# Patient Record
Sex: MALE | Race: WHITE | NOT HISPANIC OR LATINO | Employment: OTHER | URBAN - METROPOLITAN AREA
[De-identification: names, ages, dates, MRNs, and addresses within clinical notes are randomized per-mention and may not be internally consistent; named-entity substitution may affect disease eponyms.]

---

## 2017-08-11 ENCOUNTER — OFFICE VISIT (OUTPATIENT)
Dept: OPHTHALMOLOGY | Facility: CLINIC | Age: 67
End: 2017-08-11
Payer: MEDICARE

## 2017-08-11 VITALS — SYSTOLIC BLOOD PRESSURE: 186 MMHG | DIASTOLIC BLOOD PRESSURE: 76 MMHG

## 2017-08-11 DIAGNOSIS — H52.03 HYPEROPIA, BILATERAL: ICD-10-CM

## 2017-08-11 DIAGNOSIS — E11.9 DIABETES MELLITUS TYPE 2 WITHOUT RETINOPATHY: Primary | ICD-10-CM

## 2017-08-11 DIAGNOSIS — H52.4 BILATERAL PRESBYOPIA: ICD-10-CM

## 2017-08-11 PROCEDURE — 92004 COMPRE OPH EXAM NEW PT 1/>: CPT | Mod: S$GLB,,, | Performed by: OPTOMETRIST

## 2017-08-11 PROCEDURE — 99201 *HC E&M-NEW PATIENT-LVL I: CPT | Mod: PBBFAC | Performed by: OPTOMETRIST

## 2017-08-11 PROCEDURE — 92015 DETERMINE REFRACTIVE STATE: CPT | Mod: S$GLB,,, | Performed by: OPTOMETRIST

## 2017-08-11 PROCEDURE — 99999 PR PBB SHADOW E&M-NEW PATIENT-LVL I: CPT | Mod: PBBFAC,,, | Performed by: OPTOMETRIST

## 2017-08-11 RX ORDER — GLIMEPIRIDE 2 MG/1
TABLET ORAL
COMMUNITY

## 2017-08-11 RX ORDER — LOSARTAN POTASSIUM 100 MG/1
50 TABLET ORAL 2 TIMES DAILY
COMMUNITY
Start: 2015-11-05

## 2017-08-11 NOTE — LETTER
August 11, 2017        Maciel Enriquez MD  [address] [facility] [address]             [facility]  [id number] [place] [id number]  Phone: [phone]  Fax: [phone]   Patient: Feng Marques   MR Number: [id number]   YOB: 1950   Date of Visit: 8/11/2017     Dear Maciel Enriquez MD:    Today I saw Feng Marques for his diabetic eye exam.    His best corrected visual acuity was 20/25 in the right eye and 20/30 in the left eye.     Fundus exam revealed there was no background diabetic retinopathy. He was not dilated today due to his stated history of elevated BP when dilated. He did not take his BP medicine this morning. Checked today in our office it was 186/76.    Anterior ocular anatomy is normal.  Glaucoma screening is normal.  Dilated fundus exam revealed flat, healthy, well perfused optic nerves.    Thank you for this referral.  I will send this patient a reminder in 1 month for his diabetic dilated fundus exam.  If I can be of any further assistance, please feel free to contact me at [phone].      Sincerely,      Jc Miller, DELLA            CC  No Recipients    Enclosure

## 2017-08-11 NOTE — PROGRESS NOTES
HPI     New Patient  Diabetic/ 08/11/2017  NIDDM  Screening for glaucoma  RE  Not currently wearing any correction  Decreased near vision  Pt states last time he was dilated his blood pressure went up. He did not   take his BP medicine this morning.        Last edited by Jc Miller, OD on 8/11/2017  9:00 AM. (History)            Assessment /Plan     For exam results, see Encounter Report.    Diabetes mellitus type 2 without retinopathy    Hyperopia, bilateral    Bilateral presbyopia    Nuclear cataract, bilateral      No Background Diabetic Retinopathy    Mild cataracts OU, not surgical    Dispense Final Rx for glasses.  RTC 1 year

## 2017-09-15 ENCOUNTER — OFFICE VISIT (OUTPATIENT)
Dept: OPHTHALMOLOGY | Facility: CLINIC | Age: 67
End: 2017-09-15
Payer: MEDICARE

## 2017-09-15 DIAGNOSIS — E11.9 DIABETES MELLITUS TYPE 2 WITHOUT RETINOPATHY: Primary | ICD-10-CM

## 2017-09-15 PROCEDURE — 92014 COMPRE OPH EXAM EST PT 1/>: CPT | Mod: S$GLB,,, | Performed by: OPTOMETRIST

## 2017-09-15 PROCEDURE — 99999 PR PBB SHADOW E&M-EST. PATIENT-LVL I: CPT | Mod: PBBFAC,,, | Performed by: OPTOMETRIST

## 2017-09-15 RX ORDER — CHOLECALCIFEROL (VITAMIN D3) 25 MCG
1000 TABLET ORAL DAILY
COMMUNITY

## 2017-09-15 RX ORDER — FERROUS FUMARATE 324(106)MG
TABLET ORAL
COMMUNITY

## 2017-09-15 NOTE — PROGRESS NOTES
HPI     1 month DFE. Decrease near and distance visual acuity. NIDDM exam.    Last edited by Hayley Aponte on 9/15/2017  8:33 AM. (History)            Assessment /Plan     For exam results, see Encounter Report.    Diabetes mellitus type 2 without retinopathy      No Background Diabetic Retinopathy    RTC 1 year full exam and DFE

## 2018-09-21 ENCOUNTER — OFFICE VISIT (OUTPATIENT)
Dept: OPHTHALMOLOGY | Facility: CLINIC | Age: 68
End: 2018-09-21
Payer: MEDICARE

## 2018-09-21 DIAGNOSIS — E11.9 DIABETES MELLITUS TYPE 2 WITHOUT RETINOPATHY: Primary | ICD-10-CM

## 2018-09-21 DIAGNOSIS — H25.13 CATARACT, NUCLEAR SCLEROTIC SENILE, BILATERAL: ICD-10-CM

## 2018-09-21 DIAGNOSIS — H52.4 BILATERAL PRESBYOPIA: ICD-10-CM

## 2018-09-21 DIAGNOSIS — H52.03 HYPEROPIA, BILATERAL: ICD-10-CM

## 2018-09-21 PROCEDURE — 92014 COMPRE OPH EXAM EST PT 1/>: CPT | Mod: S$PBB,,, | Performed by: OPTOMETRIST

## 2018-09-21 PROCEDURE — 92015 DETERMINE REFRACTIVE STATE: CPT | Mod: ,,, | Performed by: OPTOMETRIST

## 2018-09-21 PROCEDURE — 99999 PR PBB SHADOW E&M-EST. PATIENT-LVL II: CPT | Mod: PBBFAC,,, | Performed by: OPTOMETRIST

## 2018-09-21 PROCEDURE — 99212 OFFICE O/P EST SF 10 MIN: CPT | Mod: PBBFAC | Performed by: OPTOMETRIST

## 2018-09-21 RX ORDER — PRAVASTATIN SODIUM 10 MG/1
TABLET ORAL
COMMUNITY
Start: 2018-08-10

## 2018-09-21 NOTE — PROGRESS NOTES
HPI     NIDDM exam.  Blurred vision with increase blood sugar.  Last eye exam 09/15/2017 TRF.  Update glasses RX.    Last edited by Hayley Aponte on 9/21/2018  8:45 AM. (History)            Assessment /Plan     For exam results, see Encounter Report.    Diabetes mellitus type 2 without retinopathy    Cataract, nuclear sclerotic senile, bilateral    Hyperopia, bilateral    Bilateral presbyopia      No Background Diabetic Retinopathy  One small heme right eye.    Moderate cataracts OU, not surgical    Dispense Final Rx for glasses.  RTC 1 year  Discussed above and answered questions.

## 2018-09-21 NOTE — PATIENT INSTRUCTIONS
Diabetes mellitus type 2 without retinopathy     Cataract, nuclear sclerotic senile, bilateral     Hyperopia, bilateral     Bilateral presbyopia        No Background Diabetic Retinopathy  One small heme right eye.     Moderate cataracts OU, not surgical     Dispense Final Rx for glasses.  RTC 1 year  Discussed above and answered questions.

## 2019-06-13 ENCOUNTER — OFFICE VISIT (OUTPATIENT)
Dept: CARDIOLOGY | Facility: CLINIC | Age: 69
End: 2019-06-13
Payer: MEDICARE

## 2019-06-13 ENCOUNTER — CLINICAL SUPPORT (OUTPATIENT)
Dept: CARDIOLOGY | Facility: CLINIC | Age: 69
End: 2019-06-13
Payer: MEDICARE

## 2019-06-13 VITALS
BODY MASS INDEX: 21.55 KG/M2 | DIASTOLIC BLOOD PRESSURE: 74 MMHG | SYSTOLIC BLOOD PRESSURE: 150 MMHG | WEIGHT: 150.56 LBS | HEIGHT: 70 IN

## 2019-06-13 DIAGNOSIS — I10 ESSENTIAL HYPERTENSION: ICD-10-CM

## 2019-06-13 DIAGNOSIS — R07.89 OTHER CHEST PAIN: Primary | ICD-10-CM

## 2019-06-13 DIAGNOSIS — E11.8 TYPE 2 DIABETES MELLITUS WITH COMPLICATION, WITHOUT LONG-TERM CURRENT USE OF INSULIN: ICD-10-CM

## 2019-06-13 DIAGNOSIS — R06.09 DYSPNEA ON EXERTION: ICD-10-CM

## 2019-06-13 DIAGNOSIS — R07.89 OTHER CHEST PAIN: ICD-10-CM

## 2019-06-13 DIAGNOSIS — I25.118 CORONARY ARTERY DISEASE OF NATIVE ARTERY OF NATIVE HEART WITH STABLE ANGINA PECTORIS: Primary | ICD-10-CM

## 2019-06-13 DIAGNOSIS — E78.49 OTHER HYPERLIPIDEMIA: ICD-10-CM

## 2019-06-13 PROCEDURE — 3078F DIAST BP <80 MM HG: CPT | Mod: CPTII,S$GLB,, | Performed by: INTERNAL MEDICINE

## 2019-06-13 PROCEDURE — 99999 PR PBB SHADOW E&M-EST. PATIENT-LVL III: CPT | Mod: PBBFAC,,, | Performed by: INTERNAL MEDICINE

## 2019-06-13 PROCEDURE — 3078F PR MOST RECENT DIASTOLIC BLOOD PRESSURE < 80 MM HG: ICD-10-PCS | Mod: CPTII,S$GLB,, | Performed by: INTERNAL MEDICINE

## 2019-06-13 PROCEDURE — 3077F PR MOST RECENT SYSTOLIC BLOOD PRESSURE >= 140 MM HG: ICD-10-PCS | Mod: CPTII,S$GLB,, | Performed by: INTERNAL MEDICINE

## 2019-06-13 PROCEDURE — 3077F SYST BP >= 140 MM HG: CPT | Mod: CPTII,S$GLB,, | Performed by: INTERNAL MEDICINE

## 2019-06-13 PROCEDURE — 99999 PR PBB SHADOW E&M-EST. PATIENT-LVL III: ICD-10-PCS | Mod: PBBFAC,,, | Performed by: INTERNAL MEDICINE

## 2019-06-13 PROCEDURE — 93005 EKG 12-LEAD: ICD-10-PCS | Mod: S$GLB,,, | Performed by: INTERNAL MEDICINE

## 2019-06-13 PROCEDURE — 93010 ELECTROCARDIOGRAM REPORT: CPT | Mod: S$GLB,,, | Performed by: INTERNAL MEDICINE

## 2019-06-13 PROCEDURE — 93005 ELECTROCARDIOGRAM TRACING: CPT | Mod: S$GLB,,, | Performed by: INTERNAL MEDICINE

## 2019-06-13 PROCEDURE — 93010 EKG 12-LEAD: ICD-10-PCS | Mod: S$GLB,,, | Performed by: INTERNAL MEDICINE

## 2019-06-13 PROCEDURE — 99205 PR OFFICE/OUTPT VISIT, NEW, LEVL V, 60-74 MIN: ICD-10-PCS | Mod: S$GLB,,, | Performed by: INTERNAL MEDICINE

## 2019-06-13 PROCEDURE — 99205 OFFICE O/P NEW HI 60 MIN: CPT | Mod: S$GLB,,, | Performed by: INTERNAL MEDICINE

## 2019-06-13 NOTE — PROGRESS NOTES
Subjective:   Patient ID:  Feng Marques is a 68 y.o. male who presents for cardiac consult of Shortness of Breath; Chest Pain; and Dizziness      HPI  The patient came in today for cardiac consult of Shortness of Breath; Chest Pain; and Dizziness    6/13/19  Feng Marques is a 68 y.o. male pt with CAD s/p stent 2012, HTN, HLD, DM2 presents for initial CV evaluation of CP, SOB and dizziness.    He thought he had an MI earlier this week - he had central and left sided chest pain felt like a heavy weight. He also felt SOB with that. He has very labile blood sugars. He had NTG in the past but did not use. He is not taking aspirin, he bleeds easily.     Patient feels no leg swelling, no PND, no palpitation, no dizziness, no syncope, no CNS symptoms.    Patient has fairly good exercise tolerance.    Patient is compliant with medications.    ECG - NSR, 1st deg AVB    Past Medical History:   Diagnosis Date    Diabetes mellitus 2002     am 09/15/2017    DM (diabetes mellitus) 2002     am 09/21/2018    Hypertension        History reviewed. No pertinent surgical history.    Social History     Tobacco Use    Smoking status: Never Smoker    Smokeless tobacco: Current User     Types: Chew   Substance Use Topics    Alcohol use: No    Drug use: Not on file       Family History   Problem Relation Age of Onset    Diabetes Mother     Cataracts Mother     Diabetes Sister     Diabetes Sister     Diabetes Sister         borderline       Patient's Medications   New Prescriptions    No medications on file   Previous Medications    BLOOD SUGAR DIAGNOSTIC STRP    Use as directed up to 3 times daily    FERROUS FUMARATE 324 MG (106 MG IRON) TAB    Take by mouth.    GLIMEPIRIDE (AMARYL) 2 MG TABLET        LOSARTAN (COZAAR) 100 MG TABLET    Take 100 mg by mouth.    PRAVASTATIN (PRAVACHOL) 10 MG TABLET        VITAMIN D 1000 UNITS TAB    Take 1,000 Units by mouth once daily.   Modified Medications    No  "medications on file   Discontinued Medications    No medications on file       Review of Systems   Constitutional: Positive for malaise/fatigue.   HENT: Negative.    Eyes: Negative.    Respiratory: Positive for shortness of breath.    Cardiovascular: Positive for chest pain.   Gastrointestinal: Negative.    Genitourinary: Negative.    Musculoskeletal: Negative.    Skin: Negative.    Neurological: Negative.    Endo/Heme/Allergies: Negative.    Psychiatric/Behavioral: Negative.    All 12 systems otherwise negative.      Wt Readings from Last 3 Encounters:   06/13/19 68.3 kg (150 lb 9.2 oz)     Temp Readings from Last 3 Encounters:   No data found for Temp     BP Readings from Last 3 Encounters:   06/13/19 (!) 150/74   08/11/17 (!) 186/76     Pulse Readings from Last 3 Encounters:   No data found for Pulse       BP (!) 150/74 (BP Location: Left arm, Patient Position: Sitting, BP Method: Medium (Manual))   Ht 5' 10" (1.778 m)   Wt 68.3 kg (150 lb 9.2 oz)   BMI 21.61 kg/m²     Objective:   Physical Exam   Constitutional: He is oriented to person, place, and time. He appears well-developed and well-nourished. No distress.   HENT:   Head: Normocephalic and atraumatic.   Nose: Nose normal.   Mouth/Throat: Oropharynx is clear and moist.   Eyes: Conjunctivae and EOM are normal. No scleral icterus.   Neck: Normal range of motion. Neck supple. No JVD present. No thyromegaly present.   Cardiovascular: Normal rate, regular rhythm, S1 normal and S2 normal. Exam reveals no gallop, no S3, no S4 and no friction rub.   No murmur heard.  Pulmonary/Chest: Effort normal and breath sounds normal. No stridor. No respiratory distress. He has no wheezes. He has no rales. He exhibits no tenderness.   Abdominal: Soft. Bowel sounds are normal. He exhibits no distension and no mass. There is no tenderness. There is no rebound.   Genitourinary:   Genitourinary Comments: Deferred   Musculoskeletal: Normal range of motion. He exhibits no edema, " tenderness or deformity.   Lymphadenopathy:     He has no cervical adenopathy.   Neurological: He is alert and oriented to person, place, and time. He exhibits normal muscle tone. Coordination normal.   Skin: Skin is warm and dry. No rash noted. He is not diaphoretic. No erythema. No pallor.   Psychiatric: He has a normal mood and affect. His behavior is normal. Judgment and thought content normal.   Nursing note and vitals reviewed.      No results found for: NA, K, CL, CO2, BUN, CREATININE, GLU, HGBA1C, MG, AST, ALT, ALBUMIN, PROT, BILITOT, WBC, HGB, HCT, MCV, PLT, INR, TSH, CHOL, HDL, LDLCALC, TRIG, BNP  Assessment:      1. Coronary artery disease of native artery of native heart with stable angina pectoris    2. Essential hypertension    3. Type 2 diabetes mellitus with complication, without long-term current use of insulin    4. Other hyperlipidemia    5. Other chest pain    6. Dyspnea on exertion        Plan:   1. CAD s/p stent in 2012 with CP/SOB - atypical  - pharm nuclear stress test ordered, pt cannot walk on treadmill due to weakness  - 2D ECHO  - if severe CP needs to go to ER  - rec asa, statin, BB    2. HTN  - cont meds    3. HLD  - cont statin    4. DM2  -cont meds per PCP      Thank you for allowing me to participate in this patient's care. Please do not hesitate to contact me with any questions or concerns. Consult note has been forwarded to the referral physician.

## 2019-09-27 ENCOUNTER — OFFICE VISIT (OUTPATIENT)
Dept: OPHTHALMOLOGY | Facility: CLINIC | Age: 69
End: 2019-09-27
Payer: MEDICARE

## 2019-09-27 DIAGNOSIS — E11.3299 BDR (BACKGROUND DIABETIC RETINOPATHY): Primary | ICD-10-CM

## 2019-09-27 DIAGNOSIS — H52.03 HYPEROPIA, BILATERAL: ICD-10-CM

## 2019-09-27 DIAGNOSIS — H25.13 CATARACT, NUCLEAR SCLEROTIC SENILE, BILATERAL: ICD-10-CM

## 2019-09-27 DIAGNOSIS — H52.4 BILATERAL PRESBYOPIA: ICD-10-CM

## 2019-09-27 PROCEDURE — 99999 PR PBB SHADOW E&M-EST. PATIENT-LVL II: ICD-10-PCS | Mod: PBBFAC,,, | Performed by: OPTOMETRIST

## 2019-09-27 PROCEDURE — 92014 COMPRE OPH EXAM EST PT 1/>: CPT | Mod: S$GLB,,, | Performed by: OPTOMETRIST

## 2019-09-27 PROCEDURE — 99999 PR PBB SHADOW E&M-EST. PATIENT-LVL II: CPT | Mod: PBBFAC,,, | Performed by: OPTOMETRIST

## 2019-09-27 PROCEDURE — 92014 PR EYE EXAM, EST PATIENT,COMPREHESV: ICD-10-PCS | Mod: S$GLB,,, | Performed by: OPTOMETRIST

## 2019-09-27 PROCEDURE — 92015 DETERMINE REFRACTIVE STATE: CPT | Mod: S$GLB,,, | Performed by: OPTOMETRIST

## 2019-09-27 PROCEDURE — 92015 PR REFRACTION: ICD-10-PCS | Mod: S$GLB,,, | Performed by: OPTOMETRIST

## 2019-09-27 NOTE — PROGRESS NOTES
HPI     NIDDM exam.  Patient lost glasses x 3 months  Blurred vision with increase and decrease  blood sugar.  Last eye exam 09/21/2018TRF.  Update glasses RX.      Last edited by Hayley Aponte on 9/27/2019  8:57 AM. (History)            Assessment /Plan     For exam results, see Encounter Report.    BDR (background diabetic retinopathy)    Cataract, nuclear sclerotic senile, bilateral    Hyperopia, bilateral    Bilateral presbyopia      A few hemes present OU  No macular edema present on OCT.  mOCT Right eye    mOCT Left eye        Minimal cataracts OU, not surgical    Dispense Final Rx for glasses.  RTC 6 months DFE, recheck BDR  Discussed above and answered questions.

## 2020-03-27 ENCOUNTER — TELEPHONE (OUTPATIENT)
Dept: OPHTHALMOLOGY | Facility: CLINIC | Age: 70
End: 2020-03-27

## 2020-03-27 NOTE — TELEPHONE ENCOUNTER
----- Message from Leigh Lazaro sent at 3/27/2020  4:16 PM CDT -----  Contact: self  needs call back regarding rescheduling..[phone] ([phone])

## 2020-09-10 ENCOUNTER — OFFICE VISIT (OUTPATIENT)
Dept: OPHTHALMOLOGY | Facility: CLINIC | Age: 70
End: 2020-09-10
Payer: MEDICARE

## 2020-09-10 DIAGNOSIS — E11.9 DIABETES MELLITUS TYPE 2 WITHOUT RETINOPATHY: Primary | ICD-10-CM

## 2020-09-10 DIAGNOSIS — E11.36 DIABETIC CATARACT: ICD-10-CM

## 2020-09-10 DIAGNOSIS — H52.03 HYPEROPIA, BILATERAL: ICD-10-CM

## 2020-09-10 DIAGNOSIS — H52.4 BILATERAL PRESBYOPIA: ICD-10-CM

## 2020-09-10 PROCEDURE — 92015 DETERMINE REFRACTIVE STATE: CPT | Mod: S$GLB,,, | Performed by: OPTOMETRIST

## 2020-09-10 PROCEDURE — 92015 PR REFRACTION: ICD-10-PCS | Mod: S$GLB,,, | Performed by: OPTOMETRIST

## 2020-09-10 PROCEDURE — 99999 PR PBB SHADOW E&M-EST. PATIENT-LVL II: CPT | Mod: PBBFAC,,, | Performed by: OPTOMETRIST

## 2020-09-10 PROCEDURE — 92014 COMPRE OPH EXAM EST PT 1/>: CPT | Mod: S$GLB,,, | Performed by: OPTOMETRIST

## 2020-09-10 PROCEDURE — 92014 PR EYE EXAM, EST PATIENT,COMPREHESV: ICD-10-PCS | Mod: S$GLB,,, | Performed by: OPTOMETRIST

## 2020-09-10 PROCEDURE — 99999 PR PBB SHADOW E&M-EST. PATIENT-LVL II: ICD-10-PCS | Mod: PBBFAC,,, | Performed by: OPTOMETRIST

## 2020-09-10 NOTE — PROGRESS NOTES
HPI     NIDDM exam.  Left eye watery this morning.  Last eye exam 09/27/2019 TRF.  Patient left glasses today.  Update glasses RX.    Last edited by Hayley Aponte on 9/10/2020 10:24 AM. (History)            Assessment /Plan     For exam results, see Encounter Report.    Diabetes mellitus type 2 without retinopathy    Diabetic cataract    Hyperopia, bilateral    Bilateral presbyopia      No Background Diabetic Retinopathy    Significant cataracts OU, pt defers the cataract evaluation consult for 6 months due to dental problems.    Dispense Final Rx for glasses.  RTC 6 months cat eval  Discussed above and answered questions.

## 2022-09-14 ENCOUNTER — OFFICE VISIT (OUTPATIENT)
Dept: CARDIOLOGY | Facility: CLINIC | Age: 72
End: 2022-09-14
Payer: MEDICARE

## 2022-09-14 ENCOUNTER — HOSPITAL ENCOUNTER (OUTPATIENT)
Dept: CARDIOLOGY | Facility: HOSPITAL | Age: 72
Discharge: HOME | End: 2022-09-14
Attending: INTERNAL MEDICINE
Payer: MEDICARE

## 2022-09-14 VITALS
HEIGHT: 70 IN | HEART RATE: 70 BPM | SYSTOLIC BLOOD PRESSURE: 160 MMHG | OXYGEN SATURATION: 99 % | DIASTOLIC BLOOD PRESSURE: 80 MMHG | BODY MASS INDEX: 20.99 KG/M2 | WEIGHT: 146.63 LBS

## 2022-09-14 DIAGNOSIS — R07.89 OTHER CHEST PAIN: ICD-10-CM

## 2022-09-14 DIAGNOSIS — I10 ESSENTIAL HYPERTENSION: Primary | ICD-10-CM

## 2022-09-14 DIAGNOSIS — I10 ESSENTIAL HYPERTENSION: ICD-10-CM

## 2022-09-14 DIAGNOSIS — E78.49 OTHER HYPERLIPIDEMIA: ICD-10-CM

## 2022-09-14 DIAGNOSIS — E11.8 TYPE 2 DIABETES MELLITUS WITH COMPLICATION, WITHOUT LONG-TERM CURRENT USE OF INSULIN: ICD-10-CM

## 2022-09-14 DIAGNOSIS — Z72.0 TOBACCO CHEW USE: ICD-10-CM

## 2022-09-14 DIAGNOSIS — I25.118 CORONARY ARTERY DISEASE OF NATIVE ARTERY OF NATIVE HEART WITH STABLE ANGINA PECTORIS: ICD-10-CM

## 2022-09-14 DIAGNOSIS — Z95.5 STENTED CORONARY ARTERY: ICD-10-CM

## 2022-09-14 PROCEDURE — 93005 ELECTROCARDIOGRAM TRACING: CPT

## 2022-09-14 PROCEDURE — 99205 PR OFFICE/OUTPT VISIT, NEW, LEVL V, 60-74 MIN: ICD-10-PCS | Mod: S$GLB,,, | Performed by: INTERNAL MEDICINE

## 2022-09-14 PROCEDURE — 99999 PR PBB SHADOW E&M-EST. PATIENT-LVL III: ICD-10-PCS | Mod: PBBFAC,,, | Performed by: INTERNAL MEDICINE

## 2022-09-14 PROCEDURE — 3008F PR BODY MASS INDEX (BMI) DOCUMENTED: ICD-10-PCS | Mod: CPTII,S$GLB,, | Performed by: INTERNAL MEDICINE

## 2022-09-14 PROCEDURE — 3077F SYST BP >= 140 MM HG: CPT | Mod: CPTII,S$GLB,, | Performed by: INTERNAL MEDICINE

## 2022-09-14 PROCEDURE — 1159F PR MEDICATION LIST DOCUMENTED IN MEDICAL RECORD: ICD-10-PCS | Mod: CPTII,S$GLB,, | Performed by: INTERNAL MEDICINE

## 2022-09-14 PROCEDURE — 93010 EKG 12-LEAD: ICD-10-PCS | Mod: ,,, | Performed by: STUDENT IN AN ORGANIZED HEALTH CARE EDUCATION/TRAINING PROGRAM

## 2022-09-14 PROCEDURE — 3079F PR MOST RECENT DIASTOLIC BLOOD PRESSURE 80-89 MM HG: ICD-10-PCS | Mod: CPTII,S$GLB,, | Performed by: INTERNAL MEDICINE

## 2022-09-14 PROCEDURE — 1126F AMNT PAIN NOTED NONE PRSNT: CPT | Mod: CPTII,S$GLB,, | Performed by: INTERNAL MEDICINE

## 2022-09-14 PROCEDURE — 93010 ELECTROCARDIOGRAM REPORT: CPT | Mod: ,,, | Performed by: STUDENT IN AN ORGANIZED HEALTH CARE EDUCATION/TRAINING PROGRAM

## 2022-09-14 PROCEDURE — 1101F PT FALLS ASSESS-DOCD LE1/YR: CPT | Mod: CPTII,S$GLB,, | Performed by: INTERNAL MEDICINE

## 2022-09-14 PROCEDURE — 4010F ACE/ARB THERAPY RXD/TAKEN: CPT | Mod: CPTII,S$GLB,, | Performed by: INTERNAL MEDICINE

## 2022-09-14 PROCEDURE — 1101F PR PT FALLS ASSESS DOC 0-1 FALLS W/OUT INJ PAST YR: ICD-10-PCS | Mod: CPTII,S$GLB,, | Performed by: INTERNAL MEDICINE

## 2022-09-14 PROCEDURE — 1159F MED LIST DOCD IN RCRD: CPT | Mod: CPTII,S$GLB,, | Performed by: INTERNAL MEDICINE

## 2022-09-14 PROCEDURE — 3077F PR MOST RECENT SYSTOLIC BLOOD PRESSURE >= 140 MM HG: ICD-10-PCS | Mod: CPTII,S$GLB,, | Performed by: INTERNAL MEDICINE

## 2022-09-14 PROCEDURE — 3288F FALL RISK ASSESSMENT DOCD: CPT | Mod: CPTII,S$GLB,, | Performed by: INTERNAL MEDICINE

## 2022-09-14 PROCEDURE — 3008F BODY MASS INDEX DOCD: CPT | Mod: CPTII,S$GLB,, | Performed by: INTERNAL MEDICINE

## 2022-09-14 PROCEDURE — 1126F PR PAIN SEVERITY QUANTIFIED, NO PAIN PRESENT: ICD-10-PCS | Mod: CPTII,S$GLB,, | Performed by: INTERNAL MEDICINE

## 2022-09-14 PROCEDURE — 99205 OFFICE O/P NEW HI 60 MIN: CPT | Mod: S$GLB,,, | Performed by: INTERNAL MEDICINE

## 2022-09-14 PROCEDURE — 99999 PR PBB SHADOW E&M-EST. PATIENT-LVL III: CPT | Mod: PBBFAC,,, | Performed by: INTERNAL MEDICINE

## 2022-09-14 PROCEDURE — 3288F PR FALLS RISK ASSESSMENT DOCUMENTED: ICD-10-PCS | Mod: CPTII,S$GLB,, | Performed by: INTERNAL MEDICINE

## 2022-09-14 PROCEDURE — 3079F DIAST BP 80-89 MM HG: CPT | Mod: CPTII,S$GLB,, | Performed by: INTERNAL MEDICINE

## 2022-09-14 PROCEDURE — 4010F PR ACE/ARB THEARPY RXD/TAKEN: ICD-10-PCS | Mod: CPTII,S$GLB,, | Performed by: INTERNAL MEDICINE

## 2022-09-14 RX ORDER — PANTOPRAZOLE SODIUM 40 MG/1
40 TABLET, DELAYED RELEASE ORAL DAILY
Qty: 30 TABLET | Refills: 11 | Status: SHIPPED | OUTPATIENT
Start: 2022-09-14 | End: 2023-09-14

## 2022-09-14 RX ORDER — NITROGLYCERIN 0.3 MG/1
0.3 TABLET SUBLINGUAL EVERY 5 MIN PRN
Qty: 100 TABLET | Refills: 0 | Status: SHIPPED | OUTPATIENT
Start: 2022-09-14 | End: 2023-09-14

## 2022-09-14 RX ORDER — AMLODIPINE BESYLATE 5 MG/1
5 TABLET ORAL DAILY
COMMUNITY

## 2022-09-14 RX ORDER — ESCITALOPRAM OXALATE 10 MG/1
10 TABLET ORAL DAILY
COMMUNITY
Start: 2022-06-14

## 2022-09-14 RX ORDER — ASPIRIN 81 MG/1
81 TABLET ORAL DAILY
COMMUNITY

## 2022-09-14 RX ORDER — OMEPRAZOLE 20 MG/1
20 CAPSULE, DELAYED RELEASE ORAL EVERY MORNING
COMMUNITY
Start: 2022-06-14 | End: 2022-09-14

## 2022-09-14 RX ORDER — ISOSORBIDE MONONITRATE 30 MG/1
30 TABLET, EXTENDED RELEASE ORAL DAILY
Qty: 30 TABLET | Refills: 11 | Status: SHIPPED | OUTPATIENT
Start: 2022-09-14 | End: 2023-04-12

## 2022-09-15 NOTE — PROGRESS NOTES
Subjective:   Patient ID:  Feng Marques is a 72 y.o. male who presents for evaluation of Carotid Artery Disease      HPI  72-year-old male, smokeless tobacco user, diabetes, history of stented coronaries in 2012, no records, but states that was in the main artery? LAD  Last seen Dr. Do in 2019 for chest pain as well.  There was a plan to get a nuclear stress test however not done for unclear reason.    He states that he has been having chest pain for the last few years but worse in the last few months.  Chest pains described as a right and left-sided chest pain sometime to his lower rib sometimes to the mid chest.  Some of it is feels sharp and sometimes feels like a pressure.  Exertional and nonexertional moderate in intensity.  He also states that sometimes he has feeling that his pain is in his stomach.    He denies heartburn.  But he does chew tobacco.    Not tender or reproducible on exam.  Past Medical History:   Diagnosis Date    Diabetes mellitus 2002     am 09/15/2017    DM (diabetes mellitus) 2002     am 09/21/2018    DM (diabetes mellitus) 2002     am 09/27/2019    DM (diabetes mellitus) 2002     am 09/10/2020    Hypertension        History reviewed. No pertinent surgical history.    Social History     Tobacco Use    Smoking status: Never    Smokeless tobacco: Current     Types: Chew   Substance Use Topics    Alcohol use: No       Family History   Problem Relation Age of Onset    Diabetes Mother     Cataracts Mother     Diabetes Sister     Diabetes Sister     Diabetes Sister         borderline       Review of Systems   Constitutional: Negative for fever and malaise/fatigue.   HENT:  Negative for sore throat.    Eyes:  Negative for blurred vision.   Cardiovascular:  Positive for chest pain. Negative for claudication, cyanosis, dyspnea on exertion, irregular heartbeat, leg swelling, near-syncope, orthopnea, palpitations, paroxysmal nocturnal dyspnea and syncope.    Respiratory:  Negative for cough and hemoptysis.    Hematologic/Lymphatic: Negative for bleeding problem.   Skin:  Negative for rash.   Musculoskeletal:  Negative for falls.   Gastrointestinal:  Negative for abdominal pain.   Genitourinary: Negative.    Neurological: Negative.    Psychiatric/Behavioral:  Negative for altered mental status and substance abuse.      Current Outpatient Medications on File Prior to Visit   Medication Sig    amLODIPine (NORVASC) 5 MG tablet Take 5 mg by mouth once daily.    ferrous fumarate 324 mg (106 mg iron) Tab Take by mouth.    glimepiride (AMARYL) 2 MG tablet     losartan (COZAAR) 100 MG tablet Take 50 mg by mouth 2 (two) times a day.    pravastatin (PRAVACHOL) 10 MG tablet     vitamin D 1000 units Tab Take 1,000 Units by mouth once daily.    aspirin (ECOTRIN) 81 MG EC tablet Take 81 mg by mouth once daily.    blood sugar diagnostic Strp Use as directed up to 3 times daily    EScitalopram oxalate (LEXAPRO) 10 MG tablet Take 10 mg by mouth once daily.    [DISCONTINUED] omeprazole (PRILOSEC) 20 MG capsule Take 20 mg by mouth every morning.     No current facility-administered medications on file prior to visit.       Objective:   Objective:  Wt Readings from Last 3 Encounters:   09/14/22 66.5 kg (146 lb 9.7 oz)   06/13/19 68.3 kg (150 lb 9.2 oz)     Temp Readings from Last 3 Encounters:   No data found for Temp     BP Readings from Last 3 Encounters:   09/14/22 (!) 160/80   06/13/19 (!) 150/74   08/11/17 (!) 186/76     Pulse Readings from Last 3 Encounters:   09/14/22 70       Physical Exam  Vitals reviewed.   Constitutional:       Appearance: He is well-developed.   HENT:      Head: Normocephalic and atraumatic.   Eyes:      General: No scleral icterus.     Conjunctiva/sclera: Conjunctivae normal.   Cardiovascular:      Rate and Rhythm: Normal rate and regular rhythm.      Pulses: Intact distal pulses.      Heart sounds: Normal heart sounds. No murmur heard.  Pulmonary:       Effort: No respiratory distress.      Breath sounds: No wheezing or rales.   Chest:      Chest wall: No tenderness.   Abdominal:      General: Bowel sounds are normal. There is no distension.      Palpations: Abdomen is soft.      Tenderness: There is no guarding.   Musculoskeletal:         General: Normal range of motion.      Cervical back: Normal range of motion and neck supple.   Skin:     General: Skin is warm.   Neurological:      Mental Status: He is alert and oriented to person, place, and time.       No results found for: CHOL  No results found for: HDL  No results found for: LDLCALC  No results found for: TRIG  No results found for: CHOLHDL    Chemistry    No results found for: NA, K, CL, CO2, BUN, CREATININE, GLU No results found for: CALCIUM, ALKPHOS, AST, ALT, BILITOT, ESTGFRAFRICA, EGFRNONAA       No results found for: TSH  No results found for: INR, PROTIME  No results found for: WBC, HGB, HCT, MCV, PLT  BNP  @LABRCNTIP(BNP,BNPTRIAGEBLO)@  CrCl cannot be calculated (No successful lab value found.).     Imaging:  ======  No results found for this or any previous visit.    No results found for this or any previous visit.    No results found for this or any previous visit.    No results found for this or any previous visit.    No results found for this or any previous visit.    No valid procedures specified.    Diagnostic Results:  ECG: Reviewed  Vent. Rate : 068 BPM     Atrial Rate : 068 BPM      P-R Int : 266 ms          QRS Dur : 078 ms       QT Int : 396 ms       P-R-T Axes : 077 -05 018 degrees      QTc Int : 421 ms     Sinus rhythm with 1st degree A-V block   Otherwise normal ECG   When compared with ECG of 13-JUN-2019 15:40,   No significant change was found       The ASCVD Risk score (Ina DK, et al., 2019) failed to calculate for the following reasons:    Cannot find a previous HDL lab    Cannot find a previous total cholesterol lab    Assessment and Plan:   Essential hypertension    Other  chest pain  -     Echo; Future  -     Nuclear Stress - Cardiology Interpreted; Future  -     nitroGLYCERIN (NITROSTAT) 0.3 MG SL tablet; Place 1 tablet (0.3 mg total) under the tongue every 5 (five) minutes as needed for Chest pain.  Dispense: 100 tablet; Refill: 0  -     pantoprazole (PROTONIX) 40 MG tablet; Take 1 tablet (40 mg total) by mouth once daily.  Dispense: 30 tablet; Refill: 11  -     isosorbide mononitrate (IMDUR) 30 MG 24 hr tablet; Take 1 tablet (30 mg total) by mouth once daily.  Dispense: 30 tablet; Refill: 11    Other hyperlipidemia    Coronary artery disease of native artery of native heart with stable angina pectoris    Type 2 diabetes mellitus with complication, without long-term current use of insulin    Stented coronary artery    Tobacco chew use    Continue with amlodipine losartan and aspirin  Continue pravastatin.    Repeat lipid panel next visit.    Typical and atypical chest pain.  Nitroglycerin try it.    Start Imdur and Protonix as well and DC Prilosec.    Discussed possibility of left heart catheterization if nuclear stress test is abnormal.    Not on beta-blocker or ccb due to first-degree AV block.  Counseled against tobacco chewing.    Reviewed all tests and above medical conditions with patient in detail and formulated treatment plan.  Risk factor modification discussed.   Cardiac low salt diet discussed.  Hypertension not controlled.  Start today on amlodipine by PCP on top of his losartan.  Agree with changes.      Follow up in 6 months

## 2022-10-14 ENCOUNTER — TELEPHONE (OUTPATIENT)
Dept: CARDIOLOGY | Facility: CLINIC | Age: 72
End: 2022-10-14
Payer: MEDICARE

## 2022-10-14 NOTE — TELEPHONE ENCOUNTER
No answer - appt 10/14 cancelled      ----- Message from Audrey Aponte sent at 10/14/2022 11:49 AM CDT -----  Contact: pt  The pt request a call to reschedule his 10/10 appt, no additional info given and can be reached at [phone]///[id number]

## 2022-10-24 ENCOUNTER — OFFICE VISIT (OUTPATIENT)
Dept: OPHTHALMOLOGY | Facility: CLINIC | Age: 72
End: 2022-10-24
Payer: MEDICARE

## 2022-10-24 DIAGNOSIS — E11.36 DIABETIC CATARACT: ICD-10-CM

## 2022-10-24 DIAGNOSIS — E11.9 DIABETES MELLITUS TYPE 2 WITHOUT RETINOPATHY: Primary | ICD-10-CM

## 2022-10-24 DIAGNOSIS — H52.7 REFRACTIVE ERRORS: ICD-10-CM

## 2022-10-24 PROCEDURE — 99999 PR PBB SHADOW E&M-EST. PATIENT-LVL III: CPT | Mod: PBBFAC,,, | Performed by: OPTOMETRIST

## 2022-10-24 PROCEDURE — 1159F PR MEDICATION LIST DOCUMENTED IN MEDICAL RECORD: ICD-10-PCS | Mod: CPTII,S$GLB,, | Performed by: OPTOMETRIST

## 2022-10-24 PROCEDURE — 4010F ACE/ARB THERAPY RXD/TAKEN: CPT | Mod: CPTII,S$GLB,, | Performed by: OPTOMETRIST

## 2022-10-24 PROCEDURE — 92014 COMPRE OPH EXAM EST PT 1/>: CPT | Mod: S$GLB,,, | Performed by: OPTOMETRIST

## 2022-10-24 PROCEDURE — 99999 PR PBB SHADOW E&M-EST. PATIENT-LVL III: ICD-10-PCS | Mod: PBBFAC,,, | Performed by: OPTOMETRIST

## 2022-10-24 PROCEDURE — 1159F MED LIST DOCD IN RCRD: CPT | Mod: CPTII,S$GLB,, | Performed by: OPTOMETRIST

## 2022-10-24 PROCEDURE — 92015 PR REFRACTION: ICD-10-PCS | Mod: S$GLB,,, | Performed by: OPTOMETRIST

## 2022-10-24 PROCEDURE — 4010F PR ACE/ARB THEARPY RXD/TAKEN: ICD-10-PCS | Mod: CPTII,S$GLB,, | Performed by: OPTOMETRIST

## 2022-10-24 PROCEDURE — 92015 DETERMINE REFRACTIVE STATE: CPT | Mod: S$GLB,,, | Performed by: OPTOMETRIST

## 2022-10-24 PROCEDURE — 92014 PR EYE EXAM, EST PATIENT,COMPREHESV: ICD-10-PCS | Mod: S$GLB,,, | Performed by: OPTOMETRIST

## 2022-10-24 NOTE — PROGRESS NOTES
HPI     Diabetic Eye Exam     Additional comments: No results found for: LABA1C, HGBA1C             Comments    Pt. States vision is worse when blood sugar goes up.          Last edited by Ana Gonzalez MA on 10/24/2022  2:15 PM.            Assessment /Plan     For exam results, see Encounter Report.    Diabetes mellitus type 2 without retinopathy    Diabetic cataract    Refractive errors      No Background Diabetic Retinopathy    Significant cataracts OU, pt defers the cataract evaluation consult.  Worsening cataracts, OD>OS    Dispense Final Rx for glasses.  RTC 1 year  Discussed above and answered questions.

## 2022-11-09 ENCOUNTER — HOSPITAL ENCOUNTER (OUTPATIENT)
Dept: CARDIOLOGY | Facility: HOSPITAL | Age: 72
Discharge: HOME | End: 2022-11-09
Attending: INTERNAL MEDICINE
Payer: MEDICARE

## 2022-11-09 ENCOUNTER — HOSPITAL ENCOUNTER (OUTPATIENT)
Dept: RADIOLOGY | Facility: HOSPITAL | Age: 72
Discharge: HOME | End: 2022-11-09
Attending: INTERNAL MEDICINE
Payer: MEDICARE

## 2022-11-09 VITALS — HEIGHT: 70 IN | WEIGHT: 146 LBS | BODY MASS INDEX: 20.9 KG/M2

## 2022-11-09 DIAGNOSIS — R07.89 OTHER CHEST PAIN: ICD-10-CM

## 2022-11-09 LAB
AV INDEX (PROSTH): 0.73
AV MEAN GRADIENT: 2 MMHG
AV PEAK GRADIENT: 3 MMHG
AV VALVE AREA: 2.58 CM2
AV VELOCITY RATIO: 0.81
BSA FOR ECHO PROCEDURE: 1.81 M2
CV ECHO LV RWT: 0.48 CM
DOP CALC AO PEAK VEL: 0.91 M/S
DOP CALC AO VTI: 23.8 CM
DOP CALC LVOT AREA: 3.5 CM2
DOP CALC LVOT DIAMETER: 2.12 CM
DOP CALC LVOT PEAK VEL: 0.74 M/S
DOP CALC LVOT STROKE VOLUME: 61.39 CM3
DOP CALCLVOT PEAK VEL VTI: 17.4 CM
E WAVE DECELERATION TIME: 228.97 MSEC
E/A RATIO: 0.88
E/E' RATIO: 7.09 M/S
ECHO LV POSTERIOR WALL: 0.97 CM (ref 0.6–1.1)
EJECTION FRACTION: 60 %
FRACTIONAL SHORTENING: 39 % (ref 28–44)
INTERVENTRICULAR SEPTUM: 1.15 CM (ref 0.6–1.1)
IVRT: 97.05 MSEC
LA MAJOR: 3.98 CM
LA MINOR: 4.75 CM
LA WIDTH: 3.5 CM
LEFT ATRIUM SIZE: 4.35 CM
LEFT ATRIUM VOLUME INDEX MOD: 20.5 ML/M2
LEFT ATRIUM VOLUME INDEX: 30.6 ML/M2
LEFT ATRIUM VOLUME MOD: 37.49 CM3
LEFT ATRIUM VOLUME: 56.05 CM3
LEFT INTERNAL DIMENSION IN SYSTOLE: 2.47 CM (ref 2.1–4)
LEFT VENTRICLE DIASTOLIC VOLUME INDEX: 38.61 ML/M2
LEFT VENTRICLE DIASTOLIC VOLUME: 70.66 ML
LEFT VENTRICLE MASS INDEX: 76 G/M2
LEFT VENTRICLE SYSTOLIC VOLUME INDEX: 11.8 ML/M2
LEFT VENTRICLE SYSTOLIC VOLUME: 21.66 ML
LEFT VENTRICULAR INTERNAL DIMENSION IN DIASTOLE: 4.02 CM (ref 3.5–6)
LEFT VENTRICULAR MASS: 139.14 G
LV LATERAL E/E' RATIO: 6.5 M/S
LV SEPTAL E/E' RATIO: 7.8 M/S
LVOT MG: 1.36 MMHG
LVOT MV: 0.57 CM/S
MV PEAK A VEL: 0.89 M/S
MV PEAK E VEL: 0.78 M/S
RA MAJOR: 3.95 CM
RA PRESSURE: 8 MMHG
RA WIDTH: 2.96 CM
RIGHT VENTRICULAR END-DIASTOLIC DIMENSION: 4.13 CM
SINUS: 3.12 CM
STJ: 3.19 CM
TDI LATERAL: 0.12 M/S
TDI SEPTAL: 0.1 M/S
TDI: 0.11 M/S
TRICUSPID ANNULAR PLANE SYSTOLIC EXCURSION: 2.43 CM

## 2022-11-09 PROCEDURE — 93306 TTE W/DOPPLER COMPLETE: CPT

## 2022-11-09 PROCEDURE — 93306 TTE W/DOPPLER COMPLETE: CPT | Mod: 26,,, | Performed by: INTERNAL MEDICINE

## 2022-11-09 PROCEDURE — 78452 STRESS TEST WITH MYOCARDIAL PERFUSION (CUPID ONLY): ICD-10-PCS | Mod: 26,,, | Performed by: INTERNAL MEDICINE

## 2022-11-09 PROCEDURE — A9502 TC99M TETROFOSMIN: HCPCS

## 2022-11-09 PROCEDURE — 93016 CV STRESS TEST SUPVJ ONLY: CPT | Mod: ,,, | Performed by: INTERNAL MEDICINE

## 2022-11-09 PROCEDURE — 93016 STRESS TEST WITH MYOCARDIAL PERFUSION (CUPID ONLY): ICD-10-PCS | Mod: ,,, | Performed by: INTERNAL MEDICINE

## 2022-11-09 PROCEDURE — 93306 ECHO (CUPID ONLY): ICD-10-PCS | Mod: 26,,, | Performed by: INTERNAL MEDICINE

## 2022-11-09 PROCEDURE — 63600175 PHARM REV CODE 636 W HCPCS: Performed by: INTERNAL MEDICINE

## 2022-11-09 PROCEDURE — 78452 HT MUSCLE IMAGE SPECT MULT: CPT | Mod: 26,,, | Performed by: INTERNAL MEDICINE

## 2022-11-09 PROCEDURE — 93018 CV STRESS TEST I&R ONLY: CPT | Mod: ,,, | Performed by: INTERNAL MEDICINE

## 2022-11-09 PROCEDURE — 93018 STRESS TEST WITH MYOCARDIAL PERFUSION (CUPID ONLY): ICD-10-PCS | Mod: ,,, | Performed by: INTERNAL MEDICINE

## 2022-11-09 PROCEDURE — 93017 CV STRESS TEST TRACING ONLY: CPT

## 2022-11-09 RX ORDER — REGADENOSON 0.08 MG/ML
0.4 INJECTION, SOLUTION INTRAVENOUS ONCE
Status: COMPLETED | OUTPATIENT
Start: 2022-11-09 | End: 2022-11-09

## 2022-11-09 RX ADMIN — REGADENOSON 0.4 MG: 0.08 INJECTION, SOLUTION INTRAVENOUS at 09:11

## 2022-11-10 ENCOUNTER — TELEPHONE (OUTPATIENT)
Dept: CARDIOLOGY | Facility: CLINIC | Age: 72
End: 2022-11-10
Payer: MEDICARE

## 2022-11-10 LAB
CV STRESS BASE HR: 65 BPM
DIASTOLIC BLOOD PRESSURE: 66 MMHG
NUC REST EJECTION FRACTION: 78
NUC STRESS EJECTION FRACTION: 72 %
OHS CV CPX 85 PERCENT MAX PREDICTED HEART RATE MALE: 126
OHS CV CPX ESTIMATED METS: 1
OHS CV CPX MAX PREDICTED HEART RATE: 148
OHS CV CPX PATIENT IS FEMALE: 0
OHS CV CPX PATIENT IS MALE: 1
OHS CV CPX PEAK DIASTOLIC BLOOD PRESSURE: 66 MMHG
OHS CV CPX PEAK HEAR RATE: 106 BPM
OHS CV CPX PEAK RATE PRESSURE PRODUCT: NORMAL
OHS CV CPX PEAK SYSTOLIC BLOOD PRESSURE: 136 MMHG
OHS CV CPX PERCENT MAX PREDICTED HEART RATE ACHIEVED: 72
OHS CV CPX RATE PRESSURE PRODUCT PRESENTING: 9360
STRESS ECHO POST EXERCISE DUR SEC: 51 SECONDS
SYSTOLIC BLOOD PRESSURE: 144 MMHG

## 2022-11-10 NOTE — TELEPHONE ENCOUNTER
Pt was contacted about results:     Normal stress test and echo   To keep next follow up   Thanks       Pt verbalized understanding with no questions or concerns.

## 2023-04-12 ENCOUNTER — OFFICE VISIT (OUTPATIENT)
Dept: CARDIOLOGY | Facility: CLINIC | Age: 73
End: 2023-04-12
Payer: MEDICARE

## 2023-04-12 VITALS
DIASTOLIC BLOOD PRESSURE: 70 MMHG | OXYGEN SATURATION: 98 % | WEIGHT: 145.06 LBS | BODY MASS INDEX: 20.77 KG/M2 | SYSTOLIC BLOOD PRESSURE: 140 MMHG | HEIGHT: 70 IN | HEART RATE: 73 BPM

## 2023-04-12 DIAGNOSIS — I25.118 CORONARY ARTERY DISEASE OF NATIVE ARTERY OF NATIVE HEART WITH STABLE ANGINA PECTORIS: ICD-10-CM

## 2023-04-12 DIAGNOSIS — I10 ESSENTIAL HYPERTENSION: Primary | ICD-10-CM

## 2023-04-12 DIAGNOSIS — E78.49 OTHER HYPERLIPIDEMIA: ICD-10-CM

## 2023-04-12 DIAGNOSIS — E11.8 TYPE 2 DIABETES MELLITUS WITH COMPLICATION, WITHOUT LONG-TERM CURRENT USE OF INSULIN: ICD-10-CM

## 2023-04-12 PROCEDURE — 99214 OFFICE O/P EST MOD 30 MIN: CPT | Mod: S$GLB,,, | Performed by: INTERNAL MEDICINE

## 2023-04-12 PROCEDURE — 1126F PR PAIN SEVERITY QUANTIFIED, NO PAIN PRESENT: ICD-10-PCS | Mod: CPTII,S$GLB,, | Performed by: INTERNAL MEDICINE

## 2023-04-12 PROCEDURE — 99214 PR OFFICE/OUTPT VISIT, EST, LEVL IV, 30-39 MIN: ICD-10-PCS | Mod: S$GLB,,, | Performed by: INTERNAL MEDICINE

## 2023-04-12 PROCEDURE — 1159F MED LIST DOCD IN RCRD: CPT | Mod: CPTII,S$GLB,, | Performed by: INTERNAL MEDICINE

## 2023-04-12 PROCEDURE — 3008F BODY MASS INDEX DOCD: CPT | Mod: CPTII,S$GLB,, | Performed by: INTERNAL MEDICINE

## 2023-04-12 PROCEDURE — 3078F DIAST BP <80 MM HG: CPT | Mod: CPTII,S$GLB,, | Performed by: INTERNAL MEDICINE

## 2023-04-12 PROCEDURE — 1101F PT FALLS ASSESS-DOCD LE1/YR: CPT | Mod: CPTII,S$GLB,, | Performed by: INTERNAL MEDICINE

## 2023-04-12 PROCEDURE — 3008F PR BODY MASS INDEX (BMI) DOCUMENTED: ICD-10-PCS | Mod: CPTII,S$GLB,, | Performed by: INTERNAL MEDICINE

## 2023-04-12 PROCEDURE — 3288F PR FALLS RISK ASSESSMENT DOCUMENTED: ICD-10-PCS | Mod: CPTII,S$GLB,, | Performed by: INTERNAL MEDICINE

## 2023-04-12 PROCEDURE — 1101F PR PT FALLS ASSESS DOC 0-1 FALLS W/OUT INJ PAST YR: ICD-10-PCS | Mod: CPTII,S$GLB,, | Performed by: INTERNAL MEDICINE

## 2023-04-12 PROCEDURE — 99999 PR PBB SHADOW E&M-EST. PATIENT-LVL III: ICD-10-PCS | Mod: PBBFAC,,, | Performed by: INTERNAL MEDICINE

## 2023-04-12 PROCEDURE — 3078F PR MOST RECENT DIASTOLIC BLOOD PRESSURE < 80 MM HG: ICD-10-PCS | Mod: CPTII,S$GLB,, | Performed by: INTERNAL MEDICINE

## 2023-04-12 PROCEDURE — 1159F PR MEDICATION LIST DOCUMENTED IN MEDICAL RECORD: ICD-10-PCS | Mod: CPTII,S$GLB,, | Performed by: INTERNAL MEDICINE

## 2023-04-12 PROCEDURE — 3077F SYST BP >= 140 MM HG: CPT | Mod: CPTII,S$GLB,, | Performed by: INTERNAL MEDICINE

## 2023-04-12 PROCEDURE — 99999 PR PBB SHADOW E&M-EST. PATIENT-LVL III: CPT | Mod: PBBFAC,,, | Performed by: INTERNAL MEDICINE

## 2023-04-12 PROCEDURE — 1126F AMNT PAIN NOTED NONE PRSNT: CPT | Mod: CPTII,S$GLB,, | Performed by: INTERNAL MEDICINE

## 2023-04-12 PROCEDURE — 3288F FALL RISK ASSESSMENT DOCD: CPT | Mod: CPTII,S$GLB,, | Performed by: INTERNAL MEDICINE

## 2023-04-12 PROCEDURE — 3077F PR MOST RECENT SYSTOLIC BLOOD PRESSURE >= 140 MM HG: ICD-10-PCS | Mod: CPTII,S$GLB,, | Performed by: INTERNAL MEDICINE

## 2023-04-12 NOTE — PROGRESS NOTES
Subjective:   Patient ID:  Feng Marques is a 72 y.o. male who presents for evaluation of No chief complaint on file.        HPI  4.12.2023  Takes lipid panel with his pcp and states never had a lipid issue.  However his last LDL in 2019 was 94.    He takes pravastatin 3 times a week if he takes it.  Had a normal nuclear stress test TID ratio was 1.32 on visual like there is no TID.    His echocardiogram was normal.    He states he never took the isosorbide.  He only took the PPI and he states that this has resolved all his symptoms.          11.2022  72-year-old male, smokeless tobacco user, diabetes, history of stented coronaries in 2012, no records, but states that was in the main artery? LAD  Last seen Dr. Do in 2019 for chest pain as well.  There was a plan to get a nuclear stress test however not done for unclear reason.    He states that he has been having chest pain for the last few years but worse in the last few months.  Chest pains described as a right and left-sided chest pain sometime to his lower rib sometimes to the mid chest.  Some of it is feels sharp and sometimes feels like a pressure.  Exertional and nonexertional moderate in intensity.  He also states that sometimes he has feeling that his pain is in his stomach.    He denies heartburn.  But he does chew tobacco.    Not tender or reproducible on exam.  Past Medical History:   Diagnosis Date    Diabetes mellitus 2002     am 09/15/2017    DM (diabetes mellitus) 2002     am 09/21/2018    DM (diabetes mellitus) 2002     am 09/27/2019    DM (diabetes mellitus) 2002     am 09/10/2020    Hypertension        History reviewed. No pertinent surgical history.    Social History     Tobacco Use    Smoking status: Never    Smokeless tobacco: Current     Types: Chew   Substance Use Topics    Alcohol use: No       Family History   Problem Relation Age of Onset    Diabetes Mother     Cataracts Mother     Diabetes Sister      Diabetes Sister     Diabetes Sister         borderline       Review of Systems   Constitutional: Negative for fever and malaise/fatigue.   HENT:  Negative for sore throat.    Eyes:  Negative for blurred vision.   Cardiovascular:  Positive for chest pain. Negative for claudication, cyanosis, dyspnea on exertion, irregular heartbeat, leg swelling, near-syncope, orthopnea, palpitations, paroxysmal nocturnal dyspnea and syncope.   Respiratory:  Negative for cough and hemoptysis.    Hematologic/Lymphatic: Negative for bleeding problem.   Skin:  Negative for rash.   Musculoskeletal:  Negative for falls.   Gastrointestinal:  Negative for abdominal pain.   Genitourinary: Negative.    Neurological: Negative.    Psychiatric/Behavioral:  Negative for altered mental status and substance abuse.      Current Outpatient Medications on File Prior to Visit   Medication Sig    amLODIPine (NORVASC) 5 MG tablet Take 5 mg by mouth once daily.    aspirin (ECOTRIN) 81 MG EC tablet Take 81 mg by mouth once daily.    blood sugar diagnostic Strp Use as directed up to 3 times daily    EScitalopram oxalate (LEXAPRO) 10 MG tablet Take 10 mg by mouth once daily.    ferrous fumarate 324 mg (106 mg iron) Tab Take by mouth.    glimepiride (AMARYL) 2 MG tablet     losartan (COZAAR) 100 MG tablet Take 50 mg by mouth 2 (two) times a day.    nitroGLYCERIN (NITROSTAT) 0.3 MG SL tablet Place 1 tablet (0.3 mg total) under the tongue every 5 (five) minutes as needed for Chest pain.    pantoprazole (PROTONIX) 40 MG tablet Take 1 tablet (40 mg total) by mouth once daily.    pravastatin (PRAVACHOL) 10 MG tablet     vitamin D 1000 units Tab Take 1,000 Units by mouth once daily.    [DISCONTINUED] isosorbide mononitrate (IMDUR) 30 MG 24 hr tablet Take 1 tablet (30 mg total) by mouth once daily.     No current facility-administered medications on file prior to visit.       Objective:   Objective:  Wt Readings from Last 3 Encounters:   04/12/23 65.8 kg (145 lb 1  oz)   11/09/22 66.2 kg (146 lb)   09/14/22 66.5 kg (146 lb 9.7 oz)     Temp Readings from Last 3 Encounters:   No data found for Temp     BP Readings from Last 3 Encounters:   04/12/23 (!) 140/70   09/14/22 (!) 160/80   06/13/19 (!) 150/74     Pulse Readings from Last 3 Encounters:   04/12/23 73   09/14/22 70       Physical Exam  Vitals reviewed.   Constitutional:       Appearance: He is well-developed.   HENT:      Head: Normocephalic and atraumatic.   Eyes:      General: No scleral icterus.     Conjunctiva/sclera: Conjunctivae normal.   Cardiovascular:      Rate and Rhythm: Normal rate and regular rhythm.      Pulses: Intact distal pulses.      Heart sounds: Normal heart sounds. No murmur heard.  Pulmonary:      Effort: No respiratory distress.      Breath sounds: No wheezing or rales.   Chest:      Chest wall: No tenderness.   Abdominal:      General: Bowel sounds are normal. There is no distension.      Palpations: Abdomen is soft.      Tenderness: There is no guarding.   Musculoskeletal:         General: Normal range of motion.      Cervical back: Normal range of motion and neck supple.   Skin:     General: Skin is warm.   Neurological:      Mental Status: He is alert and oriented to person, place, and time.       No results found for: CHOL  No results found for: HDL  No results found for: LDLCALC  No results found for: TRIG  No results found for: CHOLHDL    Chemistry    No results found for: NA, K, CL, CO2, BUN, CREATININE, GLU No results found for: CALCIUM, ALKPHOS, AST, ALT, BILITOT, ESTGFRAFRICA, EGFRNONAA       No results found for: TSH  No results found for: INR, PROTIME  No results found for: WBC, HGB, HCT, MCV, PLT  BNP  @LABRCNTIP(BNP,BNPTRIAGEBLO)@  CrCl cannot be calculated (No successful lab value found.).     Imaging:  ======  No results found for this or any previous visit.    No results found for this or any previous visit.    No results found for this or any previous visit.    No results found  for this or any previous visit.    No results found for this or any previous visit.    No valid procedures specified.    Diagnostic Results:  ECG: Reviewed  Vent. Rate : 068 BPM     Atrial Rate : 068 BPM      P-R Int : 266 ms          QRS Dur : 078 ms       QT Int : 396 ms       P-R-T Axes : 077 -05 018 degrees      QTc Int : 421 ms     Sinus rhythm with 1st degree A-V block   Otherwise normal ECG   When compared with ECG of 13-JUN-2019 15:40,   No significant change was found       The ASCVD Risk score (Ina DUPONT, et al., 2019) failed to calculate for the following reasons:    Cannot find a previous HDL lab    Cannot find a previous total cholesterol lab    Assessment and Plan:   Essential hypertension    Other hyperlipidemia    Coronary artery disease of native artery of native heart with stable angina pectoris    Type 2 diabetes mellitus with complication, without long-term current use of insulin        Continue with amlodipine losartan and aspirin  Continue pravastatin.       Not on beta-blocker or ccb due to first-degree AV block.  Counseled against tobacco chewing.    Reviewed all tests and above medical conditions with patient in detail and formulated treatment plan.  Risk factor modification discussed.   Cardiac low salt diet discussed.  Continue amlodipine     Follow up in 6 months

## 2024-04-02 ENCOUNTER — OFFICE VISIT (OUTPATIENT)
Dept: GASTROENTEROLOGY | Facility: CLINIC | Age: 74
End: 2024-04-02
Payer: MEDICARE

## 2024-04-02 VITALS
HEIGHT: 70 IN | WEIGHT: 147.81 LBS | OXYGEN SATURATION: 98 % | HEART RATE: 80 BPM | DIASTOLIC BLOOD PRESSURE: 98 MMHG | SYSTOLIC BLOOD PRESSURE: 158 MMHG | BODY MASS INDEX: 21.16 KG/M2

## 2024-04-02 DIAGNOSIS — I25.118 CORONARY ARTERY DISEASE OF NATIVE ARTERY OF NATIVE HEART WITH STABLE ANGINA PECTORIS: ICD-10-CM

## 2024-04-02 DIAGNOSIS — R07.9 CHEST PAIN, UNSPECIFIED TYPE: ICD-10-CM

## 2024-04-02 DIAGNOSIS — R10.13 EPIGASTRIC ABDOMINAL PAIN: Primary | ICD-10-CM

## 2024-04-02 PROCEDURE — 99214 OFFICE O/P EST MOD 30 MIN: CPT | Mod: PBBFAC,PN | Performed by: INTERNAL MEDICINE

## 2024-04-02 PROCEDURE — 99204 OFFICE O/P NEW MOD 45 MIN: CPT | Mod: S$GLB,,, | Performed by: INTERNAL MEDICINE

## 2024-04-02 PROCEDURE — 99999 PR PBB SHADOW E&M-EST. PATIENT-LVL IV: CPT | Mod: PBBFAC,,, | Performed by: INTERNAL MEDICINE

## 2024-04-02 NOTE — PROGRESS NOTES
[facility]  Gastroenterology    PCP: Maciel Enriquez MD    4/2/24    HPI       Abdominal Pain     Additional comments: Pt present today wit hc/o chest pain radiating under ribs to back with epigastric pain           Last edited by Milligan, Toni, MA on 4/2/2024 11:14 AM.        Reason for Visit: Chest Pain, Epigastric Abdominal Pain    Subjective:   Feng Marques is a 73 y.o. male here for evaluation of chest pain and epigastric abdominal pain. States he has been taking his inhaler for a few weeks but it wasn't helping so he's been off for 3 days. Describes a chest pain and epigastric pain that can radiate to back. It can occur with a deep breath but also can occur with exertional activity. It does not hurt to palpation. Takes Protonix daily but doesn't not any improvement with this. No prior EGD on file. Patient follows with cardiology but last appointment was a year ago.       Past Medical History:   Diagnosis Date    Diabetes mellitus 2002     am 09/15/2017    DM (diabetes mellitus) 2002     am 09/21/2018    DM (diabetes mellitus) 2002     am 09/27/2019    DM (diabetes mellitus) 2002     am 09/10/2020    Hypertension        No past surgical history on file.    Current Outpatient Medications on File Prior to Visit   Medication Sig Dispense Refill    amLODIPine (NORVASC) 5 MG tablet Take 5 mg by mouth once daily.      aspirin (ECOTRIN) 81 MG EC tablet Take 81 mg by mouth once daily.      blood sugar diagnostic Strp Use as directed up to 3 times daily      EScitalopram oxalate (LEXAPRO) 10 MG tablet Take 10 mg by mouth once daily.      ferrous fumarate 324 mg (106 mg iron) Tab Take by mouth.      glimepiride (AMARYL) 2 MG tablet       losartan (COZAAR) 100 MG tablet Take 50 mg by mouth 2 (two) times a day.      pravastatin (PRAVACHOL) 10 MG tablet       vitamin D 1000 units Tab Take 1,000 Units by mouth once daily.      nitroGLYCERIN (NITROSTAT) 0.3 MG SL tablet  Place 1 tablet (0.3 mg total) under the tongue every 5 (five) minutes as needed for Chest pain. 100 tablet 0    pantoprazole (PROTONIX) 40 MG tablet Take 1 tablet (40 mg total) by mouth once daily. 30 tablet 11     No current facility-administered medications on file prior to visit.       Review of patient's allergies indicates:   Allergen Reactions    Codeine        Social History     Socioeconomic History    Marital status: Single   Tobacco Use    Smoking status: Never    Smokeless tobacco: Current     Types: Chew   Substance and Sexual Activity    Alcohol use: No       Family History   Problem Relation Age of Onset    Diabetes Mother     Cataracts Mother     Diabetes Sister     Diabetes Sister     Diabetes Sister         borderline       Review of Systems   Constitutional:  Negative for appetite change, fever and unexpected weight change.   HENT:  Negative for sore throat and trouble swallowing.    Eyes:  Negative for visual disturbance.   Respiratory:  Negative for cough, shortness of breath and wheezing.    Cardiovascular:  Positive for chest pain. Negative for palpitations and leg swelling.   Gastrointestinal:  Positive for abdominal pain. Negative for blood in stool, constipation, diarrhea, nausea and vomiting.   Genitourinary:  Negative for dysuria.   Musculoskeletal:  Negative for arthralgias and myalgias.   Skin:  Negative for color change, pallor and rash.   Neurological:  Negative for dizziness and weakness.   Hematological:  Negative for adenopathy.   Psychiatric/Behavioral:  Negative for agitation.        Objective:   Vitals:   Vitals:    04/02/24 1115   BP: (!) 158/98   Pulse: 80       Physical Exam  Vitals reviewed.   Constitutional:       General: He is not in acute distress.     Appearance: He is not diaphoretic.   HENT:      Head: Normocephalic and atraumatic.      Mouth/Throat:      Pharynx: No oropharyngeal exudate.   Eyes:      General: No scleral icterus.        Right eye: No discharge.          Left eye: No discharge.      Conjunctiva/sclera: Conjunctivae normal.      Pupils: Pupils are equal, round, and reactive to light.   Cardiovascular:      Rate and Rhythm: Normal rate and regular rhythm.      Heart sounds: Normal heart sounds. No murmur heard.     No friction rub. No gallop.   Pulmonary:      Effort: Pulmonary effort is normal. No respiratory distress.      Breath sounds: Normal breath sounds. No stridor. No wheezing or rales.   Abdominal:      General: Bowel sounds are normal. There is no distension.      Palpations: Abdomen is soft. There is no mass.      Tenderness: There is no abdominal tenderness. There is no guarding.   Musculoskeletal:         General: Normal range of motion.      Cervical back: Normal range of motion.   Skin:     General: Skin is warm and dry.      Coloration: Skin is not pale.      Findings: No erythema or rash.   Neurological:      Mental Status: He is alert and oriented to person, place, and time.         IMPRESSION     Problem List Items Addressed This Visit          Cardiac/Vascular    Coronary artery disease of native artery of native heart with stable angina pectoris     Other Visit Diagnoses       Epigastric abdominal pain    -  Primary    Chest pain, unspecified type                PLANS:    - Feel that patient would benefit from EGD but needs to see cardiology first, as its been over a year and patient describes both typical and atypical chest pain symptoms  - Will message cardiology department for him to get a f/u appointment scheduled  - If cleared, can proceed with ordering EGD  - Continue with PPI for now    Epigastric abdominal pain    Chest pain, unspecified type    Coronary artery disease of native artery of native heart with stable angina pectoris      Ruby Tinoco MD  Gastroenterology

## 2024-04-04 ENCOUNTER — TELEPHONE (OUTPATIENT)
Dept: GASTROENTEROLOGY | Facility: CLINIC | Age: 74
End: 2024-04-04
Payer: MEDICARE

## 2024-04-04 NOTE — TELEPHONE ENCOUNTER
Mateohieu informed of appointment scheduled with cardiologist on 04/09/24 at 11:20; pt verbally confirmed

## 2024-04-04 NOTE — TELEPHONE ENCOUNTER
----- Message from Tsering Collado sent at 4/4/2024  7:44 AM CDT -----  Regarding: RE: appointment  Good morning,   I have patient scheduled for Tuesday with Dr. Carrasco   ----- Message -----  From: Milligan, Toni, MA  Sent: 4/3/2024   7:51 AM CDT  To: Tsering Collado  Subject: appointment                                      I do apologize, on yesterday there were internet issues and I was unable to correct the mistake I'd made.  would like  to be scheduled with  c/o chest pain, radiating to back, with epigastric pain prior to having an  EGD performed. Pt is unaware of how to contact, please contact pt to schedule. Again my apologies and thank you for responding

## 2024-04-05 DIAGNOSIS — I10 ESSENTIAL HYPERTENSION: Primary | ICD-10-CM

## 2024-04-09 ENCOUNTER — TELEPHONE (OUTPATIENT)
Dept: CARDIOLOGY | Facility: HOSPITAL | Age: 74
End: 2024-04-09
Payer: MEDICARE

## 2024-04-09 ENCOUNTER — OFFICE VISIT (OUTPATIENT)
Dept: CARDIOLOGY | Facility: CLINIC | Age: 74
End: 2024-04-09
Payer: MEDICARE

## 2024-04-09 ENCOUNTER — HOSPITAL ENCOUNTER (OUTPATIENT)
Dept: CARDIOLOGY | Facility: HOSPITAL | Age: 74
Discharge: HOME | End: 2024-04-09
Attending: INTERNAL MEDICINE
Payer: MEDICARE

## 2024-04-09 VITALS
HEIGHT: 70 IN | DIASTOLIC BLOOD PRESSURE: 89 MMHG | HEART RATE: 83 BPM | OXYGEN SATURATION: 97 % | SYSTOLIC BLOOD PRESSURE: 157 MMHG | WEIGHT: 146.38 LBS | BODY MASS INDEX: 20.96 KG/M2

## 2024-04-09 DIAGNOSIS — I44.0 FIRST DEGREE AV BLOCK: Primary | ICD-10-CM

## 2024-04-09 DIAGNOSIS — Z95.5 STENTED CORONARY ARTERY: ICD-10-CM

## 2024-04-09 DIAGNOSIS — R07.89 OTHER CHEST PAIN: ICD-10-CM

## 2024-04-09 DIAGNOSIS — I25.118 CORONARY ARTERY DISEASE OF NATIVE ARTERY OF NATIVE HEART WITH STABLE ANGINA PECTORIS: ICD-10-CM

## 2024-04-09 DIAGNOSIS — E11.8 TYPE 2 DIABETES MELLITUS WITH COMPLICATION, WITHOUT LONG-TERM CURRENT USE OF INSULIN: ICD-10-CM

## 2024-04-09 DIAGNOSIS — I10 ESSENTIAL HYPERTENSION: ICD-10-CM

## 2024-04-09 DIAGNOSIS — Z72.0 TOBACCO CHEW USE: ICD-10-CM

## 2024-04-09 DIAGNOSIS — E78.49 OTHER HYPERLIPIDEMIA: ICD-10-CM

## 2024-04-09 LAB
OHS QRS DURATION: 88 MS
OHS QTC CALCULATION: 449 MS

## 2024-04-09 PROCEDURE — 93005 ELECTROCARDIOGRAM TRACING: CPT

## 2024-04-09 PROCEDURE — 3079F DIAST BP 80-89 MM HG: CPT | Mod: CPTII,S$GLB,, | Performed by: INTERNAL MEDICINE

## 2024-04-09 PROCEDURE — 1101F PT FALLS ASSESS-DOCD LE1/YR: CPT | Mod: CPTII,S$GLB,, | Performed by: INTERNAL MEDICINE

## 2024-04-09 PROCEDURE — 99999 PR PBB SHADOW E&M-EST. PATIENT-LVL IV: CPT | Mod: PBBFAC,,, | Performed by: INTERNAL MEDICINE

## 2024-04-09 PROCEDURE — 3077F SYST BP >= 140 MM HG: CPT | Mod: CPTII,S$GLB,, | Performed by: INTERNAL MEDICINE

## 2024-04-09 PROCEDURE — 99214 OFFICE O/P EST MOD 30 MIN: CPT | Mod: S$GLB,,, | Performed by: INTERNAL MEDICINE

## 2024-04-09 PROCEDURE — 1126F AMNT PAIN NOTED NONE PRSNT: CPT | Mod: CPTII,S$GLB,, | Performed by: INTERNAL MEDICINE

## 2024-04-09 PROCEDURE — 93010 ELECTROCARDIOGRAM REPORT: CPT | Mod: ,,, | Performed by: INTERNAL MEDICINE

## 2024-04-09 PROCEDURE — 3288F FALL RISK ASSESSMENT DOCD: CPT | Mod: CPTII,S$GLB,, | Performed by: INTERNAL MEDICINE

## 2024-04-09 PROCEDURE — 1159F MED LIST DOCD IN RCRD: CPT | Mod: CPTII,S$GLB,, | Performed by: INTERNAL MEDICINE

## 2024-04-09 PROCEDURE — 3008F BODY MASS INDEX DOCD: CPT | Mod: CPTII,S$GLB,, | Performed by: INTERNAL MEDICINE

## 2024-04-09 PROCEDURE — 4010F ACE/ARB THERAPY RXD/TAKEN: CPT | Mod: CPTII,S$GLB,, | Performed by: INTERNAL MEDICINE

## 2024-04-09 RX ORDER — ISOSORBIDE MONONITRATE 30 MG/1
30 TABLET, EXTENDED RELEASE ORAL DAILY
Qty: 30 TABLET | Refills: 11 | Status: ON HOLD | OUTPATIENT
Start: 2024-04-09 | End: 2024-05-14 | Stop reason: HOSPADM

## 2024-04-09 NOTE — H&P (VIEW-ONLY)
Subjective:   Patient ID:  Feng Marques is a 73 y.o. male who presents for evaluation of Chest Pain        HPI  4.9.2024  Comes in for a 1 year follow-up.    Continues to chew tobacco.    He has a intermittent chest pains.  Reports chest pain lately with exertion.  However only once every 1-2 weeks.  And subsided quickly with nitroglycerin.  His pain is 4 to 5/10 midsternal.  He also has some stigmata of esophageal spasm as he continues to chew tobacco.    His last stress test in October 2022 showed a TID of 1.3 however visually I did not appreciate any dilatation.  So we continued to treat him medically.          4.12.2023  Takes lipid panel with his pcp and states never had a lipid issue.  However his last LDL in 2019 was 94.    He takes pravastatin 3 times a week if he takes it.  Had a normal nuclear stress test TID ratio was 1.32 on visual like there is no TID.    His echocardiogram was normal.    He states he never took the isosorbide.  He only took the PPI and he states that this has resolved all his symptoms.          11.2022  72-year-old male, smokeless tobacco user, diabetes, history of stented coronaries in 2012, no records, but states that was in the main artery? LAD  Last seen Dr. Do in 2019 for chest pain as well.  There was a plan to get a nuclear stress test however not done for unclear reason.    He states that he has been having chest pain for the last few years but worse in the last few months.  Chest pains described as a right and left-sided chest pain sometime to his lower rib sometimes to the mid chest.  Some of it is feels sharp and sometimes feels like a pressure.  Exertional and nonexertional moderate in intensity.  He also states that sometimes he has feeling that his pain is in his stomach.    He denies heartburn.  But he does chew tobacco.    Not tender or reproducible on exam.  Past Medical History:   Diagnosis Date    Diabetes mellitus 2002     am 09/15/2017    DM  (diabetes mellitus) 2002     am 09/21/2018    DM (diabetes mellitus) 2002     am 09/27/2019    DM (diabetes mellitus) 2002     am 09/10/2020    Hypertension        No past surgical history on file.    Social History     Tobacco Use    Smoking status: Never    Smokeless tobacco: Current     Types: Chew   Substance Use Topics    Alcohol use: No       Family History   Problem Relation Age of Onset    Diabetes Mother     Cataracts Mother     Diabetes Sister     Diabetes Sister     Diabetes Sister         borderline       Review of Systems   Cardiovascular:  Positive for chest pain. Negative for dyspnea on exertion, palpitations and syncope.   Genitourinary: Negative.    Neurological: Negative.        Current Outpatient Medications on File Prior to Visit   Medication Sig    amLODIPine (NORVASC) 5 MG tablet Take 5 mg by mouth once daily.    aspirin (ECOTRIN) 81 MG EC tablet Take 81 mg by mouth once daily.    blood sugar diagnostic Strp Use as directed up to 3 times daily    EScitalopram oxalate (LEXAPRO) 10 MG tablet Take 10 mg by mouth once daily.    ferrous fumarate 324 mg (106 mg iron) Tab Take by mouth.    glimepiride (AMARYL) 2 MG tablet     losartan (COZAAR) 100 MG tablet Take 50 mg by mouth 2 (two) times a day.    nitroGLYCERIN (NITROSTAT) 0.3 MG SL tablet Place 1 tablet (0.3 mg total) under the tongue every 5 (five) minutes as needed for Chest pain.    pantoprazole (PROTONIX) 40 MG tablet Take 1 tablet (40 mg total) by mouth once daily.    pravastatin (PRAVACHOL) 10 MG tablet     vitamin D 1000 units Tab Take 1,000 Units by mouth once daily.     No current facility-administered medications on file prior to visit.       Objective:   Objective:  Wt Readings from Last 3 Encounters:   04/09/24 66.4 kg (146 lb 6.2 oz)   04/02/24 67 kg (147 lb 13.1 oz)   04/12/23 65.8 kg (145 lb 1 oz)     Temp Readings from Last 3 Encounters:   No data found for Temp     BP Readings from Last 3 Encounters:   04/09/24  "(!) 157/89   04/02/24 (!) 158/98   04/12/23 (!) 140/70     Pulse Readings from Last 3 Encounters:   04/09/24 83   04/02/24 80   04/12/23 73       Physical Exam  Vitals reviewed.   Constitutional:       Appearance: He is well-developed.   Neck:      Vascular: No carotid bruit.   Cardiovascular:      Rate and Rhythm: Normal rate and regular rhythm.      Pulses: Intact distal pulses.      Heart sounds: Normal heart sounds. No murmur heard.  Pulmonary:      Breath sounds: Normal breath sounds.   Neurological:      Mental Status: He is oriented to person, place, and time.         No results found for: "CHOL"  No results found for: "HDL"  No results found for: "LDLCALC"  No results found for: "TRIG"  No results found for: "CHOLHDL"    Chemistry    No results found for: "NA", "K", "CL", "CO2", "BUN", "CREATININE", "GLU" No results found for: "CALCIUM", "ALKPHOS", "AST", "ALT", "BILITOT", "ESTGFRAFRICA", "EGFRNONAA"       No results found for: "TSH"  No results found for: "INR", "PROTIME"  No results found for: "WBC", "HGB", "HCT", "MCV", "PLT"  BNP  @LABRCNTIP(BNP,BNPTRIAGEBLO)@  CrCl cannot be calculated (No successful lab value found.).     Imaging:  ======  No results found for this or any previous visit.    No results found for this or any previous visit.    No results found for this or any previous visit.    No results found for this or any previous visit.    No results found for this or any previous visit.    No valid procedures specified.    Diagnostic Results:  ECG: Reviewed  Vent. Rate : 068 BPM     Atrial Rate : 068 BPM      P-R Int : 266 ms          QRS Dur : 078 ms       QT Int : 396 ms       P-R-T Axes : 077 -05 018 degrees      QTc Int : 421 ms     Sinus rhythm with 1st degree A-V block   Otherwise normal ECG   When compared with ECG of 13-JUN-2019 15:40,   No significant change was found       The ASCVD Risk score (Ina DK, et al., 2019) failed to calculate for the following reasons:    Cannot find a " previous HDL lab    Cannot find a previous total cholesterol lab    Assessment and Plan:   First degree AV block    Stented coronary artery    Other chest pain  -     Nuclear Stress - Cardiology Interpreted; Future    Coronary artery disease of native artery of native heart with stable angina pectoris  -     isosorbide mononitrate (IMDUR) 30 MG 24 hr tablet; Take 1 tablet (30 mg total) by mouth once daily.  Dispense: 30 tablet; Refill: 11    Tobacco chew use    Other hyperlipidemia    Essential hypertension    Type 2 diabetes mellitus with complication, without long-term current use of insulin          Continue with amlodipine losartan and aspirin  Continue pravastatin.       Not on beta-blocker or ccb due to first-degree AV block.  Counseled against tobacco chewing.    Reviewed all tests and above medical conditions with patient in detail and formulated treatment plan.  Risk factor modification discussed.   Cardiac low salt diet discussed.  Continue amlodipine   Repeat stress test.  Explained possible need for left heart catheterization if  ischemia.  Follow up in 6 months

## 2024-04-18 ENCOUNTER — HOSPITAL ENCOUNTER (OUTPATIENT)
Dept: RADIOLOGY | Facility: HOSPITAL | Age: 74
Discharge: HOME | End: 2024-04-18
Attending: INTERNAL MEDICINE
Payer: MEDICARE

## 2024-04-18 ENCOUNTER — HOSPITAL ENCOUNTER (OUTPATIENT)
Dept: PULMONOLOGY | Facility: HOSPITAL | Age: 74
Discharge: HOME | End: 2024-04-18
Attending: INTERNAL MEDICINE
Payer: MEDICARE

## 2024-04-18 VITALS
HEART RATE: 69 BPM | HEIGHT: 70 IN | DIASTOLIC BLOOD PRESSURE: 76 MMHG | SYSTOLIC BLOOD PRESSURE: 151 MMHG | BODY MASS INDEX: 21.05 KG/M2 | WEIGHT: 147 LBS

## 2024-04-18 DIAGNOSIS — R07.89 OTHER CHEST PAIN: ICD-10-CM

## 2024-04-18 LAB
CV STRESS BASE HR: 71 BPM
DIASTOLIC BLOOD PRESSURE: 76 MMHG
EJECTION FRACTION- HIGH: 73 %
END DIASTOLIC INDEX-HIGH: 165 ML/M2
END DIASTOLIC INDEX-LOW: 101 ML/M2
END SYSTOLIC INDEX-HIGH: 64 ML/M2
END SYSTOLIC INDEX-LOW: 28 ML/M2
NUC REST EJECTION FRACTION: 62
NUC STRESS EJECTION FRACTION: 54 %
OHS CV CPX 85 PERCENT MAX PREDICTED HEART RATE MALE: 125
OHS CV CPX MAX PREDICTED HEART RATE: 147
OHS CV CPX PATIENT IS FEMALE: 0
OHS CV CPX PATIENT IS MALE: 1
OHS CV CPX PEAK DIASTOLIC BLOOD PRESSURE: 76 MMHG
OHS CV CPX PEAK HEAR RATE: 98 BPM
OHS CV CPX PEAK RATE PRESSURE PRODUCT: NORMAL
OHS CV CPX PEAK SYSTOLIC BLOOD PRESSURE: 151 MMHG
OHS CV CPX PERCENT MAX PREDICTED HEART RATE ACHIEVED: 67
OHS CV CPX RATE PRESSURE PRODUCT PRESENTING: NORMAL
RETIRED EF AND QEF - SEE NOTES: 59 %
SYSTOLIC BLOOD PRESSURE: 151 MMHG

## 2024-04-18 PROCEDURE — 93016 CV STRESS TEST SUPVJ ONLY: CPT | Mod: ,,, | Performed by: INTERNAL MEDICINE

## 2024-04-18 PROCEDURE — 63600175 PHARM REV CODE 636 W HCPCS: Performed by: INTERNAL MEDICINE

## 2024-04-18 PROCEDURE — 93018 CV STRESS TEST I&R ONLY: CPT | Mod: ,,, | Performed by: INTERNAL MEDICINE

## 2024-04-18 PROCEDURE — 93017 CV STRESS TEST TRACING ONLY: CPT

## 2024-04-18 PROCEDURE — A9502 TC99M TETROFOSMIN: HCPCS | Performed by: INTERNAL MEDICINE

## 2024-04-18 PROCEDURE — 78452 HT MUSCLE IMAGE SPECT MULT: CPT | Mod: 26,,, | Performed by: INTERNAL MEDICINE

## 2024-04-18 RX ORDER — REGADENOSON 0.08 MG/ML
0.4 INJECTION, SOLUTION INTRAVENOUS ONCE
Status: COMPLETED | OUTPATIENT
Start: 2024-04-18 | End: 2024-04-18

## 2024-04-18 RX ADMIN — TETROFOSMIN 30.6 MILLICURIE: 1.38 INJECTION, POWDER, LYOPHILIZED, FOR SOLUTION INTRAVENOUS at 11:04

## 2024-04-18 RX ADMIN — TETROFOSMIN 10 MILLICURIE: 1.38 INJECTION, POWDER, LYOPHILIZED, FOR SOLUTION INTRAVENOUS at 10:04

## 2024-04-18 RX ADMIN — REGADENOSON 0.4 MG: 0.08 INJECTION, SOLUTION INTRAVENOUS at 11:04

## 2024-04-19 ENCOUNTER — TELEPHONE (OUTPATIENT)
Dept: CARDIOLOGY | Facility: CLINIC | Age: 74
End: 2024-04-19
Payer: MEDICARE

## 2024-04-19 DIAGNOSIS — I25.110 CORONARY ARTERY DISEASE INVOLVING NATIVE CORONARY ARTERY OF NATIVE HEART WITH UNSTABLE ANGINA PECTORIS: ICD-10-CM

## 2024-04-19 DIAGNOSIS — R06.02 SOB (SHORTNESS OF BREATH): ICD-10-CM

## 2024-04-19 DIAGNOSIS — R94.39 ABNORMAL STRESS TEST: Primary | ICD-10-CM

## 2024-04-19 DIAGNOSIS — I44.0 FIRST DEGREE AV BLOCK: ICD-10-CM

## 2024-04-19 NOTE — TELEPHONE ENCOUNTER
Patient has been scheduled for May 3rd at [facility] Staff to contact patient    ----- Message from Abdulaziz Carrasco MD sent at 4/19/2024  4:24 PM CDT -----  Abnormal stress test.  I called him and he is agreeable with a heart catheterization.  Please schedule for May 3rd.    Thank you

## 2024-04-23 ENCOUNTER — LAB VISIT (OUTPATIENT)
Dept: LAB | Facility: HOSPITAL | Age: 74
End: 2024-04-23
Attending: INTERNAL MEDICINE
Payer: MEDICARE

## 2024-04-23 DIAGNOSIS — I25.110 CORONARY ARTERY DISEASE INVOLVING NATIVE CORONARY ARTERY OF NATIVE HEART WITH UNSTABLE ANGINA PECTORIS: ICD-10-CM

## 2024-04-23 DIAGNOSIS — R94.39 ABNORMAL STRESS TEST: ICD-10-CM

## 2024-04-23 DIAGNOSIS — R06.02 SOB (SHORTNESS OF BREATH): ICD-10-CM

## 2024-04-23 DIAGNOSIS — I44.0 FIRST DEGREE AV BLOCK: ICD-10-CM

## 2024-04-23 LAB
ANION GAP SERPL CALC-SCNC: 9 MMOL/L (ref 8–16)
BUN SERPL-MCNC: 20 MG/DL (ref 8–23)
CALCIUM SERPL-MCNC: 9.6 MG/DL (ref 8.7–10.5)
CHLORIDE SERPL-SCNC: 103 MMOL/L (ref 95–110)
CO2 SERPL-SCNC: 24 MMOL/L (ref 23–29)
CREAT SERPL-MCNC: 1.1 MG/DL (ref 0.5–1.4)
EST. GFR  (NO RACE VARIABLE): >60 ML/MIN/1.73 M^2
GLUCOSE SERPL-MCNC: 148 MG/DL (ref 70–110)
POTASSIUM SERPL-SCNC: 4.4 MMOL/L (ref 3.5–5.1)
SODIUM SERPL-SCNC: 136 MMOL/L (ref 136–145)

## 2024-04-23 PROCEDURE — 36415 COLL VENOUS BLD VENIPUNCTURE: CPT | Performed by: INTERNAL MEDICINE

## 2024-04-23 PROCEDURE — 85610 PROTHROMBIN TIME: CPT | Performed by: INTERNAL MEDICINE

## 2024-04-23 PROCEDURE — 85025 COMPLETE CBC W/AUTO DIFF WBC: CPT | Performed by: INTERNAL MEDICINE

## 2024-04-23 PROCEDURE — 85730 THROMBOPLASTIN TIME PARTIAL: CPT | Performed by: INTERNAL MEDICINE

## 2024-04-23 PROCEDURE — 80048 BASIC METABOLIC PNL TOTAL CA: CPT | Performed by: INTERNAL MEDICINE

## 2024-04-24 LAB
APTT PPP: 27.7 SEC (ref 21–32)
BASOPHILS # BLD AUTO: 0.06 K/UL (ref 0–0.2)
BASOPHILS NFR BLD: 1 % (ref 0–1.9)
DIFFERENTIAL METHOD BLD: ABNORMAL
EOSINOPHIL # BLD AUTO: 0.2 K/UL (ref 0–0.5)
EOSINOPHIL NFR BLD: 4.1 % (ref 0–8)
ERYTHROCYTE [DISTWIDTH] IN BLOOD BY AUTOMATED COUNT: 13.2 % (ref 11.5–14.5)
HCT VFR BLD AUTO: 40.9 % (ref 40–54)
HGB BLD-MCNC: 13.3 G/DL (ref 14–18)
IMM GRANULOCYTES # BLD AUTO: 0.02 K/UL (ref 0–0.04)
IMM GRANULOCYTES NFR BLD AUTO: 0.3 % (ref 0–0.5)
INR PPP: 1 (ref 0.8–1.2)
LYMPHOCYTES # BLD AUTO: 2 K/UL (ref 1–4.8)
LYMPHOCYTES NFR BLD: 34.7 % (ref 18–48)
MCH RBC QN AUTO: 31.5 PG (ref 27–31)
MCHC RBC AUTO-ENTMCNC: 32.5 G/DL (ref 32–36)
MCV RBC AUTO: 97 FL (ref 82–98)
MONOCYTES # BLD AUTO: 0.5 K/UL (ref 0.3–1)
MONOCYTES NFR BLD: 9.2 % (ref 4–15)
NEUTROPHILS # BLD AUTO: 3 K/UL (ref 1.8–7.7)
NEUTROPHILS NFR BLD: 50.7 % (ref 38–73)
NRBC BLD-RTO: 0 /100 WBC
PLATELET # BLD AUTO: 213 K/UL (ref 150–450)
PMV BLD AUTO: 11.1 FL (ref 9.2–12.9)
PROTHROMBIN TIME: 10.8 SEC (ref 9–12.5)
RBC # BLD AUTO: 4.22 M/UL (ref 4.6–6.2)
WBC # BLD AUTO: 5.88 K/UL (ref 3.9–12.7)

## 2024-05-03 ENCOUNTER — HOSPITAL ENCOUNTER (INPATIENT)
Facility: HOSPITAL | Age: 74
LOS: 11 days | Discharge: HOME HEALTH | DRG: 234 | End: 2024-05-14
Attending: INTERNAL MEDICINE | Admitting: EMERGENCY MEDICINE
Payer: MEDICARE

## 2024-05-03 DIAGNOSIS — I25.10 CAD (CORONARY ARTERY DISEASE): ICD-10-CM

## 2024-05-03 DIAGNOSIS — I25.118 CORONARY ARTERY DISEASE OF NATIVE ARTERY OF NATIVE HEART WITH STABLE ANGINA PECTORIS: ICD-10-CM

## 2024-05-03 DIAGNOSIS — R94.39 ABNORMAL STRESS TEST: ICD-10-CM

## 2024-05-03 DIAGNOSIS — R07.9 CHEST PAIN, UNSPECIFIED TYPE: ICD-10-CM

## 2024-05-03 DIAGNOSIS — E78.49 OTHER HYPERLIPIDEMIA: ICD-10-CM

## 2024-05-03 DIAGNOSIS — I44.0 FIRST DEGREE AV BLOCK: ICD-10-CM

## 2024-05-03 DIAGNOSIS — Z95.1 S/P CABG X 4: Primary | ICD-10-CM

## 2024-05-03 DIAGNOSIS — D62 ANEMIA ASSOCIATED WITH ACUTE BLOOD LOSS: ICD-10-CM

## 2024-05-03 DIAGNOSIS — R07.9 CHEST PAIN: ICD-10-CM

## 2024-05-03 DIAGNOSIS — I10 ESSENTIAL HYPERTENSION: ICD-10-CM

## 2024-05-03 DIAGNOSIS — I25.10 CORONARY ARTERY DISEASE, UNSPECIFIED VESSEL OR LESION TYPE, UNSPECIFIED WHETHER ANGINA PRESENT, UNSPECIFIED WHETHER NATIVE OR TRANSPLANTED HEART: ICD-10-CM

## 2024-05-03 DIAGNOSIS — Z98.890 POST-OPERATIVE STATE: ICD-10-CM

## 2024-05-03 DIAGNOSIS — I25.110 CORONARY ARTERY DISEASE INVOLVING NATIVE CORONARY ARTERY OF NATIVE HEART WITH UNSTABLE ANGINA PECTORIS: ICD-10-CM

## 2024-05-03 DIAGNOSIS — Z01.810 PRE-OPERATIVE CARDIOVASCULAR EXAMINATION: ICD-10-CM

## 2024-05-03 LAB
ALBUMIN SERPL BCP-MCNC: 3.6 G/DL (ref 3.5–5.2)
ALP SERPL-CCNC: 70 U/L (ref 55–135)
ALT SERPL W/O P-5'-P-CCNC: 25 U/L (ref 10–44)
ANION GAP SERPL CALC-SCNC: 7 MMOL/L (ref 8–16)
AORTIC ROOT ANNULUS: 3.39 CM
ASCENDING AORTA: 3.23 CM
AST SERPL-CCNC: 23 U/L (ref 10–40)
AV INDEX (PROSTH): 0.77
AV MEAN GRADIENT: 3 MMHG
AV PEAK GRADIENT: 4 MMHG
AV VALVE AREA BY VELOCITY RATIO: 2.57 CM²
AV VALVE AREA: 2.48 CM²
AV VELOCITY RATIO: 0.8
BILIRUB SERPL-MCNC: 0.5 MG/DL (ref 0.1–1)
BSA FOR ECHO PROCEDURE: 1.8 M2
BUN SERPL-MCNC: 21 MG/DL (ref 8–23)
CALCIUM SERPL-MCNC: 9.4 MG/DL (ref 8.7–10.5)
CATH EF QUANTITATIVE: 70 %
CHLORIDE SERPL-SCNC: 105 MMOL/L (ref 95–110)
CO2 SERPL-SCNC: 23 MMOL/L (ref 23–29)
CREAT SERPL-MCNC: 1.2 MG/DL (ref 0.5–1.4)
CV ECHO LV RWT: 0.63 CM
DOP CALC AO PEAK VEL: 0.98 M/S
DOP CALC AO VTI: 20.5 CM
DOP CALC LVOT AREA: 3.2 CM2
DOP CALC LVOT DIAMETER: 2.03 CM
DOP CALC LVOT PEAK VEL: 0.78 M/S
DOP CALC LVOT STROKE VOLUME: 50.79 CM3
DOP CALC MV VTI: 29.2 CM
DOP CALC RVOT PEAK VEL: 1.05 M/S
DOP CALC RVOT VTI: 19.6 CM
DOP CALCLVOT PEAK VEL VTI: 15.7 CM
E WAVE DECELERATION TIME: 268.67 MSEC
E/A RATIO: 0.69
E/E' RATIO: 9.07 M/S
ECHO LV POSTERIOR WALL: 1.27 CM (ref 0.6–1.1)
EJECTION FRACTION: 60 %
EST. GFR  (NO RACE VARIABLE): >60 ML/MIN/1.73 M^2
ESTIMATED AVG GLUCOSE: 154 MG/DL (ref 68–131)
FRACTIONAL SHORTENING: 34 % (ref 28–44)
GLUCOSE SERPL-MCNC: 228 MG/DL (ref 70–110)
HBA1C MFR BLD: 7 % (ref 4–5.6)
INTERVENTRICULAR SEPTUM: 1.55 CM (ref 0.6–1.1)
IVRT: 83.73 MSEC
LA MAJOR: 4.91 CM
LA MINOR: 4.81 CM
LA WIDTH: 3.9 CM
LEFT ATRIUM SIZE: 3.87 CM
LEFT ATRIUM VOLUME INDEX: 34.3 ML/M2
LEFT ATRIUM VOLUME: 62.34 CM3
LEFT INTERNAL DIMENSION IN SYSTOLE: 2.64 CM (ref 2.1–4)
LEFT VENTRICLE DIASTOLIC VOLUME INDEX: 38.58 ML/M2
LEFT VENTRICLE DIASTOLIC VOLUME: 70.21 ML
LEFT VENTRICLE MASS INDEX: 117 G/M2
LEFT VENTRICLE SYSTOLIC VOLUME INDEX: 14 ML/M2
LEFT VENTRICLE SYSTOLIC VOLUME: 25.5 ML
LEFT VENTRICULAR INTERNAL DIMENSION IN DIASTOLE: 4.01 CM (ref 3.5–6)
LEFT VENTRICULAR MASS: 212.04 G
LV LATERAL E/E' RATIO: 8.5 M/S
LV SEPTAL E/E' RATIO: 9.71 M/S
LVOT MG: 1.83 MMHG
LVOT MV: 0.67 CM/S
MV MEAN GRADIENT: 1 MMHG
MV PEAK A VEL: 0.99 M/S
MV PEAK E VEL: 0.68 M/S
MV PEAK GRADIENT: 3 MMHG
MV STENOSIS PRESSURE HALF TIME: 77.91 MS
MV VALVE AREA BY CONTINUITY EQUATION: 1.74 CM2
MV VALVE AREA P 1/2 METHOD: 2.82 CM2
PISA MRMAX VEL: 3.82 M/S
PISA TR MAX VEL: 1.97 M/S
POCT GLUCOSE: 152 MG/DL (ref 70–110)
POCT GLUCOSE: 230 MG/DL (ref 70–110)
POCT GLUCOSE: 97 MG/DL (ref 70–110)
POTASSIUM SERPL-SCNC: 4.7 MMOL/L (ref 3.5–5.1)
PROT SERPL-MCNC: 6.7 G/DL (ref 6–8.4)
PV MEAN GRADIENT: 3 MMHG
RA MAJOR: 4.15 CM
RA PRESSURE ESTIMATED: 3 MMHG
RA WIDTH: 2.8 CM
RV TB RVSP: 5 MMHG
SODIUM SERPL-SCNC: 135 MMOL/L (ref 136–145)
STJ: 3.33 CM
TDI LATERAL: 0.08 M/S
TDI SEPTAL: 0.07 M/S
TDI: 0.08 M/S
TR MAX PG: 16 MMHG
TRICUSPID ANNULAR PLANE SYSTOLIC EXCURSION: 2.38 CM
TV REST PULMONARY ARTERY PRESSURE: 19 MMHG
Z-SCORE OF LEFT VENTRICULAR DIMENSION IN END DIASTOLE: -2.28
Z-SCORE OF LEFT VENTRICULAR DIMENSION IN END SYSTOLE: -1.3

## 2024-05-03 PROCEDURE — 25000003 PHARM REV CODE 250: Performed by: INTERNAL MEDICINE

## 2024-05-03 PROCEDURE — B2111ZZ FLUOROSCOPY OF MULTIPLE CORONARY ARTERIES USING LOW OSMOLAR CONTRAST: ICD-10-PCS | Performed by: INTERNAL MEDICINE

## 2024-05-03 PROCEDURE — 80053 COMPREHEN METABOLIC PANEL: CPT | Performed by: INTERNAL MEDICINE

## 2024-05-03 PROCEDURE — 25500020 PHARM REV CODE 255: Performed by: INTERNAL MEDICINE

## 2024-05-03 PROCEDURE — B3101ZZ FLUOROSCOPY OF THORACIC AORTA USING LOW OSMOLAR CONTRAST: ICD-10-PCS | Performed by: INTERNAL MEDICINE

## 2024-05-03 PROCEDURE — 83036 HEMOGLOBIN GLYCOSYLATED A1C: CPT | Performed by: INTERNAL MEDICINE

## 2024-05-03 PROCEDURE — 36415 COLL VENOUS BLD VENIPUNCTURE: CPT | Performed by: INTERNAL MEDICINE

## 2024-05-03 PROCEDURE — C1894 INTRO/SHEATH, NON-LASER: HCPCS | Performed by: INTERNAL MEDICINE

## 2024-05-03 PROCEDURE — 99153 MOD SED SAME PHYS/QHP EA: CPT | Performed by: INTERNAL MEDICINE

## 2024-05-03 PROCEDURE — C1769 GUIDE WIRE: HCPCS | Performed by: INTERNAL MEDICINE

## 2024-05-03 PROCEDURE — B3121ZZ FLUOROSCOPY OF LEFT SUBCLAVIAN ARTERY USING LOW OSMOLAR CONTRAST: ICD-10-PCS | Performed by: INTERNAL MEDICINE

## 2024-05-03 PROCEDURE — 4A023N7 MEASUREMENT OF CARDIAC SAMPLING AND PRESSURE, LEFT HEART, PERCUTANEOUS APPROACH: ICD-10-PCS | Performed by: INTERNAL MEDICINE

## 2024-05-03 PROCEDURE — B2151ZZ FLUOROSCOPY OF LEFT HEART USING LOW OSMOLAR CONTRAST: ICD-10-PCS | Performed by: INTERNAL MEDICINE

## 2024-05-03 PROCEDURE — 27000221 HC OXYGEN, UP TO 24 HOURS

## 2024-05-03 PROCEDURE — 21400001 HC TELEMETRY ROOM

## 2024-05-03 PROCEDURE — 99152 MOD SED SAME PHYS/QHP 5/>YRS: CPT | Performed by: INTERNAL MEDICINE

## 2024-05-03 PROCEDURE — 63600175 PHARM REV CODE 636 W HCPCS: Performed by: INTERNAL MEDICINE

## 2024-05-03 PROCEDURE — 93459 L HRT ART/GRFT ANGIO: CPT | Mod: 26,,, | Performed by: INTERNAL MEDICINE

## 2024-05-03 PROCEDURE — 99900035 HC TECH TIME PER 15 MIN (STAT)

## 2024-05-03 PROCEDURE — G0278 ILIAC ART ANGIO,CARDIAC CATH: HCPCS | Performed by: INTERNAL MEDICINE

## 2024-05-03 PROCEDURE — 99152 MOD SED SAME PHYS/QHP 5/>YRS: CPT | Mod: ,,, | Performed by: INTERNAL MEDICINE

## 2024-05-03 PROCEDURE — 93459 L HRT ART/GRFT ANGIO: CPT | Performed by: INTERNAL MEDICINE

## 2024-05-03 RX ORDER — NALOXONE HCL 0.4 MG/ML
0.4 VIAL (ML) INJECTION
Status: DISCONTINUED | OUTPATIENT
Start: 2024-05-03 | End: 2024-05-14 | Stop reason: HOSPADM

## 2024-05-03 RX ORDER — NITROGLYCERIN 5 MG/ML
INJECTION, SOLUTION INTRAVENOUS
Status: DISCONTINUED | OUTPATIENT
Start: 2024-05-03 | End: 2024-05-03 | Stop reason: HOSPADM

## 2024-05-03 RX ORDER — FENTANYL CITRATE 50 UG/ML
INJECTION, SOLUTION INTRAMUSCULAR; INTRAVENOUS
Status: DISCONTINUED | OUTPATIENT
Start: 2024-05-03 | End: 2024-05-03 | Stop reason: HOSPADM

## 2024-05-03 RX ORDER — GLUCAGON 1 MG
1 KIT INJECTION
Status: DISCONTINUED | OUTPATIENT
Start: 2024-05-03 | End: 2024-05-14 | Stop reason: HOSPADM

## 2024-05-03 RX ORDER — ISOSORBIDE MONONITRATE 30 MG/1
30 TABLET, EXTENDED RELEASE ORAL DAILY
Status: DISCONTINUED | OUTPATIENT
Start: 2024-05-04 | End: 2024-05-09

## 2024-05-03 RX ORDER — ASPIRIN 81 MG/1
81 TABLET ORAL DAILY
Status: DISCONTINUED | OUTPATIENT
Start: 2024-05-04 | End: 2024-05-06

## 2024-05-03 RX ORDER — ACETAMINOPHEN 325 MG/1
650 TABLET ORAL EVERY 4 HOURS PRN
Status: DISCONTINUED | OUTPATIENT
Start: 2024-05-03 | End: 2024-05-06

## 2024-05-03 RX ORDER — IBUPROFEN 200 MG
24 TABLET ORAL
Status: DISCONTINUED | OUTPATIENT
Start: 2024-05-03 | End: 2024-05-14 | Stop reason: HOSPADM

## 2024-05-03 RX ORDER — AMLODIPINE BESYLATE 5 MG/1
5 TABLET ORAL DAILY
Status: DISCONTINUED | OUTPATIENT
Start: 2024-05-04 | End: 2024-05-08

## 2024-05-03 RX ORDER — HEPARIN SODIUM 1000 [USP'U]/ML
INJECTION, SOLUTION INTRAVENOUS; SUBCUTANEOUS
Status: DISCONTINUED | OUTPATIENT
Start: 2024-05-03 | End: 2024-05-03 | Stop reason: HOSPADM

## 2024-05-03 RX ORDER — AMIODARONE HYDROCHLORIDE 200 MG/1
200 TABLET ORAL ONCE
Status: COMPLETED | OUTPATIENT
Start: 2024-05-06 | End: 2024-05-06

## 2024-05-03 RX ORDER — LOSARTAN POTASSIUM 50 MG/1
50 TABLET ORAL DAILY
Status: DISCONTINUED | OUTPATIENT
Start: 2024-05-04 | End: 2024-05-08

## 2024-05-03 RX ORDER — VERAPAMIL HYDROCHLORIDE 2.5 MG/ML
INJECTION, SOLUTION INTRAVENOUS
Status: DISCONTINUED | OUTPATIENT
Start: 2024-05-03 | End: 2024-05-03 | Stop reason: HOSPADM

## 2024-05-03 RX ORDER — SODIUM CHLORIDE 9 MG/ML
INJECTION, SOLUTION INTRAVENOUS CONTINUOUS
Status: ACTIVE | OUTPATIENT
Start: 2024-05-03 | End: 2024-05-03

## 2024-05-03 RX ORDER — NITROGLYCERIN 0.4 MG/1
0.4 TABLET SUBLINGUAL EVERY 5 MIN PRN
Status: DISCONTINUED | OUTPATIENT
Start: 2024-05-03 | End: 2024-05-10

## 2024-05-03 RX ORDER — SODIUM CHLORIDE 9 MG/ML
INJECTION, SOLUTION INTRAVENOUS
Status: DISCONTINUED | OUTPATIENT
Start: 2024-05-03 | End: 2024-05-06

## 2024-05-03 RX ORDER — DIPHENHYDRAMINE HCL 50 MG
50 CAPSULE ORAL ONCE
Status: COMPLETED | OUTPATIENT
Start: 2024-05-03 | End: 2024-05-03

## 2024-05-03 RX ORDER — ESCITALOPRAM OXALATE 10 MG/1
10 TABLET ORAL DAILY
Status: DISCONTINUED | OUTPATIENT
Start: 2024-05-04 | End: 2024-05-14 | Stop reason: HOSPADM

## 2024-05-03 RX ORDER — MIDAZOLAM HYDROCHLORIDE 1 MG/ML
INJECTION INTRAMUSCULAR; INTRAVENOUS
Status: DISCONTINUED | OUTPATIENT
Start: 2024-05-03 | End: 2024-05-03 | Stop reason: HOSPADM

## 2024-05-03 RX ORDER — ONDANSETRON 8 MG/1
8 TABLET, ORALLY DISINTEGRATING ORAL EVERY 8 HOURS PRN
Status: DISCONTINUED | OUTPATIENT
Start: 2024-05-03 | End: 2024-05-06

## 2024-05-03 RX ORDER — INSULIN ASPART 100 [IU]/ML
0-5 INJECTION, SOLUTION INTRAVENOUS; SUBCUTANEOUS
Status: DISCONTINUED | OUTPATIENT
Start: 2024-05-03 | End: 2024-05-06

## 2024-05-03 RX ORDER — IBUPROFEN 200 MG
16 TABLET ORAL
Status: DISCONTINUED | OUTPATIENT
Start: 2024-05-03 | End: 2024-05-14 | Stop reason: HOSPADM

## 2024-05-03 RX ORDER — SODIUM CHLORIDE 0.9 % (FLUSH) 0.9 %
10 SYRINGE (ML) INJECTION
Status: DISCONTINUED | OUTPATIENT
Start: 2024-05-03 | End: 2024-05-14 | Stop reason: HOSPADM

## 2024-05-03 RX ORDER — NAPROXEN SODIUM 220 MG/1
81 TABLET, FILM COATED ORAL ONCE
Status: COMPLETED | OUTPATIENT
Start: 2024-05-03 | End: 2024-05-03

## 2024-05-03 RX ORDER — SODIUM CHLORIDE 0.9 % (FLUSH) 0.9 %
10 SYRINGE (ML) INJECTION EVERY 12 HOURS PRN
Status: DISCONTINUED | OUTPATIENT
Start: 2024-05-03 | End: 2024-05-14 | Stop reason: HOSPADM

## 2024-05-03 RX ORDER — PRAVASTATIN SODIUM 20 MG/1
40 TABLET ORAL NIGHTLY
Status: DISCONTINUED | OUTPATIENT
Start: 2024-05-03 | End: 2024-05-08

## 2024-05-03 RX ORDER — ONDANSETRON HYDROCHLORIDE 2 MG/ML
4 INJECTION, SOLUTION INTRAVENOUS EVERY 6 HOURS PRN
Status: DISCONTINUED | OUTPATIENT
Start: 2024-05-03 | End: 2024-05-14 | Stop reason: HOSPADM

## 2024-05-03 RX ORDER — LIDOCAINE HYDROCHLORIDE 20 MG/ML
INJECTION, SOLUTION EPIDURAL; INFILTRATION; INTRACAUDAL; PERINEURAL
Status: DISCONTINUED | OUTPATIENT
Start: 2024-05-03 | End: 2024-05-03 | Stop reason: HOSPADM

## 2024-05-03 RX ADMIN — SODIUM CHLORIDE: 9 INJECTION, SOLUTION INTRAVENOUS at 08:05

## 2024-05-03 RX ADMIN — INSULIN ASPART 1 UNITS: 100 INJECTION, SOLUTION INTRAVENOUS; SUBCUTANEOUS at 09:05

## 2024-05-03 RX ADMIN — ASPIRIN 81 MG CHEWABLE TABLET 81 MG: 81 TABLET CHEWABLE at 08:05

## 2024-05-03 RX ADMIN — DIPHENHYDRAMINE HYDROCHLORIDE 50 MG: 50 CAPSULE ORAL at 08:05

## 2024-05-03 RX ADMIN — PRAVASTATIN SODIUM 40 MG: 20 TABLET ORAL at 09:05

## 2024-05-03 NOTE — ASSESSMENT & PLAN NOTE
Patient with known CAD s/p stent placement, which is uncontrolled Will continue ASA and Statin and monitor for S/Sx of angina/ACS. Continue to monitor on telemetry.   -s/p left heart cath with multivessel disease  -cardiothoracic surgery consulted  -evaluation for CABG  -prn NTG

## 2024-05-03 NOTE — ASSESSMENT & PLAN NOTE
The patient is a 73-year-old male with coronary artery disease status post stenting, diabetes, hyperlipidemia and hypertension who was worked up for chest pain with cardiac catheterization.  Cardiac catheterization shows severe multivessel coronary artery disease with a 90% proximal LAD stenosis.  The patient is a candidate for urgent coronary artery bypass grafting.  The risks and benefits of surgery have been explained to the patient.  The patient understands the risks and benefits of surgery and has agreed to proceed with urgent coronary artery bypass grafting which will be scheduled for 05/06/2024.

## 2024-05-03 NOTE — Clinical Note
The catheter was inserted into the left subclavian artery. An angiography was performed of the LIMA.

## 2024-05-03 NOTE — SUBJECTIVE & OBJECTIVE
Current Facility-Administered Medications   Medication Dose Route Frequency Provider Last Rate Last Admin    0.9%  NaCl infusion    Continuous PRN Abdulaziz Carrasco  mL/hr at 05/03/24 1020 100 mL/hr at 05/03/24 1020    acetaminophen tablet 650 mg  650 mg Oral Q4H PRN Abdulaziz Carrasco MD        acetaminophen tablet 650 mg  650 mg Oral Q4H PRN Peyton Mendosa MD        [START ON 5/4/2024] amLODIPine tablet 5 mg  5 mg Oral Daily Peyton Mendosa MD        [START ON 5/4/2024] aspirin EC tablet 81 mg  81 mg Oral Daily Peyton Mendosa MD        [START ON 5/4/2024] EScitalopram oxalate tablet 10 mg  10 mg Oral Daily Peyton Mendosa MD        glucagon (human recombinant) injection 1 mg  1 mg Intramuscular PRN Peyton Mendosa MD        glucose chewable tablet 16 g  16 g Oral PRN Peyton Mendosa MD        glucose chewable tablet 24 g  24 g Oral PRN Peyton Mendosa MD        insulin aspart U-100 pen 0-5 Units  0-5 Units Subcutaneous QID (AC + HS) PRN Peyton Mendosa MD        [START ON 5/4/2024] isosorbide mononitrate 24 hr tablet 30 mg  30 mg Oral Daily Peyton Mendosa MD        [START ON 5/4/2024] losartan tablet 50 mg  50 mg Oral Daily Peyton Mendosa MD        naloxone 0.4 mg/mL injection 0.4 mg  0.4 mg Intravenous PRN Peyton Mendosa MD        nitroGLYCERIN SL tablet 0.4 mg  0.4 mg Sublingual Q5 Min PRN Peyton Mendosa MD        ondansetron disintegrating tablet 8 mg  8 mg Oral Q8H PRN Abdulaziz Carrasco MD        ondansetron injection 4 mg  4 mg Intravenous Q6H PRN Peyton Mendosa MD        pravastatin tablet 40 mg  40 mg Oral QHS Peyton Mendosa MD        sodium chloride 0.9% flush 10 mL  10 mL Intravenous PRN Abdulaziz Carrasco MD        sodium chloride 0.9% flush 10 mL  10 mL Intravenous Q12H PRN Peyton Mendosa MD           Review of patient's allergies indicates:   Allergen Reactions    Codeine        Past Medical History:   Diagnosis Date     Diabetes mellitus 2002     am 09/15/2017    DM (diabetes mellitus) 2002     am 09/21/2018    DM (diabetes mellitus) 2002     am 09/27/2019    DM (diabetes mellitus) 2002     am 09/10/2020    Hypertension      No past surgical history on file.  Family History       Problem Relation (Age of Onset)    Cataracts Mother    Diabetes Mother, Sister, Sister, Sister          Tobacco Use    Smoking status: Never    Smokeless tobacco: Current     Types: Chew   Substance and Sexual Activity    Alcohol use: No    Drug use: Not on file    Sexual activity: Not on file     Review of Systems   Constitutional:  Positive for activity change and fatigue.   HENT: Negative.     Eyes: Negative.    Respiratory:  Negative for cough and shortness of breath.    Cardiovascular:  Positive for chest pain and palpitations. Negative for leg swelling.   Gastrointestinal:  Positive for abdominal pain. Negative for anal bleeding, constipation and diarrhea.   Genitourinary:  Positive for difficulty urinating. Negative for hematuria.   Musculoskeletal:  Positive for back pain. Negative for arthralgias.   Skin: Negative.    Neurological:  Positive for headaches.   Hematological:  Bruises/bleeds easily.   Psychiatric/Behavioral: Negative.       Objective:     Vital Signs (Most Recent):  Temp: 97.9 °F (36.6 °C) (05/03/24 1340)  Pulse: 66 (05/03/24 1340)  Resp: 18 (05/03/24 1340)  BP: (!) 158/73 (05/03/24 1340)  SpO2: 98 % (05/03/24 1340) Vital Signs (24h Range):  Temp:  [97.9 °F (36.6 °C)] 97.9 °F (36.6 °C)  Pulse:  [66-68] 66  Resp:  [18] 18  SpO2:  [98 %-100 %] 98 %  BP: (130-167)/(63-83) 158/73     Weight: 65.8 kg (145 lb)  Body mass index is 20.81 kg/m².    SpO2: 98 %        Intake/Output - Last 3 Shifts       None             Lines/Drains/Airways       Peripheral Intravenous Line  Duration                  Peripheral IV - Single Lumen 05/03/24 0801 20 G Anterior;Proximal;Right Forearm <1 day                     STS Risk Score:   Pending     Physical Exam  Constitutional:       Appearance: Normal appearance.   HENT:      Head: Normocephalic and atraumatic.      Mouth/Throat:      Mouth: Mucous membranes are moist.   Eyes:      Extraocular Movements: Extraocular movements intact.      Conjunctiva/sclera: Conjunctivae normal.      Pupils: Pupils are equal, round, and reactive to light.   Cardiovascular:      Rate and Rhythm: Normal rate and regular rhythm.      Heart sounds: Normal heart sounds.   Pulmonary:      Effort: Pulmonary effort is normal.      Breath sounds: Normal breath sounds.   Abdominal:      General: Abdomen is flat. Bowel sounds are normal.      Palpations: Abdomen is soft.   Musculoskeletal:      Right lower leg: No edema.      Left lower leg: No edema.   Skin:     General: Skin is warm and dry.   Neurological:      Mental Status: He is alert and oriented to person, place, and time.   Psychiatric:         Behavior: Behavior normal.            Significant Labs:  All pertinent labs from the last 24 hours have been reviewed.    Significant Diagnostics:  I have reviewed all pertinent imaging results/findings within the past 24 hours.

## 2024-05-03 NOTE — CONSULTS
'[facility] [place])  Cardiothoracic Surgery  Consult Note    Patient Name: Feng Marques  MRN: [id number]  Admission Date: 5/3/2024  Attending Physician: Jeremías Shaver MD  Referring Provider: Abdulaziz Carrasco MD    Patient information was obtained from patient and past medical records.     Consults  Subjective:     Principal Problem: Coronary artery disease of native artery of native heart with stable angina pectoris    History of Present Illness: The patient is a 73-year-old male with hypertension, hyperlipidemia, diabetes and coronary artery disease status post stenting several years ago who presented for cardiac catheterization today as part of a workup for complaints of chest pain.  Patient has episodes of chest pain at mild exertion.  Chest pain frequency has been increasing over the past few weeks.  Patient denies any chest pain at rest.  Patient denies any orthopnea or PND.  Patient denies any ankle swelling.  Cardiac catheterization shows severe multivessel coronary artery disease with a 90% proximal LAD stenosis.    Current Facility-Administered Medications   Medication Dose Route Frequency Provider Last Rate Last Admin    0.9%  NaCl infusion    Continuous PRN Abdulaziz Carrasco  mL/hr at 05/03/24 1020 100 mL/hr at 05/03/24 1020    acetaminophen tablet 650 mg  650 mg Oral Q4H PRN Abdulaziz Carrasco MD        acetaminophen tablet 650 mg  650 mg Oral Q4H PRN Peyton Mendosa MD        [START ON 5/4/2024] amLODIPine tablet 5 mg  5 mg Oral Daily Peyton Mendosa MD        [START ON 5/4/2024] aspirin EC tablet 81 mg  81 mg Oral Daily Peyton Mendosa MD        [START ON 5/4/2024] EScitalopram oxalate tablet 10 mg  10 mg Oral Daily Peyton Mendosa MD        glucagon (human recombinant) injection 1 mg  1 mg Intramuscular PRN Peyton Mendosa MD        glucose chewable tablet 16 g  16 g Oral PRN Peyton Mendosa MD        glucose chewable tablet 24 g  24 g Oral PRN  Peyton Mendosa MD        insulin aspart U-100 pen 0-5 Units  0-5 Units Subcutaneous QID (AC + HS) PRN Peyton Mendosa MD        [START ON 5/4/2024] isosorbide mononitrate 24 hr tablet 30 mg  30 mg Oral Daily Peyton Mendosa MD        [START ON 5/4/2024] losartan tablet 50 mg  50 mg Oral Daily Peyton Mendosa MD        naloxone 0.4 mg/mL injection 0.4 mg  0.4 mg Intravenous PRN Peyton Mendosa MD        nitroGLYCERIN SL tablet 0.4 mg  0.4 mg Sublingual Q5 Min PRN Peyton Mendosa MD        ondansetron disintegrating tablet 8 mg  8 mg Oral Q8H PRN Abdulaziz Carrasco MD        ondansetron injection 4 mg  4 mg Intravenous Q6H PRN Peyton Mendosa MD        pravastatin tablet 40 mg  40 mg Oral QHS Peyton Mendosa MD        sodium chloride 0.9% flush 10 mL  10 mL Intravenous PRN Abdulaziz Carrasco MD        sodium chloride 0.9% flush 10 mL  10 mL Intravenous Q12H PRN Peyton Mendosa MD           Review of patient's allergies indicates:   Allergen Reactions    Codeine        Past Medical History:   Diagnosis Date    Diabetes mellitus 2002     am 09/15/2017    DM (diabetes mellitus) 2002     am 09/21/2018    DM (diabetes mellitus) 2002     am 09/27/2019    DM (diabetes mellitus) 2002     am 09/10/2020    Hypertension      No past surgical history on file.  Family History       Problem Relation (Age of Onset)    Cataracts Mother    Diabetes Mother, Sister, Sister, Sister          Tobacco Use    Smoking status: Never    Smokeless tobacco: Current     Types: Chew   Substance and Sexual Activity    Alcohol use: No    Drug use: Not on file    Sexual activity: Not on file     Review of Systems   Constitutional:  Positive for activity change and fatigue.   HENT: Negative.     Eyes: Negative.    Respiratory:  Negative for cough and shortness of breath.    Cardiovascular:  Positive for chest pain and palpitations. Negative for leg swelling.   Gastrointestinal:  Positive for  abdominal pain. Negative for anal bleeding, constipation and diarrhea.   Genitourinary:  Positive for difficulty urinating. Negative for hematuria.   Musculoskeletal:  Positive for back pain. Negative for arthralgias.   Skin: Negative.    Neurological:  Positive for headaches.   Hematological:  Bruises/bleeds easily.   Psychiatric/Behavioral: Negative.       Objective:     Vital Signs (Most Recent):  Temp: 97.9 °F (36.6 °C) (05/03/24 1340)  Pulse: 66 (05/03/24 1340)  Resp: 18 (05/03/24 1340)  BP: (!) 158/73 (05/03/24 1340)  SpO2: 98 % (05/03/24 1340) Vital Signs (24h Range):  Temp:  [97.9 °F (36.6 °C)] 97.9 °F (36.6 °C)  Pulse:  [66-68] 66  Resp:  [18] 18  SpO2:  [98 %-100 %] 98 %  BP: (130-167)/(63-83) 158/73     Weight: 65.8 kg (145 lb)  Body mass index is 20.81 kg/m².    SpO2: 98 %        Intake/Output - Last 3 Shifts       None             Lines/Drains/Airways       Peripheral Intravenous Line  Duration                  Peripheral IV - Single Lumen 05/03/24 0801 20 G Anterior;Proximal;Right Forearm <1 day                     STS Risk Score:  Pending     Physical Exam  Constitutional:       Appearance: Normal appearance.   HENT:      Head: Normocephalic and atraumatic.      Mouth/Throat:      Mouth: Mucous membranes are moist.   Eyes:      Extraocular Movements: Extraocular movements intact.      Conjunctiva/sclera: Conjunctivae normal.      Pupils: Pupils are equal, round, and reactive to light.   Cardiovascular:      Rate and Rhythm: Normal rate and regular rhythm.      Heart sounds: Normal heart sounds.   Pulmonary:      Effort: Pulmonary effort is normal.      Breath sounds: Normal breath sounds.   Abdominal:      General: Abdomen is flat. Bowel sounds are normal.      Palpations: Abdomen is soft.   Musculoskeletal:      Right lower leg: No edema.      Left lower leg: No edema.   Skin:     General: Skin is warm and dry.   Neurological:      Mental Status: He is alert and oriented to person, place, and time.    Psychiatric:         Behavior: Behavior normal.            Significant Labs:  All pertinent labs from the last 24 hours have been reviewed.    Significant Diagnostics:  I have reviewed all pertinent imaging results/findings within the past 24 hours.    Assessment/Plan:     NYHA Score: NYHA III: marked limitation of physical activity, comfortable at rest    * Coronary artery disease of native artery of native heart with stable angina pectoris  The patient is a 73-year-old male with coronary artery disease status post stenting, diabetes, hyperlipidemia and hypertension who was worked up for chest pain with cardiac catheterization.  Cardiac catheterization shows severe multivessel coronary artery disease with a 90% proximal LAD stenosis.  The patient is a candidate for urgent coronary artery bypass grafting.  The risks and benefits of surgery have been explained to the patient.  The patient understands the risks and benefits of surgery and has agreed to proceed with urgent coronary artery bypass grafting which will be scheduled for 05/06/2024.        Thank you for your consult. I will follow-up with patient. Please contact us if you have any additional questions.    Maury Amato MD  Cardiothoracic Surgery  '[place] - Telemetry ([facility])

## 2024-05-03 NOTE — NURSING
Report to to Rosario RN    S/p L HC  Surgical consult pending  Completed NS while in recovery.    L wrist site benign w/splint in place    VS per flowsheet    A & O X 3 Cooperative/Communicative  Bedrest completed while in CVRU    Transported per 2 RN's  via hospital bed  SR per monitor

## 2024-05-03 NOTE — NURSING
All air removed from TR Band successfully.  Gauze dsg/clear tape applied  Splint reapplied.  Ambulated w/o diff to bathroom and reported voiding.  In good spirits  dtr-in-law remains at bedside.

## 2024-05-03 NOTE — H&P (VIEW-ONLY)
'[facility] [place])  Cardiothoracic Surgery  Consult Note    Patient Name: Feng Marques  MRN: [id number]  Admission Date: 5/3/2024  Attending Physician: Jeremías Shaver MD  Referring Provider: Abdulaziz Carrasco MD    Patient information was obtained from patient and past medical records.     Consults  Subjective:     Principal Problem: Coronary artery disease of native artery of native heart with stable angina pectoris    History of Present Illness: The patient is a 73-year-old male with hypertension, hyperlipidemia, diabetes and coronary artery disease status post stenting several years ago who presented for cardiac catheterization today as part of a workup for complaints of chest pain.  Patient has episodes of chest pain at mild exertion.  Chest pain frequency has been increasing over the past few weeks.  Patient denies any chest pain at rest.  Patient denies any orthopnea or PND.  Patient denies any ankle swelling.  Cardiac catheterization shows severe multivessel coronary artery disease with a 90% proximal LAD stenosis.    Current Facility-Administered Medications   Medication Dose Route Frequency Provider Last Rate Last Admin    0.9%  NaCl infusion    Continuous PRN Abdulaziz Carrasco  mL/hr at 05/03/24 1020 100 mL/hr at 05/03/24 1020    acetaminophen tablet 650 mg  650 mg Oral Q4H PRN Abdulaziz Carrasco MD        acetaminophen tablet 650 mg  650 mg Oral Q4H PRN Peyton Mendosa MD        [START ON 5/4/2024] amLODIPine tablet 5 mg  5 mg Oral Daily Peyton Mendosa MD        [START ON 5/4/2024] aspirin EC tablet 81 mg  81 mg Oral Daily Peyton Mendosa MD        [START ON 5/4/2024] EScitalopram oxalate tablet 10 mg  10 mg Oral Daily Peyton Mendosa MD        glucagon (human recombinant) injection 1 mg  1 mg Intramuscular PRN Peyton Mendosa MD        glucose chewable tablet 16 g  16 g Oral PRN Peyton Mendosa MD        glucose chewable tablet 24 g  24 g Oral PRN  Peyton Mendosa MD        insulin aspart U-100 pen 0-5 Units  0-5 Units Subcutaneous QID (AC + HS) PRN Peyton Mendosa MD        [START ON 5/4/2024] isosorbide mononitrate 24 hr tablet 30 mg  30 mg Oral Daily Peyton Mendosa MD        [START ON 5/4/2024] losartan tablet 50 mg  50 mg Oral Daily Peyton Mendosa MD        naloxone 0.4 mg/mL injection 0.4 mg  0.4 mg Intravenous PRN Peyton Mendosa MD        nitroGLYCERIN SL tablet 0.4 mg  0.4 mg Sublingual Q5 Min PRN Peyton Mendosa MD        ondansetron disintegrating tablet 8 mg  8 mg Oral Q8H PRN Abdulaziz Carrasco MD        ondansetron injection 4 mg  4 mg Intravenous Q6H PRN Peyton Mendosa MD        pravastatin tablet 40 mg  40 mg Oral QHS Peyton Mendosa MD        sodium chloride 0.9% flush 10 mL  10 mL Intravenous PRN Abdulaziz Carrasco MD        sodium chloride 0.9% flush 10 mL  10 mL Intravenous Q12H PRN Peyton Mendosa MD           Review of patient's allergies indicates:   Allergen Reactions    Codeine        Past Medical History:   Diagnosis Date    Diabetes mellitus 2002     am 09/15/2017    DM (diabetes mellitus) 2002     am 09/21/2018    DM (diabetes mellitus) 2002     am 09/27/2019    DM (diabetes mellitus) 2002     am 09/10/2020    Hypertension      No past surgical history on file.  Family History       Problem Relation (Age of Onset)    Cataracts Mother    Diabetes Mother, Sister, Sister, Sister          Tobacco Use    Smoking status: Never    Smokeless tobacco: Current     Types: Chew   Substance and Sexual Activity    Alcohol use: No    Drug use: Not on file    Sexual activity: Not on file     Review of Systems   Constitutional:  Positive for activity change and fatigue.   HENT: Negative.     Eyes: Negative.    Respiratory:  Negative for cough and shortness of breath.    Cardiovascular:  Positive for chest pain and palpitations. Negative for leg swelling.   Gastrointestinal:  Positive for  abdominal pain. Negative for anal bleeding, constipation and diarrhea.   Genitourinary:  Positive for difficulty urinating. Negative for hematuria.   Musculoskeletal:  Positive for back pain. Negative for arthralgias.   Skin: Negative.    Neurological:  Positive for headaches.   Hematological:  Bruises/bleeds easily.   Psychiatric/Behavioral: Negative.       Objective:     Vital Signs (Most Recent):  Temp: 97.9 °F (36.6 °C) (05/03/24 1340)  Pulse: 66 (05/03/24 1340)  Resp: 18 (05/03/24 1340)  BP: (!) 158/73 (05/03/24 1340)  SpO2: 98 % (05/03/24 1340) Vital Signs (24h Range):  Temp:  [97.9 °F (36.6 °C)] 97.9 °F (36.6 °C)  Pulse:  [66-68] 66  Resp:  [18] 18  SpO2:  [98 %-100 %] 98 %  BP: (130-167)/(63-83) 158/73     Weight: 65.8 kg (145 lb)  Body mass index is 20.81 kg/m².    SpO2: 98 %        Intake/Output - Last 3 Shifts       None             Lines/Drains/Airways       Peripheral Intravenous Line  Duration                  Peripheral IV - Single Lumen 05/03/24 0801 20 G Anterior;Proximal;Right Forearm <1 day                     STS Risk Score:  Pending     Physical Exam  Constitutional:       Appearance: Normal appearance.   HENT:      Head: Normocephalic and atraumatic.      Mouth/Throat:      Mouth: Mucous membranes are moist.   Eyes:      Extraocular Movements: Extraocular movements intact.      Conjunctiva/sclera: Conjunctivae normal.      Pupils: Pupils are equal, round, and reactive to light.   Cardiovascular:      Rate and Rhythm: Normal rate and regular rhythm.      Heart sounds: Normal heart sounds.   Pulmonary:      Effort: Pulmonary effort is normal.      Breath sounds: Normal breath sounds.   Abdominal:      General: Abdomen is flat. Bowel sounds are normal.      Palpations: Abdomen is soft.   Musculoskeletal:      Right lower leg: No edema.      Left lower leg: No edema.   Skin:     General: Skin is warm and dry.   Neurological:      Mental Status: He is alert and oriented to person, place, and time.    Psychiatric:         Behavior: Behavior normal.            Significant Labs:  All pertinent labs from the last 24 hours have been reviewed.    Significant Diagnostics:  I have reviewed all pertinent imaging results/findings within the past 24 hours.    Assessment/Plan:     NYHA Score: NYHA III: marked limitation of physical activity, comfortable at rest    * Coronary artery disease of native artery of native heart with stable angina pectoris  The patient is a 73-year-old male with coronary artery disease status post stenting, diabetes, hyperlipidemia and hypertension who was worked up for chest pain with cardiac catheterization.  Cardiac catheterization shows severe multivessel coronary artery disease with a 90% proximal LAD stenosis.  The patient is a candidate for urgent coronary artery bypass grafting.  The risks and benefits of surgery have been explained to the patient.  The patient understands the risks and benefits of surgery and has agreed to proceed with urgent coronary artery bypass grafting which will be scheduled for 05/06/2024.        Thank you for your consult. I will follow-up with patient. Please contact us if you have any additional questions.    Maury Amato MD  Cardiothoracic Surgery  '[place] - Telemetry ([facility])

## 2024-05-03 NOTE — HPI
The patient is a 74 yo male with past medical of CAD, diabetes, HLD and HTN who presented to the hospital for scheduled left heart cath. He has history of stent placement years ago. The patient states he was having chest pain with exertion. The chest pain is midsternal and lasts until he takes nitroglycerin. He reports the chest pain was occurring more frequently. He was being medically managed based upon last stress test in 2022. Cardiology brought in for left heart cath. Multiple blockages noted and not amenable to PCI. Cardiothoracic surgery and hospital medicine consulted.

## 2024-05-03 NOTE — ASSESSMENT & PLAN NOTE
Chronic, . Latest blood pressure and vitals reviewed-     Temp:  [97.9 °F (36.6 °C)]   Pulse:  [68]   Resp:  [18]   BP: (130-140)/(63-66)   SpO2:  [100 %] .   Home meds for hypertension were reviewed and noted below.   Hypertension Medications               amLODIPine (NORVASC) 5 MG tablet Take 5 mg by mouth once daily.    isosorbide mononitrate (IMDUR) 30 MG 24 hr tablet Take 1 tablet (30 mg total) by mouth once daily.    losartan (COZAAR) 100 MG tablet Take 50 mg by mouth 2 (two) times a day.    nitroGLYCERIN (NITROSTAT) 0.3 MG SL tablet Place 1 tablet (0.3 mg total) under the tongue every 5 (five) minutes as needed for Chest pain.            While in the hospital, will manage blood pressure as follows; Continue home antihypertensive regimen

## 2024-05-03 NOTE — HPI
The patient is a 73-year-old male with hypertension, hyperlipidemia, diabetes and coronary artery disease status post stenting several years ago who presented for cardiac catheterization today as part of a workup for complaints of chest pain.  Patient has episodes of chest pain at mild exertion.  Chest pain frequency has been increasing over the past few weeks.  Patient denies any chest pain at rest.  Patient denies any orthopnea or PND.  Patient denies any ankle swelling.  Cardiac catheterization shows severe multivessel coronary artery disease with a 90% proximal LAD stenosis.

## 2024-05-03 NOTE — NURSING
Rec'd to CRISSY MAHONEY  A & O X 3 Neuro assessment performed/benign.  NS at 100/hr  L wrist TR Band w/splint in place.  Site benign  Instructed to keep l wrist immobilized.  Verbalized understanding.

## 2024-05-03 NOTE — Clinical Note
The catheter was inserted into the aorta. An angiography was performed of the aorta. The angiography was performed via power injection. The injected amount was 25 mL contrast at 15 mL/s.

## 2024-05-03 NOTE — SUBJECTIVE & OBJECTIVE
Past Medical History:   Diagnosis Date    Diabetes mellitus 2002     am 09/15/2017    DM (diabetes mellitus) 2002     am 09/21/2018    DM (diabetes mellitus) 2002     am 09/27/2019    DM (diabetes mellitus) 2002     am 09/10/2020    Hypertension        No past surgical history on file.    Review of patient's allergies indicates:   Allergen Reactions    Codeine        Current Facility-Administered Medications   Medication Dose Route Frequency Provider Last Rate Last Admin    0.9%  NaCl infusion    Continuous PRN Abdulaziz Carrasco  mL/hr at 05/03/24 1020 100 mL/hr at 05/03/24 1020    0.9%  NaCl infusion   Intravenous Continuous Abdulaziz Carrasco MD        acetaminophen tablet 650 mg  650 mg Oral Q4H PRAbdulaziz Altamirano MD        acetaminophen tablet 650 mg  650 mg Oral Q4H PRN Peyton Mendosa MD        fentaNYL 50 mcg/mL injection    PRN Abdulaziz Carrasco MD   25 mcg at 05/03/24 1054    glucagon (human recombinant) injection 1 mg  1 mg Intramuscular PRN Peyton Mendosa MD        glucose chewable tablet 16 g  16 g Oral PRN Peyton Mendosa MD        glucose chewable tablet 24 g  24 g Oral PRN Peyton Mendosa MD        heparin (porcine) injection    PRN Abdulaziz Carrasco MD   3,000 Units at 05/03/24 1024    insulin aspart U-100 pen 0-5 Units  0-5 Units Subcutaneous QID (AC + HS) PRPeyton Cabrera MD        iohexoL (OMNIPAQUE 300) injection    PRN Abdulaziz Carrasco MD   135 mL at 05/03/24 1044    LIDOcaine (PF) 20 mg/mL (2%) injection    PRAbdulaziz Altamirano MD   5 mL at 05/03/24 1023    midazolam (VERSED) 1 mg/mL injection    PRAbdulaziz Altamirano MD   0.5 mg at 05/03/24 1043    naloxone 0.4 mg/mL injection 0.4 mg  0.4 mg Intravenous PRN Peyton Mendosa MD        nitroGLYCERIN injection    PRAbdulaziz Altamirano MD   200 mcg at 05/03/24 1044    nitroGLYCERIN SL tablet 0.4 mg  0.4 mg Sublingual Q5 Min PRN Peyton Mendosa MD        ondansetron  disintegrating tablet 8 mg  8 mg Oral Q8H PRN Abdulaziz Carrasco MD        ondansetron injection 4 mg  4 mg Intravenous Q6H PRN Peyton Mendosa MD        sodium chloride 0.9% flush 10 mL  10 mL Intravenous PRN Abdulaziz Carrasco MD        sodium chloride 0.9% flush 10 mL  10 mL Intravenous Q12H PRN Peyton Mendosa MD        verapamiL injection    PRN Abdulaziz Carrasco MD   2.5 mg at 05/03/24 1044     Family History       Problem Relation (Age of Onset)    Cataracts Mother    Diabetes Mother, Sister, Sister, Sister          Tobacco Use    Smoking status: Never    Smokeless tobacco: Current     Types: Chew   Substance and Sexual Activity    Alcohol use: No    Drug use: Not on file    Sexual activity: Not on file     Review of Systems   Constitutional:  Positive for activity change.   Cardiovascular:  Positive for chest pain.   All other systems reviewed and are negative.    Objective:     Vital Signs (Most Recent):  Temp: 97.9 °F (36.6 °C) (05/03/24 0750)  Pulse: 68 (05/03/24 0750)  Resp: 18 (05/03/24 0750)  BP: 135/65 (05/03/24 1115)  SpO2: 100 % (05/03/24 0750) Vital Signs (24h Range):  Temp:  [97.9 °F (36.6 °C)] 97.9 °F (36.6 °C)  Pulse:  [68] 68  Resp:  [18] 18  SpO2:  [100 %] 100 %  BP: (130-140)/(63-66) 135/65     Weight: 65.8 kg (145 lb)  Body mass index is 20.81 kg/m².     Physical Exam  HENT:      Head: Normocephalic and atraumatic.   Cardiovascular:      Rate and Rhythm: Normal rate and regular rhythm.      Heart sounds: No murmur heard.  Pulmonary:      Effort: Pulmonary effort is normal. No respiratory distress.      Breath sounds: Normal breath sounds. No wheezing.   Abdominal:      General: Bowel sounds are normal. There is no distension.      Palpations: Abdomen is soft.      Tenderness: There is no abdominal tenderness.   Musculoskeletal:         General: No swelling.   Skin:     General: Skin is warm and dry.   Neurological:      Mental Status: He is alert and oriented to person, place, and  time. Mental status is at baseline.                Significant Labs: All pertinent labs within the past 24 hours have been reviewed.  Recent Lab Results         05/03/24  1058   05/03/24  0806        Cath EF Quantitative 70         POCT Glucose   152               Significant Imaging: I have reviewed all pertinent imaging results/findings within the past 24 hours.

## 2024-05-03 NOTE — ASSESSMENT & PLAN NOTE
"Patient's FSGs are controlled on current medication regimen.  Last A1c reviewed- No results found for: "LABA1C", "HGBA1C"  Most recent fingerstick glucose reviewed-   Recent Labs   Lab 05/03/24  0806   POCTGLUCOSE 152*     Current correctional scale  Low  Maintain anti-hyperglycemic dose as follows-   Antihyperglycemics (From admission, onward)      Start     Stop Route Frequency Ordered    05/03/24 1223  insulin aspart U-100 pen 0-5 Units         -- SubQ Before meals & nightly PRN 05/03/24 1124          Hold Oral hypoglycemics while patient is in the hospital.      "

## 2024-05-03 NOTE — H&P
[facility] Lab ([facility] Medicine  History & Physical    Patient Name: Feng Marques  MRN: [id number]  Patient Class: IP- Inpatient  Admission Date: 5/3/2024  Attending Physician: Peyton Mendosa MD   Primary Care Provider: Maciel Enriquez MD         Patient information was obtained from patient, past medical records, and ER records.     Subjective:     Principal Problem:Coronary artery disease of native artery of native heart with stable angina pectoris    Chief Complaint: No chief complaint on file.       HPI: The patient is a 74 yo male with past medical of CAD, diabetes, HLD and HTN who presented to the hospital for scheduled left heart cath. He has history of stent placement years ago. The patient states he was having chest pain with exertion. The chest pain is midsternal and lasts until he takes nitroglycerin. He reports the chest pain was occurring more frequently. He was being medically managed based upon last stress test in 2022. Cardiology brought in for left heart cath. Multiple blockages noted and not amenable to PCI. Cardiothoracic surgery and hospital medicine consulted.     Past Medical History:   Diagnosis Date    Diabetes mellitus 2002     am 09/15/2017    DM (diabetes mellitus) 2002     am 09/21/2018    DM (diabetes mellitus) 2002     am 09/27/2019    DM (diabetes mellitus) 2002     am 09/10/2020    Hypertension        No past surgical history on file.    Review of patient's allergies indicates:   Allergen Reactions    Codeine        Current Facility-Administered Medications   Medication Dose Route Frequency Provider Last Rate Last Admin    0.9%  NaCl infusion    Continuous PRN Abdulaziz Carrasco  mL/hr at 05/03/24 1020 100 mL/hr at 05/03/24 1020    0.9%  NaCl infusion   Intravenous Continuous Abdulaziz Carrasco MD        acetaminophen tablet 650 mg  650 mg Oral Q4H PRN Abdulaziz Carrasco MD        acetaminophen tablet 650 mg  650 mg Oral Q4H  PRPeyton Cabrera MD        fentaNYL 50 mcg/mL injection    PRAbdulaziz Altamirano MD   25 mcg at 05/03/24 1054    glucagon (human recombinant) injection 1 mg  1 mg Intramuscular PRN Peyton Mendosa MD        glucose chewable tablet 16 g  16 g Oral PRN Peyton Mendosa MD        glucose chewable tablet 24 g  24 g Oral PRN Peyton Mendosa MD        heparin (porcine) injection    PRAbdulaziz Altamirano MD   3,000 Units at 05/03/24 1024    insulin aspart U-100 pen 0-5 Units  0-5 Units Subcutaneous QID (AC + HS) PRPeyton Cabrera MD        iohexoL (OMNIPAQUE 300) injection    PRAbdulaziz Altamirano MD   135 mL at 05/03/24 1044    LIDOcaine (PF) 20 mg/mL (2%) injection    PRAbdulaziz Altamirano MD   5 mL at 05/03/24 1023    midazolam (VERSED) 1 mg/mL injection    PRAbdulaziz Altamirano MD   0.5 mg at 05/03/24 1043    naloxone 0.4 mg/mL injection 0.4 mg  0.4 mg Intravenous PRPeyton Cabrera MD        nitroGLYCERIN injection    PRAbdulaziz Altamirano MD   200 mcg at 05/03/24 1044    nitroGLYCERIN SL tablet 0.4 mg  0.4 mg Sublingual Q5 Min PRPeyton Cabrera MD        ondansetron disintegrating tablet 8 mg  8 mg Oral Q8H PRAbdulaziz Altamirano MD        ondansetron injection 4 mg  4 mg Intravenous Q6H PRN Peyton Mendosa MD        sodium chloride 0.9% flush 10 mL  10 mL Intravenous PRAbdulaziz Altamirano MD        sodium chloride 0.9% flush 10 mL  10 mL Intravenous Q12H PRPeyton Cabrera MD        verapamiL injection    PRAbdulaziz Altamirano MD   2.5 mg at 05/03/24 1044     Family History       Problem Relation (Age of Onset)    Cataracts Mother    Diabetes Mother, Sister, Sister, Sister          Tobacco Use    Smoking status: Never    Smokeless tobacco: Current     Types: Chew   Substance and Sexual Activity    Alcohol use: No    Drug use: Not on file    Sexual activity: Not on file     Review of Systems   Constitutional:  Positive for activity change.   Cardiovascular:   Positive for chest pain.   All other systems reviewed and are negative.    Objective:     Vital Signs (Most Recent):  Temp: 97.9 °F (36.6 °C) (05/03/24 0750)  Pulse: 68 (05/03/24 0750)  Resp: 18 (05/03/24 0750)  BP: 135/65 (05/03/24 1115)  SpO2: 100 % (05/03/24 0750) Vital Signs (24h Range):  Temp:  [97.9 °F (36.6 °C)] 97.9 °F (36.6 °C)  Pulse:  [68] 68  Resp:  [18] 18  SpO2:  [100 %] 100 %  BP: (130-140)/(63-66) 135/65     Weight: 65.8 kg (145 lb)  Body mass index is 20.81 kg/m².     Physical Exam  HENT:      Head: Normocephalic and atraumatic.   Cardiovascular:      Rate and Rhythm: Normal rate and regular rhythm.      Heart sounds: No murmur heard.  Pulmonary:      Effort: Pulmonary effort is normal. No respiratory distress.      Breath sounds: Normal breath sounds. No wheezing.   Abdominal:      General: Bowel sounds are normal. There is no distension.      Palpations: Abdomen is soft.      Tenderness: There is no abdominal tenderness.   Musculoskeletal:         General: No swelling.   Skin:     General: Skin is warm and dry.   Neurological:      Mental Status: He is alert and oriented to person, place, and time. Mental status is at baseline.                Significant Labs: All pertinent labs within the past 24 hours have been reviewed.  Recent Lab Results         05/03/24  1058   05/03/24  0806        Cath EF Quantitative 70         POCT Glucose   152               Significant Imaging: I have reviewed all pertinent imaging results/findings within the past 24 hours.  Assessment/Plan:     * Coronary artery disease of native artery of native heart with stable angina pectoris  Patient with known CAD s/p stent placement, which is uncontrolled Will continue ASA and Statin and monitor for S/Sx of angina/ACS. Continue to monitor on telemetry.   -s/p left heart cath with multivessel disease  -cardiothoracic surgery consulted  -evaluation for CABG  -prn NTG    Other hyperlipidemia  -continue statin      Type 2 diabetes  "mellitus with complication, without long-term current use of insulin  Patient's FSGs are controlled on current medication regimen.  Last A1c reviewed- No results found for: "LABA1C", "HGBA1C"  Most recent fingerstick glucose reviewed-   Recent Labs   Lab 05/03/24  0806   POCTGLUCOSE 152*     Current correctional scale  Low  Maintain anti-hyperglycemic dose as follows-   Antihyperglycemics (From admission, onward)      Start     Stop Route Frequency Ordered    05/03/24 1223  insulin aspart U-100 pen 0-5 Units         -- SubQ Before meals & nightly PRN 05/03/24 1124          Hold Oral hypoglycemics while patient is in the hospital.        Essential hypertension  Chronic, . Latest blood pressure and vitals reviewed-     Temp:  [97.9 °F (36.6 °C)]   Pulse:  [68]   Resp:  [18]   BP: (130-140)/(63-66)   SpO2:  [100 %] .   Home meds for hypertension were reviewed and noted below.   Hypertension Medications               amLODIPine (NORVASC) 5 MG tablet Take 5 mg by mouth once daily.    isosorbide mononitrate (IMDUR) 30 MG 24 hr tablet Take 1 tablet (30 mg total) by mouth once daily.    losartan (COZAAR) 100 MG tablet Take 50 mg by mouth 2 (two) times a day.    nitroGLYCERIN (NITROSTAT) 0.3 MG SL tablet Place 1 tablet (0.3 mg total) under the tongue every 5 (five) minutes as needed for Chest pain.            While in the hospital, will manage blood pressure as follows; Continue home antihypertensive regimen        VTE Risk Mitigation (From admission, onward)           Ordered     IP VTE HIGH RISK PATIENT  Once         05/03/24 1124     Place sequential compression device  Until discontinued         05/03/24 1124     heparin (porcine) injection  As needed (PRN)         05/03/24 1024                                    Peyton Mendosa MD  Department of Hospital Medicine  [facility] ([facility])          "

## 2024-05-04 LAB
ALBUMIN SERPL BCP-MCNC: 3.6 G/DL (ref 3.5–5.2)
ALP SERPL-CCNC: 71 U/L (ref 55–135)
ALT SERPL W/O P-5'-P-CCNC: 26 U/L (ref 10–44)
ANION GAP SERPL CALC-SCNC: 7 MMOL/L (ref 8–16)
AST SERPL-CCNC: 23 U/L (ref 10–40)
BASOPHILS # BLD AUTO: 0.04 K/UL (ref 0–0.2)
BASOPHILS NFR BLD: 0.6 % (ref 0–1.9)
BILIRUB SERPL-MCNC: 0.3 MG/DL (ref 0.1–1)
BNP SERPL-MCNC: 489 PG/ML (ref 0–99)
BUN SERPL-MCNC: 21 MG/DL (ref 8–23)
CALCIUM SERPL-MCNC: 9.5 MG/DL (ref 8.7–10.5)
CHLORIDE SERPL-SCNC: 108 MMOL/L (ref 95–110)
CO2 SERPL-SCNC: 23 MMOL/L (ref 23–29)
CREAT SERPL-MCNC: 1.2 MG/DL (ref 0.5–1.4)
DIFFERENTIAL METHOD BLD: ABNORMAL
EOSINOPHIL # BLD AUTO: 0.5 K/UL (ref 0–0.5)
EOSINOPHIL NFR BLD: 6.9 % (ref 0–8)
ERYTHROCYTE [DISTWIDTH] IN BLOOD BY AUTOMATED COUNT: 13 % (ref 11.5–14.5)
EST. GFR  (NO RACE VARIABLE): >60 ML/MIN/1.73 M^2
GLUCOSE SERPL-MCNC: 146 MG/DL (ref 70–110)
HCT VFR BLD AUTO: 43 % (ref 40–54)
HGB BLD-MCNC: 13.9 G/DL (ref 14–18)
IMM GRANULOCYTES # BLD AUTO: 0.02 K/UL (ref 0–0.04)
IMM GRANULOCYTES NFR BLD AUTO: 0.3 % (ref 0–0.5)
LYMPHOCYTES # BLD AUTO: 2.1 K/UL (ref 1–4.8)
LYMPHOCYTES NFR BLD: 29.4 % (ref 18–48)
MAGNESIUM SERPL-MCNC: 1.9 MG/DL (ref 1.6–2.6)
MCH RBC QN AUTO: 30.8 PG (ref 27–31)
MCHC RBC AUTO-ENTMCNC: 32.3 G/DL (ref 32–36)
MCV RBC AUTO: 95 FL (ref 82–98)
MONOCYTES # BLD AUTO: 0.6 K/UL (ref 0.3–1)
MONOCYTES NFR BLD: 8.7 % (ref 4–15)
NEUTROPHILS # BLD AUTO: 3.9 K/UL (ref 1.8–7.7)
NEUTROPHILS NFR BLD: 54.1 % (ref 38–73)
NRBC BLD-RTO: 0 /100 WBC
PLATELET # BLD AUTO: 175 K/UL (ref 150–450)
PMV BLD AUTO: 10.9 FL (ref 9.2–12.9)
POCT GLUCOSE: 137 MG/DL (ref 70–110)
POCT GLUCOSE: 212 MG/DL (ref 70–110)
POCT GLUCOSE: 89 MG/DL (ref 70–110)
POTASSIUM SERPL-SCNC: 3.8 MMOL/L (ref 3.5–5.1)
PROT SERPL-MCNC: 6.9 G/DL (ref 6–8.4)
RBC # BLD AUTO: 4.51 M/UL (ref 4.6–6.2)
SODIUM SERPL-SCNC: 138 MMOL/L (ref 136–145)
WBC # BLD AUTO: 7.21 K/UL (ref 3.9–12.7)

## 2024-05-04 PROCEDURE — 21400001 HC TELEMETRY ROOM

## 2024-05-04 PROCEDURE — 83735 ASSAY OF MAGNESIUM: CPT | Performed by: INTERNAL MEDICINE

## 2024-05-04 PROCEDURE — 99232 SBSQ HOSP IP/OBS MODERATE 35: CPT | Mod: ,,, | Performed by: INTERNAL MEDICINE

## 2024-05-04 PROCEDURE — 36415 COLL VENOUS BLD VENIPUNCTURE: CPT | Performed by: INTERNAL MEDICINE

## 2024-05-04 PROCEDURE — 63600175 PHARM REV CODE 636 W HCPCS: Performed by: INTERNAL MEDICINE

## 2024-05-04 PROCEDURE — 85025 COMPLETE CBC W/AUTO DIFF WBC: CPT | Performed by: INTERNAL MEDICINE

## 2024-05-04 PROCEDURE — 25000003 PHARM REV CODE 250: Performed by: INTERNAL MEDICINE

## 2024-05-04 PROCEDURE — 80053 COMPREHEN METABOLIC PANEL: CPT | Performed by: INTERNAL MEDICINE

## 2024-05-04 PROCEDURE — 83880 ASSAY OF NATRIURETIC PEPTIDE: CPT | Performed by: THORACIC SURGERY (CARDIOTHORACIC VASCULAR SURGERY)

## 2024-05-04 RX ADMIN — INSULIN ASPART 2 UNITS: 100 INJECTION, SOLUTION INTRAVENOUS; SUBCUTANEOUS at 11:05

## 2024-05-04 RX ADMIN — ESCITALOPRAM OXALATE 10 MG: 10 TABLET ORAL at 08:05

## 2024-05-04 RX ADMIN — INSULIN ASPART 2 UNITS: 100 INJECTION, SOLUTION INTRAVENOUS; SUBCUTANEOUS at 03:05

## 2024-05-04 RX ADMIN — LOSARTAN POTASSIUM 50 MG: 50 TABLET, FILM COATED ORAL at 08:05

## 2024-05-04 RX ADMIN — ASPIRIN 81 MG: 81 TABLET, COATED ORAL at 08:05

## 2024-05-04 RX ADMIN — PRAVASTATIN SODIUM 40 MG: 20 TABLET ORAL at 08:05

## 2024-05-04 RX ADMIN — ISOSORBIDE MONONITRATE 30 MG: 30 TABLET, EXTENDED RELEASE ORAL at 08:05

## 2024-05-04 RX ADMIN — AMLODIPINE BESYLATE 5 MG: 5 TABLET ORAL at 08:05

## 2024-05-04 NOTE — PROGRESS NOTES
"[facility] - Tele[facility] ([facility] Medicine  Progress Note     Patient Name:  Feng Marques  MRN:  [id number]  Patient Class: IP-Inpatient  Admission Date:  5/3/2024   Length of Stay:  1  Attending Physician: Jose De La Cruz MD  Primary Care Provider: Maciel Enriquez MD    Subjective:      Subsequent Care: Follow up Coronary artery disease of native artery of native heart with stable angina pectoris        HPI:  The patient is a 74 yo male with past medical of CAD, diabetes, HLD and HTN who presented to the hospital for scheduled left heart cath. He has history of stent placement years ago. The patient states he was having chest pain with exertion. The chest pain is midsternal and lasts until he takes nitroglycerin. He reports the chest pain was occurring more frequently. He was being medically managed based upon last stress test in 2022. Cardiology brought in for left heart cath. Multiple blockages noted and not amenable to PCI. Cardiothoracic surgery and hospital medicine consulted.       Interval Hx  NAEON.     Objective  BP (!) 152/70   Pulse (!) 57   Temp 97.9 °F (36.6 °C)   Resp 18   Ht 5' 10" (1.778 m)   Wt 65.8 kg (145 lb)   SpO2 97%   BMI 20.81 kg/m²     Intake/Output Summary (Last 24 hours) at 5/4/2024 1056  Last data filed at 5/4/2024 0830  Gross per 24 hour   Intake 540 ml   Output --   Net 540 ml       PHYSICAL EXAM  Vitals reviewed  GEN: No acute distress, pleasant, body habitus normal  HEENT: atraumatic and normocephalic  CARDS: regular rate and rhythm, no m/g, pulses palpable in LE  PULM: breathing comfortably on room air, chest symmetric, nonlabored, no abnormal breath sounds on auscultation  ABD: nontender, nondistended, soft, no organomegaly, BS+  Neuro: Alert and oriented x3, CN's I-IX grossly intact, sensation and motor intact; follows directions and answers questions appropriately    LABS  All labs from the past 24 hours were reviewed.     BMP:   Recent Labs   Lab " "05/04/24  0534   *      K 3.8      CO2 23   BUN 21   CREATININE 1.2   CALCIUM 9.5   MG 1.9     CBC:   Recent Labs   Lab 05/04/24  0534   WBC 7.21   HGB 13.9*   HCT 43.0        CMP:   Recent Labs   Lab 05/03/24  1349 05/04/24  0534   * 138   K 4.7 3.8    108   CO2 23 23   * 146*   BUN 21 21   CREATININE 1.2 1.2   CALCIUM 9.4 9.5   PROT 6.7 6.9   ALBUMIN 3.6 3.6   BILITOT 0.5 0.3   ALKPHOS 70 71   AST 23 23   ALT 25 26   ANIONGAP 7* 7*     Cardiac Markers:   Recent Labs   Lab 05/04/24  0534   *     Coagulation: No results for input(s): "PT", "INR", "APTT" in the last 48 hours.  Lactic Acid: No results for input(s): "LACTATE" in the last 48 hours.  Magnesium:   Recent Labs   Lab 05/04/24  0534   MG 1.9     Troponin: No results for input(s): "TROPONINI", "TROPONINIHS" in the last 48 hours.  TSH:   No results for input(s): "TSH" in the last 4320 hours.  Urine Studies:   No results for input(s): "COLORU", "APPEARANCEUA", "PHUR", "SPECGRAV", "PROTEINUA", "GLUCUA", "KETONESU", "BILIRUBINUA", "OCCULTUA", "NITRITE", "UROBILINOGEN", "LEUKOCYTESUR", "RBCUA", "WBCUA", "BACTERIA", "SQUAMEPITHEL", "HYALINECASTS" in the last 48 hours.    Invalid input(s): "WRIGHTSUR"    IMAGING  All imaging from the past 24 hours were reviewed.         Assessment/Plan:     Coronary artery disease of native artery of native heart with stable angina pectoris  Patient with known CAD s/p stent placement, which is uncontrolled Will continue ASA and Statin and monitor for S/Sx of angina/ACS. Continue to monitor on telemetry.   -s/p left heart cath with multivessel disease  -cardiothoracic surgery consulted  -evaluation for CABG  -prn NTG    05/04/2024  CABG planned for Monday 5/6/2024  CTS following, appreciate recs     Other hyperlipidemia  -continue statin        Type 2 diabetes mellitus with complication, without long-term current use of insulin  Patient's FSGs are controlled on current medication " "regimen.  Last A1c reviewed- No results found for: "LABA1C", "HGBA1C"  Most recent fingerstick glucose reviewed-       Recent Labs   Lab 05/03/24  0806   POCTGLUCOSE 152*      Current correctional scale  Low  Maintain anti-hyperglycemic dose as follows-   Antihyperglycemics (From admission, onward)        Start     Stop Route Frequency Ordered     05/03/24 1223   insulin aspart U-100 pen 0-5 Units         -- SubQ Before meals & nightly PRN 05/03/24 1124             Hold Oral hypoglycemics while patient is in the hospital.           Essential hypertension  Chronic, . Latest blood pressure and vitals reviewed-      Temp:  [97.9 °F (36.6 °C)]   Pulse:  [68]   Resp:  [18]   BP: (130-140)/(63-66)   SpO2:  [100 %] .   Home meds for hypertension were reviewed and noted below.   Hypertension Medications                    amLODIPine (NORVASC) 5 MG tablet Take 5 mg by mouth once daily.     isosorbide mononitrate (IMDUR) 30 MG 24 hr tablet Take 1 tablet (30 mg total) by mouth once daily.     losartan (COZAAR) 100 MG tablet Take 50 mg by mouth 2 (two) times a day.     nitroGLYCERIN (NITROSTAT) 0.3 MG SL tablet Place 1 tablet (0.3 mg total) under the tongue every 5 (five) minutes as needed for Chest pain.                While in the hospital, will manage blood pressure as follows; Continue home antihypertensive regimen     CORE MEASURES:  VTE Risk Mitigation (From admission, onward)           Ordered     IP VTE HIGH RISK PATIENT  Once         05/03/24 1124     Place sequential compression device  Until discontinued         05/03/24 1124                    Code Status: FULL    Diet: Diet Cardiac Standard Tray    Disposition: CABG planned for Monday       Jose De La Cruz MD  Department of Hospital Medicine   [facility] - Tele[facility] (Hospital)    "

## 2024-05-04 NOTE — PLAN OF CARE
A244/A244 JACOB Marques is a 73 y.o.male admitted on 5/3/2024 for Coronary artery disease of native artery of native heart with stable angina pectoris   Code Status: Full Code MRN: [id number]   Review of patient's allergies indicates:   Allergen Reactions    Codeine      Past Medical History:   Diagnosis Date    Diabetes mellitus 2002     am 09/15/2017    DM (diabetes mellitus) 2002     am 09/21/2018    DM (diabetes mellitus) 2002     am 09/27/2019    DM (diabetes mellitus) 2002     am 09/10/2020    Hypertension       PRN meds    sodium chloride 0.9%, , Continuous PRN  acetaminophen, 650 mg, Q4H PRN  acetaminophen, 650 mg, Q4H PRN  glucagon (human recombinant), 1 mg, PRN  glucose, 16 g, PRN  glucose, 24 g, PRN  insulin aspart U-100, 0-5 Units, QID (AC + HS) PRN  naloxone, 0.4 mg, PRN  nitroGLYCERIN, 0.4 mg, Q5 Min PRN  ondansetron, 8 mg, Q8H PRN  ondansetron, 4 mg, Q6H PRN  sodium chloride 0.9%, 10 mL, PRN  sodium chloride 0.9%, 10 mL, Q12H PRN      Chart check completed. Will continue plan of care.     Plan for CABG on Monday      Orientation: oriented x 4  [place] Coma Scale Score: 15     Lead Monitored: Lead II Rhythm: sinus bradycardia Frequency/Ectopy: PVCs  Cardiac/Telemetry Box Number: [id number]  VTE Required Core Measure: Pharmacological prophylaxis initiated/maintained Last Bowel Movement: 05/04/24  Diet Cardiac Standard Tray     Chris Score: 20  Fall Risk Score: 16  Accucheck [x]   Freq?      Lines/Drains/Airways       Peripheral Intravenous Line  Duration                  Peripheral IV - Single Lumen 05/03/24 0801 20 G Anterior;Proximal;Right Forearm 1 day

## 2024-05-04 NOTE — PLAN OF CARE
A244/A244 JACOB Marques is a 73 y.o.male admitted on 5/3/2024 for Coronary artery disease of native artery of native heart with stable angina pectoris   Code Status: Full Code MRN: [id number]   Review of patient's allergies indicates:   Allergen Reactions    Codeine      Past Medical History:   Diagnosis Date    Diabetes mellitus 2002     am 09/15/2017    DM (diabetes mellitus) 2002     am 09/21/2018    DM (diabetes mellitus) 2002     am 09/27/2019    DM (diabetes mellitus) 2002     am 09/10/2020    Hypertension       PRN meds    sodium chloride 0.9%, , Continuous PRN  acetaminophen, 650 mg, Q4H PRN  acetaminophen, 650 mg, Q4H PRN  glucagon (human recombinant), 1 mg, PRN  glucose, 16 g, PRN  glucose, 24 g, PRN  insulin aspart U-100, 0-5 Units, QID (AC + HS) PRN  naloxone, 0.4 mg, PRN  nitroGLYCERIN, 0.4 mg, Q5 Min PRN  ondansetron, 8 mg, Q8H PRN  ondansetron, 4 mg, Q6H PRN  sodium chloride 0.9%, 10 mL, PRN  sodium chloride 0.9%, 10 mL, Q12H PRN      Chart check completed. Will continue plan of care.         [place] Coma Scale Score: 15     Lead Monitored: Lead II Rhythm: sinus bradycardia Frequency/Ectopy: PVCs  Cardiac/Telemetry Box Number: [id number]  VTE Required Core Measure: Pharmacological prophylaxis initiated/maintained Last Bowel Movement: 05/02/24  Diet Cardiac Standard Tray     Chris Score: 20  Fall Risk Score: 9  Accucheck [x]   Freq? AC/HS     Lines/Drains/Airways       Peripheral Intravenous Line  Duration                  Peripheral IV - Single Lumen 05/03/24 0801 20 G Anterior;Proximal;Right Forearm <1 day

## 2024-05-04 NOTE — SUBJECTIVE & OBJECTIVE
Interval History: denies any chest pain    Review of Systems   Constitutional: Negative for malaise/fatigue.   Eyes:  Negative for blurred vision.   Cardiovascular:  Negative for chest pain, claudication, cyanosis, dyspnea on exertion, irregular heartbeat, leg swelling, near-syncope, orthopnea, palpitations and paroxysmal nocturnal dyspnea.   Respiratory:  Negative for cough, hemoptysis and shortness of breath.    Hematologic/Lymphatic: Negative for bleeding problem. Does not bruise/bleed easily.   Skin:  Negative for dry skin and itching.   Musculoskeletal:  Negative for falls, muscle weakness and myalgias.   Gastrointestinal:  Negative for abdominal pain, diarrhea, heartburn, hematemesis, hematochezia and melena.   Genitourinary:  Negative for flank pain and hematuria.   Neurological:  Negative for dizziness, focal weakness, headaches, light-headedness, numbness, paresthesias, seizures and weakness.   Psychiatric/Behavioral:  Negative for altered mental status and memory loss. The patient is not nervous/anxious.    Allergic/Immunologic: Negative for hives.     Objective:     Vital Signs (Most Recent):  Temp: 98.1 °F (36.7 °C) (05/04/24 1207)  Pulse: 84 (05/04/24 1207)  Resp: 18 (05/04/24 1207)  BP: 113/63 (05/04/24 1207)  SpO2: 97 % (05/04/24 1207) Vital Signs (24h Range):  Temp:  [97.9 °F (36.6 °C)-98.8 °F (37.1 °C)] 98.1 °F (36.7 °C)  Pulse:  [54-84] 84  Resp:  [18] 18  SpO2:  [96 %-99 %] 97 %  BP: (113-158)/(63-78) 113/63     Weight: 65.8 kg (145 lb)  Body mass index is 20.81 kg/m².     SpO2: 97 %         Intake/Output Summary (Last 24 hours) at 5/4/2024 1233  Last data filed at 5/4/2024 0830  Gross per 24 hour   Intake 540 ml   Output --   Net 540 ml       Lines/Drains/Airways       Peripheral Intravenous Line  Duration                  Peripheral IV - Single Lumen 05/03/24 0801 20 G Anterior;Proximal;Right Forearm 1 day                       Physical Exam  Vitals and nursing note reviewed.   Constitutional:        General: He is not in acute distress.     Appearance: He is well-developed. He is not diaphoretic.   HENT:      Head: Normocephalic and atraumatic.   Eyes:      General:         Right eye: No discharge.         Left eye: No discharge.      Pupils: Pupils are equal, round, and reactive to light.   Neck:      Thyroid: No thyromegaly.      Vascular: No JVD.   Cardiovascular:      Rate and Rhythm: Normal rate and regular rhythm.      Pulses: Intact distal pulses.      Heart sounds: Normal heart sounds. No murmur heard.     No friction rub. No gallop.   Pulmonary:      Effort: Pulmonary effort is normal. No respiratory distress.      Breath sounds: Normal breath sounds. No wheezing or rales.   Chest:      Chest wall: No tenderness.   Abdominal:      General: Bowel sounds are normal. There is no distension.      Palpations: Abdomen is soft.      Tenderness: There is no abdominal tenderness.   Musculoskeletal:         General: Normal range of motion.      Cervical back: Neck supple.      Right lower leg: No edema.      Left lower leg: No edema.   Skin:     General: Skin is warm and dry.      Findings: No erythema or rash.      Comments: Bruises noted cath site ok.   Neurological:      General: No focal deficit present.      Mental Status: He is alert and oriented to person, place, and time.      Cranial Nerves: No cranial nerve deficit.   Psychiatric:         Mood and Affect: Mood normal.         Behavior: Behavior normal.            Significant Labs:   Recent Lab Results  (Last 5 results in the past 24 hours)        05/04/24  1107   05/04/24  0624   05/04/24  0534   05/03/24  2109   05/03/24  1658        Albumin     3.6           ALP     71           ALT     26           Anion Gap     7           Ao root annulus               Ascending aorta               Ao peak fabrizio               Ao VTI               AST     23           AV valve area               LEXUS by Velocity Ratio               AV mean gradient                AV index (prosthetic)               AV peak gradient               AV Velocity Ratio               Baso #     0.04           Basophil %     0.6           BILIRUBIN TOTAL     0.3  Comment: For infants and newborns, interpretation of results should be based  on gestational age, weight and in agreement with clinical  observations.    Premature Infant recommended reference ranges:  Up to 24 hours.............<8.0 mg/dL  Up to 48 hours............<12.0 mg/dL  3-5 days..................<15.0 mg/dL  6-29 days.................<15.0 mg/dL             BNP     489  Comment: Values of less than 100 pg/ml are consistent with non-CHF populations.           BSA               BUN     21           Calcium     9.5           Chloride     108           CO2     23           Creatinine     1.2           Left Ventricle Relative Wall Thickness               Differential Method     Automated           E/A ratio               E/E' ratio               eGFR     >60           EF               Eos #     0.5           Eos %     6.9           E wave deceleration time               FS               Glucose     146           Gran # (ANC)     3.9           Gran %     54.1           Hematocrit     43.0           Hemoglobin     13.9           Immature Grans (Abs)     0.02  Comment: Mild elevation in immature granulocytes is non specific and   can be seen in a variety of conditions including stress response,   acute inflammation, trauma and pregnancy. Correlation with other   laboratory and clinical findings is essential.             Immature Granulocytes     0.3           IVRT               IVSd               LA WIDTH               Left Atrium Major Axis               Left Atrium Minor Axis               LA size               LA volume               LA vol index               LVOT area               LV LATERAL E/E' RATIO               LV SEPTAL E/E' RATIO               LV EDV BP               LV Diastolic Volume Index               LVIDd                LVIDs               LV mass               LV Mass Index               Left Ventricular Outflow Tract Mean Gradient               Left Ventricular Outflow Tract Mean Velocity               LVOT diameter               LVOT peak evin               LVOT stroke volume               LVOT peak VTI               LV ESV BP               LV Systolic Volume Index               Lymph #     2.1           Lymph %     29.4           Magnesium      1.9           MCH     30.8           MCHC     32.3           MCV     95           Mean e'               Mono #     0.6           Mono %     8.7           MPV     10.9           Mr max evin               MV valve area p 1/2 method               MV valve area by continuity eq               MV mean gradient               MV peak gradient               MV Peak A Evin               MV Peak E Evin               MV stenosis pressure 1/2 time               MV VTI               nRBC     0           Platelet Count     175           POCT Glucose 212   137     230   97       Potassium     3.8           PROTEIN TOTAL     6.9           PV mean gradient               Posterior Wall               RA Major Axis               Est. RA pres               RA Width               RBC     4.51           RDW     13.0           RV TB RVSP               RVOT peak evin               RVOT peak VTI               Sodium     138           STJ               TAPSE               TDI SEPTAL               TDI LATERAL               Triscuspid Valve Regurgitation Peak Gradient               TR Max Evin               TV resting pulmonary artery pressure               WBC     7.21           ZLVIDD               ZLVIDS                                      Significant Imaging: X-Ray: CXR: X-Ray Chest 1 View (CXR): No results found for this visit on 05/03/24.

## 2024-05-04 NOTE — HOSPITAL COURSE
5/4/2024 uneventful night denies any chest pain or shortness of breath awaiting cabg on Monday 5/5/2024 NO ANGINA ASYMPTOMATIC    5/7/24-Patient seen and examined today, s/p CABG x4, POD # 1. Moaning in pain during exam. Labs reviewed. H/H 7.6/22.8. Platelets 128,000.    5/8/24-Patient seen and examined today, s/p CABG x 4, POD #2. Feeling better, sitting up in chair. Pain improved. Chest tubes removed. Labs reviewed. Creatinine 1.3. H/H improved post transfusion. LFT's bumped.    5/9/24-Patient seen and examined today, s/p CABG x 4 POD # 3. Continues to improve. Pain controlled. Ambulated with PT/OT this AM. Labs reviewed.     5/10/24-Patient seen and examined today, s/p CABG x 4 POD # 4. Feels ok, still weak/tired. Working with PT/OT this AM. Paced on monitor. Labs reviewed. LFT's still bumped.    5/11/24- Patient seen and examined today sitting in bedside chair. S/P CABG x 4 POD #5. Feels good. Ambulated with PT this morning. Labs reviewed. LFT's are still high.       5/12/24- Patient seen and examined today sitting in up in chair. S/P CABG X4 POD # 6. He states his pain isn't too bad. Ambulated in the hallways with PT yesterday. Patient claims his appetite isn't too good. Labs reviewed. LFT's are improving.     5/13/24-Patient amy nd examined today, s/p CABG x 4 POD #7. Sitting up in bedside chair. Feels ok. Admits to fatigue/loss of appetite. States it hurts to swallow certain foods. Incisional pain controlled. Working with PT/OT. BP marginal at times. Labs reviewed.     05/14/24 no chest pain arrhythmia and SOB. The lab reviewed and VSS. Home today per CVT

## 2024-05-04 NOTE — PROGRESS NOTES
[facility])  Cardiology  Progress Note    Patient Name: Feng Marques  MRN: [id number]  Admission Date: 5/3/2024  Hospital Length of Stay: 1 days  Code Status: Full Code   Attending Physician: Jose De La Cruz MD   Primary Care Physician: Maciel Enriquez MD  Expected Discharge Date:   Principal Problem:Coronary artery disease of native artery of native heart with stable angina pectoris    Subjective:     Hospital Course:   5/4/2024 uneventful night denies any chest pain or shortness of breath awaiting cabg on monday    Interval History: denies any chest pain    Review of Systems   Constitutional: Negative for malaise/fatigue.   Eyes:  Negative for blurred vision.   Cardiovascular:  Negative for chest pain, claudication, cyanosis, dyspnea on exertion, irregular heartbeat, leg swelling, near-syncope, orthopnea, palpitations and paroxysmal nocturnal dyspnea.   Respiratory:  Negative for cough, hemoptysis and shortness of breath.    Hematologic/Lymphatic: Negative for bleeding problem. Does not bruise/bleed easily.   Skin:  Negative for dry skin and itching.   Musculoskeletal:  Negative for falls, muscle weakness and myalgias.   Gastrointestinal:  Negative for abdominal pain, diarrhea, heartburn, hematemesis, hematochezia and melena.   Genitourinary:  Negative for flank pain and hematuria.   Neurological:  Negative for dizziness, focal weakness, headaches, light-headedness, numbness, paresthesias, seizures and weakness.   Psychiatric/Behavioral:  Negative for altered mental status and memory loss. The patient is not nervous/anxious.    Allergic/Immunologic: Negative for hives.     Objective:     Vital Signs (Most Recent):  Temp: 98.1 °F (36.7 °C) (05/04/24 1207)  Pulse: 84 (05/04/24 1207)  Resp: 18 (05/04/24 1207)  BP: 113/63 (05/04/24 1207)  SpO2: 97 % (05/04/24 1207) Vital Signs (24h Range):  Temp:  [97.9 °F (36.6 °C)-98.8 °F (37.1 °C)] 98.1 °F (36.7 °C)  Pulse:  [54-84] 84  Resp:  [18]  18  SpO2:  [96 %-99 %] 97 %  BP: (113-158)/(63-78) 113/63     Weight: 65.8 kg (145 lb)  Body mass index is 20.81 kg/m².     SpO2: 97 %         Intake/Output Summary (Last 24 hours) at 5/4/2024 1233  Last data filed at 5/4/2024 0830  Gross per 24 hour   Intake 540 ml   Output --   Net 540 ml       Lines/Drains/Airways       Peripheral Intravenous Line  Duration                  Peripheral IV - Single Lumen 05/03/24 0801 20 G Anterior;Proximal;Right Forearm 1 day                       Physical Exam  Vitals and nursing note reviewed.   Constitutional:       General: He is not in acute distress.     Appearance: He is well-developed. He is not diaphoretic.   HENT:      Head: Normocephalic and atraumatic.   Eyes:      General:         Right eye: No discharge.         Left eye: No discharge.      Pupils: Pupils are equal, round, and reactive to light.   Neck:      Thyroid: No thyromegaly.      Vascular: No JVD.   Cardiovascular:      Rate and Rhythm: Normal rate and regular rhythm.      Pulses: Intact distal pulses.      Heart sounds: Normal heart sounds. No murmur heard.     No friction rub. No gallop.   Pulmonary:      Effort: Pulmonary effort is normal. No respiratory distress.      Breath sounds: Normal breath sounds. No wheezing or rales.   Chest:      Chest wall: No tenderness.   Abdominal:      General: Bowel sounds are normal. There is no distension.      Palpations: Abdomen is soft.      Tenderness: There is no abdominal tenderness.   Musculoskeletal:         General: Normal range of motion.      Cervical back: Neck supple.      Right lower leg: No edema.      Left lower leg: No edema.   Skin:     General: Skin is warm and dry.      Findings: No erythema or rash.      Comments: Bruises noted cath site ok.   Neurological:      General: No focal deficit present.      Mental Status: He is alert and oriented to person, place, and time.      Cranial Nerves: No cranial nerve deficit.   Psychiatric:         Mood and  Affect: Mood normal.         Behavior: Behavior normal.            Significant Labs:   Recent Lab Results  (Last 5 results in the past 24 hours)        05/04/24  1107   05/04/24  0624   05/04/24  0534   05/03/24  2109   05/03/24  1658        Albumin     3.6           ALP     71           ALT     26           Anion Gap     7           Ao root annulus               Ascending aorta               Ao peak fabrizio               Ao VTI               AST     23           AV valve area               LEXUS by Velocity Ratio               AV mean gradient               AV index (prosthetic)               AV peak gradient               AV Velocity Ratio               Baso #     0.04           Basophil %     0.6           BILIRUBIN TOTAL     0.3  Comment: For infants and newborns, interpretation of results should be based  on gestational age, weight and in agreement with clinical  observations.    Premature Infant recommended reference ranges:  Up to 24 hours.............<8.0 mg/dL  Up to 48 hours............<12.0 mg/dL  3-5 days..................<15.0 mg/dL  6-29 days.................<15.0 mg/dL             BNP     489  Comment: Values of less than 100 pg/ml are consistent with non-CHF populations.           BSA               BUN     21           Calcium     9.5           Chloride     108           CO2     23           Creatinine     1.2           Left Ventricle Relative Wall Thickness               Differential Method     Automated           E/A ratio               E/E' ratio               eGFR     >60           EF               Eos #     0.5           Eos %     6.9           E wave deceleration time               FS               Glucose     146           Gran # (ANC)     3.9           Gran %     54.1           Hematocrit     43.0           Hemoglobin     13.9           Immature Grans (Abs)     0.02  Comment: Mild elevation in immature granulocytes is non specific and   can be seen in a variety of conditions including stress  response,   acute inflammation, trauma and pregnancy. Correlation with other   laboratory and clinical findings is essential.             Immature Granulocytes     0.3           IVRT               IVSd               LA WIDTH               Left Atrium Major Axis               Left Atrium Minor Axis               LA size               LA volume               LA vol index               LVOT area               LV LATERAL E/E' RATIO               LV SEPTAL E/E' RATIO               LV EDV BP               LV Diastolic Volume Index               LVIDd               LVIDs               LV mass               LV Mass Index               Left Ventricular Outflow Tract Mean Gradient               Left Ventricular Outflow Tract Mean Velocity               LVOT diameter               LVOT peak evin               LVOT stroke volume               LVOT peak VTI               LV ESV BP               LV Systolic Volume Index               Lymph #     2.1           Lymph %     29.4           Magnesium      1.9           MCH     30.8           MCHC     32.3           MCV     95           Mean e'               Mono #     0.6           Mono %     8.7           MPV     10.9           Mr max evin               MV valve area p 1/2 method               MV valve area by continuity eq               MV mean gradient               MV peak gradient               MV Peak A Evin               MV Peak E Evin               MV stenosis pressure 1/2 time               MV VTI               nRBC     0           Platelet Count     175           POCT Glucose 212   137     230   97       Potassium     3.8           PROTEIN TOTAL     6.9           PV mean gradient               Posterior Wall               RA Major Axis               Est. RA pres               RA Width               RBC     4.51           RDW     13.0           RV TB RVSP               RVOT peak evin               RVOT peak VTI               Sodium     138           STJ               TAPSE                TDI SEPTAL               TDI LATERAL               Triscuspid Valve Regurgitation Peak Gradient               TR Max Evin               TV resting pulmonary artery pressure               WBC     7.21           ZLVIDD               ZLVIDS                                      Significant Imaging: X-Ray: CXR: X-Ray Chest 1 View (CXR): No results found for this visit on 05/03/24.  Assessment and Plan:     Brief HPI: a 72 yo amle with htn hlp diabetes presents for [facility] after having an abnormal cardiolite was found to have 3 vessel cad is planned for cabg on Monday. No angina.     * Coronary artery disease of native artery of native heart with stable angina pectoris  Multivessel cad for cabg on Monday no angina    Other hyperlipidemia  statins    Type 2 diabetes mellitus with complication, without long-term current use of insulin  Per hospital medicine    Essential hypertension  Continuer home meds        VTE Risk Mitigation (From admission, onward)           Ordered     IP VTE HIGH RISK PATIENT  Once         05/03/24 1124     Place sequential compression device  Until discontinued         05/03/24 1124                    Hali Hutchison MD  Cardiology  [facility] ([facility])

## 2024-05-04 NOTE — PLAN OF CARE
[facility] (Hospital)  Initial Discharge Assessment       Primary Care Provider: Maciel Enriquez MD    Admission Diagnosis: Abnormal stress test [R94.39]  Coronary artery disease involving native coronary artery of native heart with unstable angina pectoris [I25.110]  Essential hypertension [I10]  First degree AV block [I44.0]  Chest pain, unspecified type [R07.9]    Admission Date: 5/3/2024  Expected Discharge Date:     Transition of Care Barriers: (P) None    Payor: [place] MC[employer] C / Plan: [place] [employer] ADV / Product Type: Medicare Advantage /     Extended Emergency Contact Information  Primary Emergency Contact: MateoErick hagen  Address: [address] [place], [place] [id number] [place] [address]  Mobile Phone: [phone]  Relation: Son    Discharge Plan A: (P) Home  Discharge Plan B: (P) Home with family      [address] [address] [facility] [address]  [address] [facility] [address]  [facility] [address]  Phone: [phone] Fax: [phone]      Initial Assessment (most recent)       Adult Discharge Assessment - 05/04/24 1059          Discharge Assessment    Assessment Type Discharge Planning Assessment (P)      Confirmed/corrected address, phone number and insurance Yes (P)      Confirmed Demographics Correct on Facesheet (P)      Source of Information patient (P)      When was your last doctors appointment? 01/25/24 (P)      Communicated MANUEL with patient/caregiver Date not available/Unable to determine (P)      Reason For Admission Coronary artery disease of native artery of native heart with stable angina pectoris (P)      People in Home alone (P)    Patient reported that his son lives next door.    Do you expect to return to your current living situation? Yes (P)      Do you have help at home or someone to help you manage your care at home? No (P)      Prior to hospitilization cognitive status: Alert/Oriented (P)      Current cognitive  status: Alert/Oriented (P)      Walking or Climbing Stairs Difficulty no (P)      Dressing/Bathing Difficulty no (P)      Home Accessibility not wheelchair accessible;stairs to enter home (P)      Number of Stairs, Main Entrance nine (P)      Surface of Stairs, Main Entrance hardwood (P)      Stair Railings, Main Entrance railings safe and in good condition;railings on both sides of stairs (P)      Landing, Stairs, Main Entrance railings present (P)      Home Layout Able to live on 1st floor (P)      Equipment Currently Used at Home glucometer;blood pressure machine (P)      Readmission within 30 days? No (P)      Patient currently being followed by outpatient case management? No (P)      Do you currently have service(s) that help you manage your care at home? No (P)      Do you take prescription medications? Yes (P)      Do you have prescription coverage? Yes (P)      [facility] MCARE ADV (P)      Do you have any problems affording any of your prescribed medications? No (P)      Is the patient taking medications as prescribed? yes (P)      Who is going to help you get home at discharge? Daughter or Step Daughter (P)      How do you get to doctors appointments? health plan transportation (P)      Are you on dialysis? No (P)      Do you take coumadin? No (P)      Discharge Plan A Home (P)      Discharge Plan B Home with family (P)      DME Needed Upon Discharge  none (P)      Discharge Plan discussed with: Patient (P)      Transition of Care Barriers None (P)                           Jessica Barry LMSW 5/4/2024 11:09 AM

## 2024-05-05 ENCOUNTER — ANESTHESIA EVENT (OUTPATIENT)
Dept: SURGERY | Facility: HOSPITAL | Age: 74
DRG: 234 | End: 2024-05-05
Payer: MEDICARE

## 2024-05-05 LAB
ALBUMIN SERPL BCP-MCNC: 3.7 G/DL (ref 3.5–5.2)
ALP SERPL-CCNC: 72 U/L (ref 55–135)
ALT SERPL W/O P-5'-P-CCNC: 26 U/L (ref 10–44)
ANION GAP SERPL CALC-SCNC: 10 MMOL/L (ref 8–16)
AST SERPL-CCNC: 23 U/L (ref 10–40)
BASOPHILS # BLD AUTO: 0.05 K/UL (ref 0–0.2)
BASOPHILS NFR BLD: 0.8 % (ref 0–1.9)
BILIRUB SERPL-MCNC: 0.4 MG/DL (ref 0.1–1)
BILIRUB UR QL STRIP: NEGATIVE
BUN SERPL-MCNC: 20 MG/DL (ref 8–23)
CALCIUM SERPL-MCNC: 9.4 MG/DL (ref 8.7–10.5)
CHLORIDE SERPL-SCNC: 106 MMOL/L (ref 95–110)
CLARITY UR: CLEAR
CO2 SERPL-SCNC: 23 MMOL/L (ref 23–29)
COLOR UR: COLORLESS
CREAT SERPL-MCNC: 1.2 MG/DL (ref 0.5–1.4)
DIFFERENTIAL METHOD BLD: ABNORMAL
EOSINOPHIL # BLD AUTO: 0.4 K/UL (ref 0–0.5)
EOSINOPHIL NFR BLD: 6.9 % (ref 0–8)
ERYTHROCYTE [DISTWIDTH] IN BLOOD BY AUTOMATED COUNT: 13 % (ref 11.5–14.5)
EST. GFR  (NO RACE VARIABLE): >60 ML/MIN/1.73 M^2
GLUCOSE SERPL-MCNC: 156 MG/DL (ref 70–110)
GLUCOSE UR QL STRIP: NEGATIVE
HCT VFR BLD AUTO: 42.5 % (ref 40–54)
HGB BLD-MCNC: 13.6 G/DL (ref 14–18)
HGB UR QL STRIP: NEGATIVE
IMM GRANULOCYTES # BLD AUTO: 0.01 K/UL (ref 0–0.04)
IMM GRANULOCYTES NFR BLD AUTO: 0.2 % (ref 0–0.5)
KETONES UR QL STRIP: NEGATIVE
LEUKOCYTE ESTERASE UR QL STRIP: NEGATIVE
LYMPHOCYTES # BLD AUTO: 2.1 K/UL (ref 1–4.8)
LYMPHOCYTES NFR BLD: 33.1 % (ref 18–48)
MAGNESIUM SERPL-MCNC: 1.9 MG/DL (ref 1.6–2.6)
MCH RBC QN AUTO: 30.8 PG (ref 27–31)
MCHC RBC AUTO-ENTMCNC: 32 G/DL (ref 32–36)
MCV RBC AUTO: 96 FL (ref 82–98)
MONOCYTES # BLD AUTO: 0.6 K/UL (ref 0.3–1)
MONOCYTES NFR BLD: 8.8 % (ref 4–15)
NEUTROPHILS # BLD AUTO: 3.2 K/UL (ref 1.8–7.7)
NEUTROPHILS NFR BLD: 50.2 % (ref 38–73)
NITRITE UR QL STRIP: NEGATIVE
NRBC BLD-RTO: 0 /100 WBC
PH UR STRIP: 7 [PH] (ref 5–8)
PLATELET # BLD AUTO: 161 K/UL (ref 150–450)
PMV BLD AUTO: 10.6 FL (ref 9.2–12.9)
POCT GLUCOSE: 112 MG/DL (ref 70–110)
POCT GLUCOSE: 143 MG/DL (ref 70–110)
POCT GLUCOSE: 162 MG/DL (ref 70–110)
POCT GLUCOSE: 176 MG/DL (ref 70–110)
POCT GLUCOSE: 212 MG/DL (ref 70–110)
POCT GLUCOSE: 232 MG/DL (ref 70–110)
POTASSIUM SERPL-SCNC: 4 MMOL/L (ref 3.5–5.1)
PROT SERPL-MCNC: 6.9 G/DL (ref 6–8.4)
PROT UR QL STRIP: NEGATIVE
RBC # BLD AUTO: 4.42 M/UL (ref 4.6–6.2)
SODIUM SERPL-SCNC: 139 MMOL/L (ref 136–145)
SP GR UR STRIP: 1.01 (ref 1–1.03)
URN SPEC COLLECT METH UR: ABNORMAL
UROBILINOGEN UR STRIP-ACNC: NEGATIVE EU/DL
WBC # BLD AUTO: 6.35 K/UL (ref 3.9–12.7)

## 2024-05-05 PROCEDURE — 99232 SBSQ HOSP IP/OBS MODERATE 35: CPT | Mod: ,,, | Performed by: INTERNAL MEDICINE

## 2024-05-05 PROCEDURE — 81003 URINALYSIS AUTO W/O SCOPE: CPT | Performed by: THORACIC SURGERY (CARDIOTHORACIC VASCULAR SURGERY)

## 2024-05-05 PROCEDURE — 25000003 PHARM REV CODE 250: Performed by: INTERNAL MEDICINE

## 2024-05-05 PROCEDURE — 21400001 HC TELEMETRY ROOM

## 2024-05-05 PROCEDURE — 94010 BREATHING CAPACITY TEST: CPT

## 2024-05-05 PROCEDURE — 63600175 PHARM REV CODE 636 W HCPCS: Performed by: THORACIC SURGERY (CARDIOTHORACIC VASCULAR SURGERY)

## 2024-05-05 PROCEDURE — 83735 ASSAY OF MAGNESIUM: CPT | Performed by: INTERNAL MEDICINE

## 2024-05-05 PROCEDURE — 85025 COMPLETE CBC W/AUTO DIFF WBC: CPT | Performed by: INTERNAL MEDICINE

## 2024-05-05 PROCEDURE — 25000003 PHARM REV CODE 250: Performed by: THORACIC SURGERY (CARDIOTHORACIC VASCULAR SURGERY)

## 2024-05-05 PROCEDURE — 36415 COLL VENOUS BLD VENIPUNCTURE: CPT | Performed by: INTERNAL MEDICINE

## 2024-05-05 PROCEDURE — 63600175 PHARM REV CODE 636 W HCPCS: Mod: JZ,EC,JG | Performed by: THORACIC SURGERY (CARDIOTHORACIC VASCULAR SURGERY)

## 2024-05-05 PROCEDURE — 80053 COMPREHEN METABOLIC PANEL: CPT | Performed by: INTERNAL MEDICINE

## 2024-05-05 RX ORDER — CYANOCOBALAMIN 1000 UG/ML
1000 INJECTION, SOLUTION INTRAMUSCULAR; SUBCUTANEOUS ONCE
Status: COMPLETED | OUTPATIENT
Start: 2024-05-05 | End: 2024-05-05

## 2024-05-05 RX ORDER — FOLIC ACID 1 MG/1
5 TABLET ORAL ONCE
Status: COMPLETED | OUTPATIENT
Start: 2024-05-05 | End: 2024-05-05

## 2024-05-05 RX ADMIN — AMLODIPINE BESYLATE 5 MG: 5 TABLET ORAL at 08:05

## 2024-05-05 RX ADMIN — EPOETIN ALFA-EPBX 40000 UNITS: 40000 INJECTION, SOLUTION INTRAVENOUS; SUBCUTANEOUS at 09:05

## 2024-05-05 RX ADMIN — ASPIRIN 81 MG: 81 TABLET, COATED ORAL at 08:05

## 2024-05-05 RX ADMIN — PRAVASTATIN SODIUM 40 MG: 20 TABLET ORAL at 09:05

## 2024-05-05 RX ADMIN — ESCITALOPRAM OXALATE 10 MG: 10 TABLET ORAL at 08:05

## 2024-05-05 RX ADMIN — IRON SUCROSE 200 MG: 20 INJECTION, SOLUTION INTRAVENOUS at 09:05

## 2024-05-05 RX ADMIN — LOSARTAN POTASSIUM 50 MG: 50 TABLET, FILM COATED ORAL at 08:05

## 2024-05-05 RX ADMIN — CYANOCOBALAMIN 1000 MCG: 1000 INJECTION INTRAMUSCULAR; SUBCUTANEOUS at 08:05

## 2024-05-05 RX ADMIN — ISOSORBIDE MONONITRATE 30 MG: 30 TABLET, EXTENDED RELEASE ORAL at 08:05

## 2024-05-05 RX ADMIN — INSULIN ASPART 2 UNITS: 100 INJECTION, SOLUTION INTRAVENOUS; SUBCUTANEOUS at 11:05

## 2024-05-05 RX ADMIN — FOLIC ACID 5 MG: 1 TABLET ORAL at 08:05

## 2024-05-05 NOTE — NURSING
Spoke with pt and family present to discuss CABG post-op expectations.  Spent about 30 minutes detailing first 2 days of and after surgery and answered all questions.  Provided family with visitaion guidelines sheet.

## 2024-05-05 NOTE — PROGRESS NOTES
"[facility] ([facility] Medicine  Progress Note     Patient Name:  Feng Marques  MRN:  [id number]  Patient Class: IP-Inpatient  Admission Date:  5/3/2024   Length of Stay:  2  Attending Physician: Jose De La Cruz MD  Primary Care Provider: Maciel Enriquez MD    Subjective:      Subsequent Care: Follow up Coronary artery disease of native artery of native heart with stable angina pectoris        HPI:  The patient is a 74 yo male with past medical of CAD, diabetes, HLD and HTN who presented to the hospital for scheduled left heart cath. He has history of stent placement years ago. The patient states he was having chest pain with exertion. The chest pain is midsternal and lasts until he takes nitroglycerin. He reports the chest pain was occurring more frequently. He was being medically managed based upon last stress test in 2022. Cardiology brought in for left heart cath. Multiple blockages noted and not amenable to PCI. Cardiothoracic surgery and hospital medicine consulted.       Interval Hx  NAEON.     Objective  /65   Pulse (!) 56   Temp 97.7 °F (36.5 °C)   Resp 18   Ht 5' 10" (1.778 m)   Wt 65.8 kg (145 lb)   SpO2 97%   BMI 20.81 kg/m²     Intake/Output Summary (Last 24 hours) at 5/5/2024 0912  Last data filed at 5/4/2024 1620  Gross per 24 hour   Intake 480 ml   Output --   Net 480 ml       PHYSICAL EXAM  Vitals reviewed  GEN: No acute distress, pleasant, body habitus normal  HEENT: atraumatic and normocephalic  CARDS: regular rate and rhythm, no m/g, pulses palpable in LE  PULM: breathing comfortably on room air, chest symmetric, nonlabored, no abnormal breath sounds on auscultation  ABD: nontender, nondistended, soft, no organomegaly, BS+  Neuro: Alert and oriented x3, CN's I-IX grossly intact, sensation and motor intact; follows directions and answers questions appropriately    LABS  All labs from the past 24 hours were reviewed.     BMP:   Recent Labs   Lab " "05/05/24  0528   *      K 4.0      CO2 23   BUN 20   CREATININE 1.2   CALCIUM 9.4   MG 1.9     CBC:   Recent Labs   Lab 05/04/24  0534 05/05/24  0528   WBC 7.21 6.35   HGB 13.9* 13.6*   HCT 43.0 42.5    161     CMP:   Recent Labs   Lab 05/03/24  1349 05/04/24  0534 05/05/24  0528   * 138 139   K 4.7 3.8 4.0    108 106   CO2 23 23 23   * 146* 156*   BUN 21 21 20   CREATININE 1.2 1.2 1.2   CALCIUM 9.4 9.5 9.4   PROT 6.7 6.9 6.9   ALBUMIN 3.6 3.6 3.7   BILITOT 0.5 0.3 0.4   ALKPHOS 70 71 72   AST 23 23 23   ALT 25 26 26   ANIONGAP 7* 7* 10     Cardiac Markers:   Recent Labs   Lab 05/04/24 0534   *     Coagulation: No results for input(s): "PT", "INR", "APTT" in the last 48 hours.  Lactic Acid: No results for input(s): "LACTATE" in the last 48 hours.  Magnesium:   Recent Labs   Lab 05/04/24  0534 05/05/24 0528   MG 1.9 1.9     Troponin: No results for input(s): "TROPONINI", "TROPONINIHS" in the last 48 hours.  TSH:   No results for input(s): "TSH" in the last 4320 hours.  Urine Studies:   No results for input(s): "COLORU", "APPEARANCEUA", "PHUR", "SPECGRAV", "PROTEINUA", "GLUCUA", "KETONESU", "BILIRUBINUA", "OCCULTUA", "NITRITE", "UROBILINOGEN", "LEUKOCYTESUR", "RBCUA", "WBCUA", "BACTERIA", "SQUAMEPITHEL", "HYALINECASTS" in the last 48 hours.    Invalid input(s): "WRIGHTSUR"    IMAGING  All imaging from the past 24 hours were reviewed.         Assessment/Plan:     Coronary artery disease of native artery of native heart with stable angina pectoris  Patient with known CAD s/p stent placement, which is uncontrolled Will continue ASA and Statin and monitor for S/Sx of angina/ACS. Continue to monitor on telemetry.   -s/p left heart cath with multivessel disease  -cardiothoracic surgery consulted  -evaluation for CABG  -prn NTG    05/05/2024  CABG planned for Monday 5/6/2024  CTS following, appreciate recs     Other hyperlipidemia  -continue statin        Type 2 diabetes " "mellitus with complication, without long-term current use of insulin  Patient's FSGs are controlled on current medication regimen.  Last A1c reviewed- No results found for: "LABA1C", "HGBA1C"  Most recent fingerstick glucose reviewed-       Recent Labs   Lab 05/03/24  0806   POCTGLUCOSE 152*      Current correctional scale  Low  Maintain anti-hyperglycemic dose as follows-   Antihyperglycemics (From admission, onward)        Start     Stop Route Frequency Ordered     05/03/24 1223   insulin aspart U-100 pen 0-5 Units         -- SubQ Before meals & nightly PRN 05/03/24 1124             Hold Oral hypoglycemics while patient is in the hospital.           Essential hypertension  Chronic, . Latest blood pressure and vitals reviewed-      Temp:  [97.9 °F (36.6 °C)]   Pulse:  [68]   Resp:  [18]   BP: (130-140)/(63-66)   SpO2:  [100 %] .   Home meds for hypertension were reviewed and noted below.   Hypertension Medications                    amLODIPine (NORVASC) 5 MG tablet Take 5 mg by mouth once daily.     isosorbide mononitrate (IMDUR) 30 MG 24 hr tablet Take 1 tablet (30 mg total) by mouth once daily.     losartan (COZAAR) 100 MG tablet Take 50 mg by mouth 2 (two) times a day.     nitroGLYCERIN (NITROSTAT) 0.3 MG SL tablet Place 1 tablet (0.3 mg total) under the tongue every 5 (five) minutes as needed for Chest pain.                While in the hospital, will manage blood pressure as follows; Continue home antihypertensive regimen     CORE MEASURES:  VTE Risk Mitigation (From admission, onward)           Ordered     IP VTE HIGH RISK PATIENT  Once         05/03/24 1124     Place sequential compression device  Until discontinued         05/03/24 1124                    Code Status: FULL    Diet: Diet Cardiac Standard Tray    Disposition: CABG planned for Monday       Jose De La Cruz MD  Department of Hospital Medicine   [facility] - Tele[facility] (Hospital)    " Additional Notes: Patient consent was obtained to proceed with the visit and recommended plan of care after discussion of all risks and benefits, including the risks of COVID-19 exposure. Detail Level: Simple

## 2024-05-05 NOTE — SUBJECTIVE & OBJECTIVE
Interval History: NO ANGINA     Review of Systems   Constitutional: Negative for malaise/fatigue.   Eyes:  Negative for blurred vision.   Cardiovascular:  Negative for chest pain, claudication, cyanosis, dyspnea on exertion, irregular heartbeat, leg swelling, near-syncope, orthopnea, palpitations and paroxysmal nocturnal dyspnea.   Respiratory:  Negative for cough, hemoptysis and shortness of breath.    Hematologic/Lymphatic: Negative for bleeding problem. Does not bruise/bleed easily.   Skin:  Negative for dry skin and itching.   Musculoskeletal:  Negative for falls, muscle weakness and myalgias.   Gastrointestinal:  Negative for abdominal pain, diarrhea, heartburn, hematemesis, hematochezia and melena.   Genitourinary:  Negative for flank pain and hematuria.   Neurological:  Negative for dizziness, focal weakness, headaches, light-headedness, numbness, paresthesias, seizures and weakness.   Psychiatric/Behavioral:  Negative for altered mental status and memory loss. The patient is not nervous/anxious.    Allergic/Immunologic: Negative for hives.     Objective:     Vital Signs (Most Recent):  Temp: 98 °F (36.7 °C) (05/05/24 1256)  Pulse: 66 (05/05/24 1323)  Resp: 18 (05/05/24 1256)  BP: (!) 124/59 (05/05/24 1256)  SpO2: 97 % (05/05/24 1256) Vital Signs (24h Range):  Temp:  [97.7 °F (36.5 °C)-98.2 °F (36.8 °C)] 98 °F (36.7 °C)  Pulse:  [54-90] 66  Resp:  [18-19] 18  SpO2:  [97 %] 97 %  BP: (118-155)/(55-73) 124/59     Weight: 65.8 kg (145 lb)  Body mass index is 20.81 kg/m².     SpO2: 97 %         Intake/Output Summary (Last 24 hours) at 5/5/2024 1434  Last data filed at 5/5/2024 0830  Gross per 24 hour   Intake 480 ml   Output --   Net 480 ml       Lines/Drains/Airways       Peripheral Intravenous Line  Duration                  Peripheral IV - Single Lumen 05/03/24 0801 20 G Anterior;Proximal;Right Forearm 2 days                       Physical Exam  Vitals and nursing note reviewed.   Constitutional:        General: He is not in acute distress.     Appearance: He is well-developed. He is not diaphoretic.   HENT:      Head: Normocephalic and atraumatic.   Eyes:      General:         Right eye: No discharge.         Left eye: No discharge.      Pupils: Pupils are equal, round, and reactive to light.   Neck:      Thyroid: No thyromegaly.      Vascular: No JVD.   Cardiovascular:      Rate and Rhythm: Normal rate and regular rhythm.      Pulses: Intact distal pulses.      Heart sounds: Normal heart sounds. No murmur heard.     No friction rub. No gallop.   Pulmonary:      Effort: Pulmonary effort is normal. No respiratory distress.      Breath sounds: Normal breath sounds. No wheezing or rales.   Chest:      Chest wall: No tenderness.   Abdominal:      General: Bowel sounds are normal. There is no distension.      Palpations: Abdomen is soft.      Tenderness: There is no abdominal tenderness.   Musculoskeletal:         General: Normal range of motion.      Cervical back: Neck supple.   Skin:     General: Skin is warm and dry.      Findings: No erythema or rash.   Neurological:      Mental Status: He is alert and oriented to person, place, and time.      Cranial Nerves: No cranial nerve deficit.   Psychiatric:         Behavior: Behavior normal.            Significant Labs:   Recent Lab Results  (Last 5 results in the past 24 hours)        05/05/24  1116   05/05/24  0559   05/05/24  0528   05/05/24  0010   05/04/24  2042        Albumin     3.7           ALP     72           ALT     26           Anion Gap     10           AST     23           Baso #     0.05           Basophil %     0.8           BILIRUBIN TOTAL     0.4  Comment: For infants and newborns, interpretation of results should be based  on gestational age, weight and in agreement with clinical  observations.    Premature Infant recommended reference ranges:  Up to 24 hours.............<8.0 mg/dL  Up to 48 hours............<12.0 mg/dL  3-5 days..................<15.0  mg/dL  6-29 days.................<15.0 mg/dL             BUN     20           Calcium     9.4           Chloride     106           CO2     23           Creatinine     1.2           Differential Method     Automated           eGFR     >60           Eos #     0.4           Eos %     6.9           Glucose     156           Gran # (ANC)     3.2           Gran %     50.2           Hematocrit     42.5           Hemoglobin     13.6           Immature Grans (Abs)     0.01  Comment: Mild elevation in immature granulocytes is non specific and   can be seen in a variety of conditions including stress response,   acute inflammation, trauma and pregnancy. Correlation with other   laboratory and clinical findings is essential.             Immature Granulocytes     0.2           Lymph #     2.1           Lymph %     33.1           Magnesium      1.9           MCH     30.8           MCHC     32.0           MCV     96           Mono #     0.6           Mono %     8.8           MPV     10.6           nRBC     0           Platelet Count     161           POCT Glucose 232   143     112   89       Potassium     4.0           PROTEIN TOTAL     6.9           RBC     4.42           RDW     13.0           Sodium     139           WBC     6.35                                  Significant Imaging: X-Ray: CXR: X-Ray Chest 1 View (CXR): No results found for this visit on 05/03/24.

## 2024-05-05 NOTE — PLAN OF CARE
A244/A244 JACOB Marques is a 73 y.o.male admitted on 5/3/2024 for Coronary artery disease of native artery of native heart with stable angina pectoris   Code Status: Full Code MRN: [id number]   Review of patient's allergies indicates:   Allergen Reactions    Codeine      Past Medical History:   Diagnosis Date    Diabetes mellitus 2002     am 09/15/2017    DM (diabetes mellitus) 2002     am 09/21/2018    DM (diabetes mellitus) 2002     am 09/27/2019    DM (diabetes mellitus) 2002     am 09/10/2020    Hypertension       PRN meds    sodium chloride 0.9%, , Continuous PRN  acetaminophen, 650 mg, Q4H PRN  acetaminophen, 650 mg, Q4H PRN  glucagon (human recombinant), 1 mg, PRN  glucose, 16 g, PRN  glucose, 24 g, PRN  insulin aspart U-100, 0-5 Units, QID (AC + HS) PRN  naloxone, 0.4 mg, PRN  nitroGLYCERIN, 0.4 mg, Q5 Min PRN  ondansetron, 8 mg, Q8H PRN  ondansetron, 4 mg, Q6H PRN  sodium chloride 0.9%, 10 mL, PRN  sodium chloride 0.9%, 10 mL, Q12H PRN      Chart check completed. Will continue plan of care.     CABG in AM      Orientation: oriented x 4  Chris Coma Scale Score: 15     Lead Monitored: Lead II Rhythm: normal sinus rhythm Frequency/Ectopy: PVCs  Cardiac/Telemetry Box Number: [id number]  VTE Required Core Measure: (SCDs) Sequential compression device initiated/maintained Last Bowel Movement: 05/04/24  Diet Cardiac Standard Tray     Chris Score: 22  Fall Risk Score: 16  Accucheck [x]   Freq?      Lines/Drains/Airways       Peripheral Intravenous Line  Duration                  Peripheral IV - Single Lumen 05/03/24 0801 20 G Anterior;Proximal;Right Forearm 2 days

## 2024-05-05 NOTE — PROGRESS NOTES
[facility] Telemetry ([facility])  Cardiology  Progress Note    Patient Name: Feng Marques  MRN: [id number]  Admission Date: 5/3/2024  Hospital Length of Stay: 2 days  Code Status: Full Code   Attending Physician: Jose De La Cruz MD   Primary Care Physician: Maciel Enriquez MD  Expected Discharge Date:   Principal Problem:Coronary artery disease of native artery of native heart with stable angina pectoris    Subjective:     Hospital Course:   5/4/2024 uneventful night denies any chest pain or shortness of breath awaiting cabg on Monday 5/5/2024 NO ANGINA ASYMPTOMATIC    Interval History: NO ANGINA     Review of Systems   Constitutional: Negative for malaise/fatigue.   Eyes:  Negative for blurred vision.   Cardiovascular:  Negative for chest pain, claudication, cyanosis, dyspnea on exertion, irregular heartbeat, leg swelling, near-syncope, orthopnea, palpitations and paroxysmal nocturnal dyspnea.   Respiratory:  Negative for cough, hemoptysis and shortness of breath.    Hematologic/Lymphatic: Negative for bleeding problem. Does not bruise/bleed easily.   Skin:  Negative for dry skin and itching.   Musculoskeletal:  Negative for falls, muscle weakness and myalgias.   Gastrointestinal:  Negative for abdominal pain, diarrhea, heartburn, hematemesis, hematochezia and melena.   Genitourinary:  Negative for flank pain and hematuria.   Neurological:  Negative for dizziness, focal weakness, headaches, light-headedness, numbness, paresthesias, seizures and weakness.   Psychiatric/Behavioral:  Negative for altered mental status and memory loss. The patient is not nervous/anxious.    Allergic/Immunologic: Negative for hives.     Objective:     Vital Signs (Most Recent):  Temp: 98 °F (36.7 °C) (05/05/24 1256)  Pulse: 66 (05/05/24 1323)  Resp: 18 (05/05/24 1256)  BP: (!) 124/59 (05/05/24 1256)  SpO2: 97 % (05/05/24 1256) Vital Signs (24h Range):  Temp:  [97.7 °F (36.5 °C)-98.2 °F (36.8 °C)] 98 °F (36.7 °C)  Pulse:   [54-90] 66  Resp:  [18-19] 18  SpO2:  [97 %] 97 %  BP: (118-155)/(55-73) 124/59     Weight: 65.8 kg (145 lb)  Body mass index is 20.81 kg/m².     SpO2: 97 %         Intake/Output Summary (Last 24 hours) at 5/5/2024 1434  Last data filed at 5/5/2024 0830  Gross per 24 hour   Intake 480 ml   Output --   Net 480 ml       Lines/Drains/Airways       Peripheral Intravenous Line  Duration                  Peripheral IV - Single Lumen 05/03/24 0801 20 G Anterior;Proximal;Right Forearm 2 days                       Physical Exam  Vitals and nursing note reviewed.   Constitutional:       General: He is not in acute distress.     Appearance: He is well-developed. He is not diaphoretic.   HENT:      Head: Normocephalic and atraumatic.   Eyes:      General:         Right eye: No discharge.         Left eye: No discharge.      Pupils: Pupils are equal, round, and reactive to light.   Neck:      Thyroid: No thyromegaly.      Vascular: No JVD.   Cardiovascular:      Rate and Rhythm: Normal rate and regular rhythm.      Pulses: Intact distal pulses.      Heart sounds: Normal heart sounds. No murmur heard.     No friction rub. No gallop.   Pulmonary:      Effort: Pulmonary effort is normal. No respiratory distress.      Breath sounds: Normal breath sounds. No wheezing or rales.   Chest:      Chest wall: No tenderness.   Abdominal:      General: Bowel sounds are normal. There is no distension.      Palpations: Abdomen is soft.      Tenderness: There is no abdominal tenderness.   Musculoskeletal:         General: Normal range of motion.      Cervical back: Neck supple.   Skin:     General: Skin is warm and dry.      Findings: No erythema or rash.   Neurological:      Mental Status: He is alert and oriented to person, place, and time.      Cranial Nerves: No cranial nerve deficit.   Psychiatric:         Behavior: Behavior normal.            Significant Labs:   Recent Lab Results  (Last 5 results in the past 24 hours)         05/05/24  1116   05/05/24  0559   05/05/24  0528   05/05/24  0010   05/04/24  2042        Albumin     3.7           ALP     72           ALT     26           Anion Gap     10           AST     23           Baso #     0.05           Basophil %     0.8           BILIRUBIN TOTAL     0.4  Comment: For infants and newborns, interpretation of results should be based  on gestational age, weight and in agreement with clinical  observations.    Premature Infant recommended reference ranges:  Up to 24 hours.............<8.0 mg/dL  Up to 48 hours............<12.0 mg/dL  3-5 days..................<15.0 mg/dL  6-29 days.................<15.0 mg/dL             BUN     20           Calcium     9.4           Chloride     106           CO2     23           Creatinine     1.2           Differential Method     Automated           eGFR     >60           Eos #     0.4           Eos %     6.9           Glucose     156           Gran # (ANC)     3.2           Gran %     50.2           Hematocrit     42.5           Hemoglobin     13.6           Immature Grans (Abs)     0.01  Comment: Mild elevation in immature granulocytes is non specific and   can be seen in a variety of conditions including stress response,   acute inflammation, trauma and pregnancy. Correlation with other   laboratory and clinical findings is essential.             Immature Granulocytes     0.2           Lymph #     2.1           Lymph %     33.1           Magnesium      1.9           MCH     30.8           MCHC     32.0           MCV     96           Mono #     0.6           Mono %     8.8           MPV     10.6           nRBC     0           Platelet Count     161           POCT Glucose 232   143     112   89       Potassium     4.0           PROTEIN TOTAL     6.9           RBC     4.42           RDW     13.0           Sodium     139           WBC     6.35                                  Significant Imaging: X-Ray: CXR: X-Ray Chest 1 View (CXR): No results found for  this visit on 05/03/24.  Assessment and Plan:     Brief HPI: A 74 YO M,ALTHEA WITH MULTIVESSEL CAD AWAITS CABG IN AM. HE IS ASYMPTOMATIC    * Coronary artery disease of native artery of native heart with stable angina pectoris  Multivessel cad for cabg on Monday no angina    Other hyperlipidemia  statins    Type 2 diabetes mellitus with complication, without long-term current use of insulin  Per hospital medicine    Essential hypertension  Continuer home meds        VTE Risk Mitigation (From admission, onward)           Ordered     IP VTE HIGH RISK PATIENT  Once         05/03/24 1124     Place sequential compression device  Until discontinued         05/03/24 1124                    Hali Hutchison MD  Cardiology  [facility] - Tele[facility] ([facility])

## 2024-05-05 NOTE — PLAN OF CARE
A244/A244 JACOB Marques is a 73 y.o.male admitted on 5/3/2024 for Coronary artery disease of native artery of native heart with stable angina pectoris   Code Status: Full Code MRN: [id number]   Review of patient's allergies indicates:   Allergen Reactions    Codeine      Past Medical History:   Diagnosis Date    Diabetes mellitus 2002     am 09/15/2017    DM (diabetes mellitus) 2002     am 09/21/2018    DM (diabetes mellitus) 2002     am 09/27/2019    DM (diabetes mellitus) 2002     am 09/10/2020    Hypertension       PRN meds    sodium chloride 0.9%, , Continuous PRN  acetaminophen, 650 mg, Q4H PRN  acetaminophen, 650 mg, Q4H PRN  glucagon (human recombinant), 1 mg, PRN  glucose, 16 g, PRN  glucose, 24 g, PRN  insulin aspart U-100, 0-5 Units, QID (AC + HS) PRN  naloxone, 0.4 mg, PRN  nitroGLYCERIN, 0.4 mg, Q5 Min PRN  ondansetron, 8 mg, Q8H PRN  ondansetron, 4 mg, Q6H PRN  sodium chloride 0.9%, 10 mL, PRN  sodium chloride 0.9%, 10 mL, Q12H PRN      Chart check completed. Will continue plan of care.      Orientation: oriented x 4  [place] Coma Scale Score: 15     Lead Monitored: Lead II Rhythm: sinus bradycardia Frequency/Ectopy: PVCs  Cardiac/Telemetry Box Number: [id number]  VTE Required Core Measure: Pharmacological prophylaxis initiated/maintained Last Bowel Movement: 05/02/24  Diet Cardiac Standard Tray     Chris Score: 20  Fall Risk Score: 16  Accucheck [x]   Freq?AC/HS      Lines/Drains/Airways       Peripheral Intravenous Line  Duration                  Peripheral IV - Single Lumen 05/03/24 0801 20 G Anterior;Proximal;Right Forearm 1 day

## 2024-05-06 ENCOUNTER — ANESTHESIA (OUTPATIENT)
Dept: SURGERY | Facility: HOSPITAL | Age: 74
DRG: 234 | End: 2024-05-06
Payer: MEDICARE

## 2024-05-06 PROBLEM — Z99.11 ON MECHANICALLY ASSISTED VENTILATION: Status: ACTIVE | Noted: 2024-05-06

## 2024-05-06 LAB
ABO + RH BLD: NORMAL
ALBUMIN SERPL BCP-MCNC: 2.1 G/DL (ref 3.5–5.2)
ALBUMIN SERPL BCP-MCNC: 3.8 G/DL (ref 3.5–5.2)
ALLENS TEST: ABNORMAL
ALP SERPL-CCNC: 42 U/L (ref 55–135)
ALP SERPL-CCNC: 76 U/L (ref 55–135)
ALT SERPL W/O P-5'-P-CCNC: 19 U/L (ref 10–44)
ALT SERPL W/O P-5'-P-CCNC: 29 U/L (ref 10–44)
ANION GAP SERPL CALC-SCNC: 12 MMOL/L (ref 8–16)
ANION GAP SERPL CALC-SCNC: 13 MMOL/L (ref 8–16)
ANION GAP SERPL CALC-SCNC: 8 MMOL/L (ref 8–16)
APTT PPP: 26.8 SEC (ref 21–32)
APTT PPP: 28.2 SEC (ref 21–32)
AST SERPL-CCNC: 25 U/L (ref 10–40)
AST SERPL-CCNC: 48 U/L (ref 10–40)
BASOPHILS # BLD AUTO: 0.03 K/UL (ref 0–0.2)
BASOPHILS # BLD AUTO: 0.03 K/UL (ref 0–0.2)
BASOPHILS NFR BLD: 0.2 % (ref 0–1.9)
BASOPHILS NFR BLD: 0.4 % (ref 0–1.9)
BILIRUB SERPL-MCNC: 0.3 MG/DL (ref 0.1–1)
BILIRUB SERPL-MCNC: 0.4 MG/DL (ref 0.1–1)
BLD GP AB SCN CELLS X3 SERPL QL: NORMAL
BUN SERPL-MCNC: 15 MG/DL (ref 8–23)
BUN SERPL-MCNC: 17 MG/DL (ref 8–23)
BUN SERPL-MCNC: 18 MG/DL (ref 8–23)
CALCIUM SERPL-MCNC: 9.1 MG/DL (ref 8.7–10.5)
CALCIUM SERPL-MCNC: 9.2 MG/DL (ref 8.7–10.5)
CALCIUM SERPL-MCNC: 9.8 MG/DL (ref 8.7–10.5)
CHLORIDE SERPL-SCNC: 107 MMOL/L (ref 95–110)
CHLORIDE SERPL-SCNC: 109 MMOL/L (ref 95–110)
CHLORIDE SERPL-SCNC: 110 MMOL/L (ref 95–110)
CO2 SERPL-SCNC: 21 MMOL/L (ref 23–29)
CO2 SERPL-SCNC: 23 MMOL/L (ref 23–29)
CO2 SERPL-SCNC: 27 MMOL/L (ref 23–29)
CREAT SERPL-MCNC: 1.1 MG/DL (ref 0.5–1.4)
CREAT SERPL-MCNC: 1.1 MG/DL (ref 0.5–1.4)
CREAT SERPL-MCNC: 1.5 MG/DL (ref 0.5–1.4)
DELSYS: ABNORMAL
DIFFERENTIAL METHOD BLD: ABNORMAL
DIFFERENTIAL METHOD BLD: ABNORMAL
EOSINOPHIL # BLD AUTO: 0 K/UL (ref 0–0.5)
EOSINOPHIL # BLD AUTO: 0.3 K/UL (ref 0–0.5)
EOSINOPHIL NFR BLD: 0 % (ref 0–8)
EOSINOPHIL NFR BLD: 4.7 % (ref 0–8)
ERYTHROCYTE [DISTWIDTH] IN BLOOD BY AUTOMATED COUNT: 12.9 % (ref 11.5–14.5)
ERYTHROCYTE [DISTWIDTH] IN BLOOD BY AUTOMATED COUNT: 12.9 % (ref 11.5–14.5)
ERYTHROCYTE [DISTWIDTH] IN BLOOD BY AUTOMATED COUNT: 13.2 % (ref 11.5–14.5)
ERYTHROCYTE [SEDIMENTATION RATE] IN BLOOD BY WESTERGREN METHOD: 16 MM/H
EST. GFR  (NO RACE VARIABLE): 49 ML/MIN/1.73 M^2
EST. GFR  (NO RACE VARIABLE): >60 ML/MIN/1.73 M^2
EST. GFR  (NO RACE VARIABLE): >60 ML/MIN/1.73 M^2
ESTIMATED AVG GLUCOSE: 154 MG/DL (ref 68–131)
FIBRINOGEN PPP-MCNC: 175 MG/DL (ref 182–400)
FIO2: 100
GLUCOSE SERPL-MCNC: 113 MG/DL (ref 70–110)
GLUCOSE SERPL-MCNC: 120 MG/DL (ref 70–110)
GLUCOSE SERPL-MCNC: 122 MG/DL (ref 70–110)
GLUCOSE SERPL-MCNC: 128 MG/DL (ref 70–110)
GLUCOSE SERPL-MCNC: 155 MG/DL (ref 70–110)
GLUCOSE SERPL-MCNC: 162 MG/DL (ref 70–110)
GLUCOSE SERPL-MCNC: 167 MG/DL (ref 70–110)
GLUCOSE SERPL-MCNC: 196 MG/DL (ref 70–110)
GLUCOSE SERPL-MCNC: 197 MG/DL (ref 70–110)
GLUCOSE SERPL-MCNC: 245 MG/DL (ref 70–110)
GLUCOSE SERPL-MCNC: 271 MG/DL (ref 70–110)
GLUCOSE SERPL-MCNC: 273 MG/DL (ref 70–110)
GLUCOSE SERPL-MCNC: 331 MG/DL (ref 70–110)
HBA1C MFR BLD: 7 % (ref 4–5.6)
HCO3 UR-SCNC: 18.4 MMOL/L (ref 24–28)
HCO3 UR-SCNC: 20.9 MMOL/L (ref 24–28)
HCO3 UR-SCNC: 21.4 MMOL/L (ref 24–28)
HCO3 UR-SCNC: 21.9 MMOL/L (ref 24–28)
HCO3 UR-SCNC: 23.4 MMOL/L (ref 24–28)
HCO3 UR-SCNC: 23.5 MMOL/L (ref 24–28)
HCO3 UR-SCNC: 24.1 MMOL/L (ref 24–28)
HCO3 UR-SCNC: 24.5 MMOL/L (ref 24–28)
HCO3 UR-SCNC: 24.9 MMOL/L (ref 24–28)
HCO3 UR-SCNC: 27.9 MMOL/L (ref 24–28)
HCT VFR BLD AUTO: 27.1 % (ref 40–54)
HCT VFR BLD AUTO: 27.2 % (ref 40–54)
HCT VFR BLD AUTO: 40 % (ref 40–54)
HCT VFR BLD CALC: 23 %PCV (ref 36–54)
HCT VFR BLD CALC: 24 %PCV (ref 36–54)
HCT VFR BLD CALC: 25 %PCV (ref 36–54)
HCT VFR BLD CALC: 26 %PCV (ref 36–54)
HCT VFR BLD CALC: 27 %PCV (ref 36–54)
HCT VFR BLD CALC: 30 %PCV (ref 36–54)
HCT VFR BLD CALC: 34 %PCV (ref 36–54)
HCT VFR BLD CALC: 35 %PCV (ref 36–54)
HGB BLD-MCNC: 13.4 G/DL (ref 14–18)
HGB BLD-MCNC: 9 G/DL (ref 14–18)
HGB BLD-MCNC: 9 G/DL (ref 14–18)
IMM GRANULOCYTES # BLD AUTO: 0.02 K/UL (ref 0–0.04)
IMM GRANULOCYTES # BLD AUTO: 0.13 K/UL (ref 0–0.04)
IMM GRANULOCYTES NFR BLD AUTO: 0.3 % (ref 0–0.5)
IMM GRANULOCYTES NFR BLD AUTO: 0.7 % (ref 0–0.5)
INR PPP: 0.9 (ref 0.8–1.2)
INR PPP: 1.3 (ref 0.8–1.2)
LYMPHOCYTES # BLD AUTO: 0.7 K/UL (ref 1–4.8)
LYMPHOCYTES # BLD AUTO: 2.1 K/UL (ref 1–4.8)
LYMPHOCYTES NFR BLD: 3.3 % (ref 18–48)
LYMPHOCYTES NFR BLD: 30.5 % (ref 18–48)
MAGNESIUM SERPL-MCNC: 1.9 MG/DL (ref 1.6–2.6)
MAGNESIUM SERPL-MCNC: 3 MG/DL (ref 1.6–2.6)
MAGNESIUM SERPL-MCNC: 4.2 MG/DL (ref 1.6–2.6)
MCH RBC QN AUTO: 30.6 PG (ref 27–31)
MCH RBC QN AUTO: 30.8 PG (ref 27–31)
MCH RBC QN AUTO: 30.9 PG (ref 27–31)
MCHC RBC AUTO-ENTMCNC: 33.1 G/DL (ref 32–36)
MCHC RBC AUTO-ENTMCNC: 33.2 G/DL (ref 32–36)
MCHC RBC AUTO-ENTMCNC: 33.5 G/DL (ref 32–36)
MCV RBC AUTO: 92 FL (ref 82–98)
MCV RBC AUTO: 93 FL (ref 82–98)
MCV RBC AUTO: 93 FL (ref 82–98)
MODE: ABNORMAL
MONOCYTES # BLD AUTO: 0.8 K/UL (ref 0.3–1)
MONOCYTES # BLD AUTO: 1.7 K/UL (ref 0.3–1)
MONOCYTES NFR BLD: 11 % (ref 4–15)
MONOCYTES NFR BLD: 8.7 % (ref 4–15)
NEUTROPHILS # BLD AUTO: 17.3 K/UL (ref 1.8–7.7)
NEUTROPHILS # BLD AUTO: 3.6 K/UL (ref 1.8–7.7)
NEUTROPHILS NFR BLD: 53.1 % (ref 38–73)
NEUTROPHILS NFR BLD: 87.1 % (ref 38–73)
NRBC BLD-RTO: 0 /100 WBC
NRBC BLD-RTO: 0 /100 WBC
PCO2 BLDA: 32.8 MMHG (ref 35–45)
PCO2 BLDA: 33.1 MMHG (ref 35–45)
PCO2 BLDA: 33.2 MMHG (ref 35–45)
PCO2 BLDA: 35.4 MMHG (ref 35–45)
PCO2 BLDA: 37 MMHG (ref 35–45)
PCO2 BLDA: 40.2 MMHG (ref 35–45)
PCO2 BLDA: 40.5 MMHG (ref 35–45)
PCO2 BLDA: 42 MMHG (ref 35–45)
PCO2 BLDA: 42.5 MMHG (ref 35–45)
PCO2 BLDA: 44.9 MMHG (ref 35–45)
PEEP: 5
PH SMN: 7.35 [PH] (ref 7.35–7.45)
PH SMN: 7.36 [PH] (ref 7.35–7.45)
PH SMN: 7.37 [PH] (ref 7.35–7.45)
PH SMN: 7.39 [PH] (ref 7.35–7.45)
PH SMN: 7.4 [PH] (ref 7.35–7.45)
PH SMN: 7.41 [PH] (ref 7.35–7.45)
PH SMN: 7.41 [PH] (ref 7.35–7.45)
PH SMN: 7.42 [PH] (ref 7.35–7.45)
PLATELET # BLD AUTO: 127 K/UL (ref 150–450)
PLATELET # BLD AUTO: 130 K/UL (ref 150–450)
PLATELET # BLD AUTO: 166 K/UL (ref 150–450)
PMV BLD AUTO: 10.4 FL (ref 9.2–12.9)
PMV BLD AUTO: 10.6 FL (ref 9.2–12.9)
PMV BLD AUTO: 11.2 FL (ref 9.2–12.9)
PO2 BLDA: 208 MMHG (ref 80–100)
PO2 BLDA: 247 MMHG (ref 80–100)
PO2 BLDA: 266 MMHG (ref 80–100)
PO2 BLDA: 266 MMHG (ref 80–100)
PO2 BLDA: 300 MMHG (ref 80–100)
PO2 BLDA: 317 MMHG (ref 80–100)
PO2 BLDA: 353 MMHG (ref 80–100)
PO2 BLDA: 426 MMHG (ref 80–100)
PO2 BLDA: 452 MMHG (ref 80–100)
PO2 BLDA: 561 MMHG (ref 80–100)
POC ACTIVATED CLOTTING TIME K: 131 SEC (ref 74–137)
POC ACTIVATED CLOTTING TIME K: 152 SEC (ref 74–137)
POC ACTIVATED CLOTTING TIME K: 542 SEC (ref 74–137)
POC ACTIVATED CLOTTING TIME K: 585 SEC (ref 74–137)
POC ACTIVATED CLOTTING TIME K: 590 SEC (ref 74–137)
POC ACTIVATED CLOTTING TIME K: 655 SEC (ref 74–137)
POC ACTIVATED CLOTTING TIME K: 682 SEC (ref 74–137)
POC ACTIVATED CLOTTING TIME K: 709 SEC (ref 74–137)
POC ACTIVATED CLOTTING TIME K: 844 SEC (ref 74–137)
POC BE: -1 MMOL/L
POC BE: -1 MMOL/L
POC BE: -2 MMOL/L
POC BE: -3 MMOL/L
POC BE: -3 MMOL/L
POC BE: -4 MMOL/L
POC BE: -7 MMOL/L
POC BE: 0 MMOL/L
POC BE: 0 MMOL/L
POC BE: 3 MMOL/L
POC IONIZED CALCIUM: 1.18 MMOL/L (ref 1.06–1.42)
POC IONIZED CALCIUM: 1.18 MMOL/L (ref 1.06–1.42)
POC IONIZED CALCIUM: 1.2 MMOL/L (ref 1.06–1.42)
POC IONIZED CALCIUM: 1.21 MMOL/L (ref 1.06–1.42)
POC IONIZED CALCIUM: 1.24 MMOL/L (ref 1.06–1.42)
POC IONIZED CALCIUM: 1.24 MMOL/L (ref 1.06–1.42)
POC IONIZED CALCIUM: 1.26 MMOL/L (ref 1.06–1.42)
POC IONIZED CALCIUM: 1.35 MMOL/L (ref 1.06–1.42)
POC IONIZED CALCIUM: 1.54 MMOL/L (ref 1.06–1.42)
POC IONIZED CALCIUM: 1.7 MMOL/L (ref 1.06–1.42)
POC SATURATED O2: 100 % (ref 95–100)
POCT GLUCOSE: 137 MG/DL (ref 70–110)
POCT GLUCOSE: 146 MG/DL (ref 70–110)
POCT GLUCOSE: 184 MG/DL (ref 70–110)
POCT GLUCOSE: 215 MG/DL (ref 70–110)
POCT GLUCOSE: 275 MG/DL (ref 70–110)
POCT GLUCOSE: 277 MG/DL (ref 70–110)
POCT GLUCOSE: 302 MG/DL (ref 70–110)
POCT GLUCOSE: 302 MG/DL (ref 70–110)
POCT GLUCOSE: 304 MG/DL (ref 70–110)
POCT GLUCOSE: 324 MG/DL (ref 70–110)
POTASSIUM BLD-SCNC: 3 MMOL/L (ref 3.5–5.1)
POTASSIUM BLD-SCNC: 3.5 MMOL/L (ref 3.5–5.1)
POTASSIUM BLD-SCNC: 3.8 MMOL/L (ref 3.5–5.1)
POTASSIUM BLD-SCNC: 3.9 MMOL/L (ref 3.5–5.1)
POTASSIUM BLD-SCNC: 4.1 MMOL/L (ref 3.5–5.1)
POTASSIUM BLD-SCNC: 4.2 MMOL/L (ref 3.5–5.1)
POTASSIUM BLD-SCNC: 4.6 MMOL/L (ref 3.5–5.1)
POTASSIUM BLD-SCNC: 4.7 MMOL/L (ref 3.5–5.1)
POTASSIUM BLD-SCNC: 5.9 MMOL/L (ref 3.5–5.1)
POTASSIUM BLD-SCNC: 6.6 MMOL/L (ref 3.5–5.1)
POTASSIUM SERPL-SCNC: 3.1 MMOL/L (ref 3.5–5.1)
POTASSIUM SERPL-SCNC: 3.6 MMOL/L (ref 3.5–5.1)
POTASSIUM SERPL-SCNC: 4.1 MMOL/L (ref 3.5–5.1)
PREALB SERPL-MCNC: 19 MG/DL (ref 20–43)
PROT SERPL-MCNC: 3.9 G/DL (ref 6–8.4)
PROT SERPL-MCNC: 6.8 G/DL (ref 6–8.4)
PROTHROMBIN TIME: 10.9 SEC (ref 9–12.5)
PROTHROMBIN TIME: 13.8 SEC (ref 9–12.5)
RBC # BLD AUTO: 2.92 M/UL (ref 4.6–6.2)
RBC # BLD AUTO: 2.94 M/UL (ref 4.6–6.2)
RBC # BLD AUTO: 4.33 M/UL (ref 4.6–6.2)
SAMPLE: ABNORMAL
SAMPLE: NORMAL
SITE: ABNORMAL
SODIUM BLD-SCNC: 133 MMOL/L (ref 136–145)
SODIUM BLD-SCNC: 134 MMOL/L (ref 136–145)
SODIUM BLD-SCNC: 137 MMOL/L (ref 136–145)
SODIUM BLD-SCNC: 137 MMOL/L (ref 136–145)
SODIUM BLD-SCNC: 138 MMOL/L (ref 136–145)
SODIUM BLD-SCNC: 140 MMOL/L (ref 136–145)
SODIUM BLD-SCNC: 140 MMOL/L (ref 136–145)
SODIUM BLD-SCNC: 141 MMOL/L (ref 136–145)
SODIUM BLD-SCNC: 142 MMOL/L (ref 136–145)
SODIUM BLD-SCNC: 147 MMOL/L (ref 136–145)
SODIUM SERPL-SCNC: 138 MMOL/L (ref 136–145)
SODIUM SERPL-SCNC: 143 MMOL/L (ref 136–145)
SODIUM SERPL-SCNC: 149 MMOL/L (ref 136–145)
SPECIMEN OUTDATE: NORMAL
VT: 420
WBC # BLD AUTO: 19.89 K/UL (ref 3.9–12.7)
WBC # BLD AUTO: 23.74 K/UL (ref 3.9–12.7)
WBC # BLD AUTO: 6.81 K/UL (ref 3.9–12.7)

## 2024-05-06 PROCEDURE — C1729 CATH, DRAINAGE: HCPCS | Performed by: THORACIC SURGERY (CARDIOTHORACIC VASCULAR SURGERY)

## 2024-05-06 PROCEDURE — 63600175 PHARM REV CODE 636 W HCPCS: Performed by: FAMILY MEDICINE

## 2024-05-06 PROCEDURE — 5A1221Z PERFORMANCE OF CARDIAC OUTPUT, CONTINUOUS: ICD-10-PCS | Performed by: THORACIC SURGERY (CARDIOTHORACIC VASCULAR SURGERY)

## 2024-05-06 PROCEDURE — 36000713 HC OR TIME LEV V EA ADD 15 MIN: Performed by: THORACIC SURGERY (CARDIOTHORACIC VASCULAR SURGERY)

## 2024-05-06 PROCEDURE — 36415 COLL VENOUS BLD VENIPUNCTURE: CPT | Performed by: INTERNAL MEDICINE

## 2024-05-06 PROCEDURE — 25000003 PHARM REV CODE 250: Performed by: FAMILY MEDICINE

## 2024-05-06 PROCEDURE — 25000003 PHARM REV CODE 250: Performed by: THORACIC SURGERY (CARDIOTHORACIC VASCULAR SURGERY)

## 2024-05-06 PROCEDURE — 25000003 PHARM REV CODE 250: Performed by: NURSE ANESTHETIST, CERTIFIED REGISTERED

## 2024-05-06 PROCEDURE — 86850 RBC ANTIBODY SCREEN: CPT | Performed by: THORACIC SURGERY (CARDIOTHORACIC VASCULAR SURGERY)

## 2024-05-06 PROCEDURE — 85027 COMPLETE CBC AUTOMATED: CPT | Performed by: THORACIC SURGERY (CARDIOTHORACIC VASCULAR SURGERY)

## 2024-05-06 PROCEDURE — 63600175 PHARM REV CODE 636 W HCPCS: Performed by: NURSE ANESTHETIST, CERTIFIED REGISTERED

## 2024-05-06 PROCEDURE — 06BQ4ZZ EXCISION OF LEFT SAPHENOUS VEIN, PERCUTANEOUS ENDOSCOPIC APPROACH: ICD-10-PCS | Performed by: THORACIC SURGERY (CARDIOTHORACIC VASCULAR SURGERY)

## 2024-05-06 PROCEDURE — 80048 BASIC METABOLIC PNL TOTAL CA: CPT | Mod: XB | Performed by: THORACIC SURGERY (CARDIOTHORACIC VASCULAR SURGERY)

## 2024-05-06 PROCEDURE — 63600175 PHARM REV CODE 636 W HCPCS: Performed by: INTERNAL MEDICINE

## 2024-05-06 PROCEDURE — S0017 INJECTION, AMINOCAPROIC ACID: HCPCS | Performed by: FAMILY MEDICINE

## 2024-05-06 PROCEDURE — 63600175 PHARM REV CODE 636 W HCPCS: Mod: JZ,JG | Performed by: THORACIC SURGERY (CARDIOTHORACIC VASCULAR SURGERY)

## 2024-05-06 PROCEDURE — 85025 COMPLETE CBC W/AUTO DIFF WBC: CPT | Mod: 91 | Performed by: THORACIC SURGERY (CARDIOTHORACIC VASCULAR SURGERY)

## 2024-05-06 PROCEDURE — 27100171 HC OXYGEN HIGH FLOW UP TO 24 HOURS

## 2024-05-06 PROCEDURE — 63600175 PHARM REV CODE 636 W HCPCS: Performed by: THORACIC SURGERY (CARDIOTHORACIC VASCULAR SURGERY)

## 2024-05-06 PROCEDURE — 33519 CABG ARTERY-VEIN THREE: CPT | Mod: ,,, | Performed by: THORACIC SURGERY (CARDIOTHORACIC VASCULAR SURGERY)

## 2024-05-06 PROCEDURE — 84134 ASSAY OF PREALBUMIN: CPT | Performed by: THORACIC SURGERY (CARDIOTHORACIC VASCULAR SURGERY)

## 2024-05-06 PROCEDURE — 25000003 PHARM REV CODE 250: Performed by: INTERNAL MEDICINE

## 2024-05-06 PROCEDURE — 85730 THROMBOPLASTIN TIME PARTIAL: CPT | Performed by: THORACIC SURGERY (CARDIOTHORACIC VASCULAR SURGERY)

## 2024-05-06 PROCEDURE — 94761 N-INVAS EAR/PLS OXIMETRY MLT: CPT

## 2024-05-06 PROCEDURE — 37000009 HC ANESTHESIA EA ADD 15 MINS: Performed by: THORACIC SURGERY (CARDIOTHORACIC VASCULAR SURGERY)

## 2024-05-06 PROCEDURE — C1887 CATHETER, GUIDING: HCPCS | Performed by: THORACIC SURGERY (CARDIOTHORACIC VASCULAR SURGERY)

## 2024-05-06 PROCEDURE — 94640 AIRWAY INHALATION TREATMENT: CPT

## 2024-05-06 PROCEDURE — C9290 INJ, BUPIVACAINE LIPOSOME: HCPCS | Performed by: THORACIC SURGERY (CARDIOTHORACIC VASCULAR SURGERY)

## 2024-05-06 PROCEDURE — C9399 UNCLASSIFIED DRUGS OR BIOLOG: HCPCS | Performed by: THORACIC SURGERY (CARDIOTHORACIC VASCULAR SURGERY)

## 2024-05-06 PROCEDURE — 27800903 OPTIME MED/SURG SUP & DEVICES OTHER IMPLANTS: Performed by: THORACIC SURGERY (CARDIOTHORACIC VASCULAR SURGERY)

## 2024-05-06 PROCEDURE — P9045 ALBUMIN (HUMAN), 5%, 250 ML: HCPCS | Mod: JZ,JG | Performed by: THORACIC SURGERY (CARDIOTHORACIC VASCULAR SURGERY)

## 2024-05-06 PROCEDURE — 20000000 HC ICU ROOM

## 2024-05-06 PROCEDURE — 85384 FIBRINOGEN ACTIVITY: CPT | Performed by: THORACIC SURGERY (CARDIOTHORACIC VASCULAR SURGERY)

## 2024-05-06 PROCEDURE — 33508 ENDOSCOPIC VEIN HARVEST: CPT | Mod: 59,,, | Performed by: THORACIC SURGERY (CARDIOTHORACIC VASCULAR SURGERY)

## 2024-05-06 PROCEDURE — 27201423 OPTIME MED/SURG SUP & DEVICES STERILE SUPPLY: Performed by: THORACIC SURGERY (CARDIOTHORACIC VASCULAR SURGERY)

## 2024-05-06 PROCEDURE — 36000712 HC OR TIME LEV V 1ST 15 MIN: Performed by: THORACIC SURGERY (CARDIOTHORACIC VASCULAR SURGERY)

## 2024-05-06 PROCEDURE — 83036 HEMOGLOBIN GLYCOSYLATED A1C: CPT | Performed by: THORACIC SURGERY (CARDIOTHORACIC VASCULAR SURGERY)

## 2024-05-06 PROCEDURE — 80053 COMPREHEN METABOLIC PANEL: CPT | Mod: 91 | Performed by: THORACIC SURGERY (CARDIOTHORACIC VASCULAR SURGERY)

## 2024-05-06 PROCEDURE — 99900035 HC TECH TIME PER 15 MIN (STAT)

## 2024-05-06 PROCEDURE — 25000242 PHARM REV CODE 250 ALT 637 W/ HCPCS: Performed by: THORACIC SURGERY (CARDIOTHORACIC VASCULAR SURGERY)

## 2024-05-06 PROCEDURE — 021209W BYPASS CORONARY ARTERY, THREE ARTERIES FROM AORTA WITH AUTOLOGOUS VENOUS TISSUE, OPEN APPROACH: ICD-10-PCS | Performed by: THORACIC SURGERY (CARDIOTHORACIC VASCULAR SURGERY)

## 2024-05-06 PROCEDURE — 85025 COMPLETE CBC W/AUTO DIFF WBC: CPT | Performed by: INTERNAL MEDICINE

## 2024-05-06 PROCEDURE — C9113 INJ PANTOPRAZOLE SODIUM, VIA: HCPCS | Performed by: THORACIC SURGERY (CARDIOTHORACIC VASCULAR SURGERY)

## 2024-05-06 PROCEDURE — 85610 PROTHROMBIN TIME: CPT | Performed by: THORACIC SURGERY (CARDIOTHORACIC VASCULAR SURGERY)

## 2024-05-06 PROCEDURE — 37000008 HC ANESTHESIA 1ST 15 MINUTES: Performed by: THORACIC SURGERY (CARDIOTHORACIC VASCULAR SURGERY)

## 2024-05-06 PROCEDURE — 02100Z9 BYPASS CORONARY ARTERY, ONE ARTERY FROM LEFT INTERNAL MAMMARY, OPEN APPROACH: ICD-10-PCS | Performed by: THORACIC SURGERY (CARDIOTHORACIC VASCULAR SURGERY)

## 2024-05-06 PROCEDURE — 83735 ASSAY OF MAGNESIUM: CPT | Performed by: INTERNAL MEDICINE

## 2024-05-06 PROCEDURE — 80053 COMPREHEN METABOLIC PANEL: CPT | Performed by: INTERNAL MEDICINE

## 2024-05-06 PROCEDURE — 83735 ASSAY OF MAGNESIUM: CPT | Mod: 91 | Performed by: THORACIC SURGERY (CARDIOTHORACIC VASCULAR SURGERY)

## 2024-05-06 PROCEDURE — 86920 COMPATIBILITY TEST SPIN: CPT | Performed by: STUDENT IN AN ORGANIZED HEALTH CARE EDUCATION/TRAINING PROGRAM

## 2024-05-06 PROCEDURE — 94002 VENT MGMT INPAT INIT DAY: CPT

## 2024-05-06 PROCEDURE — 27200667 HC PACEMAKER, TEMPORARY MONITORING, PER SHIFT

## 2024-05-06 PROCEDURE — 86920 COMPATIBILITY TEST SPIN: CPT | Performed by: THORACIC SURGERY (CARDIOTHORACIC VASCULAR SURGERY)

## 2024-05-06 PROCEDURE — 33533 CABG ARTERIAL SINGLE: CPT | Mod: ,,, | Performed by: THORACIC SURGERY (CARDIOTHORACIC VASCULAR SURGERY)

## 2024-05-06 DEVICE — TEMP PACING WIRE
Type: IMPLANTABLE DEVICE | Site: HEART | Status: FUNCTIONAL
Brand: MYO/WIRE

## 2024-05-06 RX ORDER — ASCORBIC ACID 500 MG
500 TABLET ORAL 2 TIMES DAILY
Status: DISCONTINUED | OUTPATIENT
Start: 2024-05-07 | End: 2024-05-14 | Stop reason: HOSPADM

## 2024-05-06 RX ORDER — POTASSIUM CHLORIDE 14.9 MG/ML
60 INJECTION INTRAVENOUS
Status: DISCONTINUED | OUTPATIENT
Start: 2024-05-06 | End: 2024-05-14 | Stop reason: HOSPADM

## 2024-05-06 RX ORDER — HEPARIN SODIUM 1000 [USP'U]/ML
INJECTION, SOLUTION INTRAVENOUS; SUBCUTANEOUS
Status: DISCONTINUED | OUTPATIENT
Start: 2024-05-06 | End: 2024-05-06

## 2024-05-06 RX ORDER — SODIUM CHLORIDE, SODIUM LACTATE, POTASSIUM CHLORIDE, CALCIUM CHLORIDE 600; 310; 30; 20 MG/100ML; MG/100ML; MG/100ML; MG/100ML
1000 INJECTION, SOLUTION INTRAVENOUS
Status: DISCONTINUED | OUTPATIENT
Start: 2024-05-06 | End: 2024-05-14 | Stop reason: HOSPADM

## 2024-05-06 RX ORDER — HEPARIN SODIUM 1000 [USP'U]/ML
INJECTION, SOLUTION INTRAVENOUS; SUBCUTANEOUS
Status: DISCONTINUED | OUTPATIENT
Start: 2024-05-06 | End: 2024-05-06 | Stop reason: HOSPADM

## 2024-05-06 RX ORDER — ACETAMINOPHEN 650 MG/20.3ML
650 LIQUID ORAL EVERY 6 HOURS PRN
Status: DISCONTINUED | OUTPATIENT
Start: 2024-05-06 | End: 2024-05-06 | Stop reason: SDUPTHER

## 2024-05-06 RX ORDER — DOCUSATE SODIUM 100 MG/1
100 CAPSULE, LIQUID FILLED ORAL 2 TIMES DAILY
Status: DISCONTINUED | OUTPATIENT
Start: 2024-05-06 | End: 2024-05-14 | Stop reason: HOSPADM

## 2024-05-06 RX ORDER — CLOPIDOGREL BISULFATE 75 MG/1
75 TABLET ORAL DAILY
Status: DISCONTINUED | OUTPATIENT
Start: 2024-05-07 | End: 2024-05-14 | Stop reason: HOSPADM

## 2024-05-06 RX ORDER — FUROSEMIDE 10 MG/ML
40 INJECTION INTRAMUSCULAR; INTRAVENOUS 2 TIMES DAILY
Status: DISCONTINUED | OUTPATIENT
Start: 2024-05-07 | End: 2024-05-08

## 2024-05-06 RX ORDER — POTASSIUM CHLORIDE 20 MEQ/1
20 TABLET, EXTENDED RELEASE ORAL EVERY 12 HOURS
Status: DISCONTINUED | OUTPATIENT
Start: 2024-05-07 | End: 2024-05-07

## 2024-05-06 RX ORDER — IPRATROPIUM BROMIDE AND ALBUTEROL SULFATE 2.5; .5 MG/3ML; MG/3ML
3 SOLUTION RESPIRATORY (INHALATION) EVERY 4 HOURS
Status: DISPENSED | OUTPATIENT
Start: 2024-05-06 | End: 2024-05-07

## 2024-05-06 RX ORDER — ACETAMINOPHEN 10 MG/ML
1000 INJECTION, SOLUTION INTRAVENOUS EVERY 8 HOURS
Status: DISCONTINUED | OUTPATIENT
Start: 2024-05-06 | End: 2024-05-06

## 2024-05-06 RX ORDER — OXYCODONE HYDROCHLORIDE 5 MG/1
10 TABLET ORAL EVERY 4 HOURS PRN
Status: DISCONTINUED | OUTPATIENT
Start: 2024-05-06 | End: 2024-05-06

## 2024-05-06 RX ORDER — ACETAMINOPHEN 325 MG/1
650 TABLET ORAL EVERY 6 HOURS
Status: DISPENSED | OUTPATIENT
Start: 2024-05-07 | End: 2024-05-10

## 2024-05-06 RX ORDER — DEXMEDETOMIDINE HYDROCHLORIDE 4 UG/ML
0-1.4 INJECTION INTRAVENOUS CONTINUOUS
Status: DISCONTINUED | OUTPATIENT
Start: 2024-05-06 | End: 2024-05-08

## 2024-05-06 RX ORDER — PROPOFOL 10 MG/ML
VIAL (ML) INTRAVENOUS
Status: DISCONTINUED | OUTPATIENT
Start: 2024-05-06 | End: 2024-05-06

## 2024-05-06 RX ORDER — METOCLOPRAMIDE HYDROCHLORIDE 5 MG/ML
5 INJECTION INTRAMUSCULAR; INTRAVENOUS EVERY 6 HOURS PRN
Status: DISCONTINUED | OUTPATIENT
Start: 2024-05-06 | End: 2024-05-14 | Stop reason: HOSPADM

## 2024-05-06 RX ORDER — FENTANYL CITRATE 50 UG/ML
25 INJECTION, SOLUTION INTRAMUSCULAR; INTRAVENOUS
Status: DISCONTINUED | OUTPATIENT
Start: 2024-05-06 | End: 2024-05-08

## 2024-05-06 RX ORDER — BUPIVACAINE HYDROCHLORIDE 2.5 MG/ML
INJECTION, SOLUTION EPIDURAL; INFILTRATION; INTRACAUDAL
Status: DISCONTINUED | OUTPATIENT
Start: 2024-05-06 | End: 2024-05-06 | Stop reason: HOSPADM

## 2024-05-06 RX ORDER — LIDOCAINE HYDROCHLORIDE ANHYDROUS AND DEXTROSE MONOHYDRATE .8; 5 G/100ML; G/100ML
INJECTION, SOLUTION INTRAVENOUS CONTINUOUS PRN
Status: DISCONTINUED | OUTPATIENT
Start: 2024-05-06 | End: 2024-05-06

## 2024-05-06 RX ORDER — OXYCODONE HYDROCHLORIDE 5 MG/1
5 TABLET ORAL EVERY 4 HOURS PRN
Status: DISCONTINUED | OUTPATIENT
Start: 2024-05-06 | End: 2024-05-06

## 2024-05-06 RX ORDER — FENTANYL CITRATE 50 UG/ML
INJECTION, SOLUTION INTRAMUSCULAR; INTRAVENOUS
Status: DISCONTINUED | OUTPATIENT
Start: 2024-05-06 | End: 2024-05-06

## 2024-05-06 RX ORDER — PAPAVERINE HYDROCHLORIDE 30 MG/ML
INJECTION INTRAMUSCULAR; INTRAVENOUS
Status: DISCONTINUED | OUTPATIENT
Start: 2024-05-06 | End: 2024-05-06 | Stop reason: HOSPADM

## 2024-05-06 RX ORDER — AMINOCAPROIC ACID 250 MG/ML
INJECTION, SOLUTION INTRAVENOUS
Status: DISCONTINUED | OUTPATIENT
Start: 2024-05-06 | End: 2024-05-06

## 2024-05-06 RX ORDER — LANOLIN ALCOHOL/MO/W.PET/CERES
1000 CREAM (GRAM) TOPICAL DAILY
Status: DISCONTINUED | OUTPATIENT
Start: 2024-05-08 | End: 2024-05-14 | Stop reason: HOSPADM

## 2024-05-06 RX ORDER — DEXTROSE, SODIUM CHLORIDE, SODIUM LACTATE, POTASSIUM CHLORIDE, AND CALCIUM CHLORIDE 5; .6; .31; .03; .02 G/100ML; G/100ML; G/100ML; G/100ML; G/100ML
INJECTION, SOLUTION INTRAVENOUS CONTINUOUS
Status: ACTIVE | OUTPATIENT
Start: 2024-05-06 | End: 2024-05-07

## 2024-05-06 RX ORDER — LANOLIN ALCOHOL/MO/W.PET/CERES
1 CREAM (GRAM) TOPICAL DAILY
Status: DISCONTINUED | OUTPATIENT
Start: 2024-05-07 | End: 2024-05-14 | Stop reason: HOSPADM

## 2024-05-06 RX ORDER — ACETAMINOPHEN 10 MG/ML
1000 INJECTION, SOLUTION INTRAVENOUS EVERY 8 HOURS
Status: COMPLETED | OUTPATIENT
Start: 2024-05-06 | End: 2024-05-06

## 2024-05-06 RX ORDER — FENTANYL CITRATE 50 UG/ML
12.5 INJECTION, SOLUTION INTRAMUSCULAR; INTRAVENOUS
Status: DISCONTINUED | OUTPATIENT
Start: 2024-05-06 | End: 2024-05-08

## 2024-05-06 RX ORDER — INDOCYANINE GREEN AND WATER 25 MG
KIT INJECTION
Status: DISCONTINUED | OUTPATIENT
Start: 2024-05-06 | End: 2024-05-06 | Stop reason: HOSPADM

## 2024-05-06 RX ORDER — LIDOCAINE HYDROCHLORIDE 20 MG/ML
INJECTION INTRAVENOUS
Status: DISCONTINUED | OUTPATIENT
Start: 2024-05-06 | End: 2024-05-06

## 2024-05-06 RX ORDER — ROCURONIUM BROMIDE 10 MG/ML
INJECTION, SOLUTION INTRAVENOUS
Status: DISCONTINUED | OUTPATIENT
Start: 2024-05-06 | End: 2024-05-06

## 2024-05-06 RX ORDER — ASPIRIN 81 MG/1
81 TABLET ORAL DAILY
Status: DISCONTINUED | OUTPATIENT
Start: 2024-05-07 | End: 2024-05-14 | Stop reason: HOSPADM

## 2024-05-06 RX ORDER — PANTOPRAZOLE SODIUM 40 MG/10ML
40 INJECTION, POWDER, LYOPHILIZED, FOR SOLUTION INTRAVENOUS DAILY
Status: DISCONTINUED | OUTPATIENT
Start: 2024-05-06 | End: 2024-05-08

## 2024-05-06 RX ORDER — CALCIUM GLUCONATE 20 MG/ML
2 INJECTION, SOLUTION INTRAVENOUS
Status: DISCONTINUED | OUTPATIENT
Start: 2024-05-06 | End: 2024-05-13

## 2024-05-06 RX ORDER — MIDAZOLAM HYDROCHLORIDE 1 MG/ML
INJECTION INTRAMUSCULAR; INTRAVENOUS
Status: DISCONTINUED | OUTPATIENT
Start: 2024-05-06 | End: 2024-05-06

## 2024-05-06 RX ORDER — VASOPRESSIN IN DEXTROSE 5 % 25/250 ML
0.04 PLASTIC BAG, INJECTION (ML) INTRAVENOUS CONTINUOUS
Status: DISCONTINUED | OUTPATIENT
Start: 2024-05-06 | End: 2024-05-13

## 2024-05-06 RX ORDER — ADHESIVE BANDAGE
10 BANDAGE TOPICAL 2 TIMES DAILY
Status: DISCONTINUED | OUTPATIENT
Start: 2024-05-07 | End: 2024-05-08

## 2024-05-06 RX ORDER — ADENOSINE 3 MG/ML
INJECTION INTRAVENOUS
Status: DISCONTINUED | OUTPATIENT
Start: 2024-05-06 | End: 2024-05-06 | Stop reason: HOSPADM

## 2024-05-06 RX ORDER — MAGNESIUM SULFATE HEPTAHYDRATE 40 MG/ML
4 INJECTION, SOLUTION INTRAVENOUS
Status: DISCONTINUED | OUTPATIENT
Start: 2024-05-06 | End: 2024-05-14 | Stop reason: HOSPADM

## 2024-05-06 RX ORDER — CALCIUM GLUCONATE 20 MG/ML
3 INJECTION, SOLUTION INTRAVENOUS
Status: DISCONTINUED | OUTPATIENT
Start: 2024-05-06 | End: 2024-05-13

## 2024-05-06 RX ORDER — CALCIUM GLUCONATE 20 MG/ML
1 INJECTION, SOLUTION INTRAVENOUS
Status: DISCONTINUED | OUTPATIENT
Start: 2024-05-06 | End: 2024-05-13

## 2024-05-06 RX ORDER — POTASSIUM CHLORIDE 14.9 MG/ML
20 INJECTION INTRAVENOUS
Status: DISCONTINUED | OUTPATIENT
Start: 2024-05-06 | End: 2024-05-14 | Stop reason: HOSPADM

## 2024-05-06 RX ORDER — KETAMINE HCL IN 0.9 % NACL 50 MG/5 ML
SYRINGE (ML) INTRAVENOUS
Status: DISCONTINUED | OUTPATIENT
Start: 2024-05-06 | End: 2024-05-06

## 2024-05-06 RX ORDER — ALBUMIN HUMAN 50 G/1000ML
25 SOLUTION INTRAVENOUS
Status: DISCONTINUED | OUTPATIENT
Start: 2024-05-06 | End: 2024-05-14 | Stop reason: HOSPADM

## 2024-05-06 RX ORDER — CEFAZOLIN SODIUM 2 G/50ML
2 SOLUTION INTRAVENOUS
Status: DISCONTINUED | OUTPATIENT
Start: 2024-05-06 | End: 2024-05-06 | Stop reason: HOSPADM

## 2024-05-06 RX ORDER — METOPROLOL TARTRATE 25 MG/1
25 TABLET, FILM COATED ORAL
Status: COMPLETED | OUTPATIENT
Start: 2024-05-06 | End: 2024-05-06

## 2024-05-06 RX ORDER — POLYETHYLENE GLYCOL 3350 17 G/17G
17 POWDER, FOR SOLUTION ORAL DAILY
Status: DISCONTINUED | OUTPATIENT
Start: 2024-05-07 | End: 2024-05-14 | Stop reason: HOSPADM

## 2024-05-06 RX ORDER — ONDANSETRON 2 MG/ML
INJECTION INTRAMUSCULAR; INTRAVENOUS
Status: DISCONTINUED | OUTPATIENT
Start: 2024-05-06 | End: 2024-05-06

## 2024-05-06 RX ORDER — ACETAMINOPHEN 325 MG/1
650 TABLET ORAL EVERY 6 HOURS PRN
Status: DISCONTINUED | OUTPATIENT
Start: 2024-05-06 | End: 2024-05-08

## 2024-05-06 RX ORDER — MAGNESIUM SULFATE HEPTAHYDRATE 40 MG/ML
INJECTION, SOLUTION INTRAVENOUS
Status: DISCONTINUED | OUTPATIENT
Start: 2024-05-06 | End: 2024-05-06

## 2024-05-06 RX ORDER — METOPROLOL TARTRATE 25 MG/1
25 TABLET, FILM COATED ORAL 2 TIMES DAILY
Status: DISCONTINUED | OUTPATIENT
Start: 2024-05-07 | End: 2024-05-09

## 2024-05-06 RX ORDER — CHLORHEXIDINE GLUCONATE ORAL RINSE 1.2 MG/ML
10 SOLUTION DENTAL
Status: DISCONTINUED | OUTPATIENT
Start: 2024-05-06 | End: 2024-05-06 | Stop reason: HOSPADM

## 2024-05-06 RX ORDER — ACETAMINOPHEN 10 MG/ML
1000 INJECTION, SOLUTION INTRAVENOUS ONCE
Status: COMPLETED | OUTPATIENT
Start: 2024-05-06 | End: 2024-05-06

## 2024-05-06 RX ORDER — CHLORHEXIDINE GLUCONATE ORAL RINSE 1.2 MG/ML
10 SOLUTION DENTAL 2 TIMES DAILY
Status: DISPENSED | OUTPATIENT
Start: 2024-05-06 | End: 2024-05-11

## 2024-05-06 RX ORDER — CEFAZOLIN SODIUM 1 G/3ML
INJECTION, POWDER, FOR SOLUTION INTRAMUSCULAR; INTRAVENOUS
Status: DISCONTINUED | OUTPATIENT
Start: 2024-05-06 | End: 2024-05-06

## 2024-05-06 RX ORDER — POTASSIUM CHLORIDE 29.8 MG/ML
40 INJECTION INTRAVENOUS
Status: DISCONTINUED | OUTPATIENT
Start: 2024-05-06 | End: 2024-05-14 | Stop reason: HOSPADM

## 2024-05-06 RX ORDER — FOLIC ACID 1 MG/1
1 TABLET ORAL DAILY
Status: DISCONTINUED | OUTPATIENT
Start: 2024-05-08 | End: 2024-05-14 | Stop reason: HOSPADM

## 2024-05-06 RX ADMIN — ONDANSETRON 4 MG: 2 INJECTION INTRAMUSCULAR; INTRAVENOUS at 06:05

## 2024-05-06 RX ADMIN — CEFAZOLIN 2 G: 330 INJECTION, POWDER, FOR SOLUTION INTRAMUSCULAR; INTRAVENOUS at 07:05

## 2024-05-06 RX ADMIN — DEXMEDETOMIDINE HYDROCHLORIDE 0.3 MCG/KG/HR: 4 INJECTION INTRAVENOUS at 01:05

## 2024-05-06 RX ADMIN — PANTOPRAZOLE SODIUM 40 MG: 40 INJECTION, POWDER, LYOPHILIZED, FOR SOLUTION INTRAVENOUS at 03:05

## 2024-05-06 RX ADMIN — VANCOMYCIN HYDROCHLORIDE 1000 MG: 1 INJECTION, POWDER, LYOPHILIZED, FOR SOLUTION INTRAVENOUS at 07:05

## 2024-05-06 RX ADMIN — CHLORHEXIDINE GLUCONATE 0.12% ORAL RINSE 10 ML: 1.2 LIQUID ORAL at 09:05

## 2024-05-06 RX ADMIN — ALBUMIN (HUMAN) 25 G: 2.5 SOLUTION INTRAVENOUS at 10:05

## 2024-05-06 RX ADMIN — FENTANYL CITRATE 50 MCG: 50 INJECTION, SOLUTION INTRAMUSCULAR; INTRAVENOUS at 07:05

## 2024-05-06 RX ADMIN — DEXMEDETOMIDINE HYDROCHLORIDE 0.4 MCG/KG/HR: 4 INJECTION INTRAVENOUS at 11:05

## 2024-05-06 RX ADMIN — AMIODARONE HYDROCHLORIDE 200 MG: 200 TABLET ORAL at 06:05

## 2024-05-06 RX ADMIN — SODIUM CHLORIDE, SODIUM LACTATE, POTASSIUM CHLORIDE, CALCIUM CHLORIDE AND DEXTROSE MONOHYDRATE: 5; 600; 310; 30; 20 INJECTION, SOLUTION INTRAVENOUS at 03:05

## 2024-05-06 RX ADMIN — HEPARIN SODIUM 35000 UNITS: 1000 INJECTION, SOLUTION INTRAVENOUS; SUBCUTANEOUS at 09:05

## 2024-05-06 RX ADMIN — INSULIN HUMAN 10 UNITS: 100 INJECTION, SOLUTION PARENTERAL at 11:05

## 2024-05-06 RX ADMIN — SODIUM CHLORIDE 1 UNITS/HR: 9 INJECTION, SOLUTION INTRAVENOUS at 04:05

## 2024-05-06 RX ADMIN — MIDAZOLAM HYDROCHLORIDE 1 MG: 1 INJECTION, SOLUTION INTRAMUSCULAR; INTRAVENOUS at 07:05

## 2024-05-06 RX ADMIN — POTASSIUM CHLORIDE 20 MEQ: 200 INJECTION, SOLUTION INTRAVENOUS at 09:05

## 2024-05-06 RX ADMIN — LIDOCAINE HYDROCHLORIDE 2 MG/MIN: 8 INJECTION, SOLUTION INTRAVENOUS at 08:05

## 2024-05-06 RX ADMIN — INSULIN HUMAN 10 UNITS: 100 INJECTION, SOLUTION PARENTERAL at 10:05

## 2024-05-06 RX ADMIN — AMINOCAPROIC ACID 5 G: 250 INJECTION, SOLUTION INTRAVENOUS at 08:05

## 2024-05-06 RX ADMIN — SODIUM CHLORIDE 10 UNITS/HR: 9 INJECTION, SOLUTION INTRAVENOUS at 10:05

## 2024-05-06 RX ADMIN — SUGAMMADEX 200 MG: 100 INJECTION, SOLUTION INTRAVENOUS at 02:05

## 2024-05-06 RX ADMIN — ROCURONIUM BROMIDE 50 MG: 10 SOLUTION INTRAVENOUS at 09:05

## 2024-05-06 RX ADMIN — PRAVASTATIN SODIUM 40 MG: 20 TABLET ORAL at 09:05

## 2024-05-06 RX ADMIN — IPRATROPIUM BROMIDE AND ALBUTEROL SULFATE 3 ML: 2.5; .5 SOLUTION RESPIRATORY (INHALATION) at 03:05

## 2024-05-06 RX ADMIN — CEFAZOLIN 2 G: 2 INJECTION, POWDER, FOR SOLUTION INTRAMUSCULAR; INTRAVENOUS at 04:05

## 2024-05-06 RX ADMIN — IPRATROPIUM BROMIDE AND ALBUTEROL SULFATE 3 ML: 2.5; .5 SOLUTION RESPIRATORY (INHALATION) at 11:05

## 2024-05-06 RX ADMIN — MAGNESIUM SULFATE HEPTAHYDRATE 2 G: 2 INJECTION, SOLUTION INTRAVENOUS at 07:05

## 2024-05-06 RX ADMIN — VASOPRESSIN 0.04 UNITS/MIN: 0.2 INJECTION INTRAVENOUS at 06:05

## 2024-05-06 RX ADMIN — SODIUM CHLORIDE, SODIUM LACTATE, POTASSIUM CHLORIDE, AND CALCIUM CHLORIDE: .6; .31; .03; .02 INJECTION, SOLUTION INTRAVENOUS at 07:05

## 2024-05-06 RX ADMIN — ALBUMIN (HUMAN) 25 G: 2.5 SOLUTION INTRAVENOUS at 03:05

## 2024-05-06 RX ADMIN — ROCURONIUM BROMIDE 50 MG: 10 SOLUTION INTRAVENOUS at 12:05

## 2024-05-06 RX ADMIN — POTASSIUM CHLORIDE 20 MEQ: 200 INJECTION, SOLUTION INTRAVENOUS at 03:05

## 2024-05-06 RX ADMIN — VASOPRESSIN 0.04 UNITS/MIN: 0.2 INJECTION INTRAVENOUS at 01:05

## 2024-05-06 RX ADMIN — METOPROLOL TARTRATE 25 MG: 25 TABLET, FILM COATED ORAL at 06:05

## 2024-05-06 RX ADMIN — EPINEPHRINE 0.04 MCG/KG/MIN: 1 INJECTION INTRAMUSCULAR; INTRAVENOUS; SUBCUTANEOUS at 11:05

## 2024-05-06 RX ADMIN — VASOPRESSIN 0.04 UNITS/MIN: 20 INJECTION INTRAVENOUS at 01:05

## 2024-05-06 RX ADMIN — Medication 25 MG: at 01:05

## 2024-05-06 RX ADMIN — CALCIUM CHLORIDE 1 G: 100 INJECTION, SOLUTION INTRAVENOUS at 01:05

## 2024-05-06 RX ADMIN — CHLORHEXIDINE GLUCONATE 0.12% ORAL RINSE 10 ML: 1.2 LIQUID ORAL at 06:05

## 2024-05-06 RX ADMIN — HEPARIN SODIUM 10000 UNITS: 1000 INJECTION, SOLUTION INTRAVENOUS; SUBCUTANEOUS at 10:05

## 2024-05-06 RX ADMIN — IPRATROPIUM BROMIDE AND ALBUTEROL SULFATE 3 ML: 2.5; .5 SOLUTION RESPIRATORY (INHALATION) at 07:05

## 2024-05-06 RX ADMIN — LIDOCAINE HYDROCHLORIDE 50 MG: 20 INJECTION INTRAVENOUS at 07:05

## 2024-05-06 RX ADMIN — EPINEPHRINE 0.04 MCG/KG/MIN: 1 INJECTION INTRAMUSCULAR; INTRAVENOUS; SUBCUTANEOUS at 01:05

## 2024-05-06 RX ADMIN — ACETAMINOPHEN 1000 MG: 10 INJECTION INTRAVENOUS at 03:05

## 2024-05-06 RX ADMIN — PROPOFOL 50 MG: 10 INJECTION, EMULSION INTRAVENOUS at 07:05

## 2024-05-06 RX ADMIN — ACETAMINOPHEN 1000 MG: 10 INJECTION INTRAVENOUS at 11:05

## 2024-05-06 RX ADMIN — ROCURONIUM BROMIDE 100 MG: 10 SOLUTION INTRAVENOUS at 07:05

## 2024-05-06 RX ADMIN — VANCOMYCIN HYDROCHLORIDE 1000 MG: 1 INJECTION, POWDER, LYOPHILIZED, FOR SOLUTION INTRAVENOUS at 09:05

## 2024-05-06 RX ADMIN — ACETAMINOPHEN 1000 MG: 10 INJECTION, SOLUTION INTRAVENOUS at 07:05

## 2024-05-06 NOTE — HPI
Mr. Marques is a 73-year-old male with past medical history of CAD, T2DM, HLD, HTN who presented to hospital last week for scheduled LHC on 5/3. Cath revealed severe multi-vessel disease involving 90% LAD stenosis. CTS consulted for CABG which was done today by Dr. Amato. Patient arrived to ICU inubated/sedated. On vasopressin/epi/insulin gtts and sedated with precedex.

## 2024-05-06 NOTE — PROGRESS NOTES
The patient is a 73-year-old male with hypertension, hyperlipidemia, diabetes and coronary artery disease who was worked up for exertional chest discomfort.  Cardiac catheterization shows severe multivessel coronary artery disease with a 90% proximal LAD stenosis.  The patient is now in the operating room for urgent coronary artery bypass grafting.  The risks and benefits of surgery include death, stroke, myocardial infarction, heart failure, renal failure, prolonged intubation, prolonged hospitalization, infection, bleeding, and reoperation.  The patient understands the risks and benefits of surgery and has agreed to proceed with urgent coronary artery bypass grafting.

## 2024-05-06 NOTE — CARE UPDATE
Pt arrived to ICU from OR. ETT secured @ 24/ teeth by tube hughes. Pt placed on 980 ventilator on documented settings. ABG shown to Dr. Amato at bedside and vent settings adjusted. Will continue to monitor.

## 2024-05-06 NOTE — INTERVAL H&P NOTE
The patient has been examined and the H&P has been reviewed:    I concur with the findings and no changes have occurred since H&P was written.    Surgery risks, benefits and alternative options discussed and understood by patient/family.          Active Hospital Problems    Diagnosis  POA    *Coronary artery disease of native artery of native heart with stable angina pectoris [I25.118]  Yes    Essential hypertension [I10]  Yes    Other hyperlipidemia [E78.49]  Yes    Type 2 diabetes mellitus with complication, without long-term current use of insulin [E11.8]  Yes      Resolved Hospital Problems   No resolved problems to display.

## 2024-05-06 NOTE — ANESTHESIA POSTPROCEDURE EVALUATION
Anesthesia Post Evaluation    Patient: Feng Marques    Procedure(s) Performed: Procedure(s) (LRB):  CORONARY ARTERY BYPASS GRAFT (CABG) (N/A)  ECHOCARDIOGRAM,TRANSESOPHAGEAL (N/A)  SURGICAL PROCUREMENT, VEIN, ENDOSCOPIC (Left)  BLOCK, NERVE, INTERCOSTAL, 2 OR MORE (N/A)    Final Anesthesia Type: general      Patient location during evaluation: ICU  Patient participation: No - Unable to Participate, Intubation  Level of consciousness: sedated  Post-procedure vital signs: reviewed and stable  Pain management: adequate  Airway patency: patent    PONV status at discharge: No PONV  Anesthetic complications: no      Cardiovascular status: blood pressure returned to baseline  Respiratory status: intubated and ventilator  Hydration status: euvolemic  Follow-up not needed.              Vitals Value Taken Time   /59 05/06/24 1601   Temp 36.9 °C (98.42 °F) 05/06/24 1613   Pulse 95 05/06/24 1613   Resp 22 05/06/24 1613   SpO2 100 % 05/06/24 1613   Vitals shown include unfiled device data.      No case tracking events are documented in the log.      Pain/Didi Score: Pain Rating Prior to Med Admin: 0 (5/6/2024  3:45 PM)

## 2024-05-06 NOTE — PLAN OF CARE
[facility]  Feng Marques is a 73 y.o.male admitted on 5/3/2024 for Coronary artery disease of native artery of native heart with stable angina pectoris   Code Status: Full Code MRN: [id number]   Review of patient's allergies indicates:   Allergen Reactions    Codeine      Past Medical History:   Diagnosis Date    Diabetes mellitus 2002     am 09/15/2017    DM (diabetes mellitus) 2002     am 09/21/2018    DM (diabetes mellitus) 2002     am 09/27/2019    DM (diabetes mellitus) 2002     am 09/10/2020    Hypertension       PRN meds    sodium chloride 0.9%, , Continuous PRN  acetaminophen, 650 mg, Q4H PRN  acetaminophen, 650 mg, Q4H PRN  ceFAZolin (Ancef) IV (PEDS and ADULTS), 2 g, On Call Procedure  chlorhexidine, 10 mL, On Call Procedure  glucagon (human recombinant), 1 mg, PRN  glucose, 16 g, PRN  glucose, 24 g, PRN  insulin aspart U-100, 0-5 Units, QID (AC + HS) PRN  metoprolol tartrate, 25 mg, On Call Procedure  naloxone, 0.4 mg, PRN  nitroGLYCERIN, 0.4 mg, Q5 Min PRN  ondansetron, 8 mg, Q8H PRN  ondansetron, 4 mg, Q6H PRN  sodium chloride 0.9%, 10 mL, PRN  sodium chloride 0.9%, 10 mL, Q12H PRN      Chart check completed. Will continue plan of care.      Orientation: oriented x 4  [place] Coma Scale Score: 15     Lead Monitored: Lead II Rhythm: sinus bradycardia Frequency/Ectopy: PVCs  Cardiac/Telemetry Box Number: [id number]  VTE Required Core Measure: (SCDs) Sequential compression device initiated/maintained Last Bowel Movement: 05/02/24  Diet NPO Except for: Sips with Medication     Chris Score: 21  Fall Risk Score: 16  Accucheck [x]   Freq? AC/HS     Lines/Drains/Airways       Peripheral Intravenous Line  Duration                  Peripheral IV - Single Lumen 05/03/24 0801 20 G Anterior;Proximal;Right Forearm 2 days

## 2024-05-06 NOTE — ASSESSMENT & PLAN NOTE
- Patient with known CAD s/p stent placement and CABG, which is controlled Will continue ASA, Plavix, and Statin and monitor for S/Sx of angina/ACS. Continue to monitor on telemetry.   - Multi-vessel disease involving LAD, LHC performed 5/3  - CTS consulted and taken for CABG today 5/6; S/p CABG x4  - Post op plan per CTS; will continue to follow

## 2024-05-06 NOTE — ANESTHESIA PROCEDURE NOTES
Arterial    Diagnosis: CABG    Patient location during procedure: done in OR  Timeout: 5/6/2024 7:11 AM  Procedure end time: 5/6/2024 7:19 AM    Staffing  Authorizing Provider: Pete Almaraz II, MD  Performing Provider: Pete Almaraz II, MD    Staffing  Performed by: Pete Almaraz II, MD  Authorized by: Pete Almaraz II, MD    Anesthesiologist was present at the time of the procedure.    Preanesthetic Checklist  Completed: patient identified, IV checked, site marked, risks and benefits discussed, surgical consent, monitors and equipment checked, pre-op evaluation, timeout performed and anesthesia consent givenArterial  Skin Prep: chlorhexidine gluconate  Local Infiltration: lidocaine  Orientation: right  Location: radial    Catheter Size: 20 G  Catheter placement by Ultrasound guidance. Heme positive aspiration all ports.   Vessel Caliber: medium, patent, compressibility normal  Needle advanced into vessel with real time Ultrasound guidance.  Guidewire confirmed in vessel.  Sterile sheath used.  Image recorded and saved.Insertion Attempts: 1  Assessment  Dressing: secured with tape and tegaderm and sutured in place and taped  Patient: Tolerated well

## 2024-05-06 NOTE — PROGRESS NOTES
Pt arrived to ICU. Secured to monitor. Dr Amato at bedside . Chest tube to suctions with medial leak. ( MD aware ). Pt intubated. PM secured with AV pacing. Sedated. All lines secured. GTTs confirmed. Pt reversed and is moving all ext. Will extubate when ready. VSS. Orders per post CABAG

## 2024-05-06 NOTE — ANESTHESIA PROCEDURE NOTES
Intubation    Date/Time: 5/6/2024 7:38 AM    Performed by: Zeina Nogueira CRNA  Authorized by: Pete Almaraz II, MD    Intubation:     Induction:  Intravenous    Intubated:  Postinduction    Mask Ventilation:  Easy mask    Attempts:  1    Attempted By:  Student    Method of Intubation:  Direct    Blade:  Cummings 2    Laryngeal View Grade: Grade I - full view of cords      Difficult Airway Encountered?: No      Complications:  None    Airway Device:  Oral endotracheal tube    Airway Device Size:  8.0    Style/Cuff Inflation:  Cuffed (inflated to minimal occlusive pressure)    Tube secured:  23    Secured at:  The lips    Placement Verified By:  Capnometry    Complicating Factors:  None    Findings Post-Intubation:  BS equal bilateral and atraumatic/condition of teeth unchanged

## 2024-05-06 NOTE — TRANSFER OF CARE
"Anesthesia Transfer of Care Note    Patient: Feng Marques    Procedure(s) Performed: Procedure(s) (LRB):  CORONARY ARTERY BYPASS GRAFT (CABG) (N/A)  ECHOCARDIOGRAM,TRANSESOPHAGEAL (N/A)  SURGICAL PROCUREMENT, VEIN, ENDOSCOPIC (Left)  BLOCK, NERVE, INTERCOSTAL, 2 OR MORE (N/A)    Patient location: ICU    Anesthesia Type: general    Transport from OR: Transported from OR intubated on 100% O2  with adequate ventilation controlled by transport ventilator. Continuous ECG monitoring in transport. Continuous SpO2 monitoring in transport. Continuos invasive BP monitoring in transport    Post pain: adequate analgesia    Post assessment: no apparent anesthetic complications    Post vital signs: stable    Level of consciousness: sedated    Nausea/Vomiting: no nausea/vomiting    Complications: none    Transfer of care protocol was followed      Last vitals: Visit Vitals  BP (!) 152/70 (BP Location: Right arm, Patient Position: Lying)   Pulse 64   Temp 36.8 °C (98.2 °F) (Oral)   Resp 18   Ht 5' 10" (1.778 m)   Wt 65.8 kg (145 lb)   SpO2 97%   BMI 20.81 kg/m²     "

## 2024-05-06 NOTE — ANESTHESIA PREPROCEDURE EVALUATION
05/06/2024  Feng Marques is a 73 y.o., male with hypertension, hyperlipidemia, diabetes and coronary artery disease status post stenting several years ago who presented for cardiac catheterization as part of a workup for complaints of chest pain.   Patient has episodes of chest pain at mild exertion. Chest pain frequency has been increasing over the past few weeks. Patient denies any chest pain at rest. Patient denies any orthopnea or PND. Patient denies any ankle swelling.   Cardiac catheterization shows severe multivessel coronary artery disease with a 90% proximal LAD stenosis.     Patient Active Problem List   Diagnosis    Essential hypertension    Type 2 diabetes mellitus with complication, without long-term current use of insulin    Other hyperlipidemia    Coronary artery disease of native artery of native heart with stable angina pectoris     Past Medical History:   Diagnosis Date    Diabetes mellitus 2002     am 09/15/2017    DM (diabetes mellitus) 2002     am 09/21/2018    DM (diabetes mellitus) 2002     am 09/27/2019    DM (diabetes mellitus) 2002     am 09/10/2020    Hypertension      No past surgical history on file.      Chemistry        Component Value Date/Time     05/05/2024 0528    K 4.0 05/05/2024 0528     05/05/2024 0528    CO2 23 05/05/2024 0528    BUN 20 05/05/2024 0528    CREATININE 1.2 05/05/2024 0528     (H) 05/05/2024 0528        Component Value Date/Time    CALCIUM 9.4 05/05/2024 0528    ALKPHOS 72 05/05/2024 0528    AST 23 05/05/2024 0528    ALT 26 05/05/2024 0528    BILITOT 0.4 05/05/2024 0528        Lab Results   Component Value Date    WBC 6.35 05/05/2024    HGB 13.6 (L) 05/05/2024    HCT 42.5 05/05/2024    MCV 96 05/05/2024     05/05/2024     Echo: (5/3/24)  Summary         Left Ventricle: The left ventricle is normal in  size. Normal wall thickness. There is concentric hypertrophy. Regional wall motion abnormalities present. See diagram for wall motion findings. There is normal systolic function with a visually estimated ejection fraction of 55 - 70%. Ejection fraction by visual approximation is 60%.    Right Ventricle: Normal right ventricular cavity size. Wall thickness is normal. Systolic function is normal.    Mitral Valve: There is mild regurgitation.    IVC/SVC: Normal venous pressure at 3 mmHg.    CARDIAC CATH (5/3/24):  Summary         The Prox RCA lesion was 50% stenosed.    The Dist RCA lesion was 70% stenosed.    The RPDA lesion was 80% stenosed.    The Ost LAD to Prox LAD lesion was 90% stenosed.    The Mid LAD lesion was 99% stenosed.    The Ost Cx to Prox Cx lesion was 95% stenosed.    The Lat 1st Mrg lesion was 75% stenosed.    The ejection fraction was calculated to be 70%.    The pre-procedure left ventricular end diastolic pressure was 12.    The post-procedure left ventricular end diastolic pressure was 14.    The estimated blood loss was none.    There was three vessel coronary artery disease.    The filling pressures on the left were normal.    There was no mitral valve regurgitation.    CT surgery evaluation, discussed with Dr Amato    Nuc Stress (4/18/24):  Interpretation Summary         There are two significant perfusion abnormalities.    Perfusion Abnormality #1 - There is a moderate to severe intensity, fixed perfusion abnormality consistent with scar in the mid to apical anteroapical wall(s).    Perfusion Abnormality #2 - There is a moderate intensity, fixed perfusion abnormality consistent with scar in the basal to distal inferolateral wall(s).    The gated perfusion images showed an ejection fraction of 62% at rest. The gated perfusion images showed an ejection fraction of 54% post stress. Normal ejection fraction is greater than 59%.    There is moderate global hypokinesis at rest and stress.    LV  cavity size is mildly enlarged at rest and mildly enlarged at stress.    The ECG portion of the study is negative for ischemia.    Pre-op Assessment    I have reviewed the Patient Summary Reports.    I have reviewed the NPO Status.   I have reviewed the Medications.     Review of Systems  Anesthesia Hx:   Neg history of prior surgery.             Social:  Non-Smoker, No Alcohol Use       Hematology/Oncology:  Hematology Normal   Oncology Normal    -- Denies Anemia:               Hematology Comments: 13.6/42.5                    Cardiovascular:     Hypertension   CAD   CABG/stent (previous stent (RCA?, 2010))   Angina        ECG has been reviewed.                          Pulmonary:  Pulmonary Normal                       Renal/:  Renal/ Normal                 Musculoskeletal:  Musculoskeletal Normal                Neurological:  Neurology Normal                                      Endocrine:  Diabetes, type 2   Bmi 21      Denies Obesity / BMI > 30      Physical Exam  General: Well nourished    Airway:  Mallampati: II   Mouth Opening: Normal  TM Distance: Normal  Neck ROM: Normal ROM    Dental:  Intact        Anesthesia Plan  Type of Anesthesia, risks & benefits discussed:    Anesthesia Type: Gen ETT  Intra-op Monitoring Plan: Standard ASA Monitors, Art Line, Central Line, ROSEMARIE and CO  Post Op Pain Control Plan: multimodal analgesia and IV/PO Opioids PRN  Induction:  IV  Airway Plan: Direct, Post-Induction  Informed Consent: Informed consent signed with the Patient and all parties understand the risks and agree with anesthesia plan.  All questions answered.   ASA Score: 3  Day of Surgery Review of History & Physical: H&P Update referred to the surgeon/provider.    Ready For Surgery From Anesthesia Perspective.     .

## 2024-05-06 NOTE — ANESTHESIA PROCEDURE NOTES
Monitor ROSEMARIE    Diagnosis: CABG  Patient location during procedure: OR    Staffing  Authorizing Provider: Pete Almaraz II, MD  Performing Provider: Pete Almaraz II, MD    Staffing  Anesthesiologist Present  Yes  Setup & Induction  Patient preparation: bite block inserted  Probe Insertion: easy      Findings    See saved images.  Impression    Other Findings    Probe Removal             Hydroxychloroquine Counseling:  I discussed with the patient that a baseline ophthalmologic exam is needed at the start of therapy and every year thereafter while on therapy. A CBC may also be warranted for monitoring.  The side effects of this medication were discussed with the patient, including but not limited to agranulocytosis, aplastic anemia, seizures, rashes, retinopathy, and liver toxicity. Patient instructed to call the office should any adverse effect occur.  The patient verbalized understanding of the proper use and possible adverse effects of Plaquenil.  All the patient's questions and concerns were addressed.

## 2024-05-06 NOTE — ASSESSMENT & PLAN NOTE
- Chronic, uncontrolled. Latest blood pressure and vitals reviewed:  - Home meds for hypertension were reviewed and noted below.   Hypertension Medications               amLODIPine (NORVASC) 5 MG tablet Take 5 mg by mouth once daily.    isosorbide mononitrate (IMDUR) 30 MG 24 hr tablet Take 1 tablet (30 mg total) by mouth once daily.    losartan (COZAAR) 100 MG tablet Take 50 mg by mouth 2 (two) times a day.    nitroGLYCERIN (NITROSTAT) 0.3 MG SL tablet Place 1 tablet (0.3 mg total) under the tongue every 5 (five) minutes as needed for Chest pain.   - While in the hospital, will manage blood pressure as follows; Continue home antihypertensive regimen

## 2024-05-06 NOTE — CONSULTS
[facility] - Intensive Care ([facility])  Critical Care Medicine  Consult Note    Patient Name: Feng Marques  MRN: [id number]  Admission Date: 5/3/2024  Hospital Length of Stay: 3 days  Code Status: Full Code  Attending Physician: Maury Amato MD   Primary Care Provider: Maciel Enriquez MD   Principal Problem: Coronary artery disease of native artery of native heart with stable angina pectoris    Subjective:     HPI:  Mr. Marques is a 73-year-old male with past medical history of CAD, T2DM, HLD, HTN who presented to hospital last week for scheduled LHC on 5/3. Cath revealed severe multi-vessel disease involving 90% LAD stenosis. CTS consulted for CABG which was done today by Dr. Amato. Patient arrived to ICU inubated/sedated. On vasopressin/epi/insulin gtts and sedated with precedex.     Past Medical History:   Diagnosis Date    Diabetes mellitus 2002     am 09/15/2017    DM (diabetes mellitus) 2002     am 09/21/2018    DM (diabetes mellitus) 2002     am 09/27/2019    DM (diabetes mellitus) 2002     am 09/10/2020    Hypertension      Past Surgical History:   Procedure Laterality Date    ANGIOGRAM, CORONARY, WITH LEFT HEART CATHETERIZATION N/A 5/3/2024    Procedure: Angiogram, Coronary, with Left Heart Cath;  Surgeon: Abdulaziz Carrasco MD;  Location: [facility] CATH LAB;  Service: Cardiology;  Laterality: N/A;    AORTOGRAPHY N/A 5/3/2024    Procedure: AORTOGRAM;  Surgeon: Abdulaziz Carrasco MD;  Location: [facility] CATH LAB;  Service: Cardiology;  Laterality: N/A;    ARTERIOGRAPHY OF SUBCLAVIAN ARTERY  5/3/2024    Procedure: ARTERIOGRAM, SUBCLAVIAN;  Surgeon: Abdulaziz Carrasco MD;  Location: [facility] CATH LAB;  Service: Cardiology;;    VENTRICULOGRAM, LEFT  5/3/2024    Procedure: Ventriculogram, Left;  Surgeon: Abdulaziz Carrasco MD;  Location: [facility] CATH LAB;  Service: Cardiology;;     Review of patient's allergies indicates:   Allergen Reactions    Codeine      Family History       Problem  Relation (Age of Onset)    Cataracts Mother    Diabetes Mother, Sister, Sister, Sister          Tobacco Use    Smoking status: Never    Smokeless tobacco: Current     Types: Chew   Substance and Sexual Activity    Alcohol use: No    Drug use: Not on file    Sexual activity: Not on file       Review of Systems   Reason unable to perform ROS: intubated/sedated.     Objective:     Vital Signs (Most Recent):  Temp: 98.6 °F (37 °C) (05/06/24 1545)  Pulse: 94 (05/06/24 1545)  Resp: (!) 22 (05/06/24 1545)  BP: (!) 109/53 (05/06/24 1503)  SpO2: 100 % (05/06/24 1545) Vital Signs (24h Range):  Temp:  [97.3 °F (36.3 °C)-98.6 °F (37 °C)] 98.6 °F (37 °C)  Pulse:  [48-94] 94  Resp:  [16-22] 22  SpO2:  [97 %-100 %] 100 %  BP: (109-152)/(53-70) 109/53  Arterial Line BP: ()/(45-51) 80/45     Weight: 65.8 kg (145 lb)  Body mass index is 20.81 kg/m².  Intake/Output Summary (Last 24 hours) at 5/6/2024 1609  Last data filed at 5/6/2024 1500  Gross per 24 hour   Intake 1466.14 ml   Output 285 ml   Net 1181.14 ml     Physical Exam  Vitals reviewed.   Constitutional:       Interventions: He is sedated and intubated.   HENT:      Head: Normocephalic.      Mouth/Throat:      Comments: ET tube in place  Cardiovascular:      Rate and Rhythm: Regular rhythm. Tachycardia present.      Pulses: Normal pulses.      Comments: Midline thoracic dressing clean/intact  Pulmonary:      Effort: He is intubated.   Abdominal:      General: Abdomen is flat. There is no distension.      Palpations: Abdomen is soft.   Musculoskeletal:      Comments: Ace bandage dressed to LLE; drain in place   Skin:     General: Skin is warm.      Capillary Refill: Capillary refill takes less than 2 seconds.      Coloration: Skin is not jaundiced.      Findings: No bruising.     Vents:  Vent Mode: A/C (05/06/24 1503)  Set Rate: 18 BPM (05/06/24 1503)  Vt Set: 420 mL (05/06/24 1503)  PEEP/CPAP: 5 cmH20 (05/06/24 1503)  Oxygen Concentration (%): 50 (05/06/24 1545)  Peak  Airway Pressure: 19 cmH20 (05/06/24 1503)  Plateau Pressure: 0 cmH20 (05/06/24 1503)  Total Ve: 7.94 L/m (05/06/24 1503)  Negative Inspiratory Force (cm H2O): 0 (05/06/24 1503)  F/VT Ratio<105 ([facility]): (!) 25.35 (05/06/24 1503)    Lines/Drains/Airways       Central Venous Catheter Line  Duration             Percutaneous Central Line - Triple Lumen  05/06/24 0846 Internal Jugular Right <1 day              Drain  Duration                  Chest Tube 05/06/24 1306 Tube - 3 Left Pleural 24 Fr. <1 day         Closed/Suction Drain 05/06/24 0956 Tube - 1 Left Leg Bulb 19 Fr. <1 day         Closed/Suction Drain 05/06/24 1000 Tube - 2 Left Leg Bulb 19 Fr. <1 day         Urethral Catheter 05/06/24 0844 Silicone;Straight-tip;Temperature probe <1 day         Y Chest Tube 1 and 2 05/06/24 1305 1 Right Mediastinal 24 Fr. 2 Left Mediastinal 24 Fr. <1 day              Airway  Duration                  Airway - Non-Surgical 05/06/24 0738 <1 day              Arterial Line  Duration             Arterial Line 05/06/24 0712 Right Radial <1 day              Line  Duration                  Pacer Wires 05/06/24 1310 <1 day         Pacer Wires 05/06/24 1310 <1 day              Peripheral Intravenous Line  Duration                  Peripheral IV - Single Lumen 05/03/24 0801 20 G Anterior;Proximal;Right Forearm 3 days         Peripheral IV - Single Lumen 05/06/24 0715 18 G Left Forearm <1 day                  Significant Labs:    CBC/Anemia Profile:  Recent Labs   Lab 05/05/24  0528 05/06/24  0318 05/06/24  0823 05/06/24  1413 05/06/24  1454 05/06/24  1458   WBC 6.35 6.81  --   --  23.74*  --    HGB 13.6* 13.4*  --   --  9.0*  --    HCT 42.5 40.0   < > 24* 27.2* 23*    166  --   --  127*  --    MCV 96 92  --   --  93  --    RDW 13.0 12.9  --   --  12.9  --     < > = values in this interval not displayed.     Chemistries:  Recent Labs   Lab 05/05/24  0528 05/06/24  0318 05/06/24  1454    138 149*   K 4.0 4.1 3.1*    107 110    CO2 23 23 27   BUN 20 18 15   CREATININE 1.2 1.1 1.1   CALCIUM 9.4 9.8 9.2   ALBUMIN 3.7 3.8 2.1*   PROT 6.9 6.8 3.9*   BILITOT 0.4 0.3 0.4   ALKPHOS 72 76 42*   ALT 26 29 19   AST 23 25 48*   MG 1.9 1.9 4.2*     ABGs:   Recent Labs   Lab 05/06/24  1458   PH 7.401   PCO2 44.9   HCO3 27.9   POCSATURATED 100   BE 3*     Significant Imaging:   I have reviewed and interpreted all pertinent imaging results/findings within the past 24 hours.    ABG  Recent Labs   Lab 05/06/24  1458   PH 7.401   PO2 452*   PCO2 44.9   HCO3 27.9   BE 3*     Assessment/Plan:     Pulmonary  On mechanically assisted ventilation  - S/p CABG x4 5/6  - Arrived to unit intubated/sedated on precedex  - Vent bundle in place  - Wean as able, hopeful extubation later today if appropriate    Cardiac/Vascular  * Coronary artery disease of native artery of native heart with stable angina pectoris  - Patient with known CAD s/p stent placement and CABG, which is controlled Will continue ASA, Plavix, and Statin and monitor for S/Sx of angina/ACS. Continue to monitor on telemetry.   - Multi-vessel disease involving LAD, LHC performed 5/3  - CTS consulted and taken for CABG today 5/6; S/p CABG x4  - Post op plan per CTS; will continue to follow     Other hyperlipidemia  - Continue statin    Essential hypertension  - Chronic, uncontrolled. Latest blood pressure and vitals reviewed:  - Home meds for hypertension were reviewed and noted below.   Hypertension Medications               amLODIPine (NORVASC) 5 MG tablet Take 5 mg by mouth once daily.    isosorbide mononitrate (IMDUR) 30 MG 24 hr tablet Take 1 tablet (30 mg total) by mouth once daily.    losartan (COZAAR) 100 MG tablet Take 50 mg by mouth 2 (two) times a day.    nitroGLYCERIN (NITROSTAT) 0.3 MG SL tablet Place 1 tablet (0.3 mg total) under the tongue every 5 (five) minutes as needed for Chest pain.   - While in the hospital, will manage blood pressure as follows; Continue home antihypertensive  regimen    Endocrine  Type 2 diabetes mellitus with complication, without long-term current use of insulin  - Insulin gtt per post CABG protocol      Critical Care Daily Checklist:    A: Awake: RASS Goal/Actual Goal: RASS Goal: 0-->alert and calm  Actual:     B: Spontaneous Breathing Trial Performed?  yes   C: SAT & SBT Coordinated?  yes                      D: Delirium: CAM-ICU  N/a   E: Early Mobility Performed? No   F: Feeding Goal:    Status:     Current Diet Order   No orders of the defined types were placed in this encounter.      AS: Analgesia/Sedation precedex   T: Thromboembolic Prophylaxis yes   H: HOB > 300 Yes   U: Stress Ulcer Prophylaxis (if needed) protonix   G: Glucose Control Insulin gtt   B: Bowel Function Stool Occurrence: 1   I: Indwelling Catheter (Lines & Michel) Necessity yes   D: De-escalation of Antimicrobials/Pharmacotherapies no    Plan for the day/ETD Extubation     Code Status:  Family/Goals of Care: Full Code       Critical Care Time: 34 minutes  Critical secondary to Patient has a condition that poses threat to life and bodily function: S/p CABGx4     Critical care was time spent personally by me on the following activities: development of treatment plan with patient or surrogate and bedside caregivers, discussions with consultants, evaluation of patient's response to treatment, examination of patient, ordering and performing treatments and interventions, ordering and review of laboratory studies, ordering and review of radiographic studies, pulse oximetry, re-evaluation of patient's condition. This critical care time did not overlap with that of any other provider or involve time for any procedures.    Thank you for your consult. I will follow-up with patient. Please contact us if you have any additional questions.     Fareed Calderon PA-C  Critical Care Medicine  [facility] - Intensive Care ([facility])

## 2024-05-06 NOTE — ASSESSMENT & PLAN NOTE
- S/p CABG x4 5/6  - Arrived to unit intubated/sedated on precedex  - Vent bundle in place  - Wean as able, hopeful extubation later today if appropriate

## 2024-05-06 NOTE — OP NOTE
[facility] - Surgery ([facility])  Cardiothoracic Surgery  Operative Note    SUMMARY     Date of Procedure: 5/6/2024     Procedure: Procedure(s) (LRB):  CORONARY ARTERY BYPASS GRAFT (CABG) (N/A)  ECHOCARDIOGRAM,TRANSESOPHAGEAL (N/A)  SURGICAL PROCUREMENT, VEIN, ENDOSCOPIC (Left)  BLOCK, NERVE, INTERCOSTAL, 2 OR MORE (N/A)      Coronary artery bypass grafting x4 with LIMA to LAD, reverse saphenous vein graft to the mid PDA, OM and diagonal.    Surgeons and Role:     * Maury Amato MD - Primary    Assisting Surgeon: None    Pre-Operative Diagnosis: Coronary artery disease, unspecified vessel or lesion type, unspecified whether angina present, unspecified whether native or transplanted heart [I25.10]  Coronary artery disease of native artery of native heart with stable angina pectoris [I25.118]    Post-Operative Diagnosis: Post-Op Diagnosis Codes:     * Coronary artery disease, unspecified vessel or lesion type, unspecified whether angina present, unspecified whether native or transplanted heart [I25.10]     * Coronary artery disease of native artery of native heart with stable angina pectoris [I25.118]    Anesthesia: General    Operative Findings (including complications, if any):  Extensive coronary artery disease.  Extensive coronary artery disease.  All distal and proximal anastomosis were interrogated with an indocyanine green angiogram which showed wide open anastomosis.  Intraop ROSEMARIE shows patient has ejection fraction was well preserved post bypass.  An epiaortic ultrasound performed shows the ascending aorta was free of atherosclerotic plaques.    Description of Technical Procedures:  The patient was prepped and draped sterilely.  A parasternal block was then performed using Exparel that was injected into the intercostal spaces in the midclavicular line bilaterally.  A median sternotomy incision was then performed which was carried down sharply to the sternum was encountered.  The sternum was then opened with a  sternal saw.  The left half of the sternum was then elevated with a mammary retractor.  The left internal mammary artery was then dissected down as a pedicle.  While this was being performed, a 2nd member of the team was harvesting the greater saphenous vein from the left leg.  The patient was then given heparin.  The pericardium was then opened.  Pericardial sutures were placed.  An epiaortic ultrasound was then performed which showed that the ascending aorta was free of atherosclerotic plaques.  The aortic cannula pursestring was then placed in the distal ascending aorta.  The venous pursestring was placed in the right atrial appendage.  The retrograde cardioplegia pursestring was placed in the free wall of the right atrium and the antegrade cardioplegia cannula pursestring was placed in the mid ascending aorta.  Therapeutic ACT was then ascertained.  The patient was then cannulated with a 21 Stateless aortic cannula.  A 3 stage venous cannula was placed in the IVC.  The retrograde cardioplegia cannula was then placed in the coronary sinus.  An antegrade cardioplegia cannula was placed in the mid ascending aorta.  The patient was then started on cardiopulmonary bypass.  The aorta was then crossclamped.  The heart was then arrested with antegrade warm cardioplegia.  Throughout the procedure the patient was perfused continuously with retrograde cold blood.  This was intermittently supplemented with antegrade cold blood cardioplegia.    Attention was 1st drawn to the PDA.  The site of the mid PDA lesion was then identified.  The PDA was then dissected out both proximal and distal to the mid PDA lesion.  A PDA arteriotomy was then performed proximal to the stenotic region.  This was then carried down sharply across the lesion onto the distal PDA.  A distal anastomosis of the reverse saphenous vein graft was then performed to the PDA.  An indocyanine green angiogram was then performed which showed that the distal  anastomosis was wide open and perfusing the inferior wall of the left ventricle.  Next attention was drawn to the aorta with 4.4 punch aortotomy was performed.  The proximal anastomosis of reverse saphenous vein graft was then performed to the aorta using 6 0 continuous Prolene sutures.  Next attention was drawn to the OM.  The OM was identified and found to be extensively diseased in his proximal aspect.  In addition the OM2 lateral branch was about 1 mm in diameter 1 was felt to be in adequate for bypass.  A soft segment of the OM was then identified.  This was then dissected out and an arteriotomy was then performed.  The distal anastomosis was then performed to the PDA as an end-to-side anastomosis using 7 0 continuous Prolene sutures.  Next an indocyanine green angiogram was then performed which showed that the distal anastomosis wide open and well perfused.  Next attention was drawn to the aorta with 4.4 punch aortotomy was performed.  The proximal anastomosis of the reverse saphenous vein graft was then performed to the aorta using 6 0 continuous Prolene sutures.  Next attention was drawn to the 1st diagonal.  The 1st diagonal was also extensively disease in his proximal 3rd.  A soft segment was identified in the mid diagonal.  A mid diagonal arteriotomy was then performed.  The distal anastomosis of the reverse saphenous vein graft was then performed to the mid diagonal as an end-to-side anastomosis using 7 0 continuous Prolene sutures.  An indocyanine green angiogram was then performed which showed that the distal anastomosis was wide open and perfusing perfusing the ventricle.  The attention was then drawn to the aorta with 4.4 punch aortotomy was performed.  The proximal anastomosis of the reverse saphenous vein graft was then performed to the aorta using 6 0 continuous Prolene sutures.  All proximal anastomosis sites were marked with a radio opaque proximal anastomosis marker.  A an indocyanine green  angiogram was then performed to interrogate all 3 proximal anastomosis.  All proximal anastomosis were wide open and perfusing the vein grafts.  Next attention was drawn to the LAD.  The LAD was intra myocardial up until the mid LAD area.  A LAD was then dissected down.  The LAD arteriotomy was then performed.  The LIMA was then anastomosed to the mid LAD as an end-to-side anastomosis using 8 0 continuous Prolene sutures.  An indocyanine green angiogram performed shows that the LAD was wide open and perfusing the anterior wall of the myocardium.  By this time the patient had been rewarming.  The patient was then given hotshot cardioplegia.  Surgical sites were inspected and were free of bleeding.    The aortic crossclamp was then removed.  Atrial and ventricular pacemaker wire were then placed.  Two mediastinal and left pleural chest tube were placed.  Ventilation was then restarted.  The patient was then weaned from cardiopulmonary bypass without any problems.  The patient was then decannulated.  Heparin was reversed with protamine.  Surgical sites were again inspected and were free of bleeding.  The patient's sternum was then closed with figure-of-eight sternal wires.  The fascia was closed with 1. Vicryl sutures.  And the skin was closed with 4 Monocryl sutures.    Significant Surgical Tasks Conducted by the Assistant(s), if Applicable:  Endoscopic vein harvesting    Estimated Blood Loss (EBL): * No values recorded between 5/6/2024  8:26 AM and 5/6/2024  2:07 PM *           Implants:   Implant Name Type Inv. Item Serial No.  Lot No. LRB No. Used Action   TIMO/WIRE TEMPORARY CARDIAC PACING WIRE   N/A  [id number]  4 Implanted   ROSELIA RADIOMARK GRAFT MARKER   N/A  [id number]  3 Implanted       Specimens:   Specimen (24h ago, onward)      None                    Condition: Stable    Disposition: ICU - intubated and hemodynamically stable.    Attestation: I performed the procedure.

## 2024-05-06 NOTE — SUBJECTIVE & OBJECTIVE
Past Medical History:   Diagnosis Date    Diabetes mellitus 2002     am 09/15/2017    DM (diabetes mellitus) 2002     am 09/21/2018    DM (diabetes mellitus) 2002     am 09/27/2019    DM (diabetes mellitus) 2002     am 09/10/2020    Hypertension      Past Surgical History:   Procedure Laterality Date    ANGIOGRAM, CORONARY, WITH LEFT HEART CATHETERIZATION N/A 5/3/2024    Procedure: Angiogram, Coronary, with Left Heart Cath;  Surgeon: Abdulaziz Carrasco MD;  Location: [facility] CATH LAB;  Service: Cardiology;  Laterality: N/A;    AORTOGRAPHY N/A 5/3/2024    Procedure: AORTOGRAM;  Surgeon: Abdulaziz Carrasco MD;  Location: [facility] CATH LAB;  Service: Cardiology;  Laterality: N/A;    ARTERIOGRAPHY OF SUBCLAVIAN ARTERY  5/3/2024    Procedure: ARTERIOGRAM, SUBCLAVIAN;  Surgeon: Abdulaziz Carrasco MD;  Location: [facility] CATH LAB;  Service: Cardiology;;    VENTRICULOGRAM, LEFT  5/3/2024    Procedure: Ventriculogram, Left;  Surgeon: Abdulaziz Carrasco MD;  Location: [facility] CATH LAB;  Service: Cardiology;;     Review of patient's allergies indicates:   Allergen Reactions    Codeine      Family History       Problem Relation (Age of Onset)    Cataracts Mother    Diabetes Mother, Sister, Sister, Sister          Tobacco Use    Smoking status: Never    Smokeless tobacco: Current     Types: Chew   Substance and Sexual Activity    Alcohol use: No    Drug use: Not on file    Sexual activity: Not on file       Review of Systems   Reason unable to perform ROS: intubated/sedated.     Objective:     Vital Signs (Most Recent):  Temp: 98.6 °F (37 °C) (05/06/24 1545)  Pulse: 94 (05/06/24 1545)  Resp: (!) 22 (05/06/24 1545)  BP: (!) 109/53 (05/06/24 1503)  SpO2: 100 % (05/06/24 1545) Vital Signs (24h Range):  Temp:  [97.3 °F (36.3 °C)-98.6 °F (37 °C)] 98.6 °F (37 °C)  Pulse:  [48-94] 94  Resp:  [16-22] 22  SpO2:  [97 %-100 %] 100 %  BP: (109-152)/(53-70) 109/53  Arterial Line BP: ()/(45-51) 80/45     Weight: 65.8 kg (145  lb)  Body mass index is 20.81 kg/m².  Intake/Output Summary (Last 24 hours) at 5/6/2024 1609  Last data filed at 5/6/2024 1500  Gross per 24 hour   Intake 1466.14 ml   Output 285 ml   Net 1181.14 ml     Physical Exam  Vitals reviewed.   Constitutional:       Interventions: He is sedated and intubated.   HENT:      Head: Normocephalic.      Mouth/Throat:      Comments: ET tube in place  Cardiovascular:      Rate and Rhythm: Regular rhythm. Tachycardia present.      Pulses: Normal pulses.      Comments: Midline thoracic dressing clean/intact  Pulmonary:      Effort: He is intubated.   Abdominal:      General: Abdomen is flat. There is no distension.      Palpations: Abdomen is soft.   Musculoskeletal:      Comments: Ace bandage dressed to LLE; drain in place   Skin:     General: Skin is warm.      Capillary Refill: Capillary refill takes less than 2 seconds.      Coloration: Skin is not jaundiced.      Findings: No bruising.     Vents:  Vent Mode: A/C (05/06/24 1503)  Set Rate: 18 BPM (05/06/24 1503)  Vt Set: 420 mL (05/06/24 1503)  PEEP/CPAP: 5 cmH20 (05/06/24 1503)  Oxygen Concentration (%): 50 (05/06/24 1545)  Peak Airway Pressure: 19 cmH20 (05/06/24 1503)  Plateau Pressure: 0 cmH20 (05/06/24 1503)  Total Ve: 7.94 L/m (05/06/24 1503)  Negative Inspiratory Force (cm H2O): 0 (05/06/24 1503)  F/VT Ratio<105 (RSBI): (!) 25.35 (05/06/24 1503)    Lines/Drains/Airways       Central Venous Catheter Line  Duration             Percutaneous Central Line - Triple Lumen  05/06/24 0846 Internal Jugular Right <1 day              Drain  Duration                  Chest Tube 05/06/24 1306 Tube - 3 Left Pleural 24 Fr. <1 day         Closed/Suction Drain 05/06/24 0956 Tube - 1 Left Leg Bulb 19 Fr. <1 day         Closed/Suction Drain 05/06/24 1000 Tube - 2 Left Leg Bulb 19 Fr. <1 day         Urethral Catheter 05/06/24 0844 Silicone;Straight-tip;Temperature probe <1 day         Y Chest Tube 1 and 2 05/06/24 1305 1 Right Mediastinal 24  Fr. 2 Left Mediastinal 24 Fr. <1 day              Airway  Duration                  Airway - Non-Surgical 05/06/24 0738 <1 day              Arterial Line  Duration             Arterial Line 05/06/24 0712 Right Radial <1 day              Line  Duration                  Pacer Wires 05/06/24 1310 <1 day         Pacer Wires 05/06/24 1310 <1 day              Peripheral Intravenous Line  Duration                  Peripheral IV - Single Lumen 05/03/24 0801 20 G Anterior;Proximal;Right Forearm 3 days         Peripheral IV - Single Lumen 05/06/24 0715 18 G Left Forearm <1 day                  Significant Labs:    CBC/Anemia Profile:  Recent Labs   Lab 05/05/24  0528 05/06/24  0318 05/06/24  0823 05/06/24  1413 05/06/24  1454 05/06/24  1458   WBC 6.35 6.81  --   --  23.74*  --    HGB 13.6* 13.4*  --   --  9.0*  --    HCT 42.5 40.0   < > 24* 27.2* 23*    166  --   --  127*  --    MCV 96 92  --   --  93  --    RDW 13.0 12.9  --   --  12.9  --     < > = values in this interval not displayed.     Chemistries:  Recent Labs   Lab 05/05/24  0528 05/06/24 0318 05/06/24  1454    138 149*   K 4.0 4.1 3.1*    107 110   CO2 23 23 27   BUN 20 18 15   CREATININE 1.2 1.1 1.1   CALCIUM 9.4 9.8 9.2   ALBUMIN 3.7 3.8 2.1*   PROT 6.9 6.8 3.9*   BILITOT 0.4 0.3 0.4   ALKPHOS 72 76 42*   ALT 26 29 19   AST 23 25 48*   MG 1.9 1.9 4.2*     ABGs:   Recent Labs   Lab 05/06/24  1458   PH 7.401   PCO2 44.9   HCO3 27.9   POCSATURATED 100   BE 3*     Significant Imaging:   I have reviewed and interpreted all pertinent imaging results/findings within the past 24 hours.

## 2024-05-06 NOTE — PROGRESS NOTES
"[facility] - Telemetry ([facility] Medicine  Progress Note     Patient Name:  Feng Marques  MRN:  [id number]  Patient Class: IP-Inpatient  Admission Date:  5/3/2024   Length of Stay:  3  Attending Physician: Jose De La Cruz MD  Primary Care Provider: Maciel Enriquez MD    Subjective:      Subsequent Care: Follow up Coronary artery disease of native artery of native heart with stable angina pectoris        HPI:  The patient is a 74 yo male with past medical of CAD, diabetes, HLD and HTN who presented to the hospital for scheduled left heart cath. He has history of stent placement years ago. The patient states he was having chest pain with exertion. The chest pain is midsternal and lasts until he takes nitroglycerin. He reports the chest pain was occurring more frequently. He was being medically managed based upon last stress test in 2022. Cardiology brought in for left heart cath. Multiple blockages noted and not amenable to PCI. Cardiothoracic surgery and hospital medicine consulted.       Interval Hx  NAEON per chart, but patient not in room this morning during rounds.     Objective  BP (!) 152/70 (BP Location: Right arm, Patient Position: Lying)   Pulse 64   Temp 98.2 °F (36.8 °C) (Oral)   Resp 18   Ht 5' 10" (1.778 m)   Wt 65.8 kg (145 lb)   SpO2 97%   BMI 20.81 kg/m²     Intake/Output Summary (Last 24 hours) at 5/6/2024 1441  Last data filed at 5/6/2024 0916  Gross per 24 hour   Intake 1350 ml   Output --   Net 1350 ml       PHYSICAL EXAM  Vitals reviewed  Did not see, could not obtain    LABS  All labs from the past 24 hours were reviewed.     BMP:   Recent Labs   Lab 05/06/24 0318   *      K 4.1      CO2 23   BUN 18   CREATININE 1.1   CALCIUM 9.8   MG 1.9     CBC:   Recent Labs   Lab 05/05/24  0528 05/06/24  0318 05/06/24  0823 05/06/24  1244 05/06/24  1333 05/06/24  1413   WBC 6.35 6.81  --   --   --   --    HGB 13.6* 13.4*  --   --   --   --    HCT 42.5 40.0   < > " "26* 25* 24*    166  --   --   --   --     < > = values in this interval not displayed.     CMP:   Recent Labs   Lab 05/05/24  0528 05/06/24  0318    138   K 4.0 4.1    107   CO2 23 23   * 155*   BUN 20 18   CREATININE 1.2 1.1   CALCIUM 9.4 9.8   PROT 6.9 6.8   ALBUMIN 3.7 3.8   BILITOT 0.4 0.3   ALKPHOS 72 76   AST 23 25   ALT 26 29   ANIONGAP 10 8     Cardiac Markers:   No results for input(s): "CKMB", "MYOGLOBIN", "BNP", "TROPISTAT" in the last 48 hours.    Coagulation:   Recent Labs   Lab 05/06/24  0318   INR 0.9   APTT 28.2     Lactic Acid: No results for input(s): "LACTATE" in the last 48 hours.  Magnesium:   Recent Labs   Lab 05/05/24 0528 05/06/24  0318   MG 1.9 1.9     Troponin: No results for input(s): "TROPONINI", "TROPONINIHS" in the last 48 hours.  TSH:   No results for input(s): "TSH" in the last 4320 hours.  Urine Studies:   Recent Labs   Lab 05/05/24  2237   COLORU Colorless*   APPEARANCEUA Clear   PHUR 7.0   SPECGRAV 1.010   PROTEINUA Negative   GLUCUA Negative   KETONESU Negative   BILIRUBINUA Negative   OCCULTUA Negative   NITRITE Negative   UROBILINOGEN Negative   LEUKOCYTESUR Negative       IMAGING  All imaging from the past 24 hours were reviewed.         Assessment/Plan:     Coronary artery disease of native artery of native heart with stable angina pectoris  Patient with known CAD s/p stent placement, which is uncontrolled Will continue ASA and Statin and monitor for S/Sx of angina/ACS. Continue to monitor on telemetry.   -s/p left heart cath with multivessel disease  -cardiothoracic surgery consulted  -evaluation for CABG  -prn NTG    05/06/2024  CABG planned for today  CTS following, appreciate recs     Other hyperlipidemia  -continue statin        Type 2 diabetes mellitus with complication, without long-term current use of insulin  Patient's FSGs are controlled on current medication regimen.  Last A1c reviewed- No results found for: "LABA1C", "HGBA1C"  Most recent " fingerstick glucose reviewed-       Recent Labs   Lab 05/03/24  0806   POCTGLUCOSE 152*      Current correctional scale  Low  Maintain anti-hyperglycemic dose as follows-   Antihyperglycemics (From admission, onward)        Start     Stop Route Frequency Ordered     05/03/24 1223   insulin aspart U-100 pen 0-5 Units         -- SubQ Before meals & nightly PRN 05/03/24 1124             Hold Oral hypoglycemics while patient is in the hospital.           Essential hypertension  Chronic, . Latest blood pressure and vitals reviewed-      Temp:  [97.9 °F (36.6 °C)]   Pulse:  [68]   Resp:  [18]   BP: (130-140)/(63-66)   SpO2:  [100 %] .   Home meds for hypertension were reviewed and noted below.   Hypertension Medications                    amLODIPine (NORVASC) 5 MG tablet Take 5 mg by mouth once daily.     isosorbide mononitrate (IMDUR) 30 MG 24 hr tablet Take 1 tablet (30 mg total) by mouth once daily.     losartan (COZAAR) 100 MG tablet Take 50 mg by mouth 2 (two) times a day.     nitroGLYCERIN (NITROSTAT) 0.3 MG SL tablet Place 1 tablet (0.3 mg total) under the tongue every 5 (five) minutes as needed for Chest pain.                While in the hospital, will manage blood pressure as follows; Continue home antihypertensive regimen     CORE MEASURES:  VTE Risk Mitigation (From admission, onward)           Ordered     Place DELROY hose  Until discontinued        Comments: Post Op Day 1 - After Ace and Drain Removed    05/06/24 1427     Place sequential compression device  Until discontinued         05/06/24 1427     IP VTE HIGH RISK PATIENT  Once         05/06/24 1427     Place DELROY hose  Until discontinued         05/05/24 1954     Place sequential compression device  Until discontinued         05/05/24 1954     Place sequential compression device  Until discontinued         05/03/24 1124                    Code Status: FULL    Diet: No diet orders on file    Disposition: CABG planned for Monday       Jose De La Cruz  MD  Department of Hospital Medicine   [facility] - Tele[facility] ([facility])

## 2024-05-06 NOTE — ANESTHESIA PROCEDURE NOTES
Central Line    Diagnosis: CABG  Patient location during procedure: done in OR  Procedure start time: 5/6/2024 7:47 AM  Timeout: 5/6/2024 7:46 AM  Procedure end time: 5/6/2024 8:01 AM    Staffing  Authorizing Provider: Pete Almaraz II, MD  Performing Provider: Pete Almaraz II, MD    Staffing  Performed: anesthesiologist   Anesthesiologist: Pete Almaraz II, MD  Performed by: Pete Almaraz II, MD  Authorized by: Pete Almaraz II, MD    Anesthesiologist was present at the time of the procedure.  Preanesthetic Checklist  Completed: patient identified, IV checked, site marked, risks and benefits discussed, surgical consent, monitors and equipment checked, pre-op evaluation, timeout performed and anesthesia consent given  Indication   Indication: hemodynamic monitoring, vascular access, med administration     Anesthesia   general anesthesia    Central Line   Skin Prep: skin prepped with ChloraPrep, skin prep agent completely dried prior to procedure  Sterile Barriers Followed: Yes    All five maximal barriers used- gloves, gown, cap, mask, and large sterile sheet    hand hygiene performed prior to central venous catheter insertion  Location: right internal jugular.   Catheter type: triple lumen  Catheter Size: 7 Fr  Inserted Catheter Length: 16 cm  Ultrasound: vascular probe with ultrasound   Vessel Caliber: medium, patent  Vascular Doppler:  not done, compressibility normal  Needle advanced into vessel with real time Ultrasound guidance.  Guidewire confirmed in vessel.  Image recorded and saved.  sterile gel and probe cover used in ultrasound-guided central venous catheter insertion   Manometry: Venous cannualation confirmed by visual estimation of blood vessel pressure using manometry.  Insertion Attempts: 1   Securement:line sutured, chlorhexidine patch, sterile dressing applied and blood return through all ports    Post-Procedure    Adverse Events:none      Guidewire Guidewire removed intact.

## 2024-05-07 PROBLEM — Z95.1 S/P CABG X 4: Status: ACTIVE | Noted: 2024-05-07

## 2024-05-07 LAB
ALBUMIN SERPL BCP-MCNC: 3.6 G/DL (ref 3.5–5.2)
ALP SERPL-CCNC: 40 U/L (ref 55–135)
ALT SERPL W/O P-5'-P-CCNC: 24 U/L (ref 10–44)
ANION GAP SERPL CALC-SCNC: 10 MMOL/L (ref 8–16)
ANION GAP SERPL CALC-SCNC: 12 MMOL/L (ref 8–16)
ANION GAP SERPL CALC-SCNC: 5 MMOL/L (ref 8–16)
APTT PPP: 35.8 SEC (ref 21–32)
AST SERPL-CCNC: 148 U/L (ref 10–40)
BASOPHILS # BLD AUTO: 0.02 K/UL (ref 0–0.2)
BASOPHILS NFR BLD: 0 % (ref 0–1.9)
BASOPHILS NFR BLD: 0.1 % (ref 0–1.9)
BILIRUB SERPL-MCNC: 0.3 MG/DL (ref 0.1–1)
BLD PROD TYP BPU: NORMAL
BLD PROD TYP BPU: NORMAL
BLOOD UNIT EXPIRATION DATE: NORMAL
BLOOD UNIT EXPIRATION DATE: NORMAL
BLOOD UNIT TYPE CODE: 5100
BLOOD UNIT TYPE CODE: 5100
BLOOD UNIT TYPE: NORMAL
BLOOD UNIT TYPE: NORMAL
BRPFT: ABNORMAL
BUN SERPL-MCNC: 16 MG/DL (ref 8–23)
BUN SERPL-MCNC: 19 MG/DL (ref 8–23)
BUN SERPL-MCNC: 25 MG/DL (ref 8–23)
CALCIUM SERPL-MCNC: 10.2 MG/DL (ref 8.7–10.5)
CALCIUM SERPL-MCNC: 9 MG/DL (ref 8.7–10.5)
CALCIUM SERPL-MCNC: 9.9 MG/DL (ref 8.7–10.5)
CHLORIDE SERPL-SCNC: 103 MMOL/L (ref 95–110)
CHLORIDE SERPL-SCNC: 106 MMOL/L (ref 95–110)
CHLORIDE SERPL-SCNC: 111 MMOL/L (ref 95–110)
CO2 SERPL-SCNC: 21 MMOL/L (ref 23–29)
CO2 SERPL-SCNC: 25 MMOL/L (ref 23–29)
CO2 SERPL-SCNC: 27 MMOL/L (ref 23–29)
CODING SYSTEM: NORMAL
CODING SYSTEM: NORMAL
CREAT SERPL-MCNC: 1.1 MG/DL (ref 0.5–1.4)
CREAT SERPL-MCNC: 1.3 MG/DL (ref 0.5–1.4)
CREAT SERPL-MCNC: 1.5 MG/DL (ref 0.5–1.4)
CROSSMATCH INTERPRETATION: NORMAL
CROSSMATCH INTERPRETATION: NORMAL
DIFFERENTIAL METHOD BLD: ABNORMAL
DIFFERENTIAL METHOD BLD: ABNORMAL
DISPENSE STATUS: NORMAL
DISPENSE STATUS: NORMAL
EOSINOPHIL # BLD AUTO: 0 K/UL (ref 0–0.5)
EOSINOPHIL NFR BLD: 0 % (ref 0–8)
EOSINOPHIL NFR BLD: 0 % (ref 0–8)
ERYTHROCYTE [DISTWIDTH] IN BLOOD BY AUTOMATED COUNT: 13.1 % (ref 11.5–14.5)
ERYTHROCYTE [DISTWIDTH] IN BLOOD BY AUTOMATED COUNT: 13.4 % (ref 11.5–14.5)
ERYTHROCYTE [DISTWIDTH] IN BLOOD BY AUTOMATED COUNT: 14.8 % (ref 11.5–14.5)
EST. GFR  (NO RACE VARIABLE): 49 ML/MIN/1.73 M^2
EST. GFR  (NO RACE VARIABLE): 58 ML/MIN/1.73 M^2
EST. GFR  (NO RACE VARIABLE): >60 ML/MIN/1.73 M^2
FEF 25 75 LLN: 0.94
FEF 25 75 PRE REF: 129.1 %
FEF 25 75 REF: 2.28
FEV1 FVC LLN: 62
FEV1 FVC PRE REF: 101.5 %
FEV1 FVC REF: 75
FEV1 LLN: 2.19
FEV1 PRE REF: 106.8 %
FEV1 REF: 3.08
FVC LLN: 3.02
FVC PRE REF: 104.6 %
FVC REF: 4.11
GLUCOSE SERPL-MCNC: 202 MG/DL (ref 70–110)
GLUCOSE SERPL-MCNC: 352 MG/DL (ref 70–110)
GLUCOSE SERPL-MCNC: 63 MG/DL (ref 70–110)
HCT VFR BLD AUTO: 19.1 % (ref 40–54)
HCT VFR BLD AUTO: 22.8 % (ref 40–54)
HCT VFR BLD AUTO: 29.7 % (ref 40–54)
HGB BLD-MCNC: 10 G/DL (ref 14–18)
HGB BLD-MCNC: 6.4 G/DL (ref 14–18)
HGB BLD-MCNC: 7.6 G/DL (ref 14–18)
IMM GRANULOCYTES # BLD AUTO: 0.14 K/UL (ref 0–0.04)
IMM GRANULOCYTES # BLD AUTO: ABNORMAL K/UL (ref 0–0.04)
IMM GRANULOCYTES NFR BLD AUTO: 0.7 % (ref 0–0.5)
IMM GRANULOCYTES NFR BLD AUTO: ABNORMAL % (ref 0–0.5)
INR PPP: 1.2 (ref 0.8–1.2)
LYMPHOCYTES # BLD AUTO: 1.4 K/UL (ref 1–4.8)
LYMPHOCYTES NFR BLD: 6 % (ref 18–48)
LYMPHOCYTES NFR BLD: 6.5 % (ref 18–48)
MAGNESIUM SERPL-MCNC: 2 MG/DL (ref 1.6–2.6)
MAGNESIUM SERPL-MCNC: 2.5 MG/DL (ref 1.6–2.6)
MCH RBC QN AUTO: 30 PG (ref 27–31)
MCH RBC QN AUTO: 30.9 PG (ref 27–31)
MCH RBC QN AUTO: 31.8 PG (ref 27–31)
MCHC RBC AUTO-ENTMCNC: 33.3 G/DL (ref 32–36)
MCHC RBC AUTO-ENTMCNC: 33.5 G/DL (ref 32–36)
MCHC RBC AUTO-ENTMCNC: 33.7 G/DL (ref 32–36)
MCV RBC AUTO: 89 FL (ref 82–98)
MCV RBC AUTO: 93 FL (ref 82–98)
MCV RBC AUTO: 95 FL (ref 82–98)
METAMYELOCYTES NFR BLD MANUAL: 1 %
MONOCYTES # BLD AUTO: 2 K/UL (ref 0.3–1)
MONOCYTES NFR BLD: 7 % (ref 4–15)
MONOCYTES NFR BLD: 9.2 % (ref 4–15)
NEUTROPHILS # BLD AUTO: 18 K/UL (ref 1.8–7.7)
NEUTROPHILS NFR BLD: 83.5 % (ref 38–73)
NEUTROPHILS NFR BLD: 85 % (ref 38–73)
NEUTS BAND NFR BLD MANUAL: 1 %
NRBC BLD-RTO: 0 /100 WBC
NRBC BLD-RTO: 0 /100 WBC
NUM UNITS TRANS PACKED RBC: NORMAL
NUM UNITS TRANS PACKED RBC: NORMAL
PEF LLN: 5.73
PEF PRE REF: 66.5 %
PEF REF: 8.05
PLATELET # BLD AUTO: 115 K/UL (ref 150–450)
PLATELET # BLD AUTO: 120 K/UL (ref 150–450)
PLATELET # BLD AUTO: 128 K/UL (ref 150–450)
PMV BLD AUTO: 11.2 FL (ref 9.2–12.9)
PMV BLD AUTO: 11.4 FL (ref 9.2–12.9)
PMV BLD AUTO: 11.4 FL (ref 9.2–12.9)
POCT GLUCOSE: 122 MG/DL (ref 70–110)
POCT GLUCOSE: 134 MG/DL (ref 70–110)
POCT GLUCOSE: 140 MG/DL (ref 70–110)
POCT GLUCOSE: 173 MG/DL (ref 70–110)
POCT GLUCOSE: 221 MG/DL (ref 70–110)
POCT GLUCOSE: 228 MG/DL (ref 70–110)
POCT GLUCOSE: 237 MG/DL (ref 70–110)
POCT GLUCOSE: 328 MG/DL (ref 70–110)
POCT GLUCOSE: 332 MG/DL (ref 70–110)
POCT GLUCOSE: 347 MG/DL (ref 70–110)
POCT GLUCOSE: 353 MG/DL (ref 70–110)
POCT GLUCOSE: 61 MG/DL (ref 70–110)
POTASSIUM SERPL-SCNC: 4 MMOL/L (ref 3.5–5.1)
POTASSIUM SERPL-SCNC: 4.3 MMOL/L (ref 3.5–5.1)
POTASSIUM SERPL-SCNC: 4.7 MMOL/L (ref 3.5–5.1)
POTASSIUM SERPL-SCNC: 5.2 MMOL/L (ref 3.5–5.1)
PRE FEF 25 75: 2.94 L/S (ref 0.94–4.19)
PRE FET 100: 7.44 SEC
PRE FEV1 FVC: 76.57 % (ref 61.55–87.86)
PRE FEV1: 3.29 L (ref 2.19–3.91)
PRE FVC: 4.3 L (ref 3.02–5.22)
PRE PEF: 5.35 L/S (ref 5.73–10.37)
PROT SERPL-MCNC: 5 G/DL (ref 6–8.4)
PROTHROMBIN TIME: 13.2 SEC (ref 9–12.5)
RBC # BLD AUTO: 2.01 M/UL (ref 4.6–6.2)
RBC # BLD AUTO: 2.46 M/UL (ref 4.6–6.2)
RBC # BLD AUTO: 3.33 M/UL (ref 4.6–6.2)
SODIUM SERPL-SCNC: 138 MMOL/L (ref 136–145)
SODIUM SERPL-SCNC: 139 MMOL/L (ref 136–145)
SODIUM SERPL-SCNC: 143 MMOL/L (ref 136–145)
WBC # BLD AUTO: 18.17 K/UL (ref 3.9–12.7)
WBC # BLD AUTO: 19.61 K/UL (ref 3.9–12.7)
WBC # BLD AUTO: 21.53 K/UL (ref 3.9–12.7)

## 2024-05-07 PROCEDURE — 85027 COMPLETE CBC AUTOMATED: CPT | Mod: 91 | Performed by: THORACIC SURGERY (CARDIOTHORACIC VASCULAR SURGERY)

## 2024-05-07 PROCEDURE — 27000221 HC OXYGEN, UP TO 24 HOURS

## 2024-05-07 PROCEDURE — 63600175 PHARM REV CODE 636 W HCPCS: Mod: JZ,JG | Performed by: THORACIC SURGERY (CARDIOTHORACIC VASCULAR SURGERY)

## 2024-05-07 PROCEDURE — P9045 ALBUMIN (HUMAN), 5%, 250 ML: HCPCS | Mod: JZ,JG | Performed by: THORACIC SURGERY (CARDIOTHORACIC VASCULAR SURGERY)

## 2024-05-07 PROCEDURE — 25000003 PHARM REV CODE 250: Performed by: INTERNAL MEDICINE

## 2024-05-07 PROCEDURE — 80048 BASIC METABOLIC PNL TOTAL CA: CPT | Mod: XB | Performed by: THORACIC SURGERY (CARDIOTHORACIC VASCULAR SURGERY)

## 2024-05-07 PROCEDURE — 25000242 PHARM REV CODE 250 ALT 637 W/ HCPCS: Performed by: THORACIC SURGERY (CARDIOTHORACIC VASCULAR SURGERY)

## 2024-05-07 PROCEDURE — P9047 ALBUMIN (HUMAN), 25%, 50ML: HCPCS | Mod: JZ,JG | Performed by: THORACIC SURGERY (CARDIOTHORACIC VASCULAR SURGERY)

## 2024-05-07 PROCEDURE — 30233N1 TRANSFUSION OF NONAUTOLOGOUS RED BLOOD CELLS INTO PERIPHERAL VEIN, PERCUTANEOUS APPROACH: ICD-10-PCS | Performed by: THORACIC SURGERY (CARDIOTHORACIC VASCULAR SURGERY)

## 2024-05-07 PROCEDURE — 80053 COMPREHEN METABOLIC PANEL: CPT | Performed by: THORACIC SURGERY (CARDIOTHORACIC VASCULAR SURGERY)

## 2024-05-07 PROCEDURE — 63600175 PHARM REV CODE 636 W HCPCS: Performed by: INTERNAL MEDICINE

## 2024-05-07 PROCEDURE — 85610 PROTHROMBIN TIME: CPT | Performed by: THORACIC SURGERY (CARDIOTHORACIC VASCULAR SURGERY)

## 2024-05-07 PROCEDURE — 63600175 PHARM REV CODE 636 W HCPCS: Performed by: THORACIC SURGERY (CARDIOTHORACIC VASCULAR SURGERY)

## 2024-05-07 PROCEDURE — 20000000 HC ICU ROOM

## 2024-05-07 PROCEDURE — 83735 ASSAY OF MAGNESIUM: CPT | Performed by: THORACIC SURGERY (CARDIOTHORACIC VASCULAR SURGERY)

## 2024-05-07 PROCEDURE — 36430 TRANSFUSION BLD/BLD COMPNT: CPT

## 2024-05-07 PROCEDURE — 85007 BL SMEAR W/DIFF WBC COUNT: CPT | Performed by: THORACIC SURGERY (CARDIOTHORACIC VASCULAR SURGERY)

## 2024-05-07 PROCEDURE — 84132 ASSAY OF SERUM POTASSIUM: CPT | Performed by: THORACIC SURGERY (CARDIOTHORACIC VASCULAR SURGERY)

## 2024-05-07 PROCEDURE — 99900035 HC TECH TIME PER 15 MIN (STAT)

## 2024-05-07 PROCEDURE — 25000003 PHARM REV CODE 250: Performed by: THORACIC SURGERY (CARDIOTHORACIC VASCULAR SURGERY)

## 2024-05-07 PROCEDURE — 99233 SBSQ HOSP IP/OBS HIGH 50: CPT | Mod: ,,, | Performed by: STUDENT IN AN ORGANIZED HEALTH CARE EDUCATION/TRAINING PROGRAM

## 2024-05-07 PROCEDURE — 94799 UNLISTED PULMONARY SVC/PX: CPT

## 2024-05-07 PROCEDURE — C9399 UNCLASSIFIED DRUGS OR BIOLOG: HCPCS | Performed by: THORACIC SURGERY (CARDIOTHORACIC VASCULAR SURGERY)

## 2024-05-07 PROCEDURE — 85730 THROMBOPLASTIN TIME PARTIAL: CPT | Performed by: THORACIC SURGERY (CARDIOTHORACIC VASCULAR SURGERY)

## 2024-05-07 PROCEDURE — 94640 AIRWAY INHALATION TREATMENT: CPT

## 2024-05-07 PROCEDURE — 27200667 HC PACEMAKER, TEMPORARY MONITORING, PER SHIFT

## 2024-05-07 PROCEDURE — 85027 COMPLETE CBC AUTOMATED: CPT | Performed by: THORACIC SURGERY (CARDIOTHORACIC VASCULAR SURGERY)

## 2024-05-07 PROCEDURE — 85025 COMPLETE CBC W/AUTO DIFF WBC: CPT | Performed by: THORACIC SURGERY (CARDIOTHORACIC VASCULAR SURGERY)

## 2024-05-07 PROCEDURE — 94761 N-INVAS EAR/PLS OXIMETRY MLT: CPT

## 2024-05-07 PROCEDURE — P9016 RBC LEUKOCYTES REDUCED: HCPCS | Performed by: THORACIC SURGERY (CARDIOTHORACIC VASCULAR SURGERY)

## 2024-05-07 RX ORDER — INSULIN ASPART 100 [IU]/ML
0-15 INJECTION, SOLUTION INTRAVENOUS; SUBCUTANEOUS
Status: DISCONTINUED | OUTPATIENT
Start: 2024-05-07 | End: 2024-05-10

## 2024-05-07 RX ORDER — HYDROCODONE BITARTRATE AND ACETAMINOPHEN 500; 5 MG/1; MG/1
TABLET ORAL
Status: DISCONTINUED | OUTPATIENT
Start: 2024-05-07 | End: 2024-05-13 | Stop reason: ALTCHOICE

## 2024-05-07 RX ORDER — TRAMADOL HYDROCHLORIDE 50 MG/1
50 TABLET ORAL EVERY 6 HOURS PRN
Status: DISCONTINUED | OUTPATIENT
Start: 2024-05-07 | End: 2024-05-14 | Stop reason: HOSPADM

## 2024-05-07 RX ORDER — INSULIN ASPART 100 [IU]/ML
0-10 INJECTION, SOLUTION INTRAVENOUS; SUBCUTANEOUS
Status: DISCONTINUED | OUTPATIENT
Start: 2024-05-07 | End: 2024-05-07

## 2024-05-07 RX ORDER — ALBUMIN HUMAN 250 G/1000ML
12.5 SOLUTION INTRAVENOUS ONCE
Status: COMPLETED | OUTPATIENT
Start: 2024-05-07 | End: 2024-05-08

## 2024-05-07 RX ADMIN — SODIUM CHLORIDE 9 UNITS/HR: 9 INJECTION, SOLUTION INTRAVENOUS at 12:05

## 2024-05-07 RX ADMIN — INSULIN ASPART 10 UNITS: 100 INJECTION, SOLUTION INTRAVENOUS; SUBCUTANEOUS at 01:05

## 2024-05-07 RX ADMIN — TRAMADOL HYDROCHLORIDE 50 MG: 50 TABLET, COATED ORAL at 09:05

## 2024-05-07 RX ADMIN — ACETAMINOPHEN 650 MG: 325 TABLET ORAL at 10:05

## 2024-05-07 RX ADMIN — FENTANYL CITRATE 25 MCG: 50 INJECTION INTRAMUSCULAR; INTRAVENOUS at 09:05

## 2024-05-07 RX ADMIN — DEXTROSE MONOHYDRATE 125 ML: 100 INJECTION, SOLUTION INTRAVENOUS at 04:05

## 2024-05-07 RX ADMIN — MAGNESIUM SULFATE HEPTAHYDRATE 4 G: 40 INJECTION, SOLUTION INTRAVENOUS at 11:05

## 2024-05-07 RX ADMIN — IPRATROPIUM BROMIDE AND ALBUTEROL SULFATE 3 ML: 2.5; .5 SOLUTION RESPIRATORY (INHALATION) at 07:05

## 2024-05-07 RX ADMIN — CEFAZOLIN 2 G: 2 INJECTION, POWDER, FOR SOLUTION INTRAMUSCULAR; INTRAVENOUS at 12:05

## 2024-05-07 RX ADMIN — CHLORHEXIDINE GLUCONATE 0.12% ORAL RINSE 10 ML: 1.2 LIQUID ORAL at 09:05

## 2024-05-07 RX ADMIN — ACETAMINOPHEN 650 MG: 325 TABLET ORAL at 05:05

## 2024-05-07 RX ADMIN — INSULIN ASPART 12 UNITS: 100 INJECTION, SOLUTION INTRAVENOUS; SUBCUTANEOUS at 05:05

## 2024-05-07 RX ADMIN — VASOPRESSIN 0.04 UNITS/MIN: 0.2 INJECTION INTRAVENOUS at 02:05

## 2024-05-07 RX ADMIN — CALCIUM CHLORIDE 2 G: 100 INJECTION, SOLUTION INTRAVENOUS at 08:05

## 2024-05-07 RX ADMIN — DOCUSATE SODIUM 100 MG: 100 CAPSULE, LIQUID FILLED ORAL at 08:05

## 2024-05-07 RX ADMIN — INSULIN ASPART 10 UNITS: 100 INJECTION, SOLUTION INTRAVENOUS; SUBCUTANEOUS at 10:05

## 2024-05-07 RX ADMIN — VASOPRESSIN 0.04 UNITS/MIN: 0.2 INJECTION INTRAVENOUS at 06:05

## 2024-05-07 RX ADMIN — DEXMEDETOMIDINE HYDROCHLORIDE 0.5 MCG/KG/HR: 4 INJECTION INTRAVENOUS at 06:05

## 2024-05-07 RX ADMIN — FENTANYL CITRATE 12.5 MCG: 50 INJECTION INTRAMUSCULAR; INTRAVENOUS at 04:05

## 2024-05-07 RX ADMIN — OXYCODONE HYDROCHLORIDE AND ACETAMINOPHEN 500 MG: 500 TABLET ORAL at 08:05

## 2024-05-07 RX ADMIN — PRAVASTATIN SODIUM 40 MG: 20 TABLET ORAL at 08:05

## 2024-05-07 RX ADMIN — METOPROLOL TARTRATE 25 MG: 25 TABLET, FILM COATED ORAL at 08:05

## 2024-05-07 RX ADMIN — VASOPRESSIN 0.04 UNITS/MIN: 0.2 INJECTION INTRAVENOUS at 10:05

## 2024-05-07 RX ADMIN — ONDANSETRON 4 MG: 2 INJECTION INTRAMUSCULAR; INTRAVENOUS at 08:05

## 2024-05-07 RX ADMIN — DEXMEDETOMIDINE HYDROCHLORIDE 0.5 MCG/KG/HR: 4 INJECTION INTRAVENOUS at 11:05

## 2024-05-07 RX ADMIN — ALBUMIN (HUMAN) 25 G: 2.5 SOLUTION INTRAVENOUS at 07:05

## 2024-05-07 RX ADMIN — CALCIUM CHLORIDE 2 G: 100 INJECTION, SOLUTION INTRAVENOUS at 09:05

## 2024-05-07 RX ADMIN — EPINEPHRINE 0.18 MCG/KG/MIN: 1 INJECTION INTRAMUSCULAR; INTRAVENOUS; SUBCUTANEOUS at 11:05

## 2024-05-07 RX ADMIN — FENTANYL CITRATE 25 MCG: 50 INJECTION INTRAMUSCULAR; INTRAVENOUS at 08:05

## 2024-05-07 RX ADMIN — ALBUMIN (HUMAN) 12.5 G: 12.5 SOLUTION INTRAVENOUS at 11:05

## 2024-05-07 RX ADMIN — FUROSEMIDE 40 MG: 10 INJECTION, SOLUTION INTRAMUSCULAR; INTRAVENOUS at 08:05

## 2024-05-07 RX ADMIN — INSULIN DETEMIR 15 UNITS: 100 INJECTION, SOLUTION SUBCUTANEOUS at 08:05

## 2024-05-07 RX ADMIN — INSULIN DETEMIR 10 UNITS: 100 INJECTION, SOLUTION SUBCUTANEOUS at 10:05

## 2024-05-07 RX ADMIN — FENTANYL CITRATE 25 MCG: 50 INJECTION INTRAMUSCULAR; INTRAVENOUS at 07:05

## 2024-05-07 RX ADMIN — FUROSEMIDE 40 MG: 10 INJECTION, SOLUTION INTRAMUSCULAR; INTRAVENOUS at 03:05

## 2024-05-07 RX ADMIN — IPRATROPIUM BROMIDE AND ALBUTEROL SULFATE 3 ML: 2.5; .5 SOLUTION RESPIRATORY (INHALATION) at 03:05

## 2024-05-07 RX ADMIN — INSULIN ASPART 4 UNITS: 100 INJECTION, SOLUTION INTRAVENOUS; SUBCUTANEOUS at 09:05

## 2024-05-07 RX ADMIN — ACETAMINOPHEN 650 MG: 325 TABLET ORAL at 09:05

## 2024-05-07 RX ADMIN — POTASSIUM CHLORIDE 20 MEQ: 200 INJECTION, SOLUTION INTRAVENOUS at 02:05

## 2024-05-07 NOTE — ASSESSMENT & PLAN NOTE
-Continue current post-op care and mgmt as per CTS  -ASA Plavix statin BB  -IS usage   -Ambulation when able

## 2024-05-07 NOTE — PROGRESS NOTES
[facility] - Intensive Care [facility])  Critical Care Medicine  Progress Note    Patient Name: Feng Marques  MRN: [id number]  Admission Date: 5/3/2024  Hospital Length of Stay: 4 days  Code Status: Full Code  Attending Provider: Maury Amato MD  Primary Care Provider: Maciel Enriquez MD   Principal Problem: Coronary artery disease of native artery of native heart with stable angina pectoris    Subjective:     HPI:  Mr. Marques is a 73-year-old male with past medical history of CAD, T2DM, HLD, HTN who presented to hospital last week for scheduled LHC on 5/3. Cath revealed severe multi-vessel disease involving 90% LAD stenosis. CTS consulted for CABG which was done today by Dr. Amato. Patient arrived to ICU inubated/sedated. On vasopressin/epi/insulin gtts and sedated with precedex.     Hospital/ICU Course:  5/7: Extubated yesterday evening to NC. glucose in 300s overnight, insulin gtt titrated up and given 20 U. Glucose dropped to 60s, so insulin gtt was stopped. Still on pressors. Requiring precedex. Complains of pain and muscle spasms.     Objective:     Vital Signs (Most Recent):  Temp: 99.5 °F (37.5 °C) (05/07/24 1330)  Pulse: 96 (05/07/24 1330)  Resp: 19 (05/07/24 1330)  BP: (!) 95/50 (05/07/24 1300)  SpO2: 95 % (05/07/24 1330) Vital Signs (24h Range):  Temp:  [97.2 °F (36.2 °C)-99.5 °F (37.5 °C)] 99.5 °F (37.5 °C)  Pulse:  [] 96  Resp:  [13-39] 19  SpO2:  [69 %-100 %] 95 %  BP: ()/(50-62) 95/50  Arterial Line BP: ()/(36-66) 119/51     Weight: 72 kg (158 lb 11.7 oz)  Body mass index is 22.78 kg/m².  Intake/Output Summary (Last 24 hours) at 5/7/2024 1441  Last data filed at 5/7/2024 1300  Gross per 24 hour   Intake 4077.84 ml   Output 2272 ml   Net 1805.84 ml     Physical Exam  Constitutional:       Appearance: He is ill-appearing.   HENT:      Head: Normocephalic.   Eyes:      Extraocular Movements: Extraocular movements intact.      Conjunctiva/sclera: Conjunctivae normal.    Cardiovascular:      Rate and Rhythm: Normal rate and regular rhythm.      Pulses: Normal pulses.      Comments: Dressing to midline c/d/I; all drains in place  Pulmonary:      Effort: Pulmonary effort is normal. No respiratory distress.   Musculoskeletal:         General: Swelling present.      Cervical back: Neck supple.      Comments: ACE to LLE   Skin:     General: Skin is warm.      Capillary Refill: Capillary refill takes less than 2 seconds.      Coloration: Skin is not jaundiced.   Neurological:      Mental Status: He is alert and oriented to person, place, and time.     Review of Systems   Constitutional:  Negative for fever.   Respiratory:  Negative for cough.    Cardiovascular:  Positive for chest pain.   Gastrointestinal:  Negative for nausea and vomiting.   Genitourinary:  Negative for dysuria.   Musculoskeletal:  Positive for arthralgias and myalgias.   Neurological:  Positive for weakness.     Vents:  Vent Mode: A/C (05/06/24 1503)  Set Rate: 18 BPM (05/06/24 1503)  Vt Set: 420 mL (05/06/24 1503)  PEEP/CPAP: 5 cmH20 (05/06/24 1503)  Oxygen Concentration (%): 40 (05/06/24 1645)  Peak Airway Pressure: 19 cmH20 (05/06/24 1503)  Plateau Pressure: 0 cmH20 (05/06/24 1503)  Total Ve: 7.94 L/m (05/06/24 1503)  Negative Inspiratory Force (cm H2O): 0 (05/06/24 1503)  F/VT Ratio<105 (RSBI): (!) 25.35 (05/06/24 1503)    Lines/Drains/Airways       Central Venous Catheter Line  Duration             Percutaneous Central Line - Triple Lumen  05/06/24 0846 Internal Jugular Right 1 day              Drain  Duration                  Chest Tube 05/06/24 1306 Tube - 3 Left Pleural 24 Fr. 1 day         Closed/Suction Drain 05/06/24 0956 Tube - 1 Left Leg Bulb 19 Fr. 1 day         Closed/Suction Drain 05/06/24 1000 Tube - 2 Left Leg Bulb 19 Fr. 1 day         Urethral Catheter 05/06/24 0844 Silicone;Straight-tip;Temperature probe 1 day         Y Chest Tube 1 and 2 05/06/24 1305 1 Right Mediastinal 24 Fr. 2 Left Mediastinal  24 Fr. 1 day              Arterial Line  Duration             Arterial Line 05/06/24 0712 Right Radial 1 day              Line  Duration                  Pacer Wires 05/06/24 1310 1 day              Peripheral Intravenous Line  Duration                  Peripheral IV - Single Lumen 05/03/24 0801 20 G Anterior;Proximal;Right Forearm 4 days         Peripheral IV - Single Lumen 05/06/24 0715 18 G Left Forearm 1 day                  Significant Labs:    CBC/Anemia Profile:  Recent Labs   Lab 05/06/24 2025 05/07/24  0430 05/07/24  0955   WBC 19.89* 18.17* 19.61*   HGB 9.0* 7.6* 6.4*   HCT 27.1* 22.8* 19.1*   * 128* 120*   MCV 93 93 95   RDW 13.2 13.1 13.4     Chemistries:  Recent Labs   Lab 05/06/24 0318 05/06/24  1454 05/06/24 2025 05/07/24  0138 05/07/24  0430 05/07/24  0955    149* 143  --  143 139   K 4.1 3.1* 3.6 4.0 4.3 5.2*    110 109  --  111* 106   CO2 23 27 21*  --  27 21*   BUN 18 15 17  --  16 19   CREATININE 1.1 1.1 1.5*  --  1.1 1.3   CALCIUM 9.8 9.2 9.1  --  9.0 10.2   ALBUMIN 3.8 2.1*  --   --  3.6  --    PROT 6.8 3.9*  --   --  5.0*  --    BILITOT 0.3 0.4  --   --  0.3  --    ALKPHOS 76 42*  --   --  40*  --    ALT 29 19  --   --  24  --    AST 25 48*  --   --  148*  --    MG 1.9 4.2* 3.0*  --  2.5  --      Significant Imaging:  I have reviewed all pertinent imaging results/findings within the past 24 hours.  I have reviewed and interpreted all pertinent imaging results/findings within the past 24 hours.    ABG  Recent Labs   Lab 05/06/24  1458   PH 7.401   PO2 452*   PCO2 44.9   HCO3 27.9   BE 3*     Assessment/Plan:     Pulmonary  On mechanically assisted ventilation  - Extubated 5/6    Cardiac/Vascular  * Coronary artery disease of native artery of native heart with stable angina pectoris  - Patient with known CAD s/p stent placement and CABG, which is controlled Will continue ASA, Plavix, and Statin and monitor for S/Sx of angina/ACS. Continue to monitor on telemetry.   -  Multi-vessel disease involving LAD, LHC performed 5/3  - CTS consulted and taken for CABG 5/6; S/p CABG x4  - Post op plan per CTS; will continue to follow   - On pressors, drains in place  - Optimize pain control regimen    Other hyperlipidemia  - Continue statin    Essential hypertension  - Chronic, uncontrolled. Latest blood pressure and vitals reviewed:  - Home meds for hypertension were reviewed and noted below:   Hypertension Medications               amLODIPine (NORVASC) 5 MG tablet Take 5 mg by mouth once daily.    isosorbide mononitrate (IMDUR) 30 MG 24 hr tablet Take 1 tablet (30 mg total) by mouth once daily.    losartan (COZAAR) 100 MG tablet Take 50 mg by mouth 2 (two) times a day.    nitroGLYCERIN (NITROSTAT) 0.3 MG SL tablet Place 1 tablet (0.3 mg total) under the tongue every 5 (five) minutes as needed for Chest pain.   - While in the hospital, will manage blood pressure as follows; Still on pressors, holding home meds at this time    Endocrine  Type 2 diabetes mellitus with complication, without long-term current use of insulin  - Insulin gtt per post CABG protocol; D/c'ed overnight  - Continue levemir + moderate SSI     Critical Care Daily Checklist:    A: Awake: RASS Goal/Actual Goal: RASS Goal: 0-->alert and calm  Actual:     B: Spontaneous Breathing Trial Performed?  Yes - extubated 5/6   C: SAT & SBT Coordinated?  yes                      D: Delirium: CAM-ICU Overall CAM-ICU: Negative   E: Early Mobility Performed? No   F: Feeding Goal: Goals: 1. Pt's diet will be advanced and to the safest fiet texture within 24 hrs 2. Pt will tolerate and consume > 75% EEN and EPN prior to RD follow up 3. Pt will consume Sajan BID prior to RD follow up 4. Cardiac nutrition education will be provided to pt at RD follow up 5. NFPE will be performed at RD follow up  Status: Nutrition Goal Status: new   Current Diet Order   No orders of the defined types were placed in this encounter.      AS: Analgesia/Sedation  Precedex, tylenol, tramadol   T: Thromboembolic Prophylaxis yes   H: HOB > 300 Yes   U: Stress Ulcer Prophylaxis (if needed) protonix   G: Glucose Control Levemir + SSI   B: Bowel Function Stool Occurrence: 1   I: Indwelling Catheter (Lines & Michel) Necessity yes   D: De-escalation of Antimicrobials/Pharmacotherapies no    Plan for the day/ETD Pain control    Code Status:  Family/Goals of Care: Full Code       Critical Care Time: 32 minutes  Critical secondary to Patient has a condition that poses threat to life and bodily function: S/p CABG x4     Critical care was time spent personally by me on the following activities: development of treatment plan with patient or surrogate and bedside caregivers, discussions with consultants, evaluation of patient's response to treatment, examination of patient, ordering and performing treatments and interventions, ordering and review of laboratory studies, ordering and review of radiographic studies, pulse oximetry, re-evaluation of patient's condition. This critical care time did not overlap with that of any other provider or involve time for any procedures.     Fareed Calderon PA-C  Critical Care Medicine  '[place] - Intensive Care ([facility])

## 2024-05-07 NOTE — PROGRESS NOTES
Dr Amato notified for Pt pain controll. Pt having Muiscle spasms. Tordol and fent given. Precedex started. Call light in reach. Zofran for nausea.

## 2024-05-07 NOTE — CONSULTS
[facility] - Intensive Care ([facility])  Adult Nutrition  Progress Note    SUMMARY       Recommendations    Recommendation/Intervention:   1. When medically appropriate, recommend Diabetic 2000 calorie, Cardiac diet, Texture per SLP recommendations   2. Recommend Boost glucose control TID to assist filling nutritional gaps   3. Recommend Sajan BID for wound healing   4. Recommend continue bowel regimen (No BM x 5 days)   5. Weigh twice weekly     Goals:   1. Pt's diet will be advanced and to the safest diet texture within 24 hrs   2. Pt will tolerate and consume > 75% EEN and EPN prior to RD follow up   3. Pt will consume Sajan BID prior to RD follow up   4. Pt will have BM prior to RD follow up   5. Cardiac nutrition education will be provided to pt at RD follow up   6. NFPE will be performed at RD follow up  Nutrition Goal Status: new  Communication of RD Recs: other (comment) (POC, sticky note)    Assessment and Plan    Nutrition Problem  Inadequate protein/energy intake   Increased protein needs   Altered GI function     Related to (etiology):   Decreased ability to consume sufficient protein/energy   Increased demand for nutrition   Alteration in GI tract structure and/or function     Signs and Symptoms (as evidenced by):   Currently no diet and NPO x  2 days   Surgery, Wound healing needs   Constipation     Interventions/Recommendations (treatment strategy):  1. Carbohydrate, sodium, fat, texture modified diet  2. Commercial beverage medical food supplement therapy  3. Amino acids medical food supplement therapy for wound healing  4. Management of nutrition related prescription medication   5. Collaboration by nutrition professional with other providers     Nutrition Diagnosis Status:   New        Malnutrition Assessment     Skin (Micronutrient): wounds unhealed (Chris score = 17 (mild risk)                                 Reason for Assessment    Reason For Assessment: consult (Post Op)  Diagnosis: cardiac  "disease (Coronary artery disease of native artery of native heart with stable angina pectoris)  Relevant Medical History: HTN, T2DM, HLD, On mechanically assisted ventilation  General Information Comments:   5/7/24: 73 y.o. Male admitted for Coronary artery disease of native artery of native heart with stable angina pectoris. Pt currently has no diet ordered, pt was NPO x 2 days, note pt had 100% PO intake x 2 days ago prior to NPO. RD consulted for Post op. H&P noted that the pt presented for cardiac catheterization d/t c/o chest pain. EMR noted that the LHC was performed on 5/3 and revealed severe multi-vessel disease, pt now S/p CABG x4 performed yesterday 5/6, pt was extubated yesterday evening. Visited pt at bedside, pt resting, pt moaning in pain. Spoke to RN, RD inquired if pt diet to be advanced today, stated that the pt to be advanced to a clear liquid diet for dinner, pt having difficulty with increased BP when swallowing liquids with medications. Attached "Heart Healthy Diet" to pt d/c nieves. Full NFPE and nutrition education will be provided and discussed at RD follow up. Reviewed chart: LBM 5/2 (x 5 days no BM); Skin: device/drain, incision L leg/ chest WDL; Chris score: 17 (mild risk); Edema: None. Labs, meds, weight reviewed. Note polyethylene glycol, docusate. Weight charted 4/9 146 lbs, 5/7 158 lbs (BMI 22.78, Underweight for age), +12 lb wt gain x 1 month, re weigh for accuracy warranted. RD will continue to follow and monitor pt's nutritional status during admit.    Nutrition Discharge Planning: Diabetic, Cardiac diet + Sajan BID + Boost glucose control as warranted    Nutrition Risk Screen    Nutrition Risk Screen: no indicators present    Nutrition Related Social Determinants of Health: SDOH: Unable to assess at this time.       Nutrition/Diet History    Spiritual, Cultural Beliefs, Confucianist Practices, Values that Affect Care: no  Food Allergies: NKFA  Factors Affecting Nutritional Intake: " "NPO    Anthropometrics    Temp: 99.5 °F (37.5 °C)  Height Method: Stated  Height: 5' 10" (177.8 cm)  Height (inches): 70 in  Weight Method: Bed Scale  Weight: 72 kg (158 lb 11.7 oz)  Weight (lb): 158.73 lb  Ideal Body Weight (IBW), Male: 166 lb  % Ideal Body Weight, Male (lb): 95.62 %  BMI (Calculated): 22.8  BMI Grade: 18.5-24.9 - normal (Underweight for age)     Wt Readings from Last 15 Encounters:   05/07/24 72 kg (158 lb 11.7 oz)   04/18/24 66.7 kg (147 lb)   04/09/24 66.4 kg (146 lb 6.2 oz)   04/02/24 67 kg (147 lb 13.1 oz)   04/12/23 65.8 kg (145 lb 1 oz)   11/09/22 66.2 kg (146 lb)   09/14/22 66.5 kg (146 lb 9.7 oz)   06/13/19 68.3 kg (150 lb 9.2 oz)     Lab/Procedures/Meds    Pertinent Labs Reviewed: reviewed  Pertinent Labs Comments: Glu (L), Total protein (L), Alk phos (L), Anion gap (L), Cl (H), AST (H)  Pertinent Medications Reviewed: reviewed  Pertinent Medications Comments: pravastatin, potassium chloride, polyethylene glycol, pantoprazole, Lopressor, magnesium hydroxide, losartan, isosorbide, Levemir, furosemide, ferrous sulfate, Escitalopram, docusate, aspirin, vitamin C, amlodipine, albuterol  BMP  Lab Results   Component Value Date     05/07/2024    K 5.2 (H) 05/07/2024     05/07/2024    CO2 21 (L) 05/07/2024    BUN 19 05/07/2024    CREATININE 1.3 05/07/2024    CALCIUM 10.2 05/07/2024    ANIONGAP 12 05/07/2024    EGFRNORACEVR 58 (A) 05/07/2024     Lab Results   Component Value Date    ALBUMIN 3.6 05/07/2024     Lab Results   Component Value Date    ALT 24 05/07/2024     (H) 05/07/2024    ALKPHOS 40 (L) 05/07/2024    BILITOT 0.3 05/07/2024     Recent Labs   Lab 05/07/24  1350   POCTGLUCOSE 347*     Lab Results   Component Value Date    HGBA1C 7.0 (H) 05/06/2024     Lab Results   Component Value Date    WBC 19.61 (H) 05/07/2024    HGB 6.4 (L) 05/07/2024    HCT 19.1 (LL) 05/07/2024    MCV 95 05/07/2024     (L) 05/07/2024       Scheduled Meds:   acetaminophen  650 mg Oral Q6H "    albuterol-ipratropium  3 mL Nebulization Q4H    amLODIPine  5 mg Oral Daily    ascorbic acid (vitamin C)  500 mg Oral BID    aspirin  81 mg Oral Daily    chlorhexidine  10 mL Mouth/Throat BID    clopidogreL  75 mg Oral Daily    [START ON 5/8/2024] cyanocobalamin  1,000 mcg Oral Daily    docusate sodium  100 mg Oral BID    EScitalopram oxalate  10 mg Oral Daily    ferrous sulfate  1 tablet Oral Daily    [START ON 5/8/2024] folic acid  1 mg Oral Daily    furosemide (LASIX) injection  40 mg Intravenous BID    insulin detemir U-100  10 Units Subcutaneous Daily    isosorbide mononitrate  30 mg Oral Daily    losartan  50 mg Oral Daily    magnesium hydroxide 400 mg/5 ml  10 mL Oral BID    metoprolol tartrate  25 mg Oral BID    pantoprazole  40 mg Intravenous Daily    polyethylene glycol  17 g Oral Daily    pravastatin  40 mg Oral QHS     Continuous Infusions:   dexmedeTOMIDine (Precedex) infusion (titrating)  0-1.4 mcg/kg/hr Intravenous Continuous 8.23 mL/hr at 05/07/24 1300 0.5 mcg/kg/hr at 05/07/24 1300    dextrose 5% lactated ringers   Intravenous Continuous 25 mL/hr at 05/07/24 1300 Rate Verify at 05/07/24 1300    EPINEPHrine  0-0.5 mcg/kg/min Intravenous Continuous 35 mL/hr at 05/07/24 1200 0.177 mcg/kg/min at 05/07/24 1200    vasopressin  0.04 Units/min Intravenous Continuous 12 mL/hr at 05/07/24 1300 0.04 Units/min at 05/07/24 1300     PRN Meds:.  Current Facility-Administered Medications:     acetaminophen, 650 mg, Oral, Q6H PRN    albumin human 5%, 25 g, Intravenous, PRN    calcium gluconate IVPB, 1 g, Intravenous, PRN    calcium gluconate IVPB, 2 g, Intravenous, PRN    calcium gluconate IVPB, 3 g, Intravenous, PRN    dextrose 10%, 12.5 g, Intravenous, PRN    dextrose 10%, 25 g, Intravenous, PRN    fentaNYL, 12.5 mcg, Intravenous, Q1H PRN    fentaNYL, 25 mcg, Intravenous, Q1H PRN    glucagon (human recombinant), 1 mg, Intramuscular, PRN    glucose, 16 g, Oral, PRN    glucose, 24 g, Oral, PRN    influenza  65up-adj, 0.5 mL, Intramuscular, vaccine x 1 dose    insulin aspart U-100, 0-10 Units, Subcutaneous, QID (AC + HS) PRN    lactated ringers, 1,000 mL, Intravenous, PRN    magnesium sulfate IVPB, 4 g, Intravenous, PRN    metoclopramide, 5 mg, Intravenous, Q6H PRN    naloxone, 0.4 mg, Intravenous, PRN    nitroGLYCERIN, 0.4 mg, Sublingual, Q5 Min PRN    ondansetron, 4 mg, Intravenous, Q6H PRN    pneumoc 20-diaz conj-dip cr(PF), 0.5 mL, Intramuscular, vaccine x 1 dose    potassium chloride in water, 20 mEq, Intravenous, PRN    potassium chloride in water, 60 mEq, Intravenous, PRN    potassium chloride in water, 40 mEq, Intravenous, PRN    sodium chloride 0.9%, 10 mL, Intravenous, PRN    sodium chloride 0.9%, 10 mL, Intravenous, Q12H PRN    traMADoL, 50 mg, Oral, Q6H PRN      Physical Findings/Assessment         Estimated/Assessed Needs    Weight Used For Calorie Calculations: 72 kg (158 lb 11.7 oz)  Energy Calorie Requirements (kcal): 5950-3722 kcals (30-35 kcals/kg ABW (Underweight, BMI 22.78 vs CABG vs Diabeties)  Energy Need Method: Kcal/kg  Protein Requirements:  g (1.2-1.5 g/kg ABW (Underweight vs Diabetic w/ wounds vs CABG vs Standard, older adult)  Weight Used For Protein Calculations: 72 kg (158 lb 11.7 oz)  Fluid Requirements (mL): 3201-2014 mL (1 mL/kcal)  Estimated Fluid Requirement Method: RDA Method  RDA Method (mL): 2160  CHO Requirement: 270-315 g (9957-6027 kcals/8)      Nutrition Prescription Ordered    Current Diet Order: None    Evaluation of Received Nutrient/Fluid Intake  I/O: (Net since admit)   5/7: +4660.8 mL    Energy Calories Required: not meeting needs  Protein Required: not meeting needs  Fluid Required: exceeds needs  Total Fluid Intake (mL): 4927.8  Tolerance:  (Currently no intake)  % Intake of Estimated Energy Needs: 0 - 25 %  % Meal Intake: NPO    Nutrition Risk    Level of Risk/Frequency of Follow-up: high (F/u x 2 weekly)     Monitor and Evaluation    Food and Nutrient Intake:  energy intake, food and beverage intake  Food and Nutrient Adminstration: diet order  Knowledge/Beliefs/Attitudes: food and nutrition knowledge/skill, beliefs and attitudes  Anthropometric Measurements: weight, weight change, body mass index  Biochemical Data, Medical Tests and Procedures: electrolyte and renal panel, glucose/endocrine profile, gastrointestinal profile     Nutrition Follow-Up    RD Follow-up?: Yes  Luz Borges, Registration Eligible, Provisional LDN

## 2024-05-07 NOTE — PROGRESS NOTES
[facility] - [facility] ([facility])  Cardiology  Progress Note    Patient Name: Feng Marques  MRN: [id number]  Admission Date: 5/3/2024  Hospital Length of Stay: 4 days  Code Status: Full Code   Attending Physician: Maury Amato MD   Primary Care Physician: Maciel Enriquez MD  Expected Discharge Date:   Principal Problem:Coronary artery disease of native artery of native heart with stable angina pectoris    Subjective:     Hospital Course:   5/4/2024 uneventful night denies any chest pain or shortness of breath awaiting cabg on Monday 5/5/2024 NO ANGINA ASYMPTOMATIC    5/7/24-Patient seen and examined today, s/p CABG x4, POD # 1. Moaning in pain during exam. Labs reviewed. H/H 7.6/22.8. Platelets 128,000.        Review of Systems   Unable to perform ROS: Acuity of condition (limited due to pain)   Cardiovascular:  Positive for chest pain (incisional).     Objective:     Vital Signs (Most Recent):  Temp: 99.3 °F (37.4 °C) (05/07/24 1229)  Pulse: 93 (05/07/24 1229)  Resp: 19 (05/07/24 1229)  BP: (!) 118/59 (05/07/24 1200)  SpO2: 96 % (05/07/24 1229) Vital Signs (24h Range):  Temp:  [97.2 °F (36.2 °C)-99.3 °F (37.4 °C)] 99.3 °F (37.4 °C)  Pulse:  [] 93  Resp:  [13-39] 19  SpO2:  [69 %-100 %] 96 %  BP: ()/(51-62) 118/59  Arterial Line BP: ()/(36-66) 109/43     Weight: 72 kg (158 lb 11.7 oz)  Body mass index is 22.78 kg/m².     SpO2: 96 %         Intake/Output Summary (Last 24 hours) at 5/7/2024 1238  Last data filed at 5/7/2024 1127  Gross per 24 hour   Intake 4077.84 ml   Output 2137 ml   Net 1940.84 ml       Lines/Drains/Airways       Central Venous Catheter Line  Duration             Percutaneous Central Line - Triple Lumen  05/06/24 0846 Internal Jugular Right 1 day              Drain  Duration                  Closed/Suction Drain 05/06/24 0956 Tube - 1 Left Leg Bulb 19 Fr. 1 day         Closed/Suction Drain 05/06/24 1000 Tube - 2 Left Leg Bulb 19 Fr. 1 day         Urethral Catheter  05/06/24 0844 Silicone;Straight-tip;Temperature probe 1 day         Chest Tube 05/06/24 1306 Tube - 3 Left Pleural 24 Fr. <1 day         Y Chest Tube 1 and 2 05/06/24 1305 1 Right Mediastinal 24 Fr. 2 Left Mediastinal 24 Fr. <1 day              Arterial Line  Duration             Arterial Line 05/06/24 0712 Right Radial 1 day              Line  Duration                  Pacer Wires 05/06/24 1310 <1 day              Peripheral Intravenous Line  Duration                  Peripheral IV - Single Lumen 05/03/24 0801 20 G Anterior;Proximal;Right Forearm 4 days         Peripheral IV - Single Lumen 05/06/24 0715 18 G Left Forearm 1 day                       Physical Exam  Vitals and nursing note reviewed.   Constitutional:       Appearance: He is ill-appearing.   HENT:      Head: Normocephalic and atraumatic.   Eyes:      Pupils: Pupils are equal, round, and reactive to light.   Cardiovascular:      Rate and Rhythm: Normal rate and regular rhythm.      Heart sounds: S1 normal and S2 normal. No murmur heard.     Comments: Sternotomy site C/D/I; no bleeding erythema or drainage  Pulmonary:      Effort: Pulmonary effort is normal.      Comments: Diminished BS at bases  Abdominal:      Palpations: Abdomen is soft.   Musculoskeletal:      Right lower leg: No edema.      Left lower leg: No edema.   Neurological:      Mental Status: He is oriented to person, place, and time.   Psychiatric:         Mood and Affect: Mood normal.            Significant Labs: CMP   Recent Labs   Lab 05/06/24  0318 05/06/24  1454 05/06/24  2025 05/07/24  0138 05/07/24  0430 05/07/24  0955    149* 143  --  143 139   K 4.1 3.1* 3.6 4.0 4.3 5.2*    110 109  --  111* 106   CO2 23 27 21*  --  27 21*   * 122* 331*  --  63* 352*   BUN 18 15 17  --  16 19   CREATININE 1.1 1.1 1.5*  --  1.1 1.3   CALCIUM 9.8 9.2 9.1  --  9.0 10.2   PROT 6.8 3.9*  --   --  5.0*  --    ALBUMIN 3.8 2.1*  --   --  3.6  --    BILITOT 0.3 0.4  --   --  0.3  --   "  ALKPHOS 76 42*  --   --  40*  --    AST 25 48*  --   --  148*  --    ALT 29 19  --   --  24  --    ANIONGAP 8 12 13  --  5* 12   , CBC   Recent Labs   Lab 05/06/24  2025 05/07/24  0430 05/07/24  0955   WBC 19.89* 18.17* 19.61*   HGB 9.0* 7.6* 6.4*   HCT 27.1* 22.8* 19.1*   * 128* 120*   , Troponin No results for input(s): "TROPONINI" in the last 48 hours., and All pertinent lab results from the last 24 hours have been reviewed.    Significant Imaging: Echocardiogram: Transthoracic echo (TTE) complete ([facility] Only):   Results for orders placed or performed during the hospital encounter of 05/03/24   Echo   Result Value Ref Range    BSA 1.8 m2    LVOT stroke volume 50.79 cm3    LVIDd 4.01 3.5 - 6.0 cm    LV Systolic Volume 25.50 mL    LV Systolic Volume Index 14.0 mL/m2    LVIDs 2.64 2.1 - 4.0 cm    LV Diastolic Volume 70.21 mL    LV Diastolic Volume Index 38.58 mL/m2    IVS 1.55 (A) 0.6 - 1.1 cm    LVOT diameter 2.03 cm    LVOT area 3.2 cm2    FS 34 28 - 44 %    Left Ventricle Relative Wall Thickness 0.63 cm    Posterior Wall 1.27 (A) 0.6 - 1.1 cm    LV mass 212.04 g    LV Mass Index 117 g/m2    MV Peak E Evin 0.68 m/s    TDI LATERAL 0.08 m/s    TDI SEPTAL 0.07 m/s    E/E' ratio 9.07 m/s    MV Peak A Evin 0.99 m/s    TR Max Evin 1.97 m/s    E/A ratio 0.69     IVRT 83.73 msec    E wave deceleration time 268.67 msec    LV SEPTAL E/E' RATIO 9.71 m/s    LV LATERAL E/E' RATIO 8.50 m/s    LVOT peak evin 0.78 m/s    Left Ventricular Outflow Tract Mean Velocity 0.67 cm/s    Left Ventricular Outflow Tract Mean Gradient 1.83 mmHg    RVOT peak VTI 19.6 cm    TAPSE 2.38 cm    LA size 3.87 cm    Left Atrium Minor Axis 4.81 cm    Left Atrium Major Axis 4.91 cm    RA Major Axis 4.15 cm    AV mean gradient 3 mmHg    AV peak gradient 4 mmHg    Ao peak evin 0.98 m/s    Ao VTI 20.50 cm    LVOT peak VTI 15.70 cm    AV valve area 2.48 cm²    AV Velocity Ratio 0.80     AV index (prosthetic) 0.77     LEXUS by Velocity Ratio 2.57 cm²    Mr " max fabrizio 3.82 m/s    MV mean gradient 1 mmHg    MV peak gradient 3 mmHg    MV stenosis pressure 1/2 time 77.91 ms    MV valve area p 1/2 method 2.82 cm2    MV valve area by continuity eq 1.74 cm2    MV VTI 29.2 cm    Triscuspid Valve Regurgitation Peak Gradient 16 mmHg    PV mean gradient 3 mmHg    RVOT peak fabrizio 1.05 m/s    Ao root annulus 3.39 cm    STJ 3.33 cm    Ascending aorta 3.23 cm    Mean e' 0.08 m/s    ZLVIDS -1.30     ZLVIDD -2.28     LA Volume Index 34.3 mL/m2    LA volume 62.34 cm3    LA WIDTH 3.9 cm    RA Width 2.8 cm    EF 60 %    TV resting pulmonary artery pressure 19 mmHg    RV TB RVSP 5 mmHg    Est. RA pres 3 mmHg    Narrative      Left Ventricle: The left ventricle is normal in size. Normal wall   thickness. There is concentric hypertrophy. Regional wall motion   abnormalities present. See diagram for wall motion findings. There is   normal systolic function with a visually estimated ejection fraction of 55   - 70%. Ejection fraction by visual approximation is 60%.    Right Ventricle: Normal right ventricular cavity size. Wall thickness   is normal. Systolic function is normal.    Mitral Valve: There is mild regurgitation.    IVC/SVC: Normal venous pressure at 3 mmHg.     , EKG: Reviewed, and X-Ray: CXR: X-Ray Chest 1 View (CXR): No results found for this visit on 05/03/24. and X-Ray Chest PA and Lateral (CXR): No results found for this visit on 05/03/24.  Assessment and Plan:   Patient who presents with multivessel CAD, recovering s/p CABG x 4. Continue current post-op mgmt as per CTS.    * Coronary artery disease of native artery of native heart with stable angina pectoris  Multivessel cad for cabg on Monday no angina    S/P CABG x 4  -Continue current post-op care and mgmt as per CTS  -ASA Plavix statin BB  -IS usage   -Ambulation when able    Other hyperlipidemia  -statins    Type 2 diabetes mellitus with complication, without long-term current use of insulin  Per hospital medicine    Essential  hypertension  -Continuer home meds        VTE Risk Mitigation (From admission, onward)           Ordered     Place DELROY hose  Until discontinued        Comments: Post Op Day 1 - After Ace and Drain Removed    05/06/24 1427     Place sequential compression device  Until discontinued         05/06/24 1427     IP VTE HIGH RISK PATIENT  Once         05/06/24 1427     Place DELROY hose  Until discontinued         05/05/24 1954     Place sequential compression device  Until discontinued         05/05/24 1954     Place sequential compression device  Until discontinued         05/03/24 1124                    Debora Ramírez PA-C  Cardiology  [facility] - [facility] ([facility])

## 2024-05-07 NOTE — PLAN OF CARE
Nutrition Recommendations 5/7/24:   1. When medically appropriate, recommend Diabetic 2000 calorie, Cardiac diet, Texture per SLP recommendations   2. Recommend Boost glucose control TID to assist filling nutritional gaps   3. Recommend Sajan BID for wound healing   4. Recommend continue bowel regimen (No BM x 5 days)   5. Weigh twice weekly   6. Collaboration by nutrition professional with other providers    Goals:   1. Pt's diet will be advanced and to the safest diet texture within 24 hrs   2. Pt will tolerate and consume > 75% EEN and EPN prior to RD follow up   3. Pt will consume Sajan BID prior to RD follow up   4. Pt will have BM prior to RD follow up   5. Cardiac nutrition education will be provided to pt at RD follow up   6. NFPE will be performed at RD follow up    Luz Borges, Registration Eligible, Provisional LDN

## 2024-05-07 NOTE — ASSESSMENT & PLAN NOTE
- Patient with known CAD s/p stent placement and CABG, which is controlled Will continue ASA, Plavix, and Statin and monitor for S/Sx of angina/ACS. Continue to monitor on telemetry.   - Multi-vessel disease involving LAD, LHC performed 5/3  - CTS consulted and taken for CABG 5/6; S/p CABG x4  - Post op plan per CTS; will continue to follow   - On pressors, drains in place  - Optimize pain control regimen

## 2024-05-07 NOTE — PT/OT/SLP PROGRESS
Occupational Therapy      Patient Name:  Feng Marques   MRN:  [id number]    OT eval orders received. Chart review completed. Spoke with nurse, Uzma, at 0750 and 1100. Patient is currently hemodynamically liable. Not appropriate for mobility. Will continue to follow up and initiate services when appropriate.    Su Beatty OT  5/7/2024  0750 & 1100

## 2024-05-07 NOTE — PROGRESS NOTES
[facility] ([facility])  Cardiothoracic Surgery  Progress Note    Patient Name: Feng Marques  MRN: [id number]  Admission Date: 5/3/2024  Hospital Length of Stay: 4 days  Code Status: Full Code   Attending Physician: Maury Amato MD   Referring Provider: Abdulaziz Carrasco MD  Principal Problem:Coronary artery disease of native artery of native heart with stable angina pectoris            Subjective:     Post-Op Info:  Procedure(s) (LRB):  CORONARY ARTERY BYPASS GRAFT (CABG) (N/A)  ECHOCARDIOGRAM,TRANSESOPHAGEAL (N/A)  SURGICAL PROCUREMENT, VEIN, ENDOSCOPIC (Left)  BLOCK, NERVE, INTERCOSTAL, 2 OR MORE (N/A)   1 Day Post-Op     Interval History: The patient is post op day 1 coronary artery bypass grafting x4.    ROS  Medications:  Continuous Infusions:   dexmedeTOMIDine (Precedex) infusion (titrating)  0-1.4 mcg/kg/hr Intravenous Continuous   Stopped at 05/07/24 0435    dextrose 5% lactated ringers   Intravenous Continuous 25 mL/hr at 05/07/24 0600 Rate Verify at 05/07/24 0600    EPINEPHrine  0-0.5 mcg/kg/min Intravenous Continuous 19.7 mL/hr at 05/07/24 0600 0.1 mcg/kg/min at 05/07/24 0600    insulin regular 1 units/mL infusion orderable (CTS POST-OP)  0-52 Units/hr Intravenous Continuous PRN   Stopped at 05/07/24 0431    vasopressin  0.04 Units/min Intravenous Continuous 12 mL/hr at 05/07/24 0600 0.04 Units/min at 05/07/24 0600     Scheduled Meds:   acetaminophen  650 mg Oral Q6H    albuterol-ipratropium  3 mL Nebulization Q4H    amLODIPine  5 mg Oral Daily    ascorbic acid (vitamin C)  500 mg Oral BID    aspirin  81 mg Oral Daily    ceFAZolin (Ancef) IV (PEDS and ADULTS)  2 g Intravenous Q8H    chlorhexidine  10 mL Mouth/Throat BID    clopidogreL  75 mg Oral Daily    [START ON 5/8/2024] cyanocobalamin  1,000 mcg Oral Daily    docusate sodium  100 mg Oral BID    EScitalopram oxalate  10 mg Oral Daily    ferrous sulfate  1 tablet Oral Daily    [START ON 5/8/2024] folic acid  1 mg Oral Daily     furosemide (LASIX) injection  40 mg Intravenous BID    isosorbide mononitrate  30 mg Oral Daily    losartan  50 mg Oral Daily    magnesium hydroxide 400 mg/5 ml  10 mL Oral BID    metoprolol tartrate  25 mg Oral BID    pantoprazole  40 mg Intravenous Daily    polyethylene glycol  17 g Oral Daily    potassium chloride  20 mEq Oral Q12H    pravastatin  40 mg Oral QHS     PRN Meds:  Current Facility-Administered Medications:     acetaminophen, 650 mg, Oral, Q6H PRN    albumin human 5%, 25 g, Intravenous, PRN    calcium gluconate IVPB, 1 g, Intravenous, PRN    calcium gluconate IVPB, 2 g, Intravenous, PRN    calcium gluconate IVPB, 3 g, Intravenous, PRN    dextrose 10%, 12.5 g, Intravenous, PRN    dextrose 10%, 25 g, Intravenous, PRN    fentaNYL, 12.5 mcg, Intravenous, Q1H PRN    fentaNYL, 25 mcg, Intravenous, Q1H PRN    glucagon (human recombinant), 1 mg, Intramuscular, PRN    glucose, 16 g, Oral, PRN    glucose, 24 g, Oral, PRN    insulin regular 1 units/mL infusion orderable (CTS POST-OP), 0-52 Units/hr, Intravenous, Continuous PRN    lactated ringers, 1,000 mL, Intravenous, PRN    magnesium sulfate IVPB, 4 g, Intravenous, PRN    metoclopramide, 5 mg, Intravenous, Q6H PRN    naloxone, 0.4 mg, Intravenous, PRN    nitroGLYCERIN, 0.4 mg, Sublingual, Q5 Min PRN    ondansetron, 4 mg, Intravenous, Q6H PRN    potassium chloride in water, 20 mEq, Intravenous, PRN    potassium chloride in water, 60 mEq, Intravenous, PRN    potassium chloride in water, 40 mEq, Intravenous, PRN    sodium chloride 0.9%, 10 mL, Intravenous, PRN    sodium chloride 0.9%, 10 mL, Intravenous, Q12H PRN     Objective:     Vital Signs (Most Recent):  Temp: 97.2 °F (36.2 °C) (05/07/24 0600)  Pulse: 89 (05/07/24 0630)  Resp: (!) 23 (05/07/24 0630)  BP: (!) 105/56 (05/07/24 0600)  SpO2: 100 % (05/07/24 0630) Vital Signs (24h Range):  Temp:  [97.2 °F (36.2 °C)-98.6 °F (37 °C)] 97.2 °F (36.2 °C)  Pulse:  [] 89  Resp:  [13-28] 23  SpO2:  [98 %-100 %]  100 %  BP: ()/(51-62) 105/56  Arterial Line BP: ()/(43-66) 105/49     Weight: 72 kg (158 lb 11.7 oz)  Body mass index is 22.78 kg/m².    SpO2: 100 %       Intake/Output - Last 3 Shifts         05/05 0700  05/06 0659 05/06 0700  05/07 0659 05/07 0700  05/08 0659    P.O. 480      I.V. (mL/kg)  2634.9 (36.6)     IV Piggyback  2292.9     Total Intake(mL/kg) 480 (7.3) 4927.8 (68.4)     Urine (mL/kg/hr)  1215 (0.7)     Drains  30     Chest Tube  522     Total Output  1767     Net +480 +3160.8            Urine Occurrence 3 x              Lines/Drains/Airways       Central Venous Catheter Line  Duration             Percutaneous Central Line - Triple Lumen  05/06/24 0846 Internal Jugular Right <1 day              Drain  Duration                  Chest Tube 05/06/24 1306 Tube - 3 Left Pleural 24 Fr. <1 day         Closed/Suction Drain 05/06/24 0956 Tube - 1 Left Leg Bulb 19 Fr. <1 day         Closed/Suction Drain 05/06/24 1000 Tube - 2 Left Leg Bulb 19 Fr. <1 day         Urethral Catheter 05/06/24 0844 Silicone;Straight-tip;Temperature probe <1 day         Y Chest Tube 1 and 2 05/06/24 1305 1 Right Mediastinal 24 Fr. 2 Left Mediastinal 24 Fr. <1 day              Arterial Line  Duration             Arterial Line 05/06/24 0712 Right Radial <1 day              Line  Duration                  Pacer Wires 05/06/24 1310 <1 day              Peripheral Intravenous Line  Duration                  Peripheral IV - Single Lumen 05/03/24 0801 20 G Anterior;Proximal;Right Forearm 3 days         Peripheral IV - Single Lumen 05/06/24 0715 18 G Left Forearm <1 day                     Physical Exam  Constitutional:       Appearance: Normal appearance.   HENT:      Head: Normocephalic and atraumatic.      Nose: Nose normal.   Cardiovascular:      Rate and Rhythm: Normal rate and regular rhythm.      Heart sounds: Normal heart sounds.   Pulmonary:      Effort: Pulmonary effort is normal.      Breath sounds: Normal breath sounds.    Abdominal:      General: Abdomen is flat. Bowel sounds are normal.      Palpations: Abdomen is soft.   Musculoskeletal:      Right lower leg: No edema.      Left lower leg: No edema.   Skin:     General: Skin is warm and dry.   Neurological:      General: No focal deficit present.      Mental Status: He is alert and oriented to person, place, and time.   Psychiatric:         Behavior: Behavior normal.            Significant Labs:  All pertinent labs from the last 24 hours have been reviewed.    Significant Diagnostics:  I have reviewed and interpreted all pertinent imaging results/findings within the past 24 hours.  Assessment/Plan:     * Coronary artery disease of native artery of native heart with stable angina pectoris  The patient is a 73-year-old male with coronary artery disease status post stenting, diabetes, hyperlipidemia and hypertension who was worked up for chest pain with cardiac catheterization.  Cardiac catheterization shows severe multivessel coronary artery disease with a 90% proximal LAD stenosis.  The patient is a candidate for urgent coronary artery bypass grafting.  The risks and benefits of surgery have been explained to the patient.  The patient understands the risks and benefits of surgery and has agreed to proceed with urgent coronary artery bypass grafting which will be scheduled for 05/06/2024.    S/P CABG x 4  05/07/2024   The patient is postop day 1 status post coronary artery bypass grafting timesx1. Overall the patient is doing well.  Neuro:  Patient is awake alert and oriented x3.  Neuro exam is nonfocal.  Requiring increasing pain medication.    Cardiac:  Patient has been stable.  Patient is on low-dose epi and vasopressin.  Wean as tolerated.  Respiratory:  Patient has good sats on nasal cannula.  Pulmonary toileting.  Continue incentive spirometer  GI:  Advance diet as tolerated.    Renal:  Creatinine is 1.1.   Patient has good urine output.  Start Lasix.  Id:  Patient is  afebrile.  White count is 19.  Suspect due to stress of surgery.  No evidence of infection.  Continue to observe.  Endocrine:  Glucose is controlled with long-acting insulin and sliding scale.  Heme:  Hematocrit is down to 19.  Will transfuse 2 units packed red blood cells.  Activities:  Advance activities as tolerated.  Line tubes and drains:  Patient has a right IJ triple-lumen, Michel catheter, A-line, pacer wires and saphenectomy site PABLO drains.        Maury Amato MD  Cardiothoracic Surgery  [facility] ([facility])

## 2024-05-07 NOTE — SUBJECTIVE & OBJECTIVE
Review of Systems   Unable to perform ROS: Acuity of condition (limited due to pain)   Cardiovascular:  Positive for chest pain (incisional).     Objective:     Vital Signs (Most Recent):  Temp: 99.3 °F (37.4 °C) (05/07/24 1229)  Pulse: 93 (05/07/24 1229)  Resp: 19 (05/07/24 1229)  BP: (!) 118/59 (05/07/24 1200)  SpO2: 96 % (05/07/24 1229) Vital Signs (24h Range):  Temp:  [97.2 °F (36.2 °C)-99.3 °F (37.4 °C)] 99.3 °F (37.4 °C)  Pulse:  [] 93  Resp:  [13-39] 19  SpO2:  [69 %-100 %] 96 %  BP: ()/(51-62) 118/59  Arterial Line BP: ()/(36-66) 109/43     Weight: 72 kg (158 lb 11.7 oz)  Body mass index is 22.78 kg/m².     SpO2: 96 %         Intake/Output Summary (Last 24 hours) at 5/7/2024 1238  Last data filed at 5/7/2024 1127  Gross per 24 hour   Intake 4077.84 ml   Output 2137 ml   Net 1940.84 ml       Lines/Drains/Airways       Central Venous Catheter Line  Duration             Percutaneous Central Line - Triple Lumen  05/06/24 0846 Internal Jugular Right 1 day              Drain  Duration                  Closed/Suction Drain 05/06/24 0956 Tube - 1 Left Leg Bulb 19 Fr. 1 day         Closed/Suction Drain 05/06/24 1000 Tube - 2 Left Leg Bulb 19 Fr. 1 day         Urethral Catheter 05/06/24 0844 Silicone;Straight-tip;Temperature probe 1 day         Chest Tube 05/06/24 1306 Tube - 3 Left Pleural 24 Fr. <1 day         Y Chest Tube 1 and 2 05/06/24 1305 1 Right Mediastinal 24 Fr. 2 Left Mediastinal 24 Fr. <1 day              Arterial Line  Duration             Arterial Line 05/06/24 0712 Right Radial 1 day              Line  Duration                  Pacer Wires 05/06/24 1310 <1 day              Peripheral Intravenous Line  Duration                  Peripheral IV - Single Lumen 05/03/24 0801 20 G Anterior;Proximal;Right Forearm 4 days         Peripheral IV - Single Lumen 05/06/24 0715 18 G Left Forearm 1 day                       Physical Exam  Vitals and nursing note reviewed.   Constitutional:        "Appearance: He is ill-appearing.   HENT:      Head: Normocephalic and atraumatic.   Eyes:      Pupils: Pupils are equal, round, and reactive to light.   Cardiovascular:      Rate and Rhythm: Normal rate and regular rhythm.      Heart sounds: S1 normal and S2 normal. No murmur heard.     Comments: Sternotomy site C/D/I; no bleeding erythema or drainage  Pulmonary:      Effort: Pulmonary effort is normal.      Comments: Diminished BS at bases  Abdominal:      Palpations: Abdomen is soft.   Musculoskeletal:      Right lower leg: No edema.      Left lower leg: No edema.   Neurological:      Mental Status: He is oriented to person, place, and time.   Psychiatric:         Mood and Affect: Mood normal.            Significant Labs: CMP   Recent Labs   Lab 05/06/24 0318 05/06/24  1454 05/06/24 2025 05/07/24 0138 05/07/24  0430 05/07/24  0955    149* 143  --  143 139   K 4.1 3.1* 3.6 4.0 4.3 5.2*    110 109  --  111* 106   CO2 23 27 21*  --  27 21*   * 122* 331*  --  63* 352*   BUN 18 15 17  --  16 19   CREATININE 1.1 1.1 1.5*  --  1.1 1.3   CALCIUM 9.8 9.2 9.1  --  9.0 10.2   PROT 6.8 3.9*  --   --  5.0*  --    ALBUMIN 3.8 2.1*  --   --  3.6  --    BILITOT 0.3 0.4  --   --  0.3  --    ALKPHOS 76 42*  --   --  40*  --    AST 25 48*  --   --  148*  --    ALT 29 19  --   --  24  --    ANIONGAP 8 12 13  --  5* 12   , CBC   Recent Labs   Lab 05/06/24 2025 05/07/24  0430 05/07/24  0955   WBC 19.89* 18.17* 19.61*   HGB 9.0* 7.6* 6.4*   HCT 27.1* 22.8* 19.1*   * 128* 120*   , Troponin No results for input(s): "TROPONINI" in the last 48 hours., and All pertinent lab results from the last 24 hours have been reviewed.    Significant Imaging: Echocardiogram: Transthoracic echo (TTE) complete ([facility] Only):   Results for orders placed or performed during the hospital encounter of 05/03/24   Echo   Result Value Ref Range    BSA 1.8 m2    LVOT stroke volume 50.79 cm3    LVIDd 4.01 3.5 - 6.0 cm    LV Systolic " Volume 25.50 mL    LV Systolic Volume Index 14.0 mL/m2    LVIDs 2.64 2.1 - 4.0 cm    LV Diastolic Volume 70.21 mL    LV Diastolic Volume Index 38.58 mL/m2    IVS 1.55 (A) 0.6 - 1.1 cm    LVOT diameter 2.03 cm    LVOT area 3.2 cm2    FS 34 28 - 44 %    Left Ventricle Relative Wall Thickness 0.63 cm    Posterior Wall 1.27 (A) 0.6 - 1.1 cm    LV mass 212.04 g    LV Mass Index 117 g/m2    MV Peak E Evin 0.68 m/s    TDI LATERAL 0.08 m/s    TDI SEPTAL 0.07 m/s    E/E' ratio 9.07 m/s    MV Peak A Evin 0.99 m/s    TR Max Evin 1.97 m/s    E/A ratio 0.69     IVRT 83.73 msec    E wave deceleration time 268.67 msec    LV SEPTAL E/E' RATIO 9.71 m/s    LV LATERAL E/E' RATIO 8.50 m/s    LVOT peak evin 0.78 m/s    Left Ventricular Outflow Tract Mean Velocity 0.67 cm/s    Left Ventricular Outflow Tract Mean Gradient 1.83 mmHg    RVOT peak VTI 19.6 cm    TAPSE 2.38 cm    LA size 3.87 cm    Left Atrium Minor Axis 4.81 cm    Left Atrium Major Axis 4.91 cm    RA Major Axis 4.15 cm    AV mean gradient 3 mmHg    AV peak gradient 4 mmHg    Ao peak evin 0.98 m/s    Ao VTI 20.50 cm    LVOT peak VTI 15.70 cm    AV valve area 2.48 cm²    AV Velocity Ratio 0.80     AV index (prosthetic) 0.77     LEXUS by Velocity Ratio 2.57 cm²    Mr max evin 3.82 m/s    MV mean gradient 1 mmHg    MV peak gradient 3 mmHg    MV stenosis pressure 1/2 time 77.91 ms    MV valve area p 1/2 method 2.82 cm2    MV valve area by continuity eq 1.74 cm2    MV VTI 29.2 cm    Triscuspid Valve Regurgitation Peak Gradient 16 mmHg    PV mean gradient 3 mmHg    RVOT peak evin 1.05 m/s    Ao root annulus 3.39 cm    STJ 3.33 cm    Ascending aorta 3.23 cm    Mean e' 0.08 m/s    ZLVIDS -1.30     ZLVIDD -2.28     LA Volume Index 34.3 mL/m2    LA volume 62.34 cm3    LA WIDTH 3.9 cm    RA Width 2.8 cm    EF 60 %    TV resting pulmonary artery pressure 19 mmHg    RV TB RVSP 5 mmHg    Est. RA pres 3 mmHg    Narrative      Left Ventricle: The left ventricle is normal in size. Normal wall    thickness. There is concentric hypertrophy. Regional wall motion   abnormalities present. See diagram for wall motion findings. There is   normal systolic function with a visually estimated ejection fraction of 55   - 70%. Ejection fraction by visual approximation is 60%.    Right Ventricle: Normal right ventricular cavity size. Wall thickness   is normal. Systolic function is normal.    Mitral Valve: There is mild regurgitation.    IVC/SVC: Normal venous pressure at 3 mmHg.     , EKG: Reviewed, and X-Ray: CXR: X-Ray Chest 1 View (CXR): No results found for this visit on 05/03/24. and X-Ray Chest PA and Lateral (CXR): No results found for this visit on 05/03/24.

## 2024-05-07 NOTE — ASSESSMENT & PLAN NOTE
05/07/2024   The patient is postop day 1 status post coronary artery bypass grafting timesx1. Overall the patient is doing well.  Neuro:  Patient is awake alert and oriented x3.  Neuro exam is nonfocal.  Requiring increasing pain medication.    Cardiac:  Patient has been stable.  Patient is on low-dose epi and vasopressin.  Wean as tolerated.  Respiratory:  Patient has good sats on nasal cannula.  Pulmonary toileting.  Continue incentive spirometer  GI:  Advance diet as tolerated.    Renal:  Creatinine is 1.1.   Patient has good urine output.  Start Lasix.  Id:  Patient is afebrile.  White count is 19.  Suspect due to stress of surgery.  No evidence of infection.  Continue to observe.  Endocrine:  Glucose is controlled with long-acting insulin and sliding scale.  Heme:  Hematocrit is down to 19.  Will transfuse 2 units packed red blood cells.  Activities:  Advance activities as tolerated.  Line tubes and drains:  Patient has a right IJ triple-lumen, Michel catheter, A-line, pacer wires and saphenectomy site PABLO drains.

## 2024-05-07 NOTE — PT/OT/SLP PROGRESS
Physical Therapy      Patient Name:  Feng Marques   MRN:  [id number]    RE-ATTEMPTED COMPLETION OF P.T. EVAL THIS AM, PT STILL HYPOTENSIVE IN SUPINE, HOLD TODAY PER NURSE HARDIK, WILL CONTINUE EFFORTS    Dinora Schumacehr, PT  5/7/2024  1100

## 2024-05-07 NOTE — PLAN OF CARE
Transfusion of 2 units PRBC. Precedex restarted for pain control. Fentanyl and tylenol. Wean epi as tolerated. Diet as tolerated. Lasix given. Labs PRN. Safety maintained.

## 2024-05-07 NOTE — HOSPITAL COURSE
05/07/2024   The patient is post op day 1 coronary artery bypass grafting x4.    05/08/2024   The patient is postop day 2 status post coronary artery bypass grafting x4.    05/09/2024   Patient is postop day 3 status post coronary artery bypass grafting x4.    05/10/2024   The patient is postop day 4 status post coronary artery bypass grafting x4.    05/11/2024   The patient is postop day 5 status post coronary artery bypass grafting x4.    05/12/2024   The patient is postop day 6 status post coronary artery bypass grafting x4.    05/13/2024   The patient is postop day 7 status post coronary artery bypass grafting x4.    05/14/2024   Patient is postop day 8 status post coronary artery bypass grafting x4.

## 2024-05-07 NOTE — PT/OT/SLP PROGRESS
Physical Therapy      Patient Name:  Feng Marques   MRN:  [id number]    P.T. OLIVIA INITIATED THIS AM VIA CHART REVIEW, ATTEMPT , HOLD PER NURSE HARDIK DUE TO PT HYPOTENSIVE, WILL CONTINUE EFFORTS    Dinora Schumachre, PT  5/7/2024  1589

## 2024-05-07 NOTE — SUBJECTIVE & OBJECTIVE
Interval History: The patient is post op day 1 coronary artery bypass grafting x4.    ROS  Medications:  Continuous Infusions:   dexmedeTOMIDine (Precedex) infusion (titrating)  0-1.4 mcg/kg/hr Intravenous Continuous   Stopped at 05/07/24 0435    dextrose 5% lactated ringers   Intravenous Continuous 25 mL/hr at 05/07/24 0600 Rate Verify at 05/07/24 0600    EPINEPHrine  0-0.5 mcg/kg/min Intravenous Continuous 19.7 mL/hr at 05/07/24 0600 0.1 mcg/kg/min at 05/07/24 0600    insulin regular 1 units/mL infusion orderable (CTS POST-OP)  0-52 Units/hr Intravenous Continuous PRN   Stopped at 05/07/24 0431    vasopressin  0.04 Units/min Intravenous Continuous 12 mL/hr at 05/07/24 0600 0.04 Units/min at 05/07/24 0600     Scheduled Meds:   acetaminophen  650 mg Oral Q6H    albuterol-ipratropium  3 mL Nebulization Q4H    amLODIPine  5 mg Oral Daily    ascorbic acid (vitamin C)  500 mg Oral BID    aspirin  81 mg Oral Daily    ceFAZolin (Ancef) IV (PEDS and ADULTS)  2 g Intravenous Q8H    chlorhexidine  10 mL Mouth/Throat BID    clopidogreL  75 mg Oral Daily    [START ON 5/8/2024] cyanocobalamin  1,000 mcg Oral Daily    docusate sodium  100 mg Oral BID    EScitalopram oxalate  10 mg Oral Daily    ferrous sulfate  1 tablet Oral Daily    [START ON 5/8/2024] folic acid  1 mg Oral Daily    furosemide (LASIX) injection  40 mg Intravenous BID    isosorbide mononitrate  30 mg Oral Daily    losartan  50 mg Oral Daily    magnesium hydroxide 400 mg/5 ml  10 mL Oral BID    metoprolol tartrate  25 mg Oral BID    pantoprazole  40 mg Intravenous Daily    polyethylene glycol  17 g Oral Daily    potassium chloride  20 mEq Oral Q12H    pravastatin  40 mg Oral QHS     PRN Meds:  Current Facility-Administered Medications:     acetaminophen, 650 mg, Oral, Q6H PRN    albumin human 5%, 25 g, Intravenous, PRN    calcium gluconate IVPB, 1 g, Intravenous, PRN    calcium gluconate IVPB, 2 g, Intravenous, PRN    calcium gluconate IVPB, 3 g, Intravenous,  PRN    dextrose 10%, 12.5 g, Intravenous, PRN    dextrose 10%, 25 g, Intravenous, PRN    fentaNYL, 12.5 mcg, Intravenous, Q1H PRN    fentaNYL, 25 mcg, Intravenous, Q1H PRN    glucagon (human recombinant), 1 mg, Intramuscular, PRN    glucose, 16 g, Oral, PRN    glucose, 24 g, Oral, PRN    insulin regular 1 units/mL infusion orderable (CTS POST-OP), 0-52 Units/hr, Intravenous, Continuous PRN    lactated ringers, 1,000 mL, Intravenous, PRN    magnesium sulfate IVPB, 4 g, Intravenous, PRN    metoclopramide, 5 mg, Intravenous, Q6H PRN    naloxone, 0.4 mg, Intravenous, PRN    nitroGLYCERIN, 0.4 mg, Sublingual, Q5 Min PRN    ondansetron, 4 mg, Intravenous, Q6H PRN    potassium chloride in water, 20 mEq, Intravenous, PRN    potassium chloride in water, 60 mEq, Intravenous, PRN    potassium chloride in water, 40 mEq, Intravenous, PRN    sodium chloride 0.9%, 10 mL, Intravenous, PRN    sodium chloride 0.9%, 10 mL, Intravenous, Q12H PRN     Objective:     Vital Signs (Most Recent):  Temp: 97.2 °F (36.2 °C) (05/07/24 0600)  Pulse: 89 (05/07/24 0630)  Resp: (!) 23 (05/07/24 0630)  BP: (!) 105/56 (05/07/24 0600)  SpO2: 100 % (05/07/24 0630) Vital Signs (24h Range):  Temp:  [97.2 °F (36.2 °C)-98.6 °F (37 °C)] 97.2 °F (36.2 °C)  Pulse:  [] 89  Resp:  [13-28] 23  SpO2:  [98 %-100 %] 100 %  BP: ()/(51-62) 105/56  Arterial Line BP: ()/(43-66) 105/49     Weight: 72 kg (158 lb 11.7 oz)  Body mass index is 22.78 kg/m².    SpO2: 100 %       Intake/Output - Last 3 Shifts         05/05 0700 05/06 0659 05/06 0700 05/07 0659 05/07 0700  05/08 0659    P.O. 480      I.V. (mL/kg)  2634.9 (36.6)     IV Piggyback  2292.9     Total Intake(mL/kg) 480 (7.3) 4927.8 (68.4)     Urine (mL/kg/hr)  1215 (0.7)     Drains  30     Chest Tube  522     Total Output  1767     Net +480 +3160.8            Urine Occurrence 3 x              Lines/Drains/Airways       Central Venous Catheter Line  Duration             Percutaneous Central Line -  Triple Lumen  05/06/24 0846 Internal Jugular Right <1 day              Drain  Duration                  Chest Tube 05/06/24 1306 Tube - 3 Left Pleural 24 Fr. <1 day         Closed/Suction Drain 05/06/24 0956 Tube - 1 Left Leg Bulb 19 Fr. <1 day         Closed/Suction Drain 05/06/24 1000 Tube - 2 Left Leg Bulb 19 Fr. <1 day         Urethral Catheter 05/06/24 0844 Silicone;Straight-tip;Temperature probe <1 day         Y Chest Tube 1 and 2 05/06/24 1305 1 Right Mediastinal 24 Fr. 2 Left Mediastinal 24 Fr. <1 day              Arterial Line  Duration             Arterial Line 05/06/24 0712 Right Radial <1 day              Line  Duration                  Pacer Wires 05/06/24 1310 <1 day              Peripheral Intravenous Line  Duration                  Peripheral IV - Single Lumen 05/03/24 0801 20 G Anterior;Proximal;Right Forearm 3 days         Peripheral IV - Single Lumen 05/06/24 0715 18 G Left Forearm <1 day                     Physical Exam  Constitutional:       Appearance: Normal appearance.   HENT:      Head: Normocephalic and atraumatic.      Nose: Nose normal.   Cardiovascular:      Rate and Rhythm: Normal rate and regular rhythm.      Heart sounds: Normal heart sounds.   Pulmonary:      Effort: Pulmonary effort is normal.      Breath sounds: Normal breath sounds.   Abdominal:      General: Abdomen is flat. Bowel sounds are normal.      Palpations: Abdomen is soft.   Musculoskeletal:      Right lower leg: No edema.      Left lower leg: No edema.   Skin:     General: Skin is warm and dry.   Neurological:      General: No focal deficit present.      Mental Status: He is alert and oriented to person, place, and time.   Psychiatric:         Behavior: Behavior normal.            Significant Labs:  All pertinent labs from the last 24 hours have been reviewed.    Significant Diagnostics:  I have reviewed and interpreted all pertinent imaging results/findings within the past 24 hours.

## 2024-05-07 NOTE — PROGRESS NOTES
Care assumed from arlene. Report at bedside with Dr Amato. POC discussed. Will leave Chest tubes for now. Wean epi and vaso as tolerated. Pain controlled. Insulin gtt off and SS added to MAR. BG monitored. VSS.

## 2024-05-07 NOTE — HOSPITAL COURSE
5/7: Extubated yesterday evening to NC. glucose in 300s overnight, insulin gtt titrated up and given 20 U. Glucose dropped to 60s, so insulin gtt was stopped. Still on pressors. Requiring precedex. Complains of pain and muscle spasms.     5/8: Much better pain control today; up in bedside chair eating breakfast. Only complains of mild chest tightness. LFTs trended up, statin held. Tmax of 101.1F overnight, better with tylenol. WBC up to 21.44. Pressors being weaned. Off sedation.    5/9: LFTs trending down. Afebrile. Glucose with better control. Vitals stable. Off pressors. H/H 9.9/29.4. Denies any pain at the moment only complains of occasional nausea which is better with zofran.

## 2024-05-07 NOTE — ASSESSMENT & PLAN NOTE
- Chronic, uncontrolled. Latest blood pressure and vitals reviewed:  - Home meds for hypertension were reviewed and noted below:   Hypertension Medications               amLODIPine (NORVASC) 5 MG tablet Take 5 mg by mouth once daily.    isosorbide mononitrate (IMDUR) 30 MG 24 hr tablet Take 1 tablet (30 mg total) by mouth once daily.    losartan (COZAAR) 100 MG tablet Take 50 mg by mouth 2 (two) times a day.    nitroGLYCERIN (NITROSTAT) 0.3 MG SL tablet Place 1 tablet (0.3 mg total) under the tongue every 5 (five) minutes as needed for Chest pain.   - While in the hospital, will manage blood pressure as follows; Still on pressors, holding home meds at this time

## 2024-05-07 NOTE — SUBJECTIVE & OBJECTIVE
Objective:     Vital Signs (Most Recent):  Temp: 99.5 °F (37.5 °C) (05/07/24 1330)  Pulse: 96 (05/07/24 1330)  Resp: 19 (05/07/24 1330)  BP: (!) 95/50 (05/07/24 1300)  SpO2: 95 % (05/07/24 1330) Vital Signs (24h Range):  Temp:  [97.2 °F (36.2 °C)-99.5 °F (37.5 °C)] 99.5 °F (37.5 °C)  Pulse:  [] 96  Resp:  [13-39] 19  SpO2:  [69 %-100 %] 95 %  BP: ()/(50-62) 95/50  Arterial Line BP: ()/(36-66) 119/51     Weight: 72 kg (158 lb 11.7 oz)  Body mass index is 22.78 kg/m².  Intake/Output Summary (Last 24 hours) at 5/7/2024 1441  Last data filed at 5/7/2024 1300  Gross per 24 hour   Intake 4077.84 ml   Output 2272 ml   Net 1805.84 ml     Physical Exam  Constitutional:       Appearance: He is ill-appearing.   HENT:      Head: Normocephalic.   Eyes:      Extraocular Movements: Extraocular movements intact.      Conjunctiva/sclera: Conjunctivae normal.   Cardiovascular:      Rate and Rhythm: Normal rate and regular rhythm.      Pulses: Normal pulses.      Comments: Dressing to midline c/d/I; all drains in place  Pulmonary:      Effort: Pulmonary effort is normal. No respiratory distress.   Musculoskeletal:         General: Swelling present.      Cervical back: Neck supple.      Comments: ACE to LLE   Skin:     General: Skin is warm.      Capillary Refill: Capillary refill takes less than 2 seconds.      Coloration: Skin is not jaundiced.   Neurological:      Mental Status: He is alert and oriented to person, place, and time.     Review of Systems   Constitutional:  Negative for fever.   Respiratory:  Negative for cough.    Cardiovascular:  Positive for chest pain.   Gastrointestinal:  Negative for nausea and vomiting.   Genitourinary:  Negative for dysuria.   Musculoskeletal:  Positive for arthralgias and myalgias.   Neurological:  Positive for weakness.     Vents:  Vent Mode: A/C (05/06/24 1503)  Set Rate: 18 BPM (05/06/24 1503)  Vt Set: 420 mL (05/06/24 1503)  PEEP/CPAP: 5 cmH20 (05/06/24 1503)  Oxygen  Concentration (%): 40 (05/06/24 1645)  Peak Airway Pressure: 19 cmH20 (05/06/24 1503)  Plateau Pressure: 0 cmH20 (05/06/24 1503)  Total Ve: 7.94 L/m (05/06/24 1503)  Negative Inspiratory Force (cm H2O): 0 (05/06/24 1503)  F/VT Ratio<105 (RSBI): (!) 25.35 (05/06/24 1503)    Lines/Drains/Airways       Central Venous Catheter Line  Duration             Percutaneous Central Line - Triple Lumen  05/06/24 0846 Internal Jugular Right 1 day              Drain  Duration                  Chest Tube 05/06/24 1306 Tube - 3 Left Pleural 24 Fr. 1 day         Closed/Suction Drain 05/06/24 0956 Tube - 1 Left Leg Bulb 19 Fr. 1 day         Closed/Suction Drain 05/06/24 1000 Tube - 2 Left Leg Bulb 19 Fr. 1 day         Urethral Catheter 05/06/24 0844 Silicone;Straight-tip;Temperature probe 1 day         Y Chest Tube 1 and 2 05/06/24 1305 1 Right Mediastinal 24 Fr. 2 Left Mediastinal 24 Fr. 1 day              Arterial Line  Duration             Arterial Line 05/06/24 0712 Right Radial 1 day              Line  Duration                  Pacer Wires 05/06/24 1310 1 day              Peripheral Intravenous Line  Duration                  Peripheral IV - Single Lumen 05/03/24 0801 20 G Anterior;Proximal;Right Forearm 4 days         Peripheral IV - Single Lumen 05/06/24 0715 18 G Left Forearm 1 day                  Significant Labs:    CBC/Anemia Profile:  Recent Labs   Lab 05/06/24 2025 05/07/24  0430 05/07/24  0955   WBC 19.89* 18.17* 19.61*   HGB 9.0* 7.6* 6.4*   HCT 27.1* 22.8* 19.1*   * 128* 120*   MCV 93 93 95   RDW 13.2 13.1 13.4     Chemistries:  Recent Labs   Lab 05/06/24  0318 05/06/24  1454 05/06/24 2025 05/07/24  0138 05/07/24  0430 05/07/24  0955    149* 143  --  143 139   K 4.1 3.1* 3.6 4.0 4.3 5.2*    110 109  --  111* 106   CO2 23 27 21*  --  27 21*   BUN 18 15 17  --  16 19   CREATININE 1.1 1.1 1.5*  --  1.1 1.3   CALCIUM 9.8 9.2 9.1  --  9.0 10.2   ALBUMIN 3.8 2.1*  --   --  3.6  --    PROT 6.8 3.9*  --    --  5.0*  --    BILITOT 0.3 0.4  --   --  0.3  --    ALKPHOS 76 42*  --   --  40*  --    ALT 29 19  --   --  24  --    AST 25 48*  --   --  148*  --    MG 1.9 4.2* 3.0*  --  2.5  --      Significant Imaging:  I have reviewed all pertinent imaging results/findings within the past 24 hours.  I have reviewed and interpreted all pertinent imaging results/findings within the past 24 hours.

## 2024-05-08 LAB
ALBUMIN SERPL BCP-MCNC: 4 G/DL (ref 3.5–5.2)
ALP SERPL-CCNC: 43 U/L (ref 55–135)
ALT SERPL W/O P-5'-P-CCNC: 392 U/L (ref 10–44)
ANION GAP SERPL CALC-SCNC: 8 MMOL/L (ref 8–16)
ANION GAP SERPL CALC-SCNC: 8 MMOL/L (ref 8–16)
AST SERPL-CCNC: 563 U/L (ref 10–40)
BASOPHILS NFR BLD: 0 % (ref 0–1.9)
BILIRUB SERPL-MCNC: 1.1 MG/DL (ref 0.1–1)
BUN SERPL-MCNC: 27 MG/DL (ref 8–23)
BUN SERPL-MCNC: 37 MG/DL (ref 8–23)
CALCIUM SERPL-MCNC: 11.1 MG/DL (ref 8.7–10.5)
CALCIUM SERPL-MCNC: 11.5 MG/DL (ref 8.7–10.5)
CHLORIDE SERPL-SCNC: 100 MMOL/L (ref 95–110)
CHLORIDE SERPL-SCNC: 97 MMOL/L (ref 95–110)
CO2 SERPL-SCNC: 30 MMOL/L (ref 23–29)
CO2 SERPL-SCNC: 31 MMOL/L (ref 23–29)
CREAT SERPL-MCNC: 1.3 MG/DL (ref 0.5–1.4)
CREAT SERPL-MCNC: 1.4 MG/DL (ref 0.5–1.4)
DIFFERENTIAL METHOD BLD: ABNORMAL
EOSINOPHIL NFR BLD: 0 % (ref 0–8)
ERYTHROCYTE [DISTWIDTH] IN BLOOD BY AUTOMATED COUNT: 15.4 % (ref 11.5–14.5)
ERYTHROCYTE [DISTWIDTH] IN BLOOD BY AUTOMATED COUNT: 15.4 % (ref 11.5–14.5)
EST. GFR  (NO RACE VARIABLE): 53 ML/MIN/1.73 M^2
EST. GFR  (NO RACE VARIABLE): 58 ML/MIN/1.73 M^2
GLUCOSE SERPL-MCNC: 122 MG/DL (ref 70–110)
GLUCOSE SERPL-MCNC: 81 MG/DL (ref 70–110)
HCT VFR BLD AUTO: 26.9 % (ref 40–54)
HCT VFR BLD AUTO: 27.6 % (ref 40–54)
HGB BLD-MCNC: 9.1 G/DL (ref 14–18)
HGB BLD-MCNC: 9.7 G/DL (ref 14–18)
IMM GRANULOCYTES # BLD AUTO: ABNORMAL K/UL (ref 0–0.04)
IMM GRANULOCYTES NFR BLD AUTO: ABNORMAL % (ref 0–0.5)
LYMPHOCYTES NFR BLD: 12 % (ref 18–48)
MAGNESIUM SERPL-MCNC: 2.3 MG/DL (ref 1.6–2.6)
MAGNESIUM SERPL-MCNC: 3 MG/DL (ref 1.6–2.6)
MAGNESIUM SERPL-MCNC: 3 MG/DL (ref 1.6–2.6)
MCH RBC QN AUTO: 30.3 PG (ref 27–31)
MCH RBC QN AUTO: 30.9 PG (ref 27–31)
MCHC RBC AUTO-ENTMCNC: 33.8 G/DL (ref 32–36)
MCHC RBC AUTO-ENTMCNC: 35.1 G/DL (ref 32–36)
MCV RBC AUTO: 88 FL (ref 82–98)
MCV RBC AUTO: 90 FL (ref 82–98)
MONOCYTES NFR BLD: 6 % (ref 4–15)
NEUTROPHILS NFR BLD: 78 % (ref 38–73)
NEUTS BAND NFR BLD MANUAL: 4 %
NRBC BLD-RTO: 0 /100 WBC
PLATELET # BLD AUTO: 102 K/UL (ref 150–450)
PLATELET # BLD AUTO: 105 K/UL (ref 150–450)
PMV BLD AUTO: 11.1 FL (ref 9.2–12.9)
PMV BLD AUTO: 11.2 FL (ref 9.2–12.9)
POCT GLUCOSE: 104 MG/DL (ref 70–110)
POCT GLUCOSE: 107 MG/DL (ref 70–110)
POCT GLUCOSE: 110 MG/DL (ref 70–110)
POCT GLUCOSE: 124 MG/DL (ref 70–110)
POCT GLUCOSE: 139 MG/DL (ref 70–110)
POCT GLUCOSE: 158 MG/DL (ref 70–110)
POCT GLUCOSE: 62 MG/DL (ref 70–110)
POTASSIUM SERPL-SCNC: 4.2 MMOL/L (ref 3.5–5.1)
POTASSIUM SERPL-SCNC: 4.5 MMOL/L (ref 3.5–5.1)
PROT SERPL-MCNC: 5.6 G/DL (ref 6–8.4)
RBC # BLD AUTO: 3 M/UL (ref 4.6–6.2)
RBC # BLD AUTO: 3.14 M/UL (ref 4.6–6.2)
SODIUM SERPL-SCNC: 136 MMOL/L (ref 136–145)
SODIUM SERPL-SCNC: 138 MMOL/L (ref 136–145)
WBC # BLD AUTO: 21.44 K/UL (ref 3.9–12.7)
WBC # BLD AUTO: 21.82 K/UL (ref 3.9–12.7)

## 2024-05-08 PROCEDURE — 25000003 PHARM REV CODE 250: Performed by: THORACIC SURGERY (CARDIOTHORACIC VASCULAR SURGERY)

## 2024-05-08 PROCEDURE — 63600175 PHARM REV CODE 636 W HCPCS: Performed by: INTERNAL MEDICINE

## 2024-05-08 PROCEDURE — C9113 INJ PANTOPRAZOLE SODIUM, VIA: HCPCS | Performed by: THORACIC SURGERY (CARDIOTHORACIC VASCULAR SURGERY)

## 2024-05-08 PROCEDURE — 97166 OT EVAL MOD COMPLEX 45 MIN: CPT

## 2024-05-08 PROCEDURE — 85007 BL SMEAR W/DIFF WBC COUNT: CPT | Performed by: THORACIC SURGERY (CARDIOTHORACIC VASCULAR SURGERY)

## 2024-05-08 PROCEDURE — P9045 ALBUMIN (HUMAN), 5%, 250 ML: HCPCS | Mod: JZ,JG | Performed by: THORACIC SURGERY (CARDIOTHORACIC VASCULAR SURGERY)

## 2024-05-08 PROCEDURE — 83735 ASSAY OF MAGNESIUM: CPT | Performed by: THORACIC SURGERY (CARDIOTHORACIC VASCULAR SURGERY)

## 2024-05-08 PROCEDURE — 85027 COMPLETE CBC AUTOMATED: CPT | Mod: 91 | Performed by: THORACIC SURGERY (CARDIOTHORACIC VASCULAR SURGERY)

## 2024-05-08 PROCEDURE — 99900035 HC TECH TIME PER 15 MIN (STAT)

## 2024-05-08 PROCEDURE — 99233 SBSQ HOSP IP/OBS HIGH 50: CPT | Mod: ,,, | Performed by: STUDENT IN AN ORGANIZED HEALTH CARE EDUCATION/TRAINING PROGRAM

## 2024-05-08 PROCEDURE — 85027 COMPLETE CBC AUTOMATED: CPT | Performed by: THORACIC SURGERY (CARDIOTHORACIC VASCULAR SURGERY)

## 2024-05-08 PROCEDURE — 83735 ASSAY OF MAGNESIUM: CPT | Mod: 91 | Performed by: THORACIC SURGERY (CARDIOTHORACIC VASCULAR SURGERY)

## 2024-05-08 PROCEDURE — 25000003 PHARM REV CODE 250: Performed by: INTERNAL MEDICINE

## 2024-05-08 PROCEDURE — 27000221 HC OXYGEN, UP TO 24 HOURS

## 2024-05-08 PROCEDURE — 97530 THERAPEUTIC ACTIVITIES: CPT

## 2024-05-08 PROCEDURE — 97162 PT EVAL MOD COMPLEX 30 MIN: CPT

## 2024-05-08 PROCEDURE — 27200667 HC PACEMAKER, TEMPORARY MONITORING, PER SHIFT

## 2024-05-08 PROCEDURE — 20000000 HC ICU ROOM

## 2024-05-08 PROCEDURE — 63600175 PHARM REV CODE 636 W HCPCS: Mod: JZ,JG | Performed by: THORACIC SURGERY (CARDIOTHORACIC VASCULAR SURGERY)

## 2024-05-08 PROCEDURE — 80048 BASIC METABOLIC PNL TOTAL CA: CPT | Mod: XB | Performed by: THORACIC SURGERY (CARDIOTHORACIC VASCULAR SURGERY)

## 2024-05-08 PROCEDURE — 94799 UNLISTED PULMONARY SVC/PX: CPT

## 2024-05-08 PROCEDURE — 80053 COMPREHEN METABOLIC PANEL: CPT | Performed by: THORACIC SURGERY (CARDIOTHORACIC VASCULAR SURGERY)

## 2024-05-08 RX ORDER — SYRING-NEEDL,DISP,INSUL,0.3 ML 29 G X1/2"
296 SYRINGE, EMPTY DISPOSABLE MISCELLANEOUS ONCE
Status: DISCONTINUED | OUTPATIENT
Start: 2024-05-08 | End: 2024-05-09 | Stop reason: SDUPTHER

## 2024-05-08 RX ORDER — ALBUMIN HUMAN 50 G/1000ML
25 SOLUTION INTRAVENOUS ONCE
Status: DISCONTINUED | OUTPATIENT
Start: 2024-05-08 | End: 2024-05-14 | Stop reason: ALTCHOICE

## 2024-05-08 RX ORDER — BISACODYL 5 MG
10 TABLET, DELAYED RELEASE (ENTERIC COATED) ORAL ONCE
Status: COMPLETED | OUTPATIENT
Start: 2024-05-08 | End: 2024-05-08

## 2024-05-08 RX ORDER — PANTOPRAZOLE SODIUM 40 MG/1
40 TABLET, DELAYED RELEASE ORAL DAILY
Status: DISCONTINUED | OUTPATIENT
Start: 2024-05-09 | End: 2024-05-14 | Stop reason: HOSPADM

## 2024-05-08 RX ORDER — LUBIPROSTONE 24 UG/1
24 CAPSULE ORAL 2 TIMES DAILY WITH MEALS
Status: DISCONTINUED | OUTPATIENT
Start: 2024-05-08 | End: 2024-05-10

## 2024-05-08 RX ADMIN — DOCUSATE SODIUM 100 MG: 100 CAPSULE, LIQUID FILLED ORAL at 08:05

## 2024-05-08 RX ADMIN — ACETAMINOPHEN 650 MG: 325 TABLET ORAL at 11:05

## 2024-05-08 RX ADMIN — MAGNESIUM HYDROXIDE 800 MG: 400 SUSPENSION ORAL at 08:05

## 2024-05-08 RX ADMIN — INSULIN DETEMIR 15 UNITS: 100 INJECTION, SOLUTION SUBCUTANEOUS at 08:05

## 2024-05-08 RX ADMIN — OXYCODONE HYDROCHLORIDE AND ACETAMINOPHEN 500 MG: 500 TABLET ORAL at 08:05

## 2024-05-08 RX ADMIN — CALCIUM CHLORIDE 2 G: 100 INJECTION, SOLUTION INTRAVENOUS at 11:05

## 2024-05-08 RX ADMIN — PANTOPRAZOLE SODIUM 40 MG: 40 INJECTION, POWDER, LYOPHILIZED, FOR SOLUTION INTRAVENOUS at 08:05

## 2024-05-08 RX ADMIN — CHLORHEXIDINE GLUCONATE 0.12% ORAL RINSE 10 ML: 1.2 LIQUID ORAL at 08:05

## 2024-05-08 RX ADMIN — ACETAMINOPHEN 650 MG: 325 TABLET ORAL at 05:05

## 2024-05-08 RX ADMIN — ACETAMINOPHEN 650 MG: 325 TABLET ORAL at 06:05

## 2024-05-08 RX ADMIN — FOLIC ACID 1 MG: 1 TABLET ORAL at 08:05

## 2024-05-08 RX ADMIN — ESCITALOPRAM OXALATE 10 MG: 10 TABLET ORAL at 08:05

## 2024-05-08 RX ADMIN — VASOPRESSIN 0.04 UNITS/MIN: 0.2 INJECTION INTRAVENOUS at 03:05

## 2024-05-08 RX ADMIN — POLYETHYLENE GLYCOL 3350 17 G: 17 POWDER, FOR SOLUTION ORAL at 08:05

## 2024-05-08 RX ADMIN — CYANOCOBALAMIN TAB 1000 MCG 1000 MCG: 1000 TAB at 08:05

## 2024-05-08 RX ADMIN — BISACODYL 10 MG: 5 TABLET, COATED ORAL at 11:05

## 2024-05-08 RX ADMIN — METOPROLOL TARTRATE 25 MG: 25 TABLET, FILM COATED ORAL at 08:05

## 2024-05-08 RX ADMIN — FERROUS SULFATE TAB 325 MG (65 MG ELEMENTAL FE) 1 EACH: 325 (65 FE) TAB at 08:05

## 2024-05-08 RX ADMIN — CLOPIDOGREL BISULFATE 75 MG: 75 TABLET ORAL at 08:05

## 2024-05-08 RX ADMIN — INSULIN DETEMIR 15 UNITS: 100 INJECTION, SOLUTION SUBCUTANEOUS at 10:05

## 2024-05-08 RX ADMIN — DEXTROSE MONOHYDRATE 125 ML: 100 INJECTION, SOLUTION INTRAVENOUS at 06:05

## 2024-05-08 RX ADMIN — FENTANYL CITRATE 12.5 MCG: 50 INJECTION INTRAMUSCULAR; INTRAVENOUS at 04:05

## 2024-05-08 RX ADMIN — RIVAROXABAN 2.5 MG: 2.5 TABLET, FILM COATED ORAL at 08:05

## 2024-05-08 RX ADMIN — LUBIPROSTONE 24 MCG: 24 CAPSULE, GELATIN COATED ORAL at 11:05

## 2024-05-08 RX ADMIN — FUROSEMIDE 40 MG: 10 INJECTION, SOLUTION INTRAMUSCULAR; INTRAVENOUS at 08:05

## 2024-05-08 RX ADMIN — ONDANSETRON 4 MG: 2 INJECTION INTRAMUSCULAR; INTRAVENOUS at 12:05

## 2024-05-08 RX ADMIN — ALBUMIN (HUMAN) 25 G: 2.5 SOLUTION INTRAVENOUS at 11:05

## 2024-05-08 RX ADMIN — LUBIPROSTONE 24 MCG: 24 CAPSULE, GELATIN COATED ORAL at 05:05

## 2024-05-08 RX ADMIN — ASPIRIN 81 MG: 81 TABLET, COATED ORAL at 08:05

## 2024-05-08 RX ADMIN — ONDANSETRON 4 MG: 2 INJECTION INTRAMUSCULAR; INTRAVENOUS at 08:05

## 2024-05-08 NOTE — ASSESSMENT & PLAN NOTE
- Insulin gtt per post CABG protocol; D/c'ed overnight  - Insulin regimen has been titrated, will continue with 15U bid + high intensity SSI for better glucose control  - Tolerating diet

## 2024-05-08 NOTE — SUBJECTIVE & OBJECTIVE
Review of Systems   Constitutional: Positive for decreased appetite and malaise/fatigue.   HENT: Negative.     Eyes: Negative.    Cardiovascular:  Positive for chest pain (incisional).   Respiratory: Negative.     Endocrine: Negative.    Hematologic/Lymphatic: Negative.    Skin: Negative.    Musculoskeletal:  Positive for arthritis.   Gastrointestinal: Negative.    Genitourinary: Negative.    Neurological: Negative.    Psychiatric/Behavioral: Negative.     Allergic/Immunologic: Negative.      Objective:     Vital Signs (Most Recent):  Temp: 98.6 °F (37 °C) (05/08/24 1000)  Pulse: 89 (05/08/24 1000)  Resp: 16 (05/08/24 1000)  BP: 99/61 (05/08/24 1000)  SpO2: 98 % (05/08/24 1000) Vital Signs (24h Range):  Temp:  [98.6 °F (37 °C)-101.1 °F (38.4 °C)] 98.6 °F (37 °C)  Pulse:  [] 89  Resp:  [16-43] 16  SpO2:  [88 %-100 %] 98 %  BP: ()/(50-84) 99/61  Arterial Line BP: ()/() 104/69     Weight: 71.9 kg (158 lb 8.2 oz)  Body mass index is 22.74 kg/m².     SpO2: 98 %         Intake/Output Summary (Last 24 hours) at 5/8/2024 1219  Last data filed at 5/8/2024 1000  Gross per 24 hour   Intake 2621.24 ml   Output 3835 ml   Net -1213.76 ml       Lines/Drains/Airways       Central Venous Catheter Line  Duration             Percutaneous Central Line - Triple Lumen  05/06/24 0846 Internal Jugular Right 2 days              Drain  Duration                  Urethral Catheter 05/06/24 0844 Silicone;Straight-tip;Temperature probe 2 days              Arterial Line  Duration             Arterial Line 05/06/24 0712 Right Radial 2 days              Line  Duration                  Pacer Wires 05/06/24 1310 1 day              Peripheral Intravenous Line  Duration                  Peripheral IV - Single Lumen 05/06/24 0715 18 G Left Forearm 2 days                       Physical Exam  Vitals and nursing note reviewed.   Constitutional:       General: He is not in acute distress.     Appearance: He is well-developed. He  "is ill-appearing. He is not diaphoretic.   HENT:      Head: Normocephalic and atraumatic.   Eyes:      General:         Right eye: No discharge.         Left eye: No discharge.      Pupils: Pupils are equal, round, and reactive to light.   Cardiovascular:      Rate and Rhythm: Normal rate and regular rhythm.      Heart sounds: Normal heart sounds, S1 normal and S2 normal. No murmur heard.     Comments: Sternotomy site C/D/I; dressing in place    Paced on monitor  Pulmonary:      Effort: Pulmonary effort is normal. No respiratory distress.      Comments: Diminished BS at bases  Abdominal:      General: There is no distension.   Musculoskeletal:      Right lower leg: No edema.      Left lower leg: No edema.   Skin:     General: Skin is warm and dry.      Findings: No erythema.   Neurological:      General: No focal deficit present.      Mental Status: He is alert and oriented to person, place, and time.   Psychiatric:         Behavior: Behavior normal.            Significant Labs: CMP   Recent Labs   Lab 05/06/24  1454 05/06/24 2025 05/07/24  0430 05/07/24 0955 05/07/24 2113 05/08/24 0432   *   < > 143 139 138 138   K 3.1*   < > 4.3 5.2* 4.7 4.2      < > 111* 106 103 100   CO2 27   < > 27 21* 25 30*   *   < > 63* 352* 202* 81   BUN 15   < > 16 19 25* 27*   CREATININE 1.1   < > 1.1 1.3 1.5* 1.3   CALCIUM 9.2   < > 9.0 10.2 9.9 11.1*   PROT 3.9*  --  5.0*  --   --  5.6*   ALBUMIN 2.1*  --  3.6  --   --  4.0   BILITOT 0.4  --  0.3  --   --  1.1*   ALKPHOS 42*  --  40*  --   --  43*   AST 48*  --  148*  --   --  563*   ALT 19  --  24  --   --  392*   ANIONGAP 12   < > 5* 12 10 8    < > = values in this interval not displayed.   , CBC   Recent Labs   Lab 05/07/24 0955 05/07/24 2113 05/08/24 0432   WBC 19.61* 21.53* 21.44*   HGB 6.4* 10.0* 9.7*   HCT 19.1* 29.7* 27.6*   * 115* 105*   , Troponin No results for input(s): "TROPONINI" in the last 48 hours., and All pertinent lab results from the " last 24 hours have been reviewed.    Significant Imaging: Echocardiogram: Transthoracic echo (TTE) complete (Cupid Only):   Results for orders placed or performed during the hospital encounter of 05/03/24   Echo   Result Value Ref Range    BSA 1.8 m2    LVOT stroke volume 50.79 cm3    LVIDd 4.01 3.5 - 6.0 cm    LV Systolic Volume 25.50 mL    LV Systolic Volume Index 14.0 mL/m2    LVIDs 2.64 2.1 - 4.0 cm    LV Diastolic Volume 70.21 mL    LV Diastolic Volume Index 38.58 mL/m2    IVS 1.55 (A) 0.6 - 1.1 cm    LVOT diameter 2.03 cm    LVOT area 3.2 cm2    FS 34 28 - 44 %    Left Ventricle Relative Wall Thickness 0.63 cm    Posterior Wall 1.27 (A) 0.6 - 1.1 cm    LV mass 212.04 g    LV Mass Index 117 g/m2    MV Peak E Evin 0.68 m/s    TDI LATERAL 0.08 m/s    TDI SEPTAL 0.07 m/s    E/E' ratio 9.07 m/s    MV Peak A Evin 0.99 m/s    TR Max Evin 1.97 m/s    E/A ratio 0.69     IVRT 83.73 msec    E wave deceleration time 268.67 msec    LV SEPTAL E/E' RATIO 9.71 m/s    LV LATERAL E/E' RATIO 8.50 m/s    LVOT peak evin 0.78 m/s    Left Ventricular Outflow Tract Mean Velocity 0.67 cm/s    Left Ventricular Outflow Tract Mean Gradient 1.83 mmHg    RVOT peak VTI 19.6 cm    TAPSE 2.38 cm    LA size 3.87 cm    Left Atrium Minor Axis 4.81 cm    Left Atrium Major Axis 4.91 cm    RA Major Axis 4.15 cm    AV mean gradient 3 mmHg    AV peak gradient 4 mmHg    Ao peak evin 0.98 m/s    Ao VTI 20.50 cm    LVOT peak VTI 15.70 cm    AV valve area 2.48 cm²    AV Velocity Ratio 0.80     AV index (prosthetic) 0.77     LEXUS by Velocity Ratio 2.57 cm²    Mr max evin 3.82 m/s    MV mean gradient 1 mmHg    MV peak gradient 3 mmHg    MV stenosis pressure 1/2 time 77.91 ms    MV valve area p 1/2 method 2.82 cm2    MV valve area by continuity eq 1.74 cm2    MV VTI 29.2 cm    Triscuspid Valve Regurgitation Peak Gradient 16 mmHg    PV mean gradient 3 mmHg    RVOT peak evin 1.05 m/s    Ao root annulus 3.39 cm    STJ 3.33 cm    Ascending aorta 3.23 cm    Mean e' 0.08  m/s    ZLVIDS -1.30     ZLVIDD -2.28     LA Volume Index 34.3 mL/m2    LA volume 62.34 cm3    LA WIDTH 3.9 cm    RA Width 2.8 cm    EF 60 %    TV resting pulmonary artery pressure 19 mmHg    RV TB RVSP 5 mmHg    Est. RA pres 3 mmHg    Narrative      Left Ventricle: The left ventricle is normal in size. Normal wall   thickness. There is concentric hypertrophy. Regional wall motion   abnormalities present. See diagram for wall motion findings. There is   normal systolic function with a visually estimated ejection fraction of 55   - 70%. Ejection fraction by visual approximation is 60%.    Right Ventricle: Normal right ventricular cavity size. Wall thickness   is normal. Systolic function is normal.    Mitral Valve: There is mild regurgitation.    IVC/SVC: Normal venous pressure at 3 mmHg.      and EKG: REviewed

## 2024-05-08 NOTE — PROGRESS NOTES
[facility] ([facility])  Cardiology  Progress Note    Patient Name: Feng Marques  MRN: [id number]  Admission Date: 5/3/2024  Hospital Length of Stay: 5 days  Code Status: Full Code   Attending Physician: Maury Amato MD   Primary Care Physician: Maciel Enriquez MD  Expected Discharge Date:   Principal Problem:Coronary artery disease of native artery of native heart with stable angina pectoris    Subjective:   Hospital Course:   5/4/2024 uneventful night denies any chest pain or shortness of breath awaiting cabg on Monday 5/5/2024 NO ANGINA ASYMPTOMATIC    5/7/24-Patient seen and examined today, s/p CABG x4, POD # 1. Moaning in pain during exam. Labs reviewed. H/H 7.6/22.8. Platelets 128,000.    5/8/24-Patient seen and examined today, s/p CABG x 4, POD #2. Feeling better, sitting up in chair. Pain improved. Chest tubes removed. Labs reviewed. Creatinine 1.3. H/H improved post transfusion. LFT's bumped.        Review of Systems   Constitutional: Positive for decreased appetite and malaise/fatigue.   HENT: Negative.     Eyes: Negative.    Cardiovascular:  Positive for chest pain (incisional).   Respiratory: Negative.     Endocrine: Negative.    Hematologic/Lymphatic: Negative.    Skin: Negative.    Musculoskeletal:  Positive for arthritis.   Gastrointestinal: Negative.    Genitourinary: Negative.    Neurological: Negative.    Psychiatric/Behavioral: Negative.     Allergic/Immunologic: Negative.      Objective:     Vital Signs (Most Recent):  Temp: 98.6 °F (37 °C) (05/08/24 1000)  Pulse: 89 (05/08/24 1000)  Resp: 16 (05/08/24 1000)  BP: 99/61 (05/08/24 1000)  SpO2: 98 % (05/08/24 1000) Vital Signs (24h Range):  Temp:  [98.6 °F (37 °C)-101.1 °F (38.4 °C)] 98.6 °F (37 °C)  Pulse:  [] 89  Resp:  [16-43] 16  SpO2:  [88 %-100 %] 98 %  BP: ()/(50-84) 99/61  Arterial Line BP: ()/() 104/69     Weight: 71.9 kg (158 lb 8.2 oz)  Body mass index is 22.74 kg/m².     SpO2: 98 %          Intake/Output Summary (Last 24 hours) at 5/8/2024 1219  Last data filed at 5/8/2024 1000  Gross per 24 hour   Intake 2621.24 ml   Output 3835 ml   Net -1213.76 ml       Lines/Drains/Airways       Central Venous Catheter Line  Duration             Percutaneous Central Line - Triple Lumen  05/06/24 0846 Internal Jugular Right 2 days              Drain  Duration                  Urethral Catheter 05/06/24 0844 Silicone;Straight-tip;Temperature probe 2 days              Arterial Line  Duration             Arterial Line 05/06/24 0712 Right Radial 2 days              Line  Duration                  Pacer Wires 05/06/24 1310 1 day              Peripheral Intravenous Line  Duration                  Peripheral IV - Single Lumen 05/06/24 0715 18 G Left Forearm 2 days                       Physical Exam  Vitals and nursing note reviewed.   Constitutional:       General: He is not in acute distress.     Appearance: He is well-developed. He is ill-appearing. He is not diaphoretic.   HENT:      Head: Normocephalic and atraumatic.   Eyes:      General:         Right eye: No discharge.         Left eye: No discharge.      Pupils: Pupils are equal, round, and reactive to light.   Cardiovascular:      Rate and Rhythm: Normal rate and regular rhythm.      Heart sounds: Normal heart sounds, S1 normal and S2 normal. No murmur heard.     Comments: Sternotomy site C/D/I; dressing in place    Paced on monitor  Pulmonary:      Effort: Pulmonary effort is normal. No respiratory distress.      Comments: Diminished BS at bases  Abdominal:      General: There is no distension.   Musculoskeletal:      Right lower leg: No edema.      Left lower leg: No edema.   Skin:     General: Skin is warm and dry.      Findings: No erythema.   Neurological:      General: No focal deficit present.      Mental Status: He is alert and oriented to person, place, and time.   Psychiatric:         Behavior: Behavior normal.            Significant Labs: CMP  "  Recent Labs   Lab 05/06/24  1454 05/06/24 2025 05/07/24  0430 05/07/24  0955 05/07/24 2113 05/08/24  0432   *   < > 143 139 138 138   K 3.1*   < > 4.3 5.2* 4.7 4.2      < > 111* 106 103 100   CO2 27   < > 27 21* 25 30*   *   < > 63* 352* 202* 81   BUN 15   < > 16 19 25* 27*   CREATININE 1.1   < > 1.1 1.3 1.5* 1.3   CALCIUM 9.2   < > 9.0 10.2 9.9 11.1*   PROT 3.9*  --  5.0*  --   --  5.6*   ALBUMIN 2.1*  --  3.6  --   --  4.0   BILITOT 0.4  --  0.3  --   --  1.1*   ALKPHOS 42*  --  40*  --   --  43*   AST 48*  --  148*  --   --  563*   ALT 19  --  24  --   --  392*   ANIONGAP 12   < > 5* 12 10 8    < > = values in this interval not displayed.   , CBC   Recent Labs   Lab 05/07/24  0955 05/07/24 2113 05/08/24 0432   WBC 19.61* 21.53* 21.44*   HGB 6.4* 10.0* 9.7*   HCT 19.1* 29.7* 27.6*   * 115* 105*   , Troponin No results for input(s): "TROPONINI" in the last 48 hours., and All pertinent lab results from the last 24 hours have been reviewed.    Significant Imaging: Echocardiogram: Transthoracic echo (TTE) complete (Cupid Only):   Results for orders placed or performed during the hospital encounter of 05/03/24   Echo   Result Value Ref Range    BSA 1.8 m2    LVOT stroke volume 50.79 cm3    LVIDd 4.01 3.5 - 6.0 cm    LV Systolic Volume 25.50 mL    LV Systolic Volume Index 14.0 mL/m2    LVIDs 2.64 2.1 - 4.0 cm    LV Diastolic Volume 70.21 mL    LV Diastolic Volume Index 38.58 mL/m2    IVS 1.55 (A) 0.6 - 1.1 cm    LVOT diameter 2.03 cm    LVOT area 3.2 cm2    FS 34 28 - 44 %    Left Ventricle Relative Wall Thickness 0.63 cm    Posterior Wall 1.27 (A) 0.6 - 1.1 cm    LV mass 212.04 g    LV Mass Index 117 g/m2    MV Peak E Evin 0.68 m/s    TDI LATERAL 0.08 m/s    TDI SEPTAL 0.07 m/s    E/E' ratio 9.07 m/s    MV Peak A Evin 0.99 m/s    TR Max Evin 1.97 m/s    E/A ratio 0.69     IVRT 83.73 msec    E wave deceleration time 268.67 msec    LV SEPTAL E/E' RATIO 9.71 m/s    LV LATERAL E/E' RATIO 8.50 " m/s    LVOT peak fabrizio 0.78 m/s    Left Ventricular Outflow Tract Mean Velocity 0.67 cm/s    Left Ventricular Outflow Tract Mean Gradient 1.83 mmHg    RVOT peak VTI 19.6 cm    TAPSE 2.38 cm    LA size 3.87 cm    Left Atrium Minor Axis 4.81 cm    Left Atrium Major Axis 4.91 cm    RA Major Axis 4.15 cm    AV mean gradient 3 mmHg    AV peak gradient 4 mmHg    Ao peak fabrizio 0.98 m/s    Ao VTI 20.50 cm    LVOT peak VTI 15.70 cm    AV valve area 2.48 cm²    AV Velocity Ratio 0.80     AV index (prosthetic) 0.77     LEXUS by Velocity Ratio 2.57 cm²    Mr max fabrizio 3.82 m/s    MV mean gradient 1 mmHg    MV peak gradient 3 mmHg    MV stenosis pressure 1/2 time 77.91 ms    MV valve area p 1/2 method 2.82 cm2    MV valve area by continuity eq 1.74 cm2    MV VTI 29.2 cm    Triscuspid Valve Regurgitation Peak Gradient 16 mmHg    PV mean gradient 3 mmHg    RVOT peak fabrizio 1.05 m/s    Ao root annulus 3.39 cm    STJ 3.33 cm    Ascending aorta 3.23 cm    Mean e' 0.08 m/s    ZLVIDS -1.30     ZLVIDD -2.28     LA Volume Index 34.3 mL/m2    LA volume 62.34 cm3    LA WIDTH 3.9 cm    RA Width 2.8 cm    EF 60 %    TV resting pulmonary artery pressure 19 mmHg    RV TB RVSP 5 mmHg    Est. RA pres 3 mmHg    Narrative      Left Ventricle: The left ventricle is normal in size. Normal wall   thickness. There is concentric hypertrophy. Regional wall motion   abnormalities present. See diagram for wall motion findings. There is   normal systolic function with a visually estimated ejection fraction of 55   - 70%. Ejection fraction by visual approximation is 60%.    Right Ventricle: Normal right ventricular cavity size. Wall thickness   is normal. Systolic function is normal.    Mitral Valve: There is mild regurgitation.    IVC/SVC: Normal venous pressure at 3 mmHg.      and EKG: REviewed  Assessment and Plan:   Patient who presents with multivessel CAD recovering s/p CABG x 4. Continue OMT/care as per CTS.    * Coronary artery disease of native artery of  native heart with stable angina pectoris  Multivessel cad for cabg on Monday no angina    S/P CABG x 4  -Continue current post-op care and mgmt as per CTS  -ASA Plavix statin BB  -IS usage   -Ambulation when able    5/8/24  -Pain improved  -Continue ASA, Plavix, BB  -Statin held due to bumped LFT's  -IS usage and ambulation    Other hyperlipidemia  -statins    5/8/24  -LFT's bumped, statin held    Type 2 diabetes mellitus with complication, without long-term current use of insulin  Per hospital medicine    Essential hypertension  -Continuer home meds        VTE Risk Mitigation (From admission, onward)           Ordered     Place DELROY hose  Until discontinued        Comments: Post Op Day 1 - After Ace and Drain Removed    05/06/24 1427     Place sequential compression device  Until discontinued         05/06/24 1427     IP VTE HIGH RISK PATIENT  Once         05/06/24 1427     Place DELROY hose  Until discontinued         05/05/24 1954     Place sequential compression device  Until discontinued         05/05/24 1954     Place sequential compression device  Until discontinued         05/03/24 1124                    Debora Ramírez PA-C  Cardiology  [facility] ([facility])

## 2024-05-08 NOTE — SUBJECTIVE & OBJECTIVE
Interval History: The patient is postop day 2 status post coronary artery bypass grafting x4.    ROS  Medications:  Continuous Infusions:   EPINEPHrine  0-0.5 mcg/kg/min Intravenous Continuous 5.9 mL/hr at 05/08/24 1000 0.03 mcg/kg/min at 05/08/24 1000    vasopressin  0.04 Units/min Intravenous Continuous 6 mL/hr at 05/08/24 1000 0.02 Units/min at 05/08/24 1000     Scheduled Meds:   acetaminophen  650 mg Oral Q6H    ascorbic acid (vitamin C)  500 mg Oral BID    aspirin  81 mg Oral Daily    bisacodyL  10 mg Oral Once    chlorhexidine  10 mL Mouth/Throat BID    clopidogreL  75 mg Oral Daily    cyanocobalamin  1,000 mcg Oral Daily    docusate sodium  100 mg Oral BID    EScitalopram oxalate  10 mg Oral Daily    ferrous sulfate  1 tablet Oral Daily    folic acid  1 mg Oral Daily    insulin detemir U-100  15 Units Subcutaneous BID    isosorbide mononitrate  30 mg Oral Daily    lubiprostone  24 mcg Oral BID WM    magnesium citrate  296 mL Oral Once    metoprolol tartrate  25 mg Oral BID    [START ON 5/9/2024] pantoprazole  40 mg Oral Daily    polyethylene glycol  17 g Oral Daily    rivaroxaban  2.5 mg Oral BID WM     PRN Meds:  Current Facility-Administered Medications:     0.9%  NaCl infusion (for blood administration), , Intravenous, Q24H PRN    albumin human 5%, 25 g, Intravenous, PRN    calcium gluconate IVPB, 1 g, Intravenous, PRN    calcium gluconate IVPB, 2 g, Intravenous, PRN    calcium gluconate IVPB, 3 g, Intravenous, PRN    dextrose 10%, 12.5 g, Intravenous, PRN    dextrose 10%, 25 g, Intravenous, PRN    glucagon (human recombinant), 1 mg, Intramuscular, PRN    glucose, 16 g, Oral, PRN    glucose, 24 g, Oral, PRN    influenza 65up-adj, 0.5 mL, Intramuscular, vaccine x 1 dose    insulin aspart U-100, 0-15 Units, Subcutaneous, QID (AC + HS) PRN    lactated ringers, 1,000 mL, Intravenous, PRN    magnesium sulfate IVPB, 4 g, Intravenous, PRN    metoclopramide, 5 mg, Intravenous, Q6H PRN    naloxone, 0.4 mg,  Intravenous, PRN    nitroGLYCERIN, 0.4 mg, Sublingual, Q5 Min PRN    ondansetron, 4 mg, Intravenous, Q6H PRN    pneumoc 20-diaz conj-dip cr(PF), 0.5 mL, Intramuscular, vaccine x 1 dose    potassium chloride in water, 20 mEq, Intravenous, PRN    potassium chloride in water, 60 mEq, Intravenous, PRN    potassium chloride in water, 40 mEq, Intravenous, PRN    sodium chloride 0.9%, 10 mL, Intravenous, PRN    sodium chloride 0.9%, 10 mL, Intravenous, Q12H PRN    traMADoL, 50 mg, Oral, Q6H PRN     Objective:     Vital Signs (Most Recent):  Temp: 98.6 °F (37 °C) (05/08/24 1000)  Pulse: 89 (05/08/24 1000)  Resp: 16 (05/08/24 1000)  BP: 99/61 (05/08/24 1000)  SpO2: 98 % (05/08/24 1000) Vital Signs (24h Range):  Temp:  [98.6 °F (37 °C)-101.1 °F (38.4 °C)] 98.6 °F (37 °C)  Pulse:  [] 89  Resp:  [16-43] 16  SpO2:  [76 %-100 %] 98 %  BP: ()/(50-84) 99/61  Arterial Line BP: ()/() 104/69     Weight: 71.9 kg (158 lb 8.2 oz)  Body mass index is 22.74 kg/m².    SpO2: 98 %       Intake/Output - Last 3 Shifts         05/06 0700  05/07 0659 05/07 0700 05/08 0659 05/08 0700  05/09 0659    P.O.       I.V. (mL/kg) 2634.9 (36.6) 2050.1 (28.5) 60.1 (0.8)    Blood  706.3     IV Piggyback 2292.9 197.5 107.4    Total Intake(mL/kg) 4927.8 (68.4) 2953.8 (41.1) 167.5 (2.3)    Urine (mL/kg/hr) 1215 (0.7) 3115 (1.8) 800 (2.9)    Drains 30 20     Stool   0    Chest Tube 522 270     Total Output 1767 3405 800    Net +3160.8 -451.2 -632.5           Stool Occurrence   0 x            Lines/Drains/Airways       Central Venous Catheter Line  Duration             Percutaneous Central Line - Triple Lumen  05/06/24 0846 Internal Jugular Right 2 days              Drain  Duration                  Urethral Catheter 05/06/24 0844 Silicone;Straight-tip;Temperature probe 2 days              Arterial Line  Duration             Arterial Line 05/06/24 0712 Right Radial 2 days              Line  Duration                  Pacer Wires 05/06/24  1310 1 day              Peripheral Intravenous Line  Duration                  Peripheral IV - Single Lumen 05/06/24 0715 18 G Left Forearm 2 days                     Physical Exam  Constitutional:       Appearance: Normal appearance.   HENT:      Head: Normocephalic and atraumatic.   Cardiovascular:      Rate and Rhythm: Normal rate and regular rhythm.      Heart sounds: Normal heart sounds.   Pulmonary:      Effort: Pulmonary effort is normal.      Breath sounds: Normal breath sounds.   Abdominal:      General: Abdomen is flat.      Palpations: Abdomen is soft.   Musculoskeletal:      Right lower leg: No edema.      Left lower leg: No edema.   Skin:     General: Skin is warm and dry.   Neurological:      Mental Status: He is alert. He is disoriented.   Psychiatric:         Behavior: Behavior normal.            Significant Labs:  All pertinent labs from the last 24 hours have been reviewed.    Significant Diagnostics:  I have reviewed all pertinent imaging results/findings within the past 24 hours.

## 2024-05-08 NOTE — ASSESSMENT & PLAN NOTE
- Chronic, uncontrolled. Latest blood pressure and vitals reviewed:  - Home meds for hypertension were reviewed and noted below:   Hypertension Medications               amLODIPine (NORVASC) 5 MG tablet Take 5 mg by mouth once daily.    isosorbide mononitrate (IMDUR) 30 MG 24 hr tablet Take 1 tablet (30 mg total) by mouth once daily.    losartan (COZAAR) 100 MG tablet Take 50 mg by mouth 2 (two) times a day.    nitroGLYCERIN (NITROSTAT) 0.3 MG SL tablet Place 1 tablet (0.3 mg total) under the tongue every 5 (five) minutes as needed for Chest pain.   - While in the hospital, will manage blood pressure as follows; Still on pressors, BB being restarted today  - Wean pressors

## 2024-05-08 NOTE — PT/OT/SLP EVAL
"Occupational Therapy Evaluation and Treatment    Name: Feng Marques  MRN: [id number]  Admitting Diagnosis: Coronary artery disease of native artery of native heart with stable angina pectoris  Recent Surgery: Procedure(s) (LRB):  CORONARY ARTERY BYPASS GRAFT (CABG) (N/A)  ECHOCARDIOGRAM,TRANSESOPHAGEAL (N/A)  SURGICAL PROCUREMENT, VEIN, ENDOSCOPIC (Left)  BLOCK, NERVE, INTERCOSTAL, 2 OR MORE (N/A) 2 Days Post-Op    Recommendations:     Discharge Recommendations: Low Intensity Therapy (24/7 SPV and A)  Level of Assistance Recommended: 24 hours significant assistance  Discharge Equipment Recommendations: shower chair  Barriers to discharge: None    Assessment:     Feng Marques is a 73 y.o. male with a medical diagnosis of Coronary artery disease of native artery of native heart with stable angina pectoris. He presents with performance deficits affecting function including weakness, impaired endurance, impaired self care skills, impaired functional mobility, impaired balance, impaired cardiopulmonary response to activity, pain, decreased safety awareness (sternal precautions).    Rehab Prognosis: Good; patient would benefit from acute OT services to address these deficits and reach maximum level of function.    Plan:     Patient to be seen 2 x/week to address the above listed problems via self-care/home management, therapeutic activities, therapeutic exercises  Plan of Care Expires: 05/22/24  Plan of Care Reviewed with: patient, daughter    Subjective     Chief Complaint: Reported "I am doing ok."  Patient Comments/Goals: increase independence  Pain/Comfort:  Pain Rating 1: 8/10  Location 1: chest  Pain Addressed 1:  (activity pacing)    Social History:  Living Environment: Patient lives alone in a single story home with  4-5 steps to enter (with rail).  Prior Level of Function: Prior to admission, patient was independent with ADLs and community distance ambulation.  Roles and Routines: Patient was driving " and retired prior to admission.  Equipment Used at Home: none  DME owned (not currently used): none  Assistance Upon Discharge: family    Objective:     Communicated with nurse, Joyce, prior to session. Patient found up in chair with blood pressure cuff, pulse ox (continuous), telemetry, wound vac, external pacer, central line, peripheral IV, bryant catheter, arterial line, oxygen, and chair check upon OT entry to room.    General Precautions: Standard, fall, sternal (hypotension)   Orthopedic Precautions: N/A   Braces: N/A    Respiratory Status: Nasal cannula, flow 2 L/min    Occupational Performance    Bed Mobility:   Seated anterior scooting with CGA and max cueing for technique.    Functional Mobility/Transfers:  Sit <> Stand Transfer with minimum assistance with no AD  Functional Mobility: not appropriate for forward mobility due to hypotension and complex medical lines. Able to stand x3 minutes with min A. Initially completed B weight shifting, progressing to marching in place.  Stand>sit with min A  Provided with v/c for technique with transfers to increase safety, independence, and ensure compliance with sternal precautions    Activities of Daily Living:  Upper Body Dressing: maximal assistance  Lower Body Dressing: total assistance    Cognitive/Visual Perceptual:  Cognitive/Psychosocial Skills:    -     Oriented to: Person, Place, Time, Situation  -     Follows Commands/attention: Follows one-step commands    Physical Exam:  Balance:    -     Sitting: stand by assistance  -     Standing: minimum assistance  Upper Extremity Range of Motion:     -       Right Upper Extremity: WFL  -       Left Upper Extremity: WFL  Upper Extremity Strength:    -       Right Upper Extremity: Deficits: grossly 4/5 elbows distally  -       Left Upper Extremity: Deficits: grossly 4/5 elbows distally   Strength:    -       Right Upper Extremity: Deficits: fair  -       Left Upper Extremity: Deficits: fair  Art line board to R  wrist  Declines sensation deficits to B hands/feet    AMPAC 6 Click ADL:  AMPAC Total Score: 15    Treatment & Education:  Therapist provided facilitation and instruction of proper body mechanics, energy conservation, and fall prevention strategies during tasks listed above  Patient educated on role of OT, POC, and goals for therapy  Patient educated on importance of OOB activities with staff member assistance and sitting OOB majority of the day  Educated on sternal precautions and their functional implications, will require reinforcement.    Patient left up in chair with all lines intact, call button in reach, RN notified, chair alarm on, and daughter present.    GOALS:   Multidisciplinary Problems       Occupational Therapy Goals          Problem: Occupational Therapy    Goal Priority Disciplines Outcome Interventions   Occupational Therapy Goal     OT, PT/OT     Description: Goals to be met by: 5/22/24     Patient will increase functional independence with ADLs by performing:    Toileting from toilet with Supervision for hygiene and clothing management.   Toilet transfer to toilet with Supervision.  Demonstrates 100% functional compliance with sternal precautions.                         History:     Past Medical History:   Diagnosis Date    Diabetes mellitus 2002     am 09/15/2017    DM (diabetes mellitus) 2002     am 09/21/2018    DM (diabetes mellitus) 2002     am 09/27/2019    DM (diabetes mellitus) 2002     am 09/10/2020    Hypertension          Past Surgical History:   Procedure Laterality Date    ANGIOGRAM, CORONARY, WITH LEFT HEART CATHETERIZATION N/A 5/3/2024    Procedure: Angiogram, Coronary, with Left Heart Cath;  Surgeon: Abdulaziz Carrasco MD;  Location: [facility] CATH LAB;  Service: Cardiology;  Laterality: N/A;    AORTOGRAPHY N/A 5/3/2024    Procedure: AORTOGRAM;  Surgeon: Abdulaziz Carrasco MD;  Location: [facility] CATH LAB;  Service: Cardiology;  Laterality: N/A;    ARTERIOGRAPHY OF  SUBCLAVIAN ARTERY  5/3/2024    Procedure: ARTERIOGRAM, SUBCLAVIAN;  Surgeon: Abdulaziz Carrasco MD;  Location: [facility] CATH LAB;  Service: Cardiology;;    CORONARY ARTERY BYPASS GRAFT (CABG) N/A 5/6/2024    Procedure: CORONARY ARTERY BYPASS GRAFT (CABG);  Surgeon: Maury Amato MD;  Location: [facility] OR;  Service: Cardiothoracic;  Laterality: N/A;  4-VESSEL WITH EPI-AORTIC ULTRASOUND    ECHOCARDIOGRAM,TRANSESOPHAGEAL N/A 5/6/2024    Procedure: ECHOCARDIOGRAM,TRANSESOPHAGEAL;  Surgeon: Maury Amato MD;  Location: [facility] OR;  Service: Cardiothoracic;  Laterality: N/A;    ENDOSCOPIC HARVEST OF VEIN Left 5/6/2024    Procedure: SURGICAL PROCUREMENT, VEIN, ENDOSCOPIC;  Surgeon: Maury Amato MD;  Location: [facility] OR;  Service: Cardiothoracic;  Laterality: Left;    INJECTION OF ANESTHETIC AGENT AROUND MULTIPLE INTERCOSTAL NERVES N/A 5/6/2024    Procedure: BLOCK, NERVE, INTERCOSTAL, 2 OR MORE;  Surgeon: Maury Amato MD;  Location: [facility] OR;  Service: Cardiothoracic;  Laterality: N/A;    VENTRICULOGRAM, LEFT  5/3/2024    Procedure: Ventriculogram, Left;  Surgeon: Abdulaziz Carrasco MD;  Location: [facility] CATH LAB;  Service: Cardiology;;       Time Tracking:     OT Date of Treatment: 05/08/24  OT Start Time: 0945  OT Stop Time: 1015  OT Total Time (min): 30 min    Billable Minutes: Evaluation 15 and Therapeutic Activity 15    Su Beatty, RAGINI  5/8/2024

## 2024-05-08 NOTE — PROGRESS NOTES
[facility] - Intensive Care ([facility])  Cardiothoracic Surgery  Progress Note    Patient Name: Feng Marques  MRN: [id number]  Admission Date: 5/3/2024  Hospital Length of Stay: 5 days  Code Status: Full Code   Attending Physician: Maury Amato MD   Referring Provider: Abdulaziz Carrasco MD  Principal Problem:Coronary artery disease of native artery of native heart with stable angina pectoris            Subjective:     Post-Op Info:  Procedure(s) (LRB):  CORONARY ARTERY BYPASS GRAFT (CABG) (N/A)  ECHOCARDIOGRAM,TRANSESOPHAGEAL (N/A)  SURGICAL PROCUREMENT, VEIN, ENDOSCOPIC (Left)  BLOCK, NERVE, INTERCOSTAL, 2 OR MORE (N/A)   2 Days Post-Op     Interval History: The patient is postop day 2 status post coronary artery bypass grafting x4.    ROS  Medications:  Continuous Infusions:   EPINEPHrine  0-0.5 mcg/kg/min Intravenous Continuous 5.9 mL/hr at 05/08/24 1000 0.03 mcg/kg/min at 05/08/24 1000    vasopressin  0.04 Units/min Intravenous Continuous 6 mL/hr at 05/08/24 1000 0.02 Units/min at 05/08/24 1000     Scheduled Meds:   acetaminophen  650 mg Oral Q6H    ascorbic acid (vitamin C)  500 mg Oral BID    aspirin  81 mg Oral Daily    bisacodyL  10 mg Oral Once    chlorhexidine  10 mL Mouth/Throat BID    clopidogreL  75 mg Oral Daily    cyanocobalamin  1,000 mcg Oral Daily    docusate sodium  100 mg Oral BID    EScitalopram oxalate  10 mg Oral Daily    ferrous sulfate  1 tablet Oral Daily    folic acid  1 mg Oral Daily    insulin detemir U-100  15 Units Subcutaneous BID    isosorbide mononitrate  30 mg Oral Daily    lubiprostone  24 mcg Oral BID WM    magnesium citrate  296 mL Oral Once    metoprolol tartrate  25 mg Oral BID    [START ON 5/9/2024] pantoprazole  40 mg Oral Daily    polyethylene glycol  17 g Oral Daily    rivaroxaban  2.5 mg Oral BID WM     PRN Meds:  Current Facility-Administered Medications:     0.9%  NaCl infusion (for blood administration), , Intravenous, Q24H PRN    albumin human 5%, 25 g,  Intravenous, PRN    calcium gluconate IVPB, 1 g, Intravenous, PRN    calcium gluconate IVPB, 2 g, Intravenous, PRN    calcium gluconate IVPB, 3 g, Intravenous, PRN    dextrose 10%, 12.5 g, Intravenous, PRN    dextrose 10%, 25 g, Intravenous, PRN    glucagon (human recombinant), 1 mg, Intramuscular, PRN    glucose, 16 g, Oral, PRN    glucose, 24 g, Oral, PRN    influenza 65up-adj, 0.5 mL, Intramuscular, vaccine x 1 dose    insulin aspart U-100, 0-15 Units, Subcutaneous, QID (AC + HS) PRN    lactated ringers, 1,000 mL, Intravenous, PRN    magnesium sulfate IVPB, 4 g, Intravenous, PRN    metoclopramide, 5 mg, Intravenous, Q6H PRN    naloxone, 0.4 mg, Intravenous, PRN    nitroGLYCERIN, 0.4 mg, Sublingual, Q5 Min PRN    ondansetron, 4 mg, Intravenous, Q6H PRN    pneumoc 20-diaz conj-dip cr(PF), 0.5 mL, Intramuscular, vaccine x 1 dose    potassium chloride in water, 20 mEq, Intravenous, PRN    potassium chloride in water, 60 mEq, Intravenous, PRN    potassium chloride in water, 40 mEq, Intravenous, PRN    sodium chloride 0.9%, 10 mL, Intravenous, PRN    sodium chloride 0.9%, 10 mL, Intravenous, Q12H PRN    traMADoL, 50 mg, Oral, Q6H PRN     Objective:     Vital Signs (Most Recent):  Temp: 98.6 °F (37 °C) (05/08/24 1000)  Pulse: 89 (05/08/24 1000)  Resp: 16 (05/08/24 1000)  BP: 99/61 (05/08/24 1000)  SpO2: 98 % (05/08/24 1000) Vital Signs (24h Range):  Temp:  [98.6 °F (37 °C)-101.1 °F (38.4 °C)] 98.6 °F (37 °C)  Pulse:  [] 89  Resp:  [16-43] 16  SpO2:  [76 %-100 %] 98 %  BP: ()/(50-84) 99/61  Arterial Line BP: ()/() 104/69     Weight: 71.9 kg (158 lb 8.2 oz)  Body mass index is 22.74 kg/m².    SpO2: 98 %       Intake/Output - Last 3 Shifts         05/06 0700  05/07 0659 05/07 0700  05/08 0659 05/08 0700  05/09 0659    P.O.       I.V. (mL/kg) 2634.9 (36.6) 2050.1 (28.5) 60.1 (0.8)    Blood  706.3     IV Piggyback 2292.9 197.5 107.4    Total Intake(mL/kg) 4927.8 (68.4) 2953.8 (41.1) 167.5 (2.3)     Urine (mL/kg/hr) 1215 (0.7) 3115 (1.8) 800 (2.9)    Drains 30 20     Stool   0    Chest Tube 522 270     Total Output 1767 3405 800    Net +3160.8 -451.2 -632.5           Stool Occurrence   0 x            Lines/Drains/Airways       Central Venous Catheter Line  Duration             Percutaneous Central Line - Triple Lumen  05/06/24 0846 Internal Jugular Right 2 days              Drain  Duration                  Urethral Catheter 05/06/24 0844 Silicone;Straight-tip;Temperature probe 2 days              Arterial Line  Duration             Arterial Line 05/06/24 0712 Right Radial 2 days              Line  Duration                  Pacer Wires 05/06/24 1310 1 day              Peripheral Intravenous Line  Duration                  Peripheral IV - Single Lumen 05/06/24 0715 18 G Left Forearm 2 days                     Physical Exam  Constitutional:       Appearance: Normal appearance.   HENT:      Head: Normocephalic and atraumatic.   Cardiovascular:      Rate and Rhythm: Normal rate and regular rhythm.      Heart sounds: Normal heart sounds.   Pulmonary:      Effort: Pulmonary effort is normal.      Breath sounds: Normal breath sounds.   Abdominal:      General: Abdomen is flat.      Palpations: Abdomen is soft.   Musculoskeletal:      Right lower leg: No edema.      Left lower leg: No edema.   Skin:     General: Skin is warm and dry.   Neurological:      Mental Status: He is alert. He is disoriented.   Psychiatric:         Behavior: Behavior normal.            Significant Labs:  All pertinent labs from the last 24 hours have been reviewed.    Significant Diagnostics:  I have reviewed all pertinent imaging results/findings within the past 24 hours.  Assessment/Plan:     * Coronary artery disease of native artery of native heart with stable angina pectoris  The patient is a 73-year-old male with coronary artery disease status post stenting, diabetes, hyperlipidemia and hypertension who was worked up for chest pain with  cardiac catheterization.  Cardiac catheterization shows severe multivessel coronary artery disease with a 90% proximal LAD stenosis.  The patient is a candidate for urgent coronary artery bypass grafting.  The risks and benefits of surgery have been explained to the patient.  The patient understands the risks and benefits of surgery and has agreed to proceed with urgent coronary artery bypass grafting which will be scheduled for 05/06/2024.    S/P CABG x 4  05/07/2024   The patient is postop day 1 status post coronary artery bypass grafting timesx1. Overall the patient is doing well.  Neuro:  Patient is awake alert and oriented x3.  Neuro exam is nonfocal.  Requiring increasing pain medication.    Cardiac:  Patient has been stable.  Patient is on low-dose epi and vasopressin.  Wean as tolerated.  Respiratory:  Patient has good sats on nasal cannula.  Pulmonary toileting.  Continue incentive spirometer  GI:  Advance diet as tolerated.    Renal:  Creatinine is 1.1.   Patient has good urine output.  Start Lasix.  Id:  Patient is afebrile.  White count is 19.  Suspect due to stress of surgery.  No evidence of infection.  Continue to observe.  Endocrine:  Glucose is controlled with long-acting insulin and sliding scale.  Heme:  Hematocrit is down to 19.  Will transfuse 2 units packed red blood cells.  Activities:  Advance activities as tolerated.  Line tubes and drains:  Patient has a right IJ triple-lumen, Michel catheter, A-line, pacer wires and saphenectomy site PABLO drains.    05/08/2024   The patient is postop day 2 status post coronary artery bypass grafting x 4.  Overall the patient is doing well.    Neuro:  Patient is awake alert and oriented x3.  Neuro exam is nonfocal.  Pain is much better controlled.  Cardiac:  Patient is hemodynamically stable.  Patient is is being weaned off low-dose vasopressin and epinephrine.  Will start metoprolol.    Respiratory: Patient has good sats.  Continue pulmonary toileting.   Continue incentive spirometer.  GI:  Patient is tolerating p.o. will start cardiac diet.  Advance as tolerated.    Renal:  Creatinine is 1.3.  Patient had excellent response to Lasix yesterday.  Patient appears euvolemic.  Will discontinue Lasix.    Id:  Patient had a T-max of 101°.  White count is 21.  However, patient has no evidence of infections.  Continue to observe.    Endocrine: Glucose is controlled with long-acting and sliding scale insulin.    Heme:  Hematocrit is 27 status post transfusion of 2 units of packed red blood cells.  Platelet is 105.  Will add low-dose rivaroxaban for anti thrombin 3 effect.  Continue aspirin and Plavix.    Activities:  Patient is out of bed to chair.  Advance as tolerated.  Line tubes and drains:  Patient has a right IJ triple-lumen, Michel catheter, A-line and pacer wires.  Chest tubes have been discontinued.          Maury Amato MD  Cardiothoracic Surgery  [facility] - Intensive Care ([facility])

## 2024-05-08 NOTE — PLAN OF CARE
P.T. EVAL COMPLETE.  PT CURRENTLY REQUIRES FATUMA FOR SEATED SCOOTING, MODA X 2 FOR SIT<>STAND TF, FATUMA FOR PRE-GAIT ACTIVITY.  P.T. RECOMMENDS LOW INTENSITY THERAPY UPON DISCHARGE WITH 24 HOUR CARE FOR SAFETY

## 2024-05-08 NOTE — PLAN OF CARE
OT tacos completed. Sit>stand with min A, marching in place x3 minutes with min A, stand>sit min A. Hypotensive throughout. Recommending low intensity intervention with 24/7 SPV and A at d/c.

## 2024-05-08 NOTE — ASSESSMENT & PLAN NOTE
05/07/2024   The patient is postop day 1 status post coronary artery bypass grafting timesx1. Overall the patient is doing well.  Neuro:  Patient is awake alert and oriented x3.  Neuro exam is nonfocal.  Requiring increasing pain medication.    Cardiac:  Patient has been stable.  Patient is on low-dose epi and vasopressin.  Wean as tolerated.  Respiratory:  Patient has good sats on nasal cannula.  Pulmonary toileting.  Continue incentive spirometer  GI:  Advance diet as tolerated.    Renal:  Creatinine is 1.1.   Patient has good urine output.  Start Lasix.  Id:  Patient is afebrile.  White count is 19.  Suspect due to stress of surgery.  No evidence of infection.  Continue to observe.  Endocrine:  Glucose is controlled with long-acting insulin and sliding scale.  Heme:  Hematocrit is down to 19.  Will transfuse 2 units packed red blood cells.  Activities:  Advance activities as tolerated.  Line tubes and drains:  Patient has a right IJ triple-lumen, Michel catheter, A-line, pacer wires and saphenectomy site PABLO drains.    05/08/2024   The patient is postop day 2 status post coronary artery bypass grafting x 4.  Overall the patient is doing well.    Neuro:  Patient is awake alert and oriented x3.  Neuro exam is nonfocal.  Pain is much better controlled.  Cardiac:  Patient is hemodynamically stable.  Patient is is being weaned off low-dose vasopressin and epinephrine.  Will start metoprolol.    Respiratory: Patient has good sats.  Continue pulmonary toileting.  Continue incentive spirometer.  GI:  Patient is tolerating p.o. will start cardiac diet.  Advance as tolerated.    Renal:  Creatinine is 1.3.  Patient had excellent response to Lasix yesterday.  Patient appears euvolemic.  Will discontinue Lasix.    Id:  Patient had a T-max of 101°.  White count is 21.  However, patient has no evidence of infections.  Continue to observe.    Endocrine: Glucose is controlled with long-acting and sliding scale insulin.    Heme:   Hematocrit is 27 status post transfusion of 2 units of packed red blood cells.  Platelet is 105.  Will add low-dose rivaroxaban for anti thrombin 3 effect.  Continue aspirin and Plavix.    Activities:  Patient is out of bed to chair.  Advance as tolerated.  Line tubes and drains:  Patient has a right IJ triple-lumen, Michel catheter, A-line and pacer wires.  Chest tubes have been discontinued.

## 2024-05-08 NOTE — PLAN OF CARE
AAOx4. Oxygen via NC at 1L. AV paced on monitor. Off of vaso. Epi at 0.02 (now titrating by MAP). A line removed. Flowtrack removed. Intermittent nausea treated with PRN zofran. Eating 25% of meals. POC reviewed with pt and family at bedside. Sat in chair until 15:00.

## 2024-05-08 NOTE — ASSESSMENT & PLAN NOTE
-Continue current post-op care and mgmt as per CTS  -ASA Plavix statin BB  -IS usage   -Ambulation when able    5/8/24  -Pain improved  -Continue ASA, Plavix, BB  -Statin held due to bumped LFT's  -IS usage and ambulation

## 2024-05-08 NOTE — SUBJECTIVE & OBJECTIVE
Objective:     Vital Signs (Most Recent):  Temp: 98.6 °F (37 °C) (05/08/24 1000)  Pulse: 89 (05/08/24 1000)  Resp: 16 (05/08/24 1000)  BP: 99/61 (05/08/24 1000)  SpO2: 98 % (05/08/24 1000) Vital Signs (24h Range):  Temp:  [98.6 °F (37 °C)-101.1 °F (38.4 °C)] 98.6 °F (37 °C)  Pulse:  [] 89  Resp:  [16-43] 16  SpO2:  [76 %-100 %] 98 %  BP: ()/(50-84) 99/61  Arterial Line BP: ()/() 104/69     Weight: 71.9 kg (158 lb 8.2 oz)  Body mass index is 22.74 kg/m².  Intake/Output Summary (Last 24 hours) at 5/8/2024 1113  Last data filed at 5/8/2024 1000  Gross per 24 hour   Intake 2621.24 ml   Output 4095 ml   Net -1473.76 ml     Physical Exam  Constitutional:       General: He is not in acute distress.     Appearance: He is ill-appearing.   HENT:      Head: Normocephalic.   Eyes:      Extraocular Movements: Extraocular movements intact.      Conjunctiva/sclera: Conjunctivae normal.      Pupils: Pupils are equal, round, and reactive to light.   Cardiovascular:      Rate and Rhythm: Normal rate and regular rhythm.      Pulses: Normal pulses.      Heart sounds: Murmur heard.      Comments: Drains in place  Pulmonary:      Effort: No respiratory distress.      Breath sounds: No wheezing.   Abdominal:      General: Bowel sounds are normal.      Palpations: Abdomen is soft.      Comments: Dressing to midlilne   Musculoskeletal:      Cervical back: Neck supple.   Skin:     General: Skin is warm.      Capillary Refill: Capillary refill takes less than 2 seconds.      Coloration: Skin is not jaundiced.      Findings: No bruising.   Neurological:      General: No focal deficit present.      Mental Status: He is alert and oriented to person, place, and time.   Psychiatric:         Mood and Affect: Mood normal.     Review of Systems   Constitutional:  Negative for fatigue and fever.   Respiratory:  Positive for chest tightness. Negative for shortness of breath and wheezing.    Cardiovascular:  Negative for chest  pain and palpitations.   Gastrointestinal:  Positive for nausea. Negative for abdominal pain, blood in stool, constipation, diarrhea and vomiting.   Genitourinary:  Negative for difficulty urinating and dysuria.   Neurological:  Positive for weakness. Negative for headaches.   Psychiatric/Behavioral:  Negative for agitation and confusion.      Vents:  Vent Mode: A/C (05/06/24 1503)  Set Rate: 18 BPM (05/06/24 1503)  Vt Set: 420 mL (05/06/24 1503)  PEEP/CPAP: 5 cmH20 (05/06/24 1503)  Oxygen Concentration (%): (S) 24 (05/08/24 0807)  Peak Airway Pressure: 19 cmH20 (05/06/24 1503)  Plateau Pressure: 0 cmH20 (05/06/24 1503)  Total Ve: 7.94 L/m (05/06/24 1503)  Negative Inspiratory Force (cm H2O): 0 (05/06/24 1503)  F/VT Ratio<105 (RSBI): (!) 25.35 (05/06/24 1503)    Lines/Drains/Airways       Central Venous Catheter Line  Duration             Percutaneous Central Line - Triple Lumen  05/06/24 0846 Internal Jugular Right 2 days              Drain  Duration                  Urethral Catheter 05/06/24 0844 Silicone;Straight-tip;Temperature probe 2 days              Arterial Line  Duration             Arterial Line 05/06/24 0712 Right Radial 2 days              Line  Duration                  Pacer Wires 05/06/24 1310 1 day              Peripheral Intravenous Line  Duration                  Peripheral IV - Single Lumen 05/06/24 0715 18 G Left Forearm 2 days                  Significant Labs:    CBC/Anemia Profile:  Recent Labs   Lab 05/07/24  0955 05/07/24 2113 05/08/24  0432   WBC 19.61* 21.53* 21.44*   HGB 6.4* 10.0* 9.7*   HCT 19.1* 29.7* 27.6*   * 115* 105*   MCV 95 89 88   RDW 13.4 14.8* 15.4*     Chemistries:  Recent Labs   Lab 05/06/24  1454 05/06/24 2025 05/07/24  0430 05/07/24  0955 05/07/24 2113 05/08/24  0432   *   < > 143 139 138 138   K 3.1*   < > 4.3 5.2* 4.7 4.2      < > 111* 106 103 100   CO2 27   < > 27 21* 25 30*   BUN 15   < > 16 19 25* 27*   CREATININE 1.1   < > 1.1 1.3 1.5* 1.3    CALCIUM 9.2   < > 9.0 10.2 9.9 11.1*   ALBUMIN 2.1*  --  3.6  --   --  4.0   PROT 3.9*  --  5.0*  --   --  5.6*   BILITOT 0.4  --  0.3  --   --  1.1*   ALKPHOS 42*  --  40*  --   --  43*   ALT 19  --  24  --   --  392*   AST 48*  --  148*  --   --  563*   MG 4.2*   < > 2.5  --  2.0 3.0*  3.0*    < > = values in this interval not displayed.     ABGs:   Recent Labs   Lab 05/06/24  1458   PH 7.401   PCO2 44.9   HCO3 27.9   POCSATURATED 100   BE 3*     POCT Glucose:   Recent Labs   Lab 05/07/24  2108 05/08/24  0641 05/08/24  0714   POCTGLUCOSE 221* 62* 110     Significant Imaging:  No new imaging in past 24 hours

## 2024-05-08 NOTE — PLAN OF CARE
Patient remains stable for duration of shift. VSS, A-paced at 90. AAOx4, NSR on monitor. Patient sats in upper 90s on room air; 2 L NC applied when patient slept. Calcium chloride given per MD. Albumin and mag given for hypovolemia and Mag of 2.0, respectively. Michel draining to gravity, 2.1L. Pressors titrated as needed. Precedex off at 0531. Glucose rechecked after AM labs resulted; Glucose was 62. PRN D10 125 mL bolus given. Pt ambulated in room to chair without complications; VSS. Care ongoing.

## 2024-05-08 NOTE — ASSESSMENT & PLAN NOTE
- Patient with known CAD s/p stent placement and CABG, which is controlled Will continue ASA, Plavix, and Statin and monitor for S/Sx of angina/ACS. Continue to monitor on telemetry.   - Multi-vessel disease involving LAD, LHC performed 5/3; CTS consulted and taken for CABG 5/6; S/p CABG x4  - Post op plan per CTS  - Pressors being weaned, off sedation, drains in place, pain better controlled today, tolerating diet, up in bedside chair

## 2024-05-08 NOTE — PROGRESS NOTES
[facility] - Intensive Care [facility])  Critical Care Medicine  Progress Note    Patient Name: Feng Marques  MRN: [id number]  Admission Date: 5/3/2024  Hospital Length of Stay: 5 days  Code Status: Full Code  Attending Provider: Maury Amato MD  Primary Care Provider: Maciel Enriquez MD   Principal Problem: Coronary artery disease of native artery of native heart with stable angina pectoris    Subjective:     HPI:  Mr. Marques is a 73-year-old male with past medical history of CAD, T2DM, HLD, HTN who presented to hospital last week for scheduled LHC on 5/3. Cath revealed severe multi-vessel disease involving 90% LAD stenosis. CTS consulted for CABG which was done today by Dr. Amato. Patient arrived to ICU inubated/sedated. On vasopressin/epi/insulin gtts and sedated with precedex.     Hospital/ICU Course:  5/7: Extubated yesterday evening to NC. glucose in 300s overnight, insulin gtt titrated up and given 20 U. Glucose dropped to 60s, so insulin gtt was stopped. Still on pressors. Requiring precedex. Complains of pain and muscle spasms.     5/8: Much better pain control today; up in bedside chair eating breakfast. Only complains of mild chest tightness. LFTs trended up, statin held. Tmax of 101.1F overnight, better with tylenol. WBC up to 21.44. Pressors being weaned. Off sedation.    Objective:     Vital Signs (Most Recent):  Temp: 98.6 °F (37 °C) (05/08/24 1000)  Pulse: 89 (05/08/24 1000)  Resp: 16 (05/08/24 1000)  BP: 99/61 (05/08/24 1000)  SpO2: 98 % (05/08/24 1000) Vital Signs (24h Range):  Temp:  [98.6 °F (37 °C)-101.1 °F (38.4 °C)] 98.6 °F (37 °C)  Pulse:  [] 89  Resp:  [16-43] 16  SpO2:  [76 %-100 %] 98 %  BP: ()/(50-84) 99/61  Arterial Line BP: ()/() 104/69     Weight: 71.9 kg (158 lb 8.2 oz)  Body mass index is 22.74 kg/m².  Intake/Output Summary (Last 24 hours) at 5/8/2024 1113  Last data filed at 5/8/2024 1000  Gross per 24 hour   Intake 2621.24 ml   Output 4095 ml    Net -1473.76 ml     Physical Exam  Constitutional:       General: He is not in acute distress.     Appearance: He is ill-appearing.   HENT:      Head: Normocephalic.   Eyes:      Extraocular Movements: Extraocular movements intact.      Conjunctiva/sclera: Conjunctivae normal.      Pupils: Pupils are equal, round, and reactive to light.   Cardiovascular:      Rate and Rhythm: Normal rate and regular rhythm.      Pulses: Normal pulses.      Heart sounds: Murmur heard.      Comments: Drains in place  Pulmonary:      Effort: No respiratory distress.      Breath sounds: No wheezing.   Abdominal:      General: Bowel sounds are normal.      Palpations: Abdomen is soft.      Comments: Dressing to midlilne   Musculoskeletal:      Cervical back: Neck supple.   Skin:     General: Skin is warm.      Capillary Refill: Capillary refill takes less than 2 seconds.      Coloration: Skin is not jaundiced.      Findings: No bruising.   Neurological:      General: No focal deficit present.      Mental Status: He is alert and oriented to person, place, and time.   Psychiatric:         Mood and Affect: Mood normal.     Review of Systems   Constitutional:  Negative for fatigue and fever.   Respiratory:  Positive for chest tightness. Negative for shortness of breath and wheezing.    Cardiovascular:  Negative for chest pain and palpitations.   Gastrointestinal:  Positive for nausea. Negative for abdominal pain, blood in stool, constipation, diarrhea and vomiting.   Genitourinary:  Negative for difficulty urinating and dysuria.   Neurological:  Positive for weakness. Negative for headaches.   Psychiatric/Behavioral:  Negative for agitation and confusion.      Vents:  Vent Mode: A/C (05/06/24 1503)  Set Rate: 18 BPM (05/06/24 1503)  Vt Set: 420 mL (05/06/24 1503)  PEEP/CPAP: 5 cmH20 (05/06/24 1503)  Oxygen Concentration (%): (S) 24 (05/08/24 0807)  Peak Airway Pressure: 19 cmH20 (05/06/24 1503)  Plateau Pressure: 0 cmH20 (05/06/24  1503)  Total Ve: 7.94 L/m (05/06/24 1503)  Negative Inspiratory Force (cm H2O): 0 (05/06/24 1503)  F/VT Ratio<105 (RSBI): (!) 25.35 (05/06/24 1503)    Lines/Drains/Airways       Central Venous Catheter Line  Duration             Percutaneous Central Line - Triple Lumen  05/06/24 0846 Internal Jugular Right 2 days              Drain  Duration                  Urethral Catheter 05/06/24 0844 Silicone;Straight-tip;Temperature probe 2 days              Arterial Line  Duration             Arterial Line 05/06/24 0712 Right Radial 2 days              Line  Duration                  Pacer Wires 05/06/24 1310 1 day              Peripheral Intravenous Line  Duration                  Peripheral IV - Single Lumen 05/06/24 0715 18 G Left Forearm 2 days                  Significant Labs:    CBC/Anemia Profile:  Recent Labs   Lab 05/07/24  0955 05/07/24 2113 05/08/24 0432   WBC 19.61* 21.53* 21.44*   HGB 6.4* 10.0* 9.7*   HCT 19.1* 29.7* 27.6*   * 115* 105*   MCV 95 89 88   RDW 13.4 14.8* 15.4*     Chemistries:  Recent Labs   Lab 05/06/24  1454 05/06/24 2025 05/07/24  0430 05/07/24  0955 05/07/24 2113 05/08/24  0432   *   < > 143 139 138 138   K 3.1*   < > 4.3 5.2* 4.7 4.2      < > 111* 106 103 100   CO2 27   < > 27 21* 25 30*   BUN 15   < > 16 19 25* 27*   CREATININE 1.1   < > 1.1 1.3 1.5* 1.3   CALCIUM 9.2   < > 9.0 10.2 9.9 11.1*   ALBUMIN 2.1*  --  3.6  --   --  4.0   PROT 3.9*  --  5.0*  --   --  5.6*   BILITOT 0.4  --  0.3  --   --  1.1*   ALKPHOS 42*  --  40*  --   --  43*   ALT 19  --  24  --   --  392*   AST 48*  --  148*  --   --  563*   MG 4.2*   < > 2.5  --  2.0 3.0*  3.0*    < > = values in this interval not displayed.     ABGs:   Recent Labs   Lab 05/06/24  1458   PH 7.401   PCO2 44.9   HCO3 27.9   POCSATURATED 100   BE 3*     POCT Glucose:   Recent Labs   Lab 05/07/24  2108 05/08/24  0641 05/08/24  0714   POCTGLUCOSE 221* 62* 110     Significant Imaging:  No new imaging in past 24  hours    ABG  Recent Labs   Lab 05/06/24  1458   PH 7.401   PO2 452*   PCO2 44.9   HCO3 27.9   BE 3*     Assessment/Plan:     Pulmonary  On mechanically assisted ventilation  - Extubated 5/6    Cardiac/Vascular  * Coronary artery disease of native artery of native heart with stable angina pectoris  - Patient with known CAD s/p stent placement and CABG, which is controlled Will continue ASA, Plavix, and Statin and monitor for S/Sx of angina/ACS. Continue to monitor on telemetry.   - Multi-vessel disease involving LAD, LHC performed 5/3; CTS consulted and taken for CABG 5/6; S/p CABG x4  - Post op plan per CTS  - Pressors being weaned, off sedation, drains in place, pain better controlled today, tolerating diet, up in bedside chair    Other hyperlipidemia  - Statin is being held due to elevated LFTs    Essential hypertension  - Chronic, uncontrolled. Latest blood pressure and vitals reviewed:  - Home meds for hypertension were reviewed and noted below:   Hypertension Medications               amLODIPine (NORVASC) 5 MG tablet Take 5 mg by mouth once daily.    isosorbide mononitrate (IMDUR) 30 MG 24 hr tablet Take 1 tablet (30 mg total) by mouth once daily.    losartan (COZAAR) 100 MG tablet Take 50 mg by mouth 2 (two) times a day.    nitroGLYCERIN (NITROSTAT) 0.3 MG SL tablet Place 1 tablet (0.3 mg total) under the tongue every 5 (five) minutes as needed for Chest pain.   - While in the hospital, will manage blood pressure as follows; Still on pressors, BB being restarted today  - Wean pressors    Endocrine  Type 2 diabetes mellitus with complication, without long-term current use of insulin  - Insulin gtt per post CABG protocol; D/c'ed overnight  - Insulin regimen has been titrated, will continue with 15U bid + high intensity SSI for better glucose control  - Tolerating diet     Critical Care Daily Checklist:    A: Awake: RASS Goal/Actual Goal: RASS Goal: 0-->alert and calm  Actual:     B: Spontaneous Breathing Trial  Performed?  N/a   C: SAT & SBT Coordinated?  N/a                      D: Delirium: CAM-ICU Overall CAM-ICU: Negative   E: Early Mobility Performed? Yes   F: Feeding Goal: Goals: 1. Pt's diet will be advanced and to the safest fiet texture within 24 hrs 2. Pt will tolerate and consume > 75% EEN and EPN prior to RD follow up 3. Pt will consume Sajan BID prior to RD follow up 4. Cardiac nutrition education will be provided to pt at RD follow up 5. NFPE will be performed at RD follow up  Status: Nutrition Goal Status: new   Current Diet Order   Procedures    Diet Cardiac Standard Tray     Order Specific Question:   Tray type:     Answer:   Standard Tray      AS: Analgesia/Sedation precedex   T: Thromboembolic Prophylaxis yes   H: HOB > 300 Yes   U: Stress Ulcer Prophylaxis (if needed) Pepcid    G: Glucose Control 15U bid + high SSI, monitor    B: Bowel Function Stool Occurrence: 0   I: Indwelling Catheter (Lines & Michel) Necessity yes   D: De-escalation of Antimicrobials/Pharmacotherapies no    Plan for the day/ETD Wean pressors, PT/OT    Code Status:  Family/Goals of Care: Full Code       Critical Care Time: 34 minutes  Critical secondary to Patient has a condition that poses threat to life and bodily function: s/p CABGx4     Critical care was time spent personally by me on the following activities: development of treatment plan with patient or surrogate and bedside caregivers, discussions with consultants, evaluation of patient's response to treatment, examination of patient, ordering and performing treatments and interventions, ordering and review of laboratory studies, ordering and review of radiographic studies, pulse oximetry, re-evaluation of patient's condition. This critical care time did not overlap with that of any other provider or involve time for any procedures.     Fareed Calderon PA-C  Critical Care Medicine  '[facility] - [facility] ([facility])

## 2024-05-08 NOTE — PT/OT/SLP EVAL
Physical Therapy Evaluation and Treatment    Patient Name:  Feng Marques   MRN:  [id number]    Recommendations:     Discharge Recommendations: Low Intensity Therapy (WITH 24 HOUR CARE FOR SAFETY)   Discharge Equipment Recommendations: shower chair   Barriers to discharge: None    Assessment:     Feng Marques is a 73 y.o. male admitted with a medical diagnosis of Coronary artery disease of native artery of native heart with stable angina pectoris.  He presents with the following impairments/functional limitations: weakness, impaired endurance, impaired functional mobility, gait instability, impaired balance, pain, decreased safety awareness, decreased lower extremity function, decreased coordination, decreased upper extremity function.    Rehab Prognosis: Good; patient would benefit from acute skilled PT services to address these deficits and reach maximum level of function.    Recent Surgery: Procedure(s) (LRB):  CORONARY ARTERY BYPASS GRAFT (CABG) (N/A)  ECHOCARDIOGRAM,TRANSESOPHAGEAL (N/A)  SURGICAL PROCUREMENT, VEIN, ENDOSCOPIC (Left)  BLOCK, NERVE, INTERCOSTAL, 2 OR MORE (N/A) 2 Days Post-Op    Plan:     During this hospitalization, patient to be seen 3 x/week to address the identified rehab impairments via gait training, therapeutic activities, therapeutic exercises and progress toward the following goals:    Plan of Care Expires:  05/22/24    Subjective     Chief Complaint: AGREEABLE  TO PARTICIPATE  Patient/Family Comments/goals: C/O MILD NAUSEA THAT IMPROVED WITH BELCHING, PT DENIES DIZZINESS DURING SESSION DESPITE HYPOTENSIVE IN SITTING AND STANDING  Pain/Comfort:  Pain Rating 1: 8/10  Location 1: chest    Patients cultural, spiritual, Orthodox conflicts given the current situation:      Living Environment:  PT LIVES ALONE 1 STORY HOUSE 4 STEPS TO ENTER WITH RAIL, PT AMB INDEP COMMUNITY DISTANCES, DRIVES, RETIRED, INDEP WITH ADL'S  Prior to admission, patients level of function was INDEP.   Equipment used at home: none.  DME owned (not currently used): none.  Upon discharge, patient will have assistance from FAMILY.    Objective:     Communicated with NURSE CRUZ prior to session.  Patient found up in chair with pulse ox (continuous), blood pressure cuff, telemetry, oxygen, peripheral IV, bryant catheter, central line, external pacer, arterial line, wound vac  upon PT entry to room.    General Precautions: Standard, fall, sternal (HYPOTENSION)  Orthopedic Precautions:N/A   Braces: N/A  Respiratory Status: Nasal cannula, flow 2 L/min    Exams:  Cognitive Exam:  Patient is oriented to Person, Place, Time, and Situation  Postural Exam:  Patient presented with the following abnormalities:    -       Rounded shoulders  -       Forward head-BASELINE POSITION IN SITTING AND STANDING PER PT REPORT  Sensation:    -       Intact  RLE ROM: WFL  RLE Strength: WFL  LLE ROM: WFL  LLE Strength: WFL    Functional Mobility:  Bed Mobility:     Scooting: minimum assistance, SEATED FWD/BWD SCOOTING IN CHAIR  Transfers:     Sit to Stand:  moderate assistance and of 2 persons with no AD  Gait: PT PERFORMED PRE-GAIT ACTIVITY STANDING IN FRONT OF CHAIR: STATIC STAND WITH PROGRESSION TO LATERAL WT. SHIFTING TO MARCHING IN PLACE, APPROX. 3 MINUTES IN STANDING, MILD DROP IN BP BUT PT DENIES SYMPTOMS.  UNABLE TO WALK AWAY FROM CHAIR DUE TO ABUNDANCE IN ICU LINES AND LOW BP  Balance: FAIR SITTING BALANCE, POOR+ DYNAMIC STANDING BALANCE DURING PRE-GAIT ACTIVITY  PT AND DAUGHTER EDUCATED IN STERNAL PRECAUTIONS IN REGARDS TO MOBILITY  PT EDUCATED IN AND PERFORMED SEATED BLE THEREX X 15 REPS: HIP FLEX/EXT, LAQ, AP'S, QUAD SET, HEEL SLIDES    AM-PAC 6 CLICK MOBILITY  Total Score:7     Treatment & Education:  PT AND DAUGHTER EDUCATION:  - ROLE OF P.T. AND POC IN ACUTE CARE HOSPITAL SETTING  - RW USE AND SAFETY DURING TF'S AND GAIT  - IMPORTANCE OF ACTIVITY PACING  - ENCOURAGED TO INCREASE TIME OOB IN CHAIR TO TOLERANCE   - TO CONTINUE  "THERAPUETIC EXERCISES THROUGHOUT THE DAY TO INCREASE ACTIVITY TOLERANCE AND DECREASE RISK FOR PNEUMONIA AND BLOOD CLOTS: HIP FLEX/EXT, HIP ABD/ADD, QUAD SET, HEEL SLIDE, AP  - RISK FOR FALLS DUE TO GENERALIZED WEAKNESS, EDUCATED ON "CALL DON'T FALL", ENCOURAGED TO CALL FOR ASSISTANCE WITH ALL NEEDS SUCH AS BED<>CHAIR TRANSFERS OR TRIPS TO BATHROOM, PT AGREEABLE TO SAFETY PRECAUTIONS    Patient left up in chair with all lines intact, call button in reach, chair alarm on, NURSE notified, and DAUGHTER  present.    GOALS:   Multidisciplinary Problems       Physical Therapy Goals          Problem: Physical Therapy    Goal Priority Disciplines Outcome Goal Variances Interventions   Physical Therapy Goal     PT, PT/OT      Description: LTG'S TO BE MET IN 14 DAYS (5-22-24)  PT WILL BE EMERSON FOR BED MOBILITY  PT WILL BE EMERSON FOR BED<>CHAIR TF'S  PT WILL  FEET NO AD EMERSON  PT WILL INC AMPAC SCORE BY 2 POINTS TO PROGRESS GROSS FUNC MOBILITY                         History:     Past Medical History:   Diagnosis Date    Diabetes mellitus 2002     am 09/15/2017    DM (diabetes mellitus) 2002     am 09/21/2018    DM (diabetes mellitus) 2002     am 09/27/2019    DM (diabetes mellitus) 2002     am 09/10/2020    Hypertension        Past Surgical History:   Procedure Laterality Date    ANGIOGRAM, CORONARY, WITH LEFT HEART CATHETERIZATION N/A 5/3/2024    Procedure: Angiogram, Coronary, with Left Heart Cath;  Surgeon: Abdulaziz Carrasco MD;  Location: [facility] CATH LAB;  Service: Cardiology;  Laterality: N/A;    AORTOGRAPHY N/A 5/3/2024    Procedure: AORTOGRAM;  Surgeon: Abdulaziz Carrasco MD;  Location: [facility] CATH LAB;  Service: Cardiology;  Laterality: N/A;    ARTERIOGRAPHY OF SUBCLAVIAN ARTERY  5/3/2024    Procedure: ARTERIOGRAM, SUBCLAVIAN;  Surgeon: Abdulaziz Carrasco MD;  Location: [facility] CATH LAB;  Service: Cardiology;;    CORONARY ARTERY BYPASS GRAFT (CABG) N/A 5/6/2024    Procedure: CORONARY ARTERY BYPASS " GRAFT (CABG);  Surgeon: Maury Amato MD;  Location: [facility] OR;  Service: Cardiothoracic;  Laterality: N/A;  4-VESSEL WITH EPI-AORTIC ULTRASOUND    ECHOCARDIOGRAM,TRANSESOPHAGEAL N/A 5/6/2024    Procedure: ECHOCARDIOGRAM,TRANSESOPHAGEAL;  Surgeon: Maury Amato MD;  Location: [facility] OR;  Service: Cardiothoracic;  Laterality: N/A;    ENDOSCOPIC HARVEST OF VEIN Left 5/6/2024    Procedure: SURGICAL PROCUREMENT, VEIN, ENDOSCOPIC;  Surgeon: Maury Amato MD;  Location: [facility] OR;  Service: Cardiothoracic;  Laterality: Left;    INJECTION OF ANESTHETIC AGENT AROUND MULTIPLE INTERCOSTAL NERVES N/A 5/6/2024    Procedure: BLOCK, NERVE, INTERCOSTAL, 2 OR MORE;  Surgeon: Maury Amato MD;  Location: [facility] OR;  Service: Cardiothoracic;  Laterality: N/A;    VENTRICULOGRAM, LEFT  5/3/2024    Procedure: Ventriculogram, Left;  Surgeon: Abdulaziz Carrasco MD;  Location: [facility] CATH LAB;  Service: Cardiology;;       Time Tracking:     PT Received On: 05/08/24  PT Start Time: 0950     PT Stop Time: 1020  PT Total Time (min): 30 min     Billable Minutes: Evaluation 15 and Therapeutic Activity 15    05/08/2024

## 2024-05-09 LAB
ALBUMIN SERPL BCP-MCNC: 3.8 G/DL (ref 3.5–5.2)
ALP SERPL-CCNC: 79 U/L (ref 55–135)
ALT SERPL W/O P-5'-P-CCNC: 406 U/L (ref 10–44)
ANION GAP SERPL CALC-SCNC: 7 MMOL/L (ref 8–16)
ANION GAP SERPL CALC-SCNC: 8 MMOL/L (ref 8–16)
AST SERPL-CCNC: 341 U/L (ref 10–40)
BASOPHILS # BLD AUTO: 0.02 K/UL (ref 0–0.2)
BASOPHILS # BLD AUTO: 0.03 K/UL (ref 0–0.2)
BASOPHILS NFR BLD: 0.1 % (ref 0–1.9)
BASOPHILS NFR BLD: 0.1 % (ref 0–1.9)
BILIRUB DIRECT SERPL-MCNC: 0.4 MG/DL (ref 0.1–0.3)
BILIRUB SERPL-MCNC: 0.8 MG/DL (ref 0.1–1)
BUN SERPL-MCNC: 39 MG/DL (ref 8–23)
BUN SERPL-MCNC: 44 MG/DL (ref 8–23)
CALCIUM SERPL-MCNC: 10.4 MG/DL (ref 8.7–10.5)
CALCIUM SERPL-MCNC: 11.3 MG/DL (ref 8.7–10.5)
CHLORIDE SERPL-SCNC: 96 MMOL/L (ref 95–110)
CHLORIDE SERPL-SCNC: 96 MMOL/L (ref 95–110)
CO2 SERPL-SCNC: 30 MMOL/L (ref 23–29)
CO2 SERPL-SCNC: 31 MMOL/L (ref 23–29)
CREAT SERPL-MCNC: 1.2 MG/DL (ref 0.5–1.4)
CREAT SERPL-MCNC: 1.3 MG/DL (ref 0.5–1.4)
DIFFERENTIAL METHOD BLD: ABNORMAL
DIFFERENTIAL METHOD BLD: ABNORMAL
EOSINOPHIL # BLD AUTO: 0 K/UL (ref 0–0.5)
EOSINOPHIL # BLD AUTO: 0 K/UL (ref 0–0.5)
EOSINOPHIL NFR BLD: 0 % (ref 0–8)
EOSINOPHIL NFR BLD: 0 % (ref 0–8)
ERYTHROCYTE [DISTWIDTH] IN BLOOD BY AUTOMATED COUNT: 14.6 % (ref 11.5–14.5)
ERYTHROCYTE [DISTWIDTH] IN BLOOD BY AUTOMATED COUNT: 14.8 % (ref 11.5–14.5)
EST. GFR  (NO RACE VARIABLE): 58 ML/MIN/1.73 M^2
EST. GFR  (NO RACE VARIABLE): >60 ML/MIN/1.73 M^2
GLUCOSE SERPL-MCNC: 82 MG/DL (ref 70–110)
GLUCOSE SERPL-MCNC: 89 MG/DL (ref 70–110)
HCT VFR BLD AUTO: 28.5 % (ref 40–54)
HCT VFR BLD AUTO: 29.4 % (ref 40–54)
HGB BLD-MCNC: 9.7 G/DL (ref 14–18)
HGB BLD-MCNC: 9.9 G/DL (ref 14–18)
IMM GRANULOCYTES # BLD AUTO: 0.26 K/UL (ref 0–0.04)
IMM GRANULOCYTES # BLD AUTO: 0.44 K/UL (ref 0–0.04)
IMM GRANULOCYTES NFR BLD AUTO: 1.4 % (ref 0–0.5)
IMM GRANULOCYTES NFR BLD AUTO: 1.9 % (ref 0–0.5)
LYMPHOCYTES # BLD AUTO: 1.7 K/UL (ref 1–4.8)
LYMPHOCYTES # BLD AUTO: 1.8 K/UL (ref 1–4.8)
LYMPHOCYTES NFR BLD: 7.4 % (ref 18–48)
LYMPHOCYTES NFR BLD: 9.3 % (ref 18–48)
MAGNESIUM SERPL-MCNC: 2.1 MG/DL (ref 1.6–2.6)
MAGNESIUM SERPL-MCNC: 2.5 MG/DL (ref 1.6–2.6)
MCH RBC QN AUTO: 30.3 PG (ref 27–31)
MCH RBC QN AUTO: 30.6 PG (ref 27–31)
MCHC RBC AUTO-ENTMCNC: 33.7 G/DL (ref 32–36)
MCHC RBC AUTO-ENTMCNC: 34 G/DL (ref 32–36)
MCV RBC AUTO: 90 FL (ref 82–98)
MCV RBC AUTO: 90 FL (ref 82–98)
MONOCYTES # BLD AUTO: 0.8 K/UL (ref 0.3–1)
MONOCYTES # BLD AUTO: 1 K/UL (ref 0.3–1)
MONOCYTES NFR BLD: 3.9 % (ref 4–15)
MONOCYTES NFR BLD: 4.3 % (ref 4–15)
NEUTROPHILS # BLD AUTO: 16.4 K/UL (ref 1.8–7.7)
NEUTROPHILS # BLD AUTO: 19.7 K/UL (ref 1.8–7.7)
NEUTROPHILS NFR BLD: 85.3 % (ref 38–73)
NEUTROPHILS NFR BLD: 86.3 % (ref 38–73)
NRBC BLD-RTO: 0 /100 WBC
NRBC BLD-RTO: 0 /100 WBC
PLATELET # BLD AUTO: 117 K/UL (ref 150–450)
PLATELET # BLD AUTO: 122 K/UL (ref 150–450)
PMV BLD AUTO: 11.4 FL (ref 9.2–12.9)
PMV BLD AUTO: 11.6 FL (ref 9.2–12.9)
POCT GLUCOSE: 110 MG/DL (ref 70–110)
POCT GLUCOSE: 54 MG/DL (ref 70–110)
POCT GLUCOSE: 56 MG/DL (ref 70–110)
POCT GLUCOSE: 74 MG/DL (ref 70–110)
POCT GLUCOSE: 76 MG/DL (ref 70–110)
POCT GLUCOSE: 84 MG/DL (ref 70–110)
POCT GLUCOSE: 89 MG/DL (ref 70–110)
POCT GLUCOSE: 93 MG/DL (ref 70–110)
POTASSIUM SERPL-SCNC: 4.1 MMOL/L (ref 3.5–5.1)
POTASSIUM SERPL-SCNC: 4.2 MMOL/L (ref 3.5–5.1)
PROT SERPL-MCNC: 5.7 G/DL (ref 6–8.4)
RBC # BLD AUTO: 3.17 M/UL (ref 4.6–6.2)
RBC # BLD AUTO: 3.27 M/UL (ref 4.6–6.2)
SODIUM SERPL-SCNC: 134 MMOL/L (ref 136–145)
SODIUM SERPL-SCNC: 134 MMOL/L (ref 136–145)
WBC # BLD AUTO: 19.17 K/UL (ref 3.9–12.7)
WBC # BLD AUTO: 22.87 K/UL (ref 3.9–12.7)

## 2024-05-09 PROCEDURE — 63600175 PHARM REV CODE 636 W HCPCS: Mod: JZ,JG | Performed by: THORACIC SURGERY (CARDIOTHORACIC VASCULAR SURGERY)

## 2024-05-09 PROCEDURE — 80048 BASIC METABOLIC PNL TOTAL CA: CPT | Mod: XB | Performed by: THORACIC SURGERY (CARDIOTHORACIC VASCULAR SURGERY)

## 2024-05-09 PROCEDURE — 85025 COMPLETE CBC W/AUTO DIFF WBC: CPT | Performed by: THORACIC SURGERY (CARDIOTHORACIC VASCULAR SURGERY)

## 2024-05-09 PROCEDURE — 80053 COMPREHEN METABOLIC PANEL: CPT | Performed by: THORACIC SURGERY (CARDIOTHORACIC VASCULAR SURGERY)

## 2024-05-09 PROCEDURE — 82248 BILIRUBIN DIRECT: CPT | Performed by: THORACIC SURGERY (CARDIOTHORACIC VASCULAR SURGERY)

## 2024-05-09 PROCEDURE — 97530 THERAPEUTIC ACTIVITIES: CPT

## 2024-05-09 PROCEDURE — 83735 ASSAY OF MAGNESIUM: CPT | Performed by: THORACIC SURGERY (CARDIOTHORACIC VASCULAR SURGERY)

## 2024-05-09 PROCEDURE — 25000003 PHARM REV CODE 250: Performed by: INTERNAL MEDICINE

## 2024-05-09 PROCEDURE — P9045 ALBUMIN (HUMAN), 5%, 250 ML: HCPCS | Mod: JZ,JG | Performed by: THORACIC SURGERY (CARDIOTHORACIC VASCULAR SURGERY)

## 2024-05-09 PROCEDURE — 25000003 PHARM REV CODE 250: Performed by: THORACIC SURGERY (CARDIOTHORACIC VASCULAR SURGERY)

## 2024-05-09 PROCEDURE — 94761 N-INVAS EAR/PLS OXIMETRY MLT: CPT

## 2024-05-09 PROCEDURE — 20000000 HC ICU ROOM

## 2024-05-09 PROCEDURE — 63600175 PHARM REV CODE 636 W HCPCS: Performed by: INTERNAL MEDICINE

## 2024-05-09 PROCEDURE — 97116 GAIT TRAINING THERAPY: CPT

## 2024-05-09 PROCEDURE — 27200667 HC PACEMAKER, TEMPORARY MONITORING, PER SHIFT

## 2024-05-09 PROCEDURE — 94799 UNLISTED PULMONARY SVC/PX: CPT

## 2024-05-09 PROCEDURE — 99900035 HC TECH TIME PER 15 MIN (STAT)

## 2024-05-09 PROCEDURE — 27000221 HC OXYGEN, UP TO 24 HOURS

## 2024-05-09 PROCEDURE — 99223 1ST HOSP IP/OBS HIGH 75: CPT | Mod: ,,, | Performed by: STUDENT IN AN ORGANIZED HEALTH CARE EDUCATION/TRAINING PROGRAM

## 2024-05-09 RX ORDER — METOPROLOL TARTRATE 25 MG/1
12.5 TABLET ORAL 2 TIMES DAILY
Status: DISCONTINUED | OUTPATIENT
Start: 2024-05-09 | End: 2024-05-14 | Stop reason: HOSPADM

## 2024-05-09 RX ORDER — SYRING-NEEDL,DISP,INSUL,0.3 ML 29 G X1/2"
296 SYRINGE, EMPTY DISPOSABLE MISCELLANEOUS ONCE
Status: COMPLETED | OUTPATIENT
Start: 2024-05-09 | End: 2024-05-09

## 2024-05-09 RX ADMIN — CHLORHEXIDINE GLUCONATE 0.12% ORAL RINSE 10 ML: 1.2 LIQUID ORAL at 09:05

## 2024-05-09 RX ADMIN — BE HEALTH MAGNESIUM CITRATE ORAL SOLUTION - LEMON 296 ML: 1.75 LIQUID ORAL at 09:05

## 2024-05-09 RX ADMIN — RIVAROXABAN 2.5 MG: 2.5 TABLET, FILM COATED ORAL at 08:05

## 2024-05-09 RX ADMIN — ONDANSETRON 4 MG: 2 INJECTION INTRAMUSCULAR; INTRAVENOUS at 07:05

## 2024-05-09 RX ADMIN — RIVAROXABAN 2.5 MG: 2.5 TABLET, FILM COATED ORAL at 05:05

## 2024-05-09 RX ADMIN — METOCLOPRAMIDE 5 MG: 5 INJECTION, SOLUTION INTRAMUSCULAR; INTRAVENOUS at 09:05

## 2024-05-09 RX ADMIN — DOCUSATE SODIUM 100 MG: 100 CAPSULE, LIQUID FILLED ORAL at 09:05

## 2024-05-09 RX ADMIN — METOPROLOL TARTRATE 12.5 MG: 25 TABLET, FILM COATED ORAL at 09:05

## 2024-05-09 RX ADMIN — ASPIRIN 81 MG: 81 TABLET, COATED ORAL at 09:05

## 2024-05-09 RX ADMIN — POLYETHYLENE GLYCOL 3350 17 G: 17 POWDER, FOR SOLUTION ORAL at 09:05

## 2024-05-09 RX ADMIN — CLOPIDOGREL BISULFATE 75 MG: 75 TABLET ORAL at 09:05

## 2024-05-09 RX ADMIN — LUBIPROSTONE 24 MCG: 24 CAPSULE, GELATIN COATED ORAL at 05:05

## 2024-05-09 RX ADMIN — ONDANSETRON 2 G: 2 INJECTION INTRAMUSCULAR; INTRAVENOUS at 12:05

## 2024-05-09 RX ADMIN — LUBIPROSTONE 24 MCG: 24 CAPSULE, GELATIN COATED ORAL at 08:05

## 2024-05-09 RX ADMIN — DEXTROSE MONOHYDRATE 125 ML: 100 INJECTION, SOLUTION INTRAVENOUS at 09:05

## 2024-05-09 RX ADMIN — ACETAMINOPHEN 325 MG: 325 TABLET ORAL at 05:05

## 2024-05-09 RX ADMIN — FOLIC ACID 1 MG: 1 TABLET ORAL at 09:05

## 2024-05-09 RX ADMIN — ACETAMINOPHEN 650 MG: 325 TABLET ORAL at 05:05

## 2024-05-09 RX ADMIN — ACETAMINOPHEN 650 MG: 325 TABLET ORAL at 11:05

## 2024-05-09 RX ADMIN — ESCITALOPRAM OXALATE 10 MG: 10 TABLET ORAL at 09:05

## 2024-05-09 RX ADMIN — OXYCODONE HYDROCHLORIDE AND ACETAMINOPHEN 500 MG: 500 TABLET ORAL at 09:05

## 2024-05-09 RX ADMIN — ALBUMIN (HUMAN) 25 G: 2.5 SOLUTION INTRAVENOUS at 11:05

## 2024-05-09 RX ADMIN — PANTOPRAZOLE SODIUM 40 MG: 40 TABLET, DELAYED RELEASE ORAL at 09:05

## 2024-05-09 RX ADMIN — CYANOCOBALAMIN TAB 1000 MCG 1000 MCG: 1000 TAB at 09:05

## 2024-05-09 NOTE — ASSESSMENT & PLAN NOTE
- Patient with known CAD s/p stent placement and CABG, which is controlled Will continue ASA, Plavix, and Statin and monitor for S/Sx of angina/ACS. Continue to monitor on telemetry.   - Multi-vessel disease involving LAD, LHC performed 5/3; CTS consulted and taken for CABG 5/6; S/p CABG x4  - Post op plan per CTS  - Pressors/sedation off, pain control, diabetic diet, PT/OT

## 2024-05-09 NOTE — SUBJECTIVE & OBJECTIVE
Interval History: Patient is postop day 3 status post coronary artery bypass grafting x4.    ROS  Medications:  Continuous Infusions:   EPINEPHrine  0-0.5 mcg/kg/min Intravenous Continuous   Stopped at 05/08/24 1808    vasopressin  0.04 Units/min Intravenous Continuous   Stopped at 05/08/24 1000     Scheduled Meds:   acetaminophen  650 mg Oral Q6H    albumin human 5%  25 g Intravenous Once    ascorbic acid (vitamin C)  500 mg Oral BID    aspirin  81 mg Oral Daily    chlorhexidine  10 mL Mouth/Throat BID    clopidogreL  75 mg Oral Daily    cyanocobalamin  1,000 mcg Oral Daily    docusate sodium  100 mg Oral BID    EScitalopram oxalate  10 mg Oral Daily    ferrous sulfate  1 tablet Oral Daily    folic acid  1 mg Oral Daily    [START ON 5/10/2024] insulin detemir U-100  8 Units Subcutaneous Daily    lubiprostone  24 mcg Oral BID WM    metoprolol tartrate  12.5 mg Oral BID    pantoprazole  40 mg Oral Daily    polyethylene glycol  17 g Oral Daily    rivaroxaban  2.5 mg Oral BID WM     PRN Meds:  Current Facility-Administered Medications:     0.9%  NaCl infusion (for blood administration), , Intravenous, Q24H PRN    albumin human 5%, 25 g, Intravenous, PRN    calcium gluconate IVPB, 1 g, Intravenous, PRN    calcium gluconate IVPB, 2 g, Intravenous, PRN    calcium gluconate IVPB, 3 g, Intravenous, PRN    dextrose 10%, 12.5 g, Intravenous, PRN    dextrose 10%, 25 g, Intravenous, PRN    glucagon (human recombinant), 1 mg, Intramuscular, PRN    glucose, 16 g, Oral, PRN    glucose, 24 g, Oral, PRN    influenza 65up-adj, 0.5 mL, Intramuscular, vaccine x 1 dose    insulin aspart U-100, 0-15 Units, Subcutaneous, QID (AC + HS) PRN    lactated ringers, 1,000 mL, Intravenous, PRN    magnesium sulfate IVPB, 4 g, Intravenous, PRN    metoclopramide, 5 mg, Intravenous, Q6H PRN    naloxone, 0.4 mg, Intravenous, PRN    nitroGLYCERIN, 0.4 mg, Sublingual, Q5 Min PRN    ondansetron, 4 mg, Intravenous, Q6H PRN    pneumoc 20-diaz conj-dip  cr(PF), 0.5 mL, Intramuscular, vaccine x 1 dose    potassium chloride in water, 20 mEq, Intravenous, PRN    potassium chloride in water, 60 mEq, Intravenous, PRN    potassium chloride in water, 40 mEq, Intravenous, PRN    sodium chloride 0.9%, 10 mL, Intravenous, PRN    sodium chloride 0.9%, 10 mL, Intravenous, Q12H PRN    traMADoL, 50 mg, Oral, Q6H PRN     Objective:     Vital Signs (Most Recent):  Temp: 99.1 °F (37.3 °C) (05/09/24 0600)  Pulse: 89 (05/09/24 0600)  Resp: 18 (05/09/24 0737)  BP: 102/62 (05/09/24 0600)  SpO2: 100 % (05/09/24 0600) Vital Signs (24h Range):  Temp:  [98.1 °F (36.7 °C)-99.1 °F (37.3 °C)] 99.1 °F (37.3 °C)  Pulse:  [74-91] 89  Resp:  [15-29] 18  SpO2:  [89 %-100 %] 100 %  BP: ()/(50-72) 102/62  Arterial Line BP: ()/(53-98) 85/65     Weight: 71.9 kg (158 lb 8.2 oz)  Body mass index is 22.74 kg/m².    SpO2: 100 %       Intake/Output - Last 3 Shifts         05/07 0700  05/08 0659 05/08 0700 05/09 0659 05/09 0700  05/10 0659    P.O.  720     I.V. (mL/kg) 2050.1 (28.5) 594.9 (8.3)     Blood 706.3      IV Piggyback 197.5 205.9     Total Intake(mL/kg) 2953.8 (41.1) 1520.8 (21.2)     Urine (mL/kg/hr) 3115 (1.8) 2500 (1.4)     Drains 20      Stool  0     Chest Tube 270      Total Output 3405 2500     Net -451.2 -979.2            Stool Occurrence  0 x             Lines/Drains/Airways       Central Venous Catheter Line  Duration             Percutaneous Central Line - Triple Lumen  05/06/24 0846 Internal Jugular Right 3 days              Drain  Duration                  Urethral Catheter 05/06/24 0844 Silicone;Straight-tip;Temperature probe 3 days              Line  Duration                  Pacer Wires 05/06/24 1310 2 days              Peripheral Intravenous Line  Duration                  Peripheral IV - Single Lumen 05/06/24 0715 18 G Left Forearm 3 days                     Physical Exam  Constitutional:       Appearance: Normal appearance.   HENT:      Head: Normocephalic and  atraumatic.      Nose: Nose normal.   Cardiovascular:      Rate and Rhythm: Normal rate and regular rhythm.      Heart sounds: Normal heart sounds.   Pulmonary:      Breath sounds: Normal breath sounds.   Abdominal:      General: Abdomen is flat. Bowel sounds are normal.      Palpations: Abdomen is soft.   Musculoskeletal:      Right lower leg: No edema.      Left lower leg: No edema.   Skin:     General: Skin is warm and dry.   Neurological:      Mental Status: He is alert and oriented to person, place, and time.   Psychiatric:         Behavior: Behavior normal.            Significant Labs:  All pertinent labs from the last 24 hours have been reviewed.    Significant Diagnostics:  I have reviewed all pertinent imaging results/findings within the past 24 hours.

## 2024-05-09 NOTE — PT/OT/SLP PROGRESS
Physical Therapy Treatment    Patient Name:  Feng Marques   MRN:  [id number]    Recommendations:     Discharge Recommendations: Low Intensity Therapy (WITH 24 HOUR CARE)  Discharge Equipment Recommendations: shower chair  Barriers to discharge: None    Assessment:     Feng Marques is a 73 y.o. male admitted with a medical diagnosis of Coronary artery disease of native artery of native heart with stable angina pectoris.  He presents with the following impairments/functional limitations: weakness, impaired endurance, impaired functional mobility, gait instability, impaired balance, decreased safety awareness, decreased lower extremity function, decreased coordination.    Rehab Prognosis: Good; patient would benefit from acute skilled PT services to address these deficits and reach maximum level of function.    Recent Surgery: Procedure(s) (LRB):  CORONARY ARTERY BYPASS GRAFT (CABG) (N/A)  ECHOCARDIOGRAM,TRANSESOPHAGEAL (N/A)  SURGICAL PROCUREMENT, VEIN, ENDOSCOPIC (Left)  BLOCK, NERVE, INTERCOSTAL, 2 OR MORE (N/A) 3 Days Post-Op    Plan:     During this hospitalization, patient to be seen 3 x/week to address the identified rehab impairments via gait training, therapeutic activities, therapeutic exercises and progress toward the following goals:    Plan of Care Expires:  05/22/24    Subjective     Chief Complaint: NONE, AGREEABLE TO TX.  Patient/Family Comments/goals:   Pain/Comfort:  Pain Rating 1: 5/10  Location 1: chest      Objective:     Communicated with NURSE ROSE prior to session.  Patient found supine with blood pressure cuff, pulse ox (continuous), telemetry, peripheral IV, external pacer, bryant catheter, central line, wound vac upon PT entry to room.     General Precautions: Standard, fall, sternal  Orthopedic Precautions: N/A  Braces: N/A  Respiratory Status: Room air     Functional Mobility:  Bed Mobility:     Scooting: minimum assistance  Transfers:     Sit to Stand:  minimum assistance with  "rolling walker  Gait: PT AMB 80' WITH RW AND MIN, CUES FOR UPRIGHT POSTURE BUT LIMITED DUE TO PT FF AT HEAD AND TRUNK AT BASELINE, NO LOB, QUICK TO FATIGUE BUT GOOD EFFORT, 2 SMALL STANDING REST BREAKS, NO SOB ON ROOM AIR.  PT DENIES DIZZINESS THROUGHOUT GAIT TRIAL, CHAIR IN TOW FOR SAFETY  Balance: FAIR SITTING BALANCE, POOR+ DYNAMIC BALANCE DURING GAIT  PT EDUCATED IN AND PERFORMED SEATED BLE THEREX X 15 REPS: HIP FLEX/EXT, LAQ, AP'S.  HOME EX PROGRAM HANDOUT ISSUED TO PT  REVIEW STERNAL PRECAUTION WITH PT AND DAUGHTER, HANDOUT ISSUED   BP AFTER TX: 96/51, O2 SAT 96% ON ROOM AIR    AM-PAC 6 CLICK MOBILITY  Turning over in bed (including adjusting bedclothes, sheets and blankets)?: 1  Sitting down on and standing up from a chair with arms (e.g., wheelchair, bedside commode, etc.): 3  Moving from lying on back to sitting on the side of the bed?: 1  Moving to and from a bed to a chair (including a wheelchair)?: 3  Need to walk in hospital room?: 3  Climbing 3-5 steps with a railing?: 1  Basic Mobility Total Score: 12     Treatment & Education:  PT EDUCATION:  - ROLE OF P.T. AND POC IN ACUTE CARE HOSPITAL SETTING  - RW USE AND SAFETY DURING TF'S AND GAIT  - ENCOURAGED TO INCREASE TIME OOB IN CHAIR TO TOLERANCE   - TO CONTINUE THERAPUETIC EXERCISES THROUGHOUT THE DAY TO INCREASE ACTIVITY TOLERANCE AND DECREASE RISK FOR PNEUMONIA AND BLOOD CLOTS: HIP FLEX/EXT, HIP ABD/ADD, QUAD SET, HEEL SLIDE, AP  - RISK FOR FALLS DUE TO GENERALIZED WEAKNESS, EDUCATED ON "CALL DON'T FALL", ENCOURAGED TO CALL FOR ASSISTANCE WITH ALL NEEDS SUCH AS BED<>CHAIR TRANSFERS OR TRIPS TO BATHROOM, PT AGREEABLE TO SAFETY PRECAUTIONS    Patient left up in chair with all lines intact, call button in reach, chair alarm on, NURSE notified, and DAUGHTER present..    GOALS:   Multidisciplinary Problems       Physical Therapy Goals          Problem: Physical Therapy    Goal Priority Disciplines Outcome Goal Variances Interventions   Physical Therapy " Goal     PT, PT/OT Progressing     Description: LTG'S TO BE MET IN 14 DAYS (5-22-24)  PT WILL BE EMERSON FOR BED MOBILITY  PT WILL BE EMERSON FOR BED<>CHAIR TF'S  PT WILL  FEET NO AD EMERSON  PT WILL INC AMPAC SCORE BY 2 POINTS TO PROGRESS GROSS FUNC MOBILITY                         Time Tracking:     PT Received On: 05/09/24  PT Start Time: 0840     PT Stop Time: 0910  PT Total Time (min): 30 min     Billable Minutes: Gait Training 15 and Therapeutic Activity 15    Treatment Type: Treatment  PT/PTA: PT     Number of PTA visits since last PT visit: 0     05/09/2024

## 2024-05-09 NOTE — PLAN OF CARE
Pt AAOx4. 100% AV paced at 90bpm on telemetry. Normotensive s/p 500cc 5% Albumin and 2g CaCl2. RR even and unlabored, oxygenating well on RA. Intermittent nausea today. Zofran and Reglan PRN. Poor appetite. TID Boost ordered. BM today. Removed bryant catheter and central line. Walked with PT/OT today. OOB to chair with meals. Bed low, wheels locked, alarms audible. POC reviewed with pt and family today.

## 2024-05-09 NOTE — PROGRESS NOTES
[facility])  Critical Care Medicine  Progress Note    Patient Name: Feng Marques  MRN: [id number]  Admission Date: 5/3/2024  Hospital Length of Stay: 6 days  Code Status: Full Code  Attending Provider: Maury Amato MD  Primary Care Provider: Maciel Enriquez MD   Principal Problem: Coronary artery disease of native artery of native heart with stable angina pectoris    Subjective:     HPI:  Mr. Marques is a 73-year-old male with past medical history of CAD, T2DM, HLD, HTN who presented to hospital last week for scheduled LHC on 5/3. Cath revealed severe multi-vessel disease involving 90% LAD stenosis. CTS consulted for CABG which was done today by Dr. Amato. Patient arrived to ICU inubated/sedated. On vasopressin/epi/insulin gtts and sedated with precedex.     Hospital/ICU Course:  5/7: Extubated yesterday evening to NC. glucose in 300s overnight, insulin gtt titrated up and given 20 U. Glucose dropped to 60s, so insulin gtt was stopped. Still on pressors. Requiring precedex. Complains of pain and muscle spasms.     5/8: Much better pain control today; up in bedside chair eating breakfast. Only complains of mild chest tightness. LFTs trended up, statin held. Tmax of 101.1F overnight, better with tylenol. WBC up to 21.44. Pressors being weaned. Off sedation.    5/9: LFTs trending down. Afebrile. Glucose with better control. Vitals stable. Off pressors. H/H 9.9/29.4. Denies any pain at the moment only complains of occasional nausea which is better with zofran.     Objective:     Vital Signs (Most Recent):  Temp: 99 °F (37.2 °C) (05/09/24 1000)  Pulse: 89 (05/09/24 1000)  Resp: (!) 23 (05/09/24 1000)  BP: (!) 96/55 (05/09/24 1000)  SpO2: 95 % (05/09/24 1000) Vital Signs (24h Range):  Temp:  [98.1 °F (36.7 °C)-99.1 °F (37.3 °C)] 99 °F (37.2 °C)  Pulse:  [74-91] 89  Resp:  [15-29] 23  SpO2:  [89 %-100 %] 95 %  BP: ()/(50-72) 96/55  Arterial Line BP: ()/(53-98) 85/65      Weight: 71.9 kg (158 lb 8.2 oz)  Body mass index is 22.74 kg/m².  Intake/Output Summary (Last 24 hours) at 5/9/2024 1043  Last data filed at 5/9/2024 0900  Gross per 24 hour   Intake 1233.38 ml   Output 1825 ml   Net -591.62 ml     Physical Exam     Review of Systems    Vents:  Vent Mode: A/C (05/06/24 1503)  Set Rate: 18 BPM (05/06/24 1503)  Vt Set: 420 mL (05/06/24 1503)  PEEP/CPAP: 5 cmH20 (05/06/24 1503)  Oxygen Concentration (%): 21 (05/09/24 0737)  Peak Airway Pressure: 19 cmH20 (05/06/24 1503)  Plateau Pressure: 0 cmH20 (05/06/24 1503)  Total Ve: 7.94 L/m (05/06/24 1503)  Negative Inspiratory Force (cm H2O): 0 (05/06/24 1503)  F/VT Ratio<105 (RSBI): (!) 25.35 (05/06/24 1503)    Lines/Drains/Airways       Central Venous Catheter Line  Duration             Percutaneous Central Line - Triple Lumen  05/06/24 0846 Internal Jugular Right 3 days              Drain  Duration                  Urethral Catheter 05/06/24 0844 Silicone;Straight-tip;Temperature probe 3 days              Line  Duration                  Pacer Wires 05/06/24 1310 2 days              Peripheral Intravenous Line  Duration                  Peripheral IV - Single Lumen 05/06/24 0715 18 G Left Forearm 3 days                  Significant Labs:    CBC/Anemia Profile:  Recent Labs   Lab 05/08/24 0432 05/08/24 1657 05/09/24  0444   WBC 21.44* 21.82* 22.87*   HGB 9.7* 9.1* 9.9*   HCT 27.6* 26.9* 29.4*   * 102* 122*   MCV 88 90 90   RDW 15.4* 15.4* 14.8*     Chemistries:  Recent Labs   Lab 05/08/24 0432 05/08/24 1657 05/09/24  0444    136 134*   K 4.2 4.5 4.1    97 96   CO2 30* 31* 30*   BUN 27* 37* 39*   CREATININE 1.3 1.4 1.3   CALCIUM 11.1* 11.5* 10.4   ALBUMIN 4.0  --  3.8   PROT 5.6*  --  5.7*   BILITOT 1.1*  --  0.8   ALKPHOS 43*  --  79   *  --  406*   *  --  341*   MG 3.0*  3.0* 2.3 2.1     POCT Glucose:   Recent Labs   Lab 05/09/24  0443 05/09/24  0808 05/09/24  0927   POCTGLUCOSE 84 74 110      Significant Imaging:  No new imaging within the past 24 hours    ABG  Recent Labs   Lab 05/06/24  1458   PH 7.401   PO2 452*   PCO2 44.9   HCO3 27.9   BE 3*     Assessment/Plan:     Pulmonary  On mechanically assisted ventilation  - Extubated 5/6    Cardiac/Vascular  * Coronary artery disease of native artery of native heart with stable angina pectoris  - Patient with known CAD s/p stent placement and CABG, which is controlled Will continue ASA, Plavix, and Statin and monitor for S/Sx of angina/ACS. Continue to monitor on telemetry.   - Multi-vessel disease involving LAD, LHC performed 5/3; CTS consulted and taken for CABG 5/6; S/p CABG x4  - Post op plan per CTS  - Pressors/sedation off, pain control, diabetic diet, PT/OT    S/P CABG x 4  - See plan for CAD    Other hyperlipidemia  - Statin is being held due to elevated LFTs; trending back down   - Resume once able     Essential hypertension  - Chronic, uncontrolled. Latest blood pressure and vitals reviewed:  - Home meds for hypertension were reviewed and noted below:   Hypertension Medications               amLODIPine (NORVASC) 5 MG tablet Take 5 mg by mouth once daily.    isosorbide mononitrate (IMDUR) 30 MG 24 hr tablet Take 1 tablet (30 mg total) by mouth once daily.    losartan (COZAAR) 100 MG tablet Take 50 mg by mouth 2 (two) times a day.    nitroGLYCERIN (NITROSTAT) 0.3 MG SL tablet Place 1 tablet (0.3 mg total) under the tongue every 5 (five) minutes as needed for Chest pain.   - While in the hospital, will manage blood pressure as follows; BB restarted   - Off pressors as of this morning     Endocrine  Type 2 diabetes mellitus with complication, without long-term current use of insulin  - Insulin regimen has been titrated, will continue with 15U bid + high intensity SSI   - Much better control now; tolerating diet    Fareed Calderon PA-C  Critical Care Medicine  [facility] ([facility])

## 2024-05-09 NOTE — ASSESSMENT & PLAN NOTE
-Continue current post-op care and mgmt as per CTS  -ASA Plavix statin BB  -IS usage   -Ambulation when able    5/8/24  -Pain improved  -Continue ASA, Plavix, BB  -Statin held due to bumped LFT's  -IS usage and ambulation    5/9/24  -Stable CV wise  -Continue ASA, Plavix, BB  -Statin held due to bumped LFT's  -Continue IS usage and ambulation

## 2024-05-09 NOTE — ASSESSMENT & PLAN NOTE
- Chronic, uncontrolled. Latest blood pressure and vitals reviewed:  - Home meds for hypertension were reviewed and noted below:   Hypertension Medications               amLODIPine (NORVASC) 5 MG tablet Take 5 mg by mouth once daily.    isosorbide mononitrate (IMDUR) 30 MG 24 hr tablet Take 1 tablet (30 mg total) by mouth once daily.    losartan (COZAAR) 100 MG tablet Take 50 mg by mouth 2 (two) times a day.    nitroGLYCERIN (NITROSTAT) 0.3 MG SL tablet Place 1 tablet (0.3 mg total) under the tongue every 5 (five) minutes as needed for Chest pain.   - While in the hospital, will manage blood pressure as follows; BB restarted   - Off pressors as of this morning

## 2024-05-09 NOTE — PLAN OF CARE
No acute events this shift. Patient remains stable for duration of shift. VSS, AV-paced at 90. AAOx4, NSR on monitor; 2 L NC. Michel draining to gravity. Pt still c/o lack of appetite and nausea (Zofran given 1x) but tolerated crackers and juice without complications. Epi turned off and disconnected at 2000. Glucose spot checked throughout night. 0400 Glucose was 84, encouraged pt to drink juice to prevent hypoglycemia. Ambulated to toilet at 2000 and ambulated to chair without complications. VSS. Currently resting in the recliner with safety and fall prevention measures in place. Call light in reach. Care ongoing.

## 2024-05-09 NOTE — PROGRESS NOTES
[facility])  Cardiothoracic Surgery  Progress Note    Patient Name: Feng Marques  MRN: [id number]  Admission Date: 5/3/2024  Hospital Length of Stay: 6 days  Code Status: Full Code   Attending Physician: Maury Amato MD   Referring Provider: Abdulaziz Carrasco MD  Principal Problem:Coronary artery disease of native artery of native heart with stable angina pectoris            Subjective:     Post-Op Info:  Procedure(s) (LRB):  CORONARY ARTERY BYPASS GRAFT (CABG) (N/A)  ECHOCARDIOGRAM,TRANSESOPHAGEAL (N/A)  SURGICAL PROCUREMENT, VEIN, ENDOSCOPIC (Left)  BLOCK, NERVE, INTERCOSTAL, 2 OR MORE (N/A)   3 Days Post-Op     Interval History: Patient is postop day 3 status post coronary artery bypass grafting x4.    ROS  Medications:  Continuous Infusions:   EPINEPHrine  0-0.5 mcg/kg/min Intravenous Continuous   Stopped at 05/08/24 1808    vasopressin  0.04 Units/min Intravenous Continuous   Stopped at 05/08/24 1000     Scheduled Meds:   acetaminophen  650 mg Oral Q6H    albumin human 5%  25 g Intravenous Once    ascorbic acid (vitamin C)  500 mg Oral BID    aspirin  81 mg Oral Daily    chlorhexidine  10 mL Mouth/Throat BID    clopidogreL  75 mg Oral Daily    cyanocobalamin  1,000 mcg Oral Daily    docusate sodium  100 mg Oral BID    EScitalopram oxalate  10 mg Oral Daily    ferrous sulfate  1 tablet Oral Daily    folic acid  1 mg Oral Daily    [START ON 5/10/2024] insulin detemir U-100  8 Units Subcutaneous Daily    lubiprostone  24 mcg Oral BID WM    metoprolol tartrate  12.5 mg Oral BID    pantoprazole  40 mg Oral Daily    polyethylene glycol  17 g Oral Daily    rivaroxaban  2.5 mg Oral BID WM     PRN Meds:  Current Facility-Administered Medications:     0.9%  NaCl infusion (for blood administration), , Intravenous, Q24H PRN    albumin human 5%, 25 g, Intravenous, PRN    calcium gluconate IVPB, 1 g, Intravenous, PRN    calcium gluconate IVPB, 2 g, Intravenous, PRN    calcium gluconate IVPB,  3 g, Intravenous, PRN    dextrose 10%, 12.5 g, Intravenous, PRN    dextrose 10%, 25 g, Intravenous, PRN    glucagon (human recombinant), 1 mg, Intramuscular, PRN    glucose, 16 g, Oral, PRN    glucose, 24 g, Oral, PRN    influenza 65up-adj, 0.5 mL, Intramuscular, vaccine x 1 dose    insulin aspart U-100, 0-15 Units, Subcutaneous, QID (AC + HS) PRN    lactated ringers, 1,000 mL, Intravenous, PRN    magnesium sulfate IVPB, 4 g, Intravenous, PRN    metoclopramide, 5 mg, Intravenous, Q6H PRN    naloxone, 0.4 mg, Intravenous, PRN    nitroGLYCERIN, 0.4 mg, Sublingual, Q5 Min PRN    ondansetron, 4 mg, Intravenous, Q6H PRN    pneumoc 20-diaz conj-dip cr(PF), 0.5 mL, Intramuscular, vaccine x 1 dose    potassium chloride in water, 20 mEq, Intravenous, PRN    potassium chloride in water, 60 mEq, Intravenous, PRN    potassium chloride in water, 40 mEq, Intravenous, PRN    sodium chloride 0.9%, 10 mL, Intravenous, PRN    sodium chloride 0.9%, 10 mL, Intravenous, Q12H PRN    traMADoL, 50 mg, Oral, Q6H PRN     Objective:     Vital Signs (Most Recent):  Temp: 99.1 °F (37.3 °C) (05/09/24 0600)  Pulse: 89 (05/09/24 0600)  Resp: 18 (05/09/24 0737)  BP: 102/62 (05/09/24 0600)  SpO2: 100 % (05/09/24 0600) Vital Signs (24h Range):  Temp:  [98.1 °F (36.7 °C)-99.1 °F (37.3 °C)] 99.1 °F (37.3 °C)  Pulse:  [74-91] 89  Resp:  [15-29] 18  SpO2:  [89 %-100 %] 100 %  BP: ()/(50-72) 102/62  Arterial Line BP: ()/(53-98) 85/65     Weight: 71.9 kg (158 lb 8.2 oz)  Body mass index is 22.74 kg/m².    SpO2: 100 %       Intake/Output - Last 3 Shifts         05/07 0700  05/08 0659 05/08 0700 05/09 0659 05/09 0700  05/10 0659    P.O.  720     I.V. (mL/kg) 2050.1 (28.5) 594.9 (8.3)     Blood 706.3      IV Piggyback 197.5 205.9     Total Intake(mL/kg) 2953.8 (41.1) 1520.8 (21.2)     Urine (mL/kg/hr) 3115 (1.8) 2500 (1.4)     Drains 20      Stool  0     Chest Tube 270      Total Output 3405 2500     Net -451.2 -979.2            Stool Occurrence   0 x             Lines/Drains/Airways       Central Venous Catheter Line  Duration             Percutaneous Central Line - Triple Lumen  05/06/24 0846 Internal Jugular Right 3 days              Drain  Duration                  Urethral Catheter 05/06/24 0844 Silicone;Straight-tip;Temperature probe 3 days              Line  Duration                  Pacer Wires 05/06/24 1310 2 days              Peripheral Intravenous Line  Duration                  Peripheral IV - Single Lumen 05/06/24 0715 18 G Left Forearm 3 days                     Physical Exam  Constitutional:       Appearance: Normal appearance.   HENT:      Head: Normocephalic and atraumatic.      Nose: Nose normal.   Cardiovascular:      Rate and Rhythm: Normal rate and regular rhythm.      Heart sounds: Normal heart sounds.   Pulmonary:      Breath sounds: Normal breath sounds.   Abdominal:      General: Abdomen is flat. Bowel sounds are normal.      Palpations: Abdomen is soft.   Musculoskeletal:      Right lower leg: No edema.      Left lower leg: No edema.   Skin:     General: Skin is warm and dry.   Neurological:      Mental Status: He is alert and oriented to person, place, and time.   Psychiatric:         Behavior: Behavior normal.            Significant Labs:  All pertinent labs from the last 24 hours have been reviewed.    Significant Diagnostics:  I have reviewed all pertinent imaging results/findings within the past 24 hours.  Assessment/Plan:     * Coronary artery disease of native artery of native heart with stable angina pectoris  The patient is a 73-year-old male with coronary artery disease status post stenting, diabetes, hyperlipidemia and hypertension who was worked up for chest pain with cardiac catheterization.  Cardiac catheterization shows severe multivessel coronary artery disease with a 90% proximal LAD stenosis.  The patient is a candidate for urgent coronary artery bypass grafting.  The risks and benefits of surgery have been  explained to the patient.  The patient understands the risks and benefits of surgery and has agreed to proceed with urgent coronary artery bypass grafting which will be scheduled for 05/06/2024.    S/P CABG x 4  05/07/2024   The patient is postop day 1 status post coronary artery bypass grafting timesx1. Overall the patient is doing well.  Neuro:  Patient is awake alert and oriented x3.  Neuro exam is nonfocal.  Requiring increasing pain medication.    Cardiac:  Patient has been stable.  Patient is on low-dose epi and vasopressin.  Wean as tolerated.  Respiratory:  Patient has good sats on nasal cannula.  Pulmonary toileting.  Continue incentive spirometer  GI:  Advance diet as tolerated.    Renal:  Creatinine is 1.1.   Patient has good urine output.  Start Lasix.  Id:  Patient is afebrile.  White count is 19.  Suspect due to stress of surgery.  No evidence of infection.  Continue to observe.  Endocrine:  Glucose is controlled with long-acting insulin and sliding scale.  Heme:  Hematocrit is down to 19.  Will transfuse 2 units packed red blood cells.  Activities:  Advance activities as tolerated.  Line tubes and drains:  Patient has a right IJ triple-lumen, Michel catheter, A-line, pacer wires and saphenectomy site PABLO drains.    05/08/2024   The patient is postop day 2 status post coronary artery bypass grafting x 4.  Overall the patient is doing well.    Neuro:  Patient is awake alert and oriented x3.  Neuro exam is nonfocal.  Pain is much better controlled.  Cardiac:  Patient is hemodynamically stable.  Patient is is being weaned off low-dose vasopressin and epinephrine.  Will start metoprolol.    Respiratory: Patient has good sats.  Continue pulmonary toileting.  Continue incentive spirometer.  GI:  Patient is tolerating p.o. will start cardiac diet.  Advance as tolerated.    Renal:  Creatinine is 1.3.  Patient had excellent response to Lasix yesterday.  Patient appears euvolemic.  Will discontinue Lasix.    Id:   Patient had a T-max of 101°.  White count is 21.  However, patient has no evidence of infections.  Continue to observe.    Endocrine: Glucose is controlled with long-acting and sliding scale insulin.    Heme:  Hematocrit is 27 status post transfusion of 2 units of packed red blood cells.  Platelet is 105.  Will add low-dose rivaroxaban for anti thrombin 3 effect.  Continue aspirin and Plavix.    Activities:  Patient is out of bed to chair.  Advance as tolerated.  Line tubes and drains:  Patient has a right IJ triple-lumen, Michel catheter, A-line and pacer wires.  Chest tubes have been discontinued.      05/09/2024   The patient is postop day 3 status post bypass grafting x4.  Overall the patient is.    Neuro:  Patient is awake alert and x3.  Neuro exam is nonfocal.  Pain is well controlled.    Cardiac:  Patient's is off all systolic blood pressure in the high 90s.  Will decrease metoprolol dose.    Respiratory:  Patient has good sats.  Continue pulmonary toileting.  Continue incentive spirometer.  GI:  Patient is complaining of nausea.  Patient has not had a bowel movement.  Will increase bowel regimen.  Patient's abdomen soft nontender sounds.  Renal:  Creatinine is 1.3.  Patient is off diuretics.    Id:  Patient is afebrile.  However white count remains high 22  No evidence of infection.  Continue to follow.  Endocrine: Glucose is controlled with long-acting and sliding scale insulin.  Patient has been having hypoglycemic episode.  Will decrease long-acting insulin.  Dose and frequency.    Heme:  Hematocrit is 24 and platelet count is 122 continue aspirin Plavix and low-dose rivaroxaban.  Activities:  Patient is out of bed to chair advance as tolerated.    Line tubes and drains:  Patient has a right IJ triple-lumen catheter Michel catheter, pacer wires, will discontinue Michel catheter.          Maury Amato MD  Cardiothoracic Surgery  '[facility] ([facility])

## 2024-05-09 NOTE — SUBJECTIVE & OBJECTIVE
Review of Systems   Constitutional: Positive for decreased appetite and malaise/fatigue.   HENT: Negative.     Eyes: Negative.    Cardiovascular:  Positive for chest pain (incisional).   Respiratory: Negative.     Endocrine: Negative.    Hematologic/Lymphatic: Negative.    Skin: Negative.    Musculoskeletal:  Positive for arthritis and joint pain.   Gastrointestinal: Negative.    Genitourinary: Negative.    Neurological: Negative.    Psychiatric/Behavioral: Negative.     Allergic/Immunologic: Negative.      Objective:     Vital Signs (Most Recent):  Temp: 99 °F (37.2 °C) (05/09/24 1100)  Pulse: 89 (05/09/24 1200)  Resp: 16 (05/09/24 1200)  BP: (!) 89/50 (05/09/24 1200)  SpO2: (!) 94 % (05/09/24 1200) Vital Signs (24h Range):  Temp:  [98.1 °F (36.7 °C)-99.1 °F (37.3 °C)] 99 °F (37.2 °C)  Pulse:  [88-91] 89  Resp:  [15-29] 16  SpO2:  [89 %-100 %] 94 %  BP: ()/(50-72) 89/50  Arterial Line BP: ()/(59-98) 85/65     Weight: 71.9 kg (158 lb 8.2 oz)  Body mass index is 22.74 kg/m².     SpO2: (!) 94 %         Intake/Output Summary (Last 24 hours) at 5/9/2024 1250  Last data filed at 5/9/2024 1200  Gross per 24 hour   Intake 659.05 ml   Output 1695 ml   Net -1035.95 ml       Lines/Drains/Airways       Central Venous Catheter Line  Duration             Percutaneous Central Line - Triple Lumen  05/06/24 0846 Internal Jugular Right 3 days              Line  Duration                  Pacer Wires 05/06/24 1310 2 days              Peripheral Intravenous Line  Duration                  Peripheral IV - Single Lumen 05/06/24 0715 18 G Left Forearm 3 days                       Physical Exam  Vitals and nursing note reviewed.   Constitutional:       General: He is not in acute distress.     Appearance: Normal appearance. He is well-developed. He is not diaphoretic.   HENT:      Head: Normocephalic and atraumatic.   Eyes:      General:         Right eye: No discharge.         Left eye: No discharge.      Pupils: Pupils  "are equal, round, and reactive to light.   Cardiovascular:      Rate and Rhythm: Normal rate and regular rhythm.      Pulses: Intact distal pulses.      Heart sounds: Normal heart sounds, S1 normal and S2 normal. No murmur heard.     Comments: Sternotomy site C/D/I; no bleeding erythema or drainage    Paced on monitor  Pulmonary:      Effort: Pulmonary effort is normal. No respiratory distress.      Comments: Diminished  Abdominal:      General: There is no distension.   Musculoskeletal:      Right lower leg: No edema.      Left lower leg: No edema.   Skin:     General: Skin is warm and dry.      Findings: No erythema.   Neurological:      General: No focal deficit present.      Mental Status: He is alert and oriented to person, place, and time.   Psychiatric:         Mood and Affect: Mood normal.         Behavior: Behavior normal.            Significant Labs: CMP   Recent Labs   Lab 05/08/24 0432 05/08/24 1657 05/09/24  0444    136 134*   K 4.2 4.5 4.1    97 96   CO2 30* 31* 30*   GLU 81 122* 82   BUN 27* 37* 39*   CREATININE 1.3 1.4 1.3   CALCIUM 11.1* 11.5* 10.4   PROT 5.6*  --  5.7*   ALBUMIN 4.0  --  3.8   BILITOT 1.1*  --  0.8   ALKPHOS 43*  --  79   *  --  341*   *  --  406*   ANIONGAP 8 8 8   , CBC   Recent Labs   Lab 05/08/24 0432 05/08/24 1657 05/09/24  0444   WBC 21.44* 21.82* 22.87*   HGB 9.7* 9.1* 9.9*   HCT 27.6* 26.9* 29.4*   * 102* 122*   , Troponin No results for input(s): "TROPONINI" in the last 48 hours., and All pertinent lab results from the last 24 hours have been reviewed.    Significant Imaging: Echocardiogram: Transthoracic echo (TTE) complete ([facility] Only):   Results for orders placed or performed during the hospital encounter of 05/03/24   Echo   Result Value Ref Range    BSA 1.8 m2    LVOT stroke volume 50.79 cm3    LVIDd 4.01 3.5 - 6.0 cm    LV Systolic Volume 25.50 mL    LV Systolic Volume Index 14.0 mL/m2    LVIDs 2.64 2.1 - 4.0 cm    LV Diastolic " Volume 70.21 mL    LV Diastolic Volume Index 38.58 mL/m2    IVS 1.55 (A) 0.6 - 1.1 cm    LVOT diameter 2.03 cm    LVOT area 3.2 cm2    FS 34 28 - 44 %    Left Ventricle Relative Wall Thickness 0.63 cm    Posterior Wall 1.27 (A) 0.6 - 1.1 cm    LV mass 212.04 g    LV Mass Index 117 g/m2    MV Peak E Evin 0.68 m/s    TDI LATERAL 0.08 m/s    TDI SEPTAL 0.07 m/s    E/E' ratio 9.07 m/s    MV Peak A Evin 0.99 m/s    TR Max Evin 1.97 m/s    E/A ratio 0.69     IVRT 83.73 msec    E wave deceleration time 268.67 msec    LV SEPTAL E/E' RATIO 9.71 m/s    LV LATERAL E/E' RATIO 8.50 m/s    LVOT peak evin 0.78 m/s    Left Ventricular Outflow Tract Mean Velocity 0.67 cm/s    Left Ventricular Outflow Tract Mean Gradient 1.83 mmHg    RVOT peak VTI 19.6 cm    TAPSE 2.38 cm    LA size 3.87 cm    Left Atrium Minor Axis 4.81 cm    Left Atrium Major Axis 4.91 cm    RA Major Axis 4.15 cm    AV mean gradient 3 mmHg    AV peak gradient 4 mmHg    Ao peak evin 0.98 m/s    Ao VTI 20.50 cm    LVOT peak VTI 15.70 cm    AV valve area 2.48 cm²    AV Velocity Ratio 0.80     AV index (prosthetic) 0.77     LEXUS by Velocity Ratio 2.57 cm²    Mr max evin 3.82 m/s    MV mean gradient 1 mmHg    MV peak gradient 3 mmHg    MV stenosis pressure 1/2 time 77.91 ms    MV valve area p 1/2 method 2.82 cm2    MV valve area by continuity eq 1.74 cm2    MV VTI 29.2 cm    Triscuspid Valve Regurgitation Peak Gradient 16 mmHg    PV mean gradient 3 mmHg    RVOT peak evin 1.05 m/s    Ao root annulus 3.39 cm    STJ 3.33 cm    Ascending aorta 3.23 cm    Mean e' 0.08 m/s    ZLVIDS -1.30     ZLVIDD -2.28     LA Volume Index 34.3 mL/m2    LA volume 62.34 cm3    LA WIDTH 3.9 cm    RA Width 2.8 cm    EF 60 %    TV resting pulmonary artery pressure 19 mmHg    RV TB RVSP 5 mmHg    Est. RA pres 3 mmHg    Narrative      Left Ventricle: The left ventricle is normal in size. Normal wall   thickness. There is concentric hypertrophy. Regional wall motion   abnormalities present. See diagram  for wall motion findings. There is   normal systolic function with a visually estimated ejection fraction of 55   - 70%. Ejection fraction by visual approximation is 60%.    Right Ventricle: Normal right ventricular cavity size. Wall thickness   is normal. Systolic function is normal.    Mitral Valve: There is mild regurgitation.    IVC/SVC: Normal venous pressure at 3 mmHg.     , EKG: Reviewed, and X-Ray: CXR: X-Ray Chest 1 View (CXR): No results found for this visit on 05/03/24. and X-Ray Chest PA and Lateral (CXR): No results found for this visit on 05/03/24.

## 2024-05-09 NOTE — PT/OT/SLP PROGRESS
"Occupational Therapy   Treatment    Name: Feng Marques  MRN: [id number]  Admitting Diagnosis:  Coronary artery disease of native artery of native heart with stable angina pectoris  3 Days Post-Op    Recommendations:     Discharge Recommendations: Low Intensity Therapy (24/7 SPV and A)  Discharge Equipment Recommendations:  shower chair  Barriers to discharge:  None    Assessment:     Feng Marques is a 73 y.o. male with a medical diagnosis of Coronary artery disease of native artery of native heart with stable angina pectoris.  He presents with the following performance deficits affecting function are weakness, impaired endurance, impaired self care skills, impaired functional mobility, impaired balance, pain, impaired cardiopulmonary response to activity, decreased safety awareness (sternal precautions).     Rehab Prognosis:  Good; patient would benefit from acute skilled OT services to address these deficits and reach maximum level of function.       Plan:     Patient to be seen 2 x/week to address the above listed problems via self-care/home management, therapeutic activities, therapeutic exercises  Plan of Care Expires: 05/22/24  Plan of Care Reviewed with: patient    Subjective     Chief Complaint: Reported "I always look down."  Patient/Family Comments/goals: increase independence  Pain/Comfort:  Pain Rating 1: 5/10  Location 1: chest  Pain Addressed 1:  (activity pacing)    Objective:     Communicated with: NurseWilma, prior to session.  Patient found up in chair with blood pressure cuff, pulse ox (continuous), telemetry, wound vac, central line, peripheral IV, bryant catheter, external pacer upon OT entry to room.    General Precautions: Standard, fall, sternal    Orthopedic Precautions:N/A  Braces: N/A  Respiratory Status: Room air     Occupational Performance:     Functional Mobility/Transfers:  Patient completed Sit <> Stand Transfer with minimum assistance  with  rolling walker   Patient " completed Bed <> Chair Transfer using Step Transfer technique with minimum assistance with rolling walker  Functional Mobility: Patient completed x80ft functional mobility with RW and min A to increase dynamic standing balance and activity tolerance needed for ADL completion.  Provided with v/c for technique with transfers to increase safety and independence with completion  Reinforced education re: sternal precautions and their functional implications  X2 standing rest breaks with mobility  Forward posture with head down, reports baseline mobility  Hypotensive but stable throughout    [facility] 6 Click ADL: 16    Treatment & Education:  Patient tolerated session well overall. Improvements in activity tolerance and dynamic standing balance. Provided with KYMITT education document and review with patient and family present in room. Encouraged completion of B UE AROM therex throughout the day to increase functional strength and activity tolerance needed for ADL completion. Educated on benefits of OOB activity and importance of calling for A to transfer back to bed. Patient stated understanding and in agreement with POC.    Patient left up in chair with all lines intact, call button in reach, chair alarm on, and nurse notified    GOALS:   Multidisciplinary Problems       Occupational Therapy Goals          Problem: Occupational Therapy    Goal Priority Disciplines Outcome Interventions   Occupational Therapy Goal     OT, PT/OT Progressing    Description: Goals to be met by: 5/22/24     Patient will increase functional independence with ADLs by performing:    Toileting from toilet with Supervision for hygiene and clothing management.   Toilet transfer to toilet with Supervision.  Demonstrates 100% functional compliance with sternal precautions.                         Time Tracking:     OT Date of Treatment: 05/09/24  OT Start Time: 0855  OT Stop Time: 0925  OT Total Time (min): 30 min    Billable Minutes:Therapeutic  Activity 30    Su Beatty, OT  5/9/2024

## 2024-05-09 NOTE — ASSESSMENT & PLAN NOTE
- Insulin regimen has been titrated, will continue with 15U bid + high intensity SSI   - Much better control now; tolerating diet

## 2024-05-09 NOTE — ASSESSMENT & PLAN NOTE
05/07/2024   The patient is postop day 1 status post coronary artery bypass grafting timesx1. Overall the patient is doing well.  Neuro:  Patient is awake alert and oriented x3.  Neuro exam is nonfocal.  Requiring increasing pain medication.    Cardiac:  Patient has been stable.  Patient is on low-dose epi and vasopressin.  Wean as tolerated.  Respiratory:  Patient has good sats on nasal cannula.  Pulmonary toileting.  Continue incentive spirometer  GI:  Advance diet as tolerated.    Renal:  Creatinine is 1.1.   Patient has good urine output.  Start Lasix.  Id:  Patient is afebrile.  White count is 19.  Suspect due to stress of surgery.  No evidence of infection.  Continue to observe.  Endocrine:  Glucose is controlled with long-acting insulin and sliding scale.  Heme:  Hematocrit is down to 19.  Will transfuse 2 units packed red blood cells.  Activities:  Advance activities as tolerated.  Line tubes and drains:  Patient has a right IJ triple-lumen, Michel catheter, A-line, pacer wires and saphenectomy site PABLO drains.    05/08/2024   The patient is postop day 2 status post coronary artery bypass grafting x 4.  Overall the patient is doing well.    Neuro:  Patient is awake alert and oriented x3.  Neuro exam is nonfocal.  Pain is much better controlled.  Cardiac:  Patient is hemodynamically stable.  Patient is is being weaned off low-dose vasopressin and epinephrine.  Will start metoprolol.    Respiratory: Patient has good sats.  Continue pulmonary toileting.  Continue incentive spirometer.  GI:  Patient is tolerating p.o. will start cardiac diet.  Advance as tolerated.    Renal:  Creatinine is 1.3.  Patient had excellent response to Lasix yesterday.  Patient appears euvolemic.  Will discontinue Lasix.    Id:  Patient had a T-max of 101°.  White count is 21.  However, patient has no evidence of infections.  Continue to observe.    Endocrine: Glucose is controlled with long-acting and sliding scale insulin.    Heme:   Hematocrit is 27 status post transfusion of 2 units of packed red blood cells.  Platelet is 105.  Will add low-dose rivaroxaban for anti thrombin 3 effect.  Continue aspirin and Plavix.    Activities:  Patient is out of bed to chair.  Advance as tolerated.  Line tubes and drains:  Patient has a right IJ triple-lumen, Michel catheter, A-line and pacer wires.  Chest tubes have been discontinued.      05/09/2024   The patient is postop day 3 status post bypass grafting x4.  Overall the patient is.    Neuro:  Patient is awake alert and x3.  Neuro exam is nonfocal.  Pain is well controlled.    Cardiac:  Patient's is off all systolic blood pressure in the high 90s.  Will decrease metoprolol dose.    Respiratory:  Patient has good sats.  Continue pulmonary toileting.  Continue incentive spirometer.  GI:  Patient is complaining of nausea.  Patient has not had a bowel movement.  Will increase bowel regimen.  Patient's abdomen soft nontender sounds.  Renal:  Creatinine is 1.3.  Patient is off diuretics.    Id:  Patient is afebrile.  However white count remains high 22  No evidence of infection.  Continue to follow.  Endocrine: Glucose is controlled with long-acting and sliding scale insulin.  Patient has been having hypoglycemic episode.  Will decrease long-acting insulin.  Dose and frequency.    Heme:  Hematocrit is 24 and platelet count is 122 continue aspirin Plavix and low-dose rivaroxaban.  Activities:  Patient is out of bed to chair advance as tolerated.    Line tubes and drains:  Patient has a right IJ triple-lumen catheter Michel catheter, pacer wires, will discontinue Michel catheter.

## 2024-05-09 NOTE — PROGRESS NOTES
[facility] ([facility])  Cardiology  Progress Note    Patient Name: Feng Marques  MRN: [id number]  Admission Date: 5/3/2024  Hospital Length of Stay: 6 days  Code Status: Full Code   Attending Physician: Maury Amato MD   Primary Care Physician: Maciel Enriquze MD  Expected Discharge Date:   Principal Problem:Coronary artery disease of native artery of native heart with stable angina pectoris    Subjective:   Hospital Course:   5/4/2024 uneventful night denies any chest pain or shortness of breath awaiting cabg on Monday 5/5/2024 NO ANGINA ASYMPTOMATIC    5/7/24-Patient seen and examined today, s/p CABG x4, POD # 1. Moaning in pain during exam. Labs reviewed. H/H 7.6/22.8. Platelets 128,000.    5/8/24-Patient seen and examined today, s/p CABG x 4, POD #2. Feeling better, sitting up in chair. Pain improved. Chest tubes removed. Labs reviewed. Creatinine 1.3. H/H improved post transfusion. LFT's bumped.    5/9/24-Patient seen and examined today, s/p CABG x 4 POD # 3. Continues to improve. Pain controlled. Ambulated with PT/OT this AM. Labs reviewed.         Review of Systems   Constitutional: Positive for decreased appetite and malaise/fatigue.   HENT: Negative.     Eyes: Negative.    Cardiovascular:  Positive for chest pain (incisional).   Respiratory: Negative.     Endocrine: Negative.    Hematologic/Lymphatic: Negative.    Skin: Negative.    Musculoskeletal:  Positive for arthritis and joint pain.   Gastrointestinal: Negative.    Genitourinary: Negative.    Neurological: Negative.    Psychiatric/Behavioral: Negative.     Allergic/Immunologic: Negative.      Objective:     Vital Signs (Most Recent):  Temp: 99 °F (37.2 °C) (05/09/24 1100)  Pulse: 89 (05/09/24 1200)  Resp: 16 (05/09/24 1200)  BP: (!) 89/50 (05/09/24 1200)  SpO2: (!) 94 % (05/09/24 1200) Vital Signs (24h Range):  Temp:  [98.1 °F (36.7 °C)-99.1 °F (37.3 °C)] 99 °F (37.2 °C)  Pulse:  [88-91] 89  Resp:  [15-29] 16  SpO2:  [89  %-100 %] 94 %  BP: ()/(50-72) 89/50  Arterial Line BP: ()/(59-98) 85/65     Weight: 71.9 kg (158 lb 8.2 oz)  Body mass index is 22.74 kg/m².     SpO2: (!) 94 %         Intake/Output Summary (Last 24 hours) at 5/9/2024 1250  Last data filed at 5/9/2024 1200  Gross per 24 hour   Intake 659.05 ml   Output 1695 ml   Net -1035.95 ml       Lines/Drains/Airways       Central Venous Catheter Line  Duration             Percutaneous Central Line - Triple Lumen  05/06/24 0846 Internal Jugular Right 3 days              Line  Duration                  Pacer Wires 05/06/24 1310 2 days              Peripheral Intravenous Line  Duration                  Peripheral IV - Single Lumen 05/06/24 0715 18 G Left Forearm 3 days                       Physical Exam  Vitals and nursing note reviewed.   Constitutional:       General: He is not in acute distress.     Appearance: Normal appearance. He is well-developed. He is not diaphoretic.   HENT:      Head: Normocephalic and atraumatic.   Eyes:      General:         Right eye: No discharge.         Left eye: No discharge.      Pupils: Pupils are equal, round, and reactive to light.   Cardiovascular:      Rate and Rhythm: Normal rate and regular rhythm.      Pulses: Intact distal pulses.      Heart sounds: Normal heart sounds, S1 normal and S2 normal. No murmur heard.     Comments: Sternotomy site C/D/I; no bleeding erythema or drainage    Paced on monitor  Pulmonary:      Effort: Pulmonary effort is normal. No respiratory distress.      Comments: Diminished  Abdominal:      General: There is no distension.   Musculoskeletal:      Right lower leg: No edema.      Left lower leg: No edema.   Skin:     General: Skin is warm and dry.      Findings: No erythema.   Neurological:      General: No focal deficit present.      Mental Status: He is alert and oriented to person, place, and time.   Psychiatric:         Mood and Affect: Mood normal.         Behavior: Behavior normal.         "    Significant Labs: CMP   Recent Labs   Lab 05/08/24 0432 05/08/24 1657 05/09/24 0444    136 134*   K 4.2 4.5 4.1    97 96   CO2 30* 31* 30*   GLU 81 122* 82   BUN 27* 37* 39*   CREATININE 1.3 1.4 1.3   CALCIUM 11.1* 11.5* 10.4   PROT 5.6*  --  5.7*   ALBUMIN 4.0  --  3.8   BILITOT 1.1*  --  0.8   ALKPHOS 43*  --  79   *  --  341*   *  --  406*   ANIONGAP 8 8 8   , CBC   Recent Labs   Lab 05/08/24 0432 05/08/24 1657 05/09/24 0444   WBC 21.44* 21.82* 22.87*   HGB 9.7* 9.1* 9.9*   HCT 27.6* 26.9* 29.4*   * 102* 122*   , Troponin No results for input(s): "TROPONINI" in the last 48 hours., and All pertinent lab results from the last 24 hours have been reviewed.    Significant Imaging: Echocardiogram: Transthoracic echo (TTE) complete ([facility] Only):   Results for orders placed or performed during the hospital encounter of 05/03/24   Echo   Result Value Ref Range    BSA 1.8 m2    LVOT stroke volume 50.79 cm3    LVIDd 4.01 3.5 - 6.0 cm    LV Systolic Volume 25.50 mL    LV Systolic Volume Index 14.0 mL/m2    LVIDs 2.64 2.1 - 4.0 cm    LV Diastolic Volume 70.21 mL    LV Diastolic Volume Index 38.58 mL/m2    IVS 1.55 (A) 0.6 - 1.1 cm    LVOT diameter 2.03 cm    LVOT area 3.2 cm2    FS 34 28 - 44 %    Left Ventricle Relative Wall Thickness 0.63 cm    Posterior Wall 1.27 (A) 0.6 - 1.1 cm    LV mass 212.04 g    LV Mass Index 117 g/m2    MV Peak E Evin 0.68 m/s    TDI LATERAL 0.08 m/s    TDI SEPTAL 0.07 m/s    E/E' ratio 9.07 m/s    MV Peak A Evin 0.99 m/s    TR Max Evin 1.97 m/s    E/A ratio 0.69     IVRT 83.73 msec    E wave deceleration time 268.67 msec    LV SEPTAL E/E' RATIO 9.71 m/s    LV LATERAL E/E' RATIO 8.50 m/s    LVOT peak evin 0.78 m/s    Left Ventricular Outflow Tract Mean Velocity 0.67 cm/s    Left Ventricular Outflow Tract Mean Gradient 1.83 mmHg    RVOT peak VTI 19.6 cm    TAPSE 2.38 cm    LA size 3.87 cm    Left Atrium Minor Axis 4.81 cm    Left Atrium Major Axis 4.91 cm    RA " Major Axis 4.15 cm    AV mean gradient 3 mmHg    AV peak gradient 4 mmHg    Ao peak fabrizio 0.98 m/s    Ao VTI 20.50 cm    LVOT peak VTI 15.70 cm    AV valve area 2.48 cm²    AV Velocity Ratio 0.80     AV index (prosthetic) 0.77     LEXUS by Velocity Ratio 2.57 cm²    Mr max fabrizio 3.82 m/s    MV mean gradient 1 mmHg    MV peak gradient 3 mmHg    MV stenosis pressure 1/2 time 77.91 ms    MV valve area p 1/2 method 2.82 cm2    MV valve area by continuity eq 1.74 cm2    MV VTI 29.2 cm    Triscuspid Valve Regurgitation Peak Gradient 16 mmHg    PV mean gradient 3 mmHg    RVOT peak fabrizio 1.05 m/s    Ao root annulus 3.39 cm    STJ 3.33 cm    Ascending aorta 3.23 cm    Mean e' 0.08 m/s    ZLVIDS -1.30     ZLVIDD -2.28     LA Volume Index 34.3 mL/m2    LA volume 62.34 cm3    LA WIDTH 3.9 cm    RA Width 2.8 cm    EF 60 %    TV resting pulmonary artery pressure 19 mmHg    RV TB RVSP 5 mmHg    Est. RA pres 3 mmHg    Narrative      Left Ventricle: The left ventricle is normal in size. Normal wall   thickness. There is concentric hypertrophy. Regional wall motion   abnormalities present. See diagram for wall motion findings. There is   normal systolic function with a visually estimated ejection fraction of 55   - 70%. Ejection fraction by visual approximation is 60%.    Right Ventricle: Normal right ventricular cavity size. Wall thickness   is normal. Systolic function is normal.    Mitral Valve: There is mild regurgitation.    IVC/SVC: Normal venous pressure at 3 mmHg.     , EKG: Reviewed, and X-Ray: CXR: X-Ray Chest 1 View (CXR): No results found for this visit on 05/03/24. and X-Ray Chest PA and Lateral (CXR): No results found for this visit on 05/03/24.  Assessment and Plan:   Patient who presents with multivessel CAD. Recovering well post CABG. Mgmt as per CTS.    * Coronary artery disease of native artery of native heart with stable angina pectoris  Multivessel cad for cabg on Monday no angina    S/P CABG x 4  -Continue current  post-op care and mgmt as per CTS  -ASA Plavix statin BB  -IS usage   -Ambulation when able    5/8/24  -Pain improved  -Continue ASA, Plavix, BB  -Statin held due to bumped LFT's  -IS usage and ambulation    5/9/24  -Stable CV wise  -Continue ASA, Plavix, BB  -Statin held due to bumped LFT's  -Continue IS usage and ambulation    Other hyperlipidemia  -statins    5/8/24  -LFT's bumped, statin held    Type 2 diabetes mellitus with complication, without long-term current use of insulin  Per hospital medicine    Essential hypertension  -Continuer home meds        VTE Risk Mitigation (From admission, onward)           Ordered     Place DELROY hose  Until discontinued        Comments: Post Op Day 1 - After Ace and Drain Removed    05/06/24 1427     Place sequential compression device  Until discontinued         05/06/24 1427     IP VTE HIGH RISK PATIENT  Once         05/06/24 1427     Place DELROY hose  Until discontinued         05/05/24 1954     Place sequential compression device  Until discontinued         05/05/24 1954     Place sequential compression device  Until discontinued         05/03/24 1124                    Debora Ramírez PA-C  Cardiology  [facility] ([facility])

## 2024-05-09 NOTE — PLAN OF CARE
05/09/24 1006   Post-Acute Status   Post-Acute Authorization Home Health   Home Health Status Referrals Sent   Discharge Plan   Discharge Plan A Home Health     Discussed home health with patient and daughter at bedside. Patient is eligible for [facility] Home through [facility] with daily monitoring. Agreeable to home health referral being sent to [facility].

## 2024-05-09 NOTE — PLAN OF CARE
Nutrition Plan of Care:    Recommendations     Recommendation/Intervention:   1. Recommend Diabetic 2000 calorie, Cardiac diet, Texture per SLP recommendations   2. Recommend Boost glucose control TID to assist filling nutritional gaps   3. Recommend Sajan BID for wound healing   4. Recommend continue bowel regimen (No BM x 5 days)   5. Weigh twice weekly      Goals:   1. Pt's diet will be advanced and to the safest diet texture within 24 hrs (meeting)   2. Pt will tolerate and consume > 75% EEN and EPN prior to RD follow up   3. Pt will consume Sajan BID prior to RD follow up   4. Pt will have BM prior to RD follow up   5. Cardiac nutrition education will be provided to pt at RD follow up   6. NFPE will be performed at RD follow up  Nutrition Goal Status: continues  Communication of RD Recs: other (comment) (POC, sticky note)     Assessment and Plan     Nutrition Problem  Inadequate protein/energy intake   Increased protein needs   Altered GI function      Related to (etiology):   Decreased ability to consume sufficient protein/energy   Increased demand for nutrition   Alteration in GI tract structure and/or function      Signs and Symptoms (as evidenced by):   Decreased po intake  Surgery, Wound healing needs   Constipation      Interventions/Recommendations (treatment strategy):  1. Carbohydrate, sodium, fat, texture modified diet  2. Commercial beverage medical food supplement therapy  3. Amino acids medical food supplement therapy for wound healing  4. Management of nutrition related prescription medication   5. Collaboration by nutrition professional with other providers      Nutrition Diagnosis Status:   Continues         Malnutrition Assessment  Skin (Micronutrient): wounds unhealed (Chris score = 17 (mild risk)             Karlie Miller MS, RDN, LDN

## 2024-05-09 NOTE — PROGRESS NOTES
[facility] ([facility])  Adult Nutrition  Progress Note    SUMMARY       Recommendations    Recommendation/Intervention:   1. Recommend Diabetic 2000 calorie, Cardiac diet, Texture per SLP recommendations   2. Recommend Boost glucose control TID to assist filling nutritional gaps   3. Recommend Sajan BID for wound healing   4. Recommend continue bowel regimen (No BM x 5 days)   5. Weigh twice weekly     Goals:   1. Pt's diet will be advanced and to the safest diet texture within 24 hrs (meeting)   2. Pt will tolerate and consume > 75% EEN and EPN prior to RD follow up   3. Pt will consume Sajan BID prior to RD follow up   4. Pt will have BM prior to RD follow up   5. Cardiac nutrition education will be provided to pt at RD follow up   6. NFPE will be performed at RD follow up  Nutrition Goal Status: continues  Communication of RD Recs: other (comment) (POC, sticky note)    Assessment and Plan    Nutrition Problem  Inadequate protein/energy intake   Increased protein needs   Altered GI function     Related to (etiology):   Decreased ability to consume sufficient protein/energy   Increased demand for nutrition   Alteration in GI tract structure and/or function     Signs and Symptoms (as evidenced by):   Decreased po intake  Surgery, Wound healing needs   Constipation     Interventions/Recommendations (treatment strategy):  1. Carbohydrate, sodium, fat, texture modified diet  2. Commercial beverage medical food supplement therapy  3. Amino acids medical food supplement therapy for wound healing  4. Management of nutrition related prescription medication   5. Collaboration by nutrition professional with other providers     Nutrition Diagnosis Status:   Continues        Malnutrition Assessment     Skin (Micronutrient): wounds unhealed (Chris score = 17 (mild risk)                                 Reason for Assessment    Reason For Assessment: consult (Post Op)  Diagnosis: cardiac disease (Coronary artery  "disease of native artery of native heart with stable angina pectoris)  Relevant Medical History: HTN, T2DM, HLD, On mechanically assisted ventilation  General Information Comments:   5/7/24: 73 y.o. Male admitted for Coronary artery disease of native artery of native heart with stable angina pectoris. Pt currently has no diet ordered, pt was NPO x 2 days, note pt had 100% PO intake x 2 days ago prior to NPO. RD consulted for Post op. H&P noted that the pt presented for cardiac catheterization d/t c/o chest pain. EMR noted that the LHC was performed on 5/3 and revealed severe multi-vessel disease, pt now S/p CABG x4 performed yesterday 5/6, pt was extubated yesterday evening. Visited pt at bedside, pt resting, pt moaning in pain. Spoke to RN, RD inquired if pt diet to be advanced today, stated that the pt to be advanced to a clear liquid diet for dinner, pt having difficulty with increased BP when swallowing liquids with medications. Attached "Heart Healthy Diet" to pt d/c nieves. Full NFPE and nutrition education will be provided and discussed at RD follow up. Reviewed chart: LBM 5/2 (x 5 days no BM); Skin: device/drain, incision L leg/ chest WDL; Chris score: 17 (mild risk); Edema: None. Labs, meds, weight reviewed. Note polyethylene glycol, docusate. Weight charted 4/9 146 lbs, 5/7 158 lbs (BMI 22.78, Underweight for age), +12 lb wt gain x 1 month, re weigh for accuracy warranted. RD will continue to follow and monitor pt's nutritional status during admit.      5/9/2024:  Patient continues on a cardiac oral diet at this time.  Noted that patient is still having some nausea.  On site RD spoke with family member and provided education on "Heart Healthy Consistent Carbohydrate Nutrition Therapy".  RD contact information was provided along with educational handout.  Patient was sleeping at time of visit.  Patient's appetite remains reduced but family member stated patient is consuming commercial oral supplements. " "Outside food is sometimes brought in to hospital to encourage patient's po intake.  Labs reviewed.  NKFA.  LBM: 5/9/2024.  RD to continue to monitor and follow. NFPE not performed.  Nutritional risk associated with NFPE will be monitored at each follow up visit.     Nutrition Discharge Planning: Diabetic, Cardiac diet + Sajan BID + Boost glucose control as warranted    Nutrition Risk Screen    Nutrition Risk Screen: no indicators present    Nutrition Related Social Determinants of Health: SDOH: Adequate food in home environment and None Identified      Nutrition/Diet History    Spiritual, Cultural Beliefs, Mu-ism Practices, Values that Affect Care: no  Food Allergies: NKFA  Factors Affecting Nutritional Intake: Cardiac     Anthropometrics    Temp: 99 °F (37.2 °C)  Height Method: Stated  Height: 5' 10" (177.8 cm)  Height (inches): 70 in  Weight Method: Bed Scale  Weight: 71.9 kg (158 lb 8.2 oz)  Weight (lb): 158.51 lb  Ideal Body Weight (IBW), Male: 166 lb  % Ideal Body Weight, Male (lb): 95.62 %  BMI (Calculated): 22.7  BMI Grade: 18.5-24.9 - normal (Underweight for age)     Wt Readings from Last 15 Encounters:   05/08/24 71.9 kg (158 lb 8.2 oz)   04/18/24 66.7 kg (147 lb)   04/09/24 66.4 kg (146 lb 6.2 oz)   04/02/24 67 kg (147 lb 13.1 oz)   04/12/23 65.8 kg (145 lb 1 oz)   11/09/22 66.2 kg (146 lb)   09/14/22 66.5 kg (146 lb 9.7 oz)   06/13/19 68.3 kg (150 lb 9.2 oz)     Lab/Procedures/Meds    Pertinent Labs Reviewed: reviewed    Pertinent Medications Reviewed: reviewed  Pertinent Medications Comments: pravastatin, potassium chloride, polyethylene glycol, pantoprazole, Lopressor, magnesium hydroxide, losartan, isosorbide, Levemir, furosemide, ferrous sulfate, Escitalopram, docusate, aspirin, vitamin C, amlodipine, albuterol  BMP  Lab Results   Component Value Date     (L) 05/09/2024    K 4.1 05/09/2024    CL 96 05/09/2024    CO2 30 (H) 05/09/2024    BUN 39 (H) 05/09/2024    CREATININE 1.3 05/09/2024    " CALCIUM 10.4 05/09/2024    ANIONGAP 8 05/09/2024    EGFRNORACEVR 58 (A) 05/09/2024     Lab Results   Component Value Date    ALBUMIN 3.8 05/09/2024     Lab Results   Component Value Date     (H) 05/09/2024     (H) 05/09/2024    ALKPHOS 79 05/09/2024    BILITOT 0.8 05/09/2024     Recent Labs   Lab 05/09/24  1218   POCTGLUCOSE 93     Lab Results   Component Value Date    HGBA1C 7.0 (H) 05/06/2024     Lab Results   Component Value Date    WBC 22.87 (H) 05/09/2024    HGB 9.9 (L) 05/09/2024    HCT 29.4 (L) 05/09/2024    MCV 90 05/09/2024     (L) 05/09/2024       Scheduled Meds:   acetaminophen  650 mg Oral Q6H    albumin human 5%  25 g Intravenous Once    ascorbic acid (vitamin C)  500 mg Oral BID    aspirin  81 mg Oral Daily    chlorhexidine  10 mL Mouth/Throat BID    clopidogreL  75 mg Oral Daily    cyanocobalamin  1,000 mcg Oral Daily    docusate sodium  100 mg Oral BID    EScitalopram oxalate  10 mg Oral Daily    ferrous sulfate  1 tablet Oral Daily    folic acid  1 mg Oral Daily    [START ON 5/10/2024] insulin detemir U-100  8 Units Subcutaneous Daily    lubiprostone  24 mcg Oral BID WM    metoprolol tartrate  12.5 mg Oral BID    pantoprazole  40 mg Oral Daily    polyethylene glycol  17 g Oral Daily    rivaroxaban  2.5 mg Oral BID WM     Continuous Infusions:   EPINEPHrine  0-0.5 mcg/kg/min Intravenous Continuous   Stopped at 05/08/24 1808    vasopressin  0.04 Units/min Intravenous Continuous   Stopped at 05/08/24 1000     PRN Meds:.  Current Facility-Administered Medications:     0.9%  NaCl infusion (for blood administration), , Intravenous, Q24H PRN    albumin human 5%, 25 g, Intravenous, PRN    calcium gluconate IVPB, 1 g, Intravenous, PRN    calcium gluconate IVPB, 2 g, Intravenous, PRN    calcium gluconate IVPB, 3 g, Intravenous, PRN    dextrose 10%, 12.5 g, Intravenous, PRN    dextrose 10%, 25 g, Intravenous, PRN    glucagon (human recombinant), 1 mg, Intramuscular, PRN    glucose, 16 g,  Oral, PRN    glucose, 24 g, Oral, PRN    influenza 65up-adj, 0.5 mL, Intramuscular, vaccine x 1 dose    insulin aspart U-100, 0-15 Units, Subcutaneous, QID (AC + HS) PRN    lactated ringers, 1,000 mL, Intravenous, PRN    magnesium sulfate IVPB, 4 g, Intravenous, PRN    metoclopramide, 5 mg, Intravenous, Q6H PRN    naloxone, 0.4 mg, Intravenous, PRN    nitroGLYCERIN, 0.4 mg, Sublingual, Q5 Min PRN    ondansetron, 4 mg, Intravenous, Q6H PRN    pneumoc 20-diaz conj-dip cr(PF), 0.5 mL, Intramuscular, vaccine x 1 dose    potassium chloride in water, 20 mEq, Intravenous, PRN    potassium chloride in water, 60 mEq, Intravenous, PRN    potassium chloride in water, 40 mEq, Intravenous, PRN    sodium chloride 0.9%, 10 mL, Intravenous, PRN    sodium chloride 0.9%, 10 mL, Intravenous, Q12H PRN    traMADoL, 50 mg, Oral, Q6H PRN      Physical Findings/Assessment         Estimated/Assessed Needs    Weight Used For Calorie Calculations: 72 kg (158 lb 11.7 oz)  Energy Calorie Requirements (kcal): 1236-1211 kcals (30-35 kcals/kg ABW (Underweight, BMI 22.78 vs CABG vs Diabeties)  Energy Need Method: Kcal/kg  Protein Requirements:  g (1.2-1.5 g/kg ABW (Underweight vs Diabetic w/ wounds vs CABG vs Standard, older adult)  Weight Used For Protein Calculations: 72 kg (158 lb 11.7 oz)  Fluid Requirements (mL): 6067-4336 mL (1 mL/kcal)  Estimated Fluid Requirement Method: RDA Method  RDA Method (mL): 2160  CHO Requirement: 270-315 g (0155-9398 kcals/8)      Nutrition Prescription Ordered    Current Diet Order: Cardiac     Evaluation of Received Nutrient/Fluid Intake  I/O: (Net since admit)   5/7: +4660.8 mL  5/9: +3231mL   Energy Calories Required: not meeting needs  Protein Required: not meeting needs  Fluid Required: exceeds needs    Tolerance:  nausea   % Intake of Estimated Energy Needs: 25 - 50 %  % Meal Intake: 25 - 50 %    Nutrition Risk    Level of Risk/Frequency of Follow-up: high (F/u x 2 weekly)     Monitor and  Evaluation    Food and Nutrient Intake: energy intake, food and beverage intake  Food and Nutrient Adminstration: diet order  Knowledge/Beliefs/Attitudes: food and nutrition knowledge/skill, beliefs and attitudes  Anthropometric Measurements: weight, weight change, body mass index  Biochemical Data, Medical Tests and Procedures: electrolyte and renal panel, glucose/endocrine profile, gastrointestinal profile     Nutrition Follow-Up    RD Follow-up?: Yes  Karlie Miller MS, RDN, LDN

## 2024-05-09 NOTE — SUBJECTIVE & OBJECTIVE
Objective:     Vital Signs (Most Recent):  Temp: 99 °F (37.2 °C) (05/09/24 1000)  Pulse: 89 (05/09/24 1000)  Resp: (!) 23 (05/09/24 1000)  BP: (!) 96/55 (05/09/24 1000)  SpO2: 95 % (05/09/24 1000) Vital Signs (24h Range):  Temp:  [98.1 °F (36.7 °C)-99.1 °F (37.3 °C)] 99 °F (37.2 °C)  Pulse:  [74-91] 89  Resp:  [15-29] 23  SpO2:  [89 %-100 %] 95 %  BP: ()/(50-72) 96/55  Arterial Line BP: ()/(53-98) 85/65     Weight: 71.9 kg (158 lb 8.2 oz)  Body mass index is 22.74 kg/m².  Intake/Output Summary (Last 24 hours) at 5/9/2024 1043  Last data filed at 5/9/2024 0900  Gross per 24 hour   Intake 1233.38 ml   Output 1825 ml   Net -591.62 ml     Physical Exam     Review of Systems    Vents:  Vent Mode: A/C (05/06/24 1503)  Set Rate: 18 BPM (05/06/24 1503)  Vt Set: 420 mL (05/06/24 1503)  PEEP/CPAP: 5 cmH20 (05/06/24 1503)  Oxygen Concentration (%): 21 (05/09/24 0737)  Peak Airway Pressure: 19 cmH20 (05/06/24 1503)  Plateau Pressure: 0 cmH20 (05/06/24 1503)  Total Ve: 7.94 L/m (05/06/24 1503)  Negative Inspiratory Force (cm H2O): 0 (05/06/24 1503)  F/VT Ratio<105 (RSBI): (!) 25.35 (05/06/24 1503)    Lines/Drains/Airways       Central Venous Catheter Line  Duration             Percutaneous Central Line - Triple Lumen  05/06/24 0846 Internal Jugular Right 3 days              Drain  Duration                  Urethral Catheter 05/06/24 0844 Silicone;Straight-tip;Temperature probe 3 days              Line  Duration                  Pacer Wires 05/06/24 1310 2 days              Peripheral Intravenous Line  Duration                  Peripheral IV - Single Lumen 05/06/24 0715 18 G Left Forearm 3 days                  Significant Labs:    CBC/Anemia Profile:  Recent Labs   Lab 05/08/24  0432 05/08/24  1657 05/09/24  0444   WBC 21.44* 21.82* 22.87*   HGB 9.7* 9.1* 9.9*   HCT 27.6* 26.9* 29.4*   * 102* 122*   MCV 88 90 90   RDW 15.4* 15.4* 14.8*     Chemistries:  Recent Labs   Lab 05/08/24  0432 05/08/24 1657  05/09/24  0444    136 134*   K 4.2 4.5 4.1    97 96   CO2 30* 31* 30*   BUN 27* 37* 39*   CREATININE 1.3 1.4 1.3   CALCIUM 11.1* 11.5* 10.4   ALBUMIN 4.0  --  3.8   PROT 5.6*  --  5.7*   BILITOT 1.1*  --  0.8   ALKPHOS 43*  --  79   *  --  406*   *  --  341*   MG 3.0*  3.0* 2.3 2.1     POCT Glucose:   Recent Labs   Lab 05/09/24  0443 05/09/24  0808 05/09/24  0927   POCTGLUCOSE 84 74 110     Significant Imaging:  No new imaging within the past 24 hours

## 2024-05-10 LAB
ALBUMIN SERPL BCP-MCNC: 3.4 G/DL (ref 3.5–5.2)
ALP SERPL-CCNC: 72 U/L (ref 55–135)
ALT SERPL W/O P-5'-P-CCNC: 430 U/L (ref 10–44)
ANION GAP SERPL CALC-SCNC: 7 MMOL/L (ref 8–16)
ANION GAP SERPL CALC-SCNC: 9 MMOL/L (ref 8–16)
AST SERPL-CCNC: 285 U/L (ref 10–40)
BASOPHILS # BLD AUTO: 0.01 K/UL (ref 0–0.2)
BASOPHILS # BLD AUTO: 0.02 K/UL (ref 0–0.2)
BASOPHILS NFR BLD: 0.1 % (ref 0–1.9)
BASOPHILS NFR BLD: 0.2 % (ref 0–1.9)
BILIRUB DIRECT SERPL-MCNC: 0.3 MG/DL (ref 0.1–0.3)
BILIRUB SERPL-MCNC: 0.7 MG/DL (ref 0.1–1)
BLD PROD TYP BPU: NORMAL
BLD PROD TYP BPU: NORMAL
BLOOD UNIT EXPIRATION DATE: NORMAL
BLOOD UNIT EXPIRATION DATE: NORMAL
BLOOD UNIT TYPE CODE: 5100
BLOOD UNIT TYPE CODE: 5100
BLOOD UNIT TYPE: NORMAL
BLOOD UNIT TYPE: NORMAL
BUN SERPL-MCNC: 34 MG/DL (ref 8–23)
BUN SERPL-MCNC: 37 MG/DL (ref 8–23)
CALCIUM SERPL-MCNC: 10.3 MG/DL (ref 8.7–10.5)
CALCIUM SERPL-MCNC: 9.8 MG/DL (ref 8.7–10.5)
CHLORIDE SERPL-SCNC: 97 MMOL/L (ref 95–110)
CHLORIDE SERPL-SCNC: 98 MMOL/L (ref 95–110)
CO2 SERPL-SCNC: 28 MMOL/L (ref 23–29)
CO2 SERPL-SCNC: 31 MMOL/L (ref 23–29)
CODING SYSTEM: NORMAL
CODING SYSTEM: NORMAL
CREAT SERPL-MCNC: 1.1 MG/DL (ref 0.5–1.4)
CREAT SERPL-MCNC: 1.2 MG/DL (ref 0.5–1.4)
CROSSMATCH INTERPRETATION: NORMAL
CROSSMATCH INTERPRETATION: NORMAL
DIFFERENTIAL METHOD BLD: ABNORMAL
DIFFERENTIAL METHOD BLD: ABNORMAL
DISPENSE STATUS: NORMAL
DISPENSE STATUS: NORMAL
EOSINOPHIL # BLD AUTO: 0 K/UL (ref 0–0.5)
EOSINOPHIL # BLD AUTO: 0 K/UL (ref 0–0.5)
EOSINOPHIL NFR BLD: 0.1 % (ref 0–8)
EOSINOPHIL NFR BLD: 0.3 % (ref 0–8)
ERYTHROCYTE [DISTWIDTH] IN BLOOD BY AUTOMATED COUNT: 14.8 % (ref 11.5–14.5)
ERYTHROCYTE [DISTWIDTH] IN BLOOD BY AUTOMATED COUNT: 15 % (ref 11.5–14.5)
EST. GFR  (NO RACE VARIABLE): >60 ML/MIN/1.73 M^2
EST. GFR  (NO RACE VARIABLE): >60 ML/MIN/1.73 M^2
GLUCOSE SERPL-MCNC: 104 MG/DL (ref 70–110)
GLUCOSE SERPL-MCNC: 143 MG/DL (ref 70–110)
HCT VFR BLD AUTO: 32.3 % (ref 40–54)
HCT VFR BLD AUTO: 33.7 % (ref 40–54)
HGB BLD-MCNC: 10.7 G/DL (ref 14–18)
HGB BLD-MCNC: 10.9 G/DL (ref 14–18)
IMM GRANULOCYTES # BLD AUTO: 0.08 K/UL (ref 0–0.04)
IMM GRANULOCYTES # BLD AUTO: 0.1 K/UL (ref 0–0.04)
IMM GRANULOCYTES NFR BLD AUTO: 0.6 % (ref 0–0.5)
IMM GRANULOCYTES NFR BLD AUTO: 0.6 % (ref 0–0.5)
LYMPHOCYTES # BLD AUTO: 1.5 K/UL (ref 1–4.8)
LYMPHOCYTES # BLD AUTO: 1.9 K/UL (ref 1–4.8)
LYMPHOCYTES NFR BLD: 14.9 % (ref 18–48)
LYMPHOCYTES NFR BLD: 9.4 % (ref 18–48)
MAGNESIUM SERPL-MCNC: 2.1 MG/DL (ref 1.6–2.6)
MAGNESIUM SERPL-MCNC: 2.2 MG/DL (ref 1.6–2.6)
MCH RBC QN AUTO: 29.8 PG (ref 27–31)
MCH RBC QN AUTO: 30.4 PG (ref 27–31)
MCHC RBC AUTO-ENTMCNC: 32.3 G/DL (ref 32–36)
MCHC RBC AUTO-ENTMCNC: 33.1 G/DL (ref 32–36)
MCV RBC AUTO: 92 FL (ref 82–98)
MCV RBC AUTO: 92 FL (ref 82–98)
MONOCYTES # BLD AUTO: 0.8 K/UL (ref 0.3–1)
MONOCYTES # BLD AUTO: 0.9 K/UL (ref 0.3–1)
MONOCYTES NFR BLD: 5.5 % (ref 4–15)
MONOCYTES NFR BLD: 6.3 % (ref 4–15)
NEUTROPHILS # BLD AUTO: 13.2 K/UL (ref 1.8–7.7)
NEUTROPHILS # BLD AUTO: 9.7 K/UL (ref 1.8–7.7)
NEUTROPHILS NFR BLD: 77.7 % (ref 38–73)
NEUTROPHILS NFR BLD: 84.3 % (ref 38–73)
NRBC BLD-RTO: 0 /100 WBC
NRBC BLD-RTO: 0 /100 WBC
NUM UNITS TRANS PACKED RBC: NORMAL
NUM UNITS TRANS PACKED RBC: NORMAL
PLATELET # BLD AUTO: 135 K/UL (ref 150–450)
PLATELET # BLD AUTO: 142 K/UL (ref 150–450)
PMV BLD AUTO: 10.6 FL (ref 9.2–12.9)
PMV BLD AUTO: 10.9 FL (ref 9.2–12.9)
POCT GLUCOSE: 105 MG/DL (ref 70–110)
POCT GLUCOSE: 132 MG/DL (ref 70–110)
POCT GLUCOSE: 152 MG/DL (ref 70–110)
POCT GLUCOSE: 162 MG/DL (ref 70–110)
POCT GLUCOSE: 85 MG/DL (ref 70–110)
POCT GLUCOSE: 99 MG/DL (ref 70–110)
POTASSIUM SERPL-SCNC: 4.1 MMOL/L (ref 3.5–5.1)
POTASSIUM SERPL-SCNC: 4.1 MMOL/L (ref 3.5–5.1)
PROT SERPL-MCNC: 5.8 G/DL (ref 6–8.4)
RBC # BLD AUTO: 3.52 M/UL (ref 4.6–6.2)
RBC # BLD AUTO: 3.66 M/UL (ref 4.6–6.2)
SODIUM SERPL-SCNC: 135 MMOL/L (ref 136–145)
SODIUM SERPL-SCNC: 135 MMOL/L (ref 136–145)
WBC # BLD AUTO: 12.54 K/UL (ref 3.9–12.7)
WBC # BLD AUTO: 15.59 K/UL (ref 3.9–12.7)

## 2024-05-10 PROCEDURE — 99900035 HC TECH TIME PER 15 MIN (STAT)

## 2024-05-10 PROCEDURE — 85025 COMPLETE CBC W/AUTO DIFF WBC: CPT | Mod: 91 | Performed by: THORACIC SURGERY (CARDIOTHORACIC VASCULAR SURGERY)

## 2024-05-10 PROCEDURE — 27200667 HC PACEMAKER, TEMPORARY MONITORING, PER SHIFT

## 2024-05-10 PROCEDURE — 25000003 PHARM REV CODE 250: Performed by: INTERNAL MEDICINE

## 2024-05-10 PROCEDURE — 80076 HEPATIC FUNCTION PANEL: CPT | Performed by: THORACIC SURGERY (CARDIOTHORACIC VASCULAR SURGERY)

## 2024-05-10 PROCEDURE — 25000003 PHARM REV CODE 250: Performed by: THORACIC SURGERY (CARDIOTHORACIC VASCULAR SURGERY)

## 2024-05-10 PROCEDURE — 94799 UNLISTED PULMONARY SVC/PX: CPT

## 2024-05-10 PROCEDURE — 97530 THERAPEUTIC ACTIVITIES: CPT

## 2024-05-10 PROCEDURE — 97116 GAIT TRAINING THERAPY: CPT

## 2024-05-10 PROCEDURE — 36415 COLL VENOUS BLD VENIPUNCTURE: CPT | Performed by: THORACIC SURGERY (CARDIOTHORACIC VASCULAR SURGERY)

## 2024-05-10 PROCEDURE — P9045 ALBUMIN (HUMAN), 5%, 250 ML: HCPCS | Mod: JZ,JG | Performed by: THORACIC SURGERY (CARDIOTHORACIC VASCULAR SURGERY)

## 2024-05-10 PROCEDURE — 63600175 PHARM REV CODE 636 W HCPCS: Performed by: INTERNAL MEDICINE

## 2024-05-10 PROCEDURE — 83735 ASSAY OF MAGNESIUM: CPT | Performed by: THORACIC SURGERY (CARDIOTHORACIC VASCULAR SURGERY)

## 2024-05-10 PROCEDURE — 63600175 PHARM REV CODE 636 W HCPCS: Mod: JZ,JG | Performed by: THORACIC SURGERY (CARDIOTHORACIC VASCULAR SURGERY)

## 2024-05-10 PROCEDURE — 99233 SBSQ HOSP IP/OBS HIGH 50: CPT | Mod: ,,, | Performed by: STUDENT IN AN ORGANIZED HEALTH CARE EDUCATION/TRAINING PROGRAM

## 2024-05-10 PROCEDURE — 94761 N-INVAS EAR/PLS OXIMETRY MLT: CPT

## 2024-05-10 PROCEDURE — 20000000 HC ICU ROOM

## 2024-05-10 PROCEDURE — 80048 BASIC METABOLIC PNL TOTAL CA: CPT | Mod: 91 | Performed by: THORACIC SURGERY (CARDIOTHORACIC VASCULAR SURGERY)

## 2024-05-10 RX ORDER — INSULIN ASPART 100 [IU]/ML
0-10 INJECTION, SOLUTION INTRAVENOUS; SUBCUTANEOUS
Status: DISCONTINUED | OUTPATIENT
Start: 2024-05-10 | End: 2024-05-14 | Stop reason: HOSPADM

## 2024-05-10 RX ORDER — SYRING-NEEDL,DISP,INSUL,0.3 ML 29 G X1/2"
296 SYRINGE, EMPTY DISPOSABLE MISCELLANEOUS ONCE
Status: DISCONTINUED | OUTPATIENT
Start: 2024-05-10 | End: 2024-05-10

## 2024-05-10 RX ADMIN — ONDANSETRON 4 MG: 2 INJECTION INTRAMUSCULAR; INTRAVENOUS at 08:05

## 2024-05-10 RX ADMIN — ACETAMINOPHEN 650 MG: 325 TABLET ORAL at 06:05

## 2024-05-10 RX ADMIN — RIVAROXABAN 2.5 MG: 2.5 TABLET, FILM COATED ORAL at 08:05

## 2024-05-10 RX ADMIN — RIVAROXABAN 2.5 MG: 2.5 TABLET, FILM COATED ORAL at 05:05

## 2024-05-10 RX ADMIN — LUBIPROSTONE 24 MCG: 24 CAPSULE, GELATIN COATED ORAL at 08:05

## 2024-05-10 RX ADMIN — OXYCODONE HYDROCHLORIDE AND ACETAMINOPHEN 500 MG: 500 TABLET ORAL at 08:05

## 2024-05-10 RX ADMIN — PANTOPRAZOLE SODIUM 40 MG: 40 TABLET, DELAYED RELEASE ORAL at 08:05

## 2024-05-10 RX ADMIN — TRAMADOL HYDROCHLORIDE 50 MG: 50 TABLET, COATED ORAL at 08:05

## 2024-05-10 RX ADMIN — CYANOCOBALAMIN TAB 1000 MCG 1000 MCG: 1000 TAB at 08:05

## 2024-05-10 RX ADMIN — FERROUS SULFATE TAB 325 MG (65 MG ELEMENTAL FE) 1 EACH: 325 (65 FE) TAB at 08:05

## 2024-05-10 RX ADMIN — CHLORHEXIDINE GLUCONATE 0.12% ORAL RINSE 10 ML: 1.2 LIQUID ORAL at 08:05

## 2024-05-10 RX ADMIN — TRAMADOL HYDROCHLORIDE 50 MG: 50 TABLET, COATED ORAL at 04:05

## 2024-05-10 RX ADMIN — METOPROLOL TARTRATE 12.5 MG: 25 TABLET, FILM COATED ORAL at 08:05

## 2024-05-10 RX ADMIN — DOCUSATE SODIUM 100 MG: 100 CAPSULE, LIQUID FILLED ORAL at 08:05

## 2024-05-10 RX ADMIN — ESCITALOPRAM OXALATE 10 MG: 10 TABLET ORAL at 08:05

## 2024-05-10 RX ADMIN — ACETAMINOPHEN 650 MG: 325 TABLET ORAL at 12:05

## 2024-05-10 RX ADMIN — CLOPIDOGREL BISULFATE 75 MG: 75 TABLET ORAL at 08:05

## 2024-05-10 RX ADMIN — ASPIRIN 81 MG: 81 TABLET, COATED ORAL at 08:05

## 2024-05-10 RX ADMIN — ALBUMIN (HUMAN) 12.5 G: 2.5 SOLUTION INTRAVENOUS at 11:05

## 2024-05-10 RX ADMIN — FOLIC ACID 1 MG: 1 TABLET ORAL at 08:05

## 2024-05-10 NOTE — PT/OT/SLP PROGRESS
"Occupational Therapy   Treatment    Name: Feng Marques  MRN: [id number]  Admitting Diagnosis:  Coronary artery disease of native artery of native heart with stable angina pectoris  4 Days Post-Op    Recommendations:     Discharge Recommendations: Low Intensity Therapy (24/7 SPV and A)  Discharge Equipment Recommendations:  shower chair  Barriers to discharge:  None    Assessment:     Feng Marques is a 73 y.o. male with a medical diagnosis of Coronary artery disease of native artery of native heart with stable angina pectoris.  He presents with the following performance deficits affecting function are weakness, impaired endurance, impaired self care skills, impaired functional mobility, impaired balance, impaired cardiopulmonary response to activity, decreased safety awareness (sternal precautions).     Rehab Prognosis:  Good; patient would benefit from acute skilled OT services to address these deficits and reach maximum level of function.       Plan:     Patient to be seen 2 x/week to address the above listed problems via self-care/home management, therapeutic activities, therapeutic exercises  Plan of Care Expires: 05/22/24  Plan of Care Reviewed with: patient, family    Subjective     Chief Complaint: Reported "I am doing fine."  Patient/Family Comments/goals: none reported  Pain/Comfort:  Pain Rating 1: 0/10  Pain Addressed 1:  (activity pacing)    Objective:     Communicated with: NursePam, prior to session.  Patient found up in chair with blood pressure cuff, pulse ox (continuous), telemetry, bryant catheter, wound vac, oxygen, external pacer, central line upon OT entry to room.    General Precautions: Standard, fall, sternal    Orthopedic Precautions:N/A  Braces: N/A  Respiratory Status: Nasal cannula, flow 2 L/min     Occupational Performance:     Functional Mobility/Transfers:  Patient completed Sit <> Stand Transfer with minimum assistance  with  rolling walker   Patient completed Bed <> " Chair Transfer using Step Transfer technique with minimum assistance with rolling walker  Functional Mobility: Patient completed x80ft x2 trials functional mobility with RW and min A to increase dynamic standing balance and activity tolerance needed for ADL completion.  Provided with v/c for technique with transfer to increase safety and independence with completion  Continued cueing for sternal precaution compliance throughout  Seated anterior scooting to edge of chair with CGA and increased time    Activities of Daily Living:  Upper Body Dressing: minimum assistance donning gown as robe while seated in bedside chair    AMPAC 6 Click ADL: 16    Treatment & Education:  Patient tolerated session well overall. Edema to L hand noted. LOB with mobility when distracted, corrected with min A. Stable vitals throughout. Encouraged completion of B UE AROM therex throughout the day to increase functional strength and activity tolerance needed for ADL completion. Educated on benefits of OOB activity and importance of calling for A to transfer back to bed. Patient and family present in room stated understanding and in agreement with POC.    Patient left up in chair with all lines intact, call button in reach, and family present    GOALS:   Multidisciplinary Problems       Occupational Therapy Goals          Problem: Occupational Therapy    Goal Priority Disciplines Outcome Interventions   Occupational Therapy Goal     OT, PT/OT Progressing    Description: Goals to be met by: 5/22/24     Patient will increase functional independence with ADLs by performing:    Toileting from toilet with Supervision for hygiene and clothing management.   Toilet transfer to toilet with Supervision.  Demonstrates 100% functional compliance with sternal precautions.                         Time Tracking:     OT Date of Treatment: 05/10/24  OT Start Time: 0930  OT Stop Time: 1000  OT Total Time (min): 30 min    Billable Minutes:Therapeutic Activity  30    Su Beatty, OT  5/10/2024

## 2024-05-10 NOTE — ASSESSMENT & PLAN NOTE
05/07/2024   The patient is postop day 1 status post coronary artery bypass grafting timesx1. Overall the patient is doing well.  Neuro:  Patient is awake alert and oriented x3.  Neuro exam is nonfocal.  Requiring increasing pain medication.    Cardiac:  Patient has been stable.  Patient is on low-dose epi and vasopressin.  Wean as tolerated.  Respiratory:  Patient has good sats on nasal cannula.  Pulmonary toileting.  Continue incentive spirometer  GI:  Advance diet as tolerated.    Renal:  Creatinine is 1.1.   Patient has good urine output.  Start Lasix.  Id:  Patient is afebrile.  White count is 19.  Suspect due to stress of surgery.  No evidence of infection.  Continue to observe.  Endocrine:  Glucose is controlled with long-acting insulin and sliding scale.  Heme:  Hematocrit is down to 19.  Will transfuse 2 units packed red blood cells.  Activities:  Advance activities as tolerated.  Line tubes and drains:  Patient has a right IJ triple-lumen, Michel catheter, A-line, pacer wires and saphenectomy site PABLO drains.    05/08/2024   The patient is postop day 2 status post coronary artery bypass grafting x 4.  Overall the patient is doing well.    Neuro:  Patient is awake alert and oriented x3.  Neuro exam is nonfocal.  Pain is much better controlled.  Cardiac:  Patient is hemodynamically stable.  Patient is is being weaned off low-dose vasopressin and epinephrine.  Will start metoprolol.    Respiratory: Patient has good sats.  Continue pulmonary toileting.  Continue incentive spirometer.  GI:  Patient is tolerating p.o. will start cardiac diet.  Advance as tolerated.    Renal:  Creatinine is 1.3.  Patient had excellent response to Lasix yesterday.  Patient appears euvolemic.  Will discontinue Lasix.    Id:  Patient had a T-max of 101°.  White count is 21.  However, patient has no evidence of infections.  Continue to observe.    Endocrine: Glucose is controlled with long-acting and sliding scale insulin.    Heme:   Hematocrit is 27 status post transfusion of 2 units of packed red blood cells.  Platelet is 105.  Will add low-dose rivaroxaban for anti thrombin 3 effect.  Continue aspirin and Plavix.    Activities:  Patient is out of bed to chair.  Advance as tolerated.  Line tubes and drains:  Patient has a right IJ triple-lumen, Michel catheter, A-line and pacer wires.  Chest tubes have been discontinued.      05/09/2024   The patient is postop day 3 status post bypass grafting x4.  Overall the patient is.    Neuro:  Patient is awake alert and x3.  Neuro exam is nonfocal.  Pain is well controlled.    Cardiac:  Patient's is off all systolic blood pressure in the high 90s.  Will decrease metoprolol dose.    Respiratory:  Patient has good sats.  Continue pulmonary toileting.  Continue incentive spirometer.  GI:  Patient is complaining of nausea.  Patient has not had a bowel movement.  Will increase bowel regimen.  Patient's abdomen soft nontender sounds.  Renal:  Creatinine is 1.3.  Patient is off diuretics.    Id:  Patient is afebrile.  However white count remains high 22  No evidence of infection.  Continue to follow.  Endocrine: Glucose is controlled with long-acting and sliding scale insulin.  Patient has been having hypoglycemic episode.  Will decrease long-acting insulin.  Dose and frequency.    Heme:  Hematocrit is 24 and platelet count is 122 continue aspirin Plavix and low-dose rivaroxaban.  Activities:  Patient is out of bed to chair advance as tolerated.    Line tubes and drains:  Patient has a right IJ triple-lumen catheter Michel catheter, pacer wires, will discontinue Michel catheter.      05/10/2024   The patient is postop day 4 status post coronary artery bypass grafting x4.  Overall the patient is doing well.  Neuro:  Patient is awake alert and oriented x3.  Neuro exam is nonfocal.  Pain is well controlled.  Cardiac:  Patient's blood pressure has been improved since albumin yesterday.  Continue on low-dose  metoprolol.    Respiratory:  Patient has good sats on nasal cannula.  Continue pulmonary toileting.  Continue incentive spirometer.    GI: Patient nausea is much improved.  Patient had 1 bowel movement yesterday.  Will repeat Mag citrate.  Will add boost to patient's p.o. intake.  Patient has poor p.o. intake.  LFTs are slowly trending better.  Renal: Creatinine is 1.2.  Patient is making urine.  Patient is off diuretics.    Id:  Patient is afebrile.  White count is down to 15.  Will continue to follow.    Endocrine:  Patient had another episode of hypoglycemia on low-dose long-acting insulin.  Long-acting insulin will be discontinued.    Heme:  Hematocrit is 33 and platelet count is 135.  Continue aspirin Plavix and low-dose rivaroxaban.  Activities:  Patient is out of bed to chair ambulate and advance as tolerated.    Line tubes and drains:  Patient has peripheral lines and pacer wires.

## 2024-05-10 NOTE — PROGRESS NOTES
[facility])  Cardiothoracic Surgery  Progress Note    Patient Name: Feng Marques  MRN: [id number]  Admission Date: 5/3/2024  Hospital Length of Stay: 7 days  Code Status: Full Code   Attending Physician: Maury Amato MD   Referring Provider: Abdulaziz Carrasco MD  Principal Problem:Coronary artery disease of native artery of native heart with stable angina pectoris            Subjective:     Post-Op Info:  Procedure(s) (LRB):  CORONARY ARTERY BYPASS GRAFT (CABG) (N/A)  ECHOCARDIOGRAM,TRANSESOPHAGEAL (N/A)  SURGICAL PROCUREMENT, VEIN, ENDOSCOPIC (Left)  BLOCK, NERVE, INTERCOSTAL, 2 OR MORE (N/A)   4 Days Post-Op     Interval History: The patient is postop day 4 status post coronary artery bypass grafting x4.      ROS  Medications:  Continuous Infusions:   EPINEPHrine  0-0.5 mcg/kg/min Intravenous Continuous   Stopped at 05/08/24 1808    vasopressin  0.04 Units/min Intravenous Continuous   Stopped at 05/08/24 1000     Scheduled Meds:   acetaminophen  650 mg Oral Q6H    albumin human 5%  25 g Intravenous Once    ascorbic acid (vitamin C)  500 mg Oral BID    aspirin  81 mg Oral Daily    chlorhexidine  10 mL Mouth/Throat BID    clopidogreL  75 mg Oral Daily    cyanocobalamin  1,000 mcg Oral Daily    docusate sodium  100 mg Oral BID    EScitalopram oxalate  10 mg Oral Daily    ferrous sulfate  1 tablet Oral Daily    folic acid  1 mg Oral Daily    lubiprostone  24 mcg Oral BID WM    magnesium citrate  296 mL Oral Once    metoprolol tartrate  12.5 mg Oral BID    pantoprazole  40 mg Oral Daily    polyethylene glycol  17 g Oral Daily    rivaroxaban  2.5 mg Oral BID WM     PRN Meds:  Current Facility-Administered Medications:     0.9%  NaCl infusion (for blood administration), , Intravenous, Q24H PRN    albumin human 5%, 25 g, Intravenous, PRN    calcium gluconate IVPB, 1 g, Intravenous, PRN    calcium gluconate IVPB, 2 g, Intravenous, PRN    calcium gluconate IVPB, 3 g, Intravenous, PRN     dextrose 10%, 12.5 g, Intravenous, PRN    dextrose 10%, 25 g, Intravenous, PRN    glucagon (human recombinant), 1 mg, Intramuscular, PRN    glucose, 16 g, Oral, PRN    glucose, 24 g, Oral, PRN    influenza 65up-adj, 0.5 mL, Intramuscular, vaccine x 1 dose    insulin aspart U-100, 0-15 Units, Subcutaneous, QID (AC + HS) PRN    lactated ringers, 1,000 mL, Intravenous, PRN    magnesium sulfate IVPB, 4 g, Intravenous, PRN    metoclopramide, 5 mg, Intravenous, Q6H PRN    naloxone, 0.4 mg, Intravenous, PRN    nitroGLYCERIN, 0.4 mg, Sublingual, Q5 Min PRN    ondansetron, 4 mg, Intravenous, Q6H PRN    pneumoc 20-diaz conj-dip cr(PF), 0.5 mL, Intramuscular, vaccine x 1 dose    potassium chloride in water, 20 mEq, Intravenous, PRN    potassium chloride in water, 60 mEq, Intravenous, PRN    potassium chloride in water, 40 mEq, Intravenous, PRN    sodium chloride 0.9%, 10 mL, Intravenous, PRN    sodium chloride 0.9%, 10 mL, Intravenous, Q12H PRN    traMADoL, 50 mg, Oral, Q6H PRN     Objective:     Vital Signs (Most Recent):  Temp: 98.4 °F (36.9 °C) (05/10/24 0301)  Pulse: 90 (05/10/24 0839)  Resp: 20 (05/10/24 0839)  BP: (!) 105/55 (05/10/24 0835)  SpO2: 96 % (05/10/24 0839) Vital Signs (24h Range):  Temp:  [98.1 °F (36.7 °C)-99 °F (37.2 °C)] 98.4 °F (36.9 °C)  Pulse:  [89-97] 90  Resp:  [11-28] 20  SpO2:  [90 %-100 %] 96 %  BP: ()/(50-74) 105/55     Weight: 72.2 kg (159 lb 2.8 oz)  Body mass index is 22.84 kg/m².    SpO2: 96 %       Intake/Output - Last 3 Shifts         05/08 0700  05/09 0659 05/09 0700  05/10 0659 05/10 0700 05/11 0659    P.O. 720      I.V. (mL/kg) 594.9 (8.3) 478.1 (6.6)     Blood       IV Piggyback 205.9 43.4     Total Intake(mL/kg) 1520.8 (21.2) 521.5 (7.2)     Urine (mL/kg/hr) 2500 (1.4) 420 (0.2)     Drains       Other  0     Stool 0 0     Chest Tube       Total Output 2500 420     Net -979.2 +101.5            Stool Occurrence 0 x 1 x             Lines/Drains/Airways       Line  Duration                   Pacer Wires 05/06/24 1310 3 days              Peripheral Intravenous Line  Duration                  Peripheral IV - Single Lumen 05/09/24 2130 18 G Right Antecubital <1 day                     Physical Exam  Constitutional:       Appearance: Normal appearance.   HENT:      Head: Normocephalic and atraumatic.      Nose: Nose normal.   Cardiovascular:      Rate and Rhythm: Normal rate and regular rhythm.      Heart sounds: Normal heart sounds.   Pulmonary:      Breath sounds: Normal breath sounds.   Abdominal:      General: Abdomen is flat. Bowel sounds are normal.      Palpations: Abdomen is soft.   Musculoskeletal:      Right lower leg: No edema.      Left lower leg: No edema.   Skin:     General: Skin is warm and dry.   Neurological:      Mental Status: He is alert and oriented to person, place, and time.   Psychiatric:         Behavior: Behavior normal.            Significant Labs:  All pertinent labs from the last 24 hours have been reviewed.    Significant Diagnostics:  I have reviewed all pertinent imaging results/findings within the past 24 hours.  Assessment/Plan:     * Coronary artery disease of native artery of native heart with stable angina pectoris  The patient is a 73-year-old male with coronary artery disease status post stenting, diabetes, hyperlipidemia and hypertension who was worked up for chest pain with cardiac catheterization.  Cardiac catheterization shows severe multivessel coronary artery disease with a 90% proximal LAD stenosis.  The patient is a candidate for urgent coronary artery bypass grafting.  The risks and benefits of surgery have been explained to the patient.  The patient understands the risks and benefits of surgery and has agreed to proceed with urgent coronary artery bypass grafting which will be scheduled for 05/06/2024.    S/P CABG x 4  05/07/2024   The patient is postop day 1 status post coronary artery bypass grafting timesx1. Overall the patient is doing  well.  Neuro:  Patient is awake alert and oriented x3.  Neuro exam is nonfocal.  Requiring increasing pain medication.    Cardiac:  Patient has been stable.  Patient is on low-dose epi and vasopressin.  Wean as tolerated.  Respiratory:  Patient has good sats on nasal cannula.  Pulmonary toileting.  Continue incentive spirometer  GI:  Advance diet as tolerated.    Renal:  Creatinine is 1.1.   Patient has good urine output.  Start Lasix.  Id:  Patient is afebrile.  White count is 19.  Suspect due to stress of surgery.  No evidence of infection.  Continue to observe.  Endocrine:  Glucose is controlled with long-acting insulin and sliding scale.  Heme:  Hematocrit is down to 19.  Will transfuse 2 units packed red blood cells.  Activities:  Advance activities as tolerated.  Line tubes and drains:  Patient has a right IJ triple-lumen, Michel catheter, A-line, pacer wires and saphenectomy site PABLO drains.    05/08/2024   The patient is postop day 2 status post coronary artery bypass grafting x 4.  Overall the patient is doing well.    Neuro:  Patient is awake alert and oriented x3.  Neuro exam is nonfocal.  Pain is much better controlled.  Cardiac:  Patient is hemodynamically stable.  Patient is is being weaned off low-dose vasopressin and epinephrine.  Will start metoprolol.    Respiratory: Patient has good sats.  Continue pulmonary toileting.  Continue incentive spirometer.  GI:  Patient is tolerating p.o. will start cardiac diet.  Advance as tolerated.    Renal:  Creatinine is 1.3.  Patient had excellent response to Lasix yesterday.  Patient appears euvolemic.  Will discontinue Lasix.    Id:  Patient had a T-max of 101°.  White count is 21.  However, patient has no evidence of infections.  Continue to observe.    Endocrine: Glucose is controlled with long-acting and sliding scale insulin.    Heme:  Hematocrit is 27 status post transfusion of 2 units of packed red blood cells.  Platelet is 105.  Will add low-dose  rivaroxaban for anti thrombin 3 effect.  Continue aspirin and Plavix.    Activities:  Patient is out of bed to chair.  Advance as tolerated.  Line tubes and drains:  Patient has a right IJ triple-lumen, Michel catheter, A-line and pacer wires.  Chest tubes have been discontinued.      05/09/2024   The patient is postop day 3 status post bypass grafting x4.  Overall the patient is.    Neuro:  Patient is awake alert and x3.  Neuro exam is nonfocal.  Pain is well controlled.    Cardiac:  Patient's is off all systolic blood pressure in the high 90s.  Will decrease metoprolol dose.    Respiratory:  Patient has good sats.  Continue pulmonary toileting.  Continue incentive spirometer.  GI:  Patient is complaining of nausea.  Patient has not had a bowel movement.  Will increase bowel regimen.  Patient's abdomen soft nontender sounds.  Renal:  Creatinine is 1.3.  Patient is off diuretics.    Id:  Patient is afebrile.  However white count remains high 22  No evidence of infection.  Continue to follow.  Endocrine: Glucose is controlled with long-acting and sliding scale insulin.  Patient has been having hypoglycemic episode.  Will decrease long-acting insulin.  Dose and frequency.    Heme:  Hematocrit is 24 and platelet count is 122 continue aspirin Plavix and low-dose rivaroxaban.  Activities:  Patient is out of bed to chair advance as tolerated.    Line tubes and drains:  Patient has a right IJ triple-lumen catheter Michel catheter, pacer wires, will discontinue Michel catheter.      05/10/2024   The patient is postop day 4 status post coronary artery bypass grafting x4.  Overall the patient is doing well.  Neuro:  Patient is awake alert and oriented x3.  Neuro exam is nonfocal.  Pain is well controlled.  Cardiac:  Patient's blood pressure has been improved since albumin yesterday.  Continue on low-dose metoprolol.    Respiratory:  Patient has good sats on nasal cannula.  Continue pulmonary toileting.  Continue incentive  spirometer.    GI: Patient nausea is much improved.  Patient had 1 bowel movement yesterday.  Will repeat Mag citrate.  Will add boost to patient's p.o. intake.  Patient has poor p.o. intake.  LFTs are slowly trending better.  Renal: Creatinine is 1.2.  Patient is making urine.  Patient is off diuretics.    Id:  Patient is afebrile.  White count is down to 15.  Will continue to follow.    Endocrine:  Patient had another episode of hypoglycemia on low-dose long-acting insulin.  Long-acting insulin will be discontinued.    Heme:  Hematocrit is 33 and platelet count is 135.  Continue aspirin Plavix and low-dose rivaroxaban.  Activities:  Patient is out of bed to chair ambulate and advance as tolerated.    Line tubes and drains:  Patient has peripheral lines and pacer wires.        Maury Amato MD  Cardiothoracic Surgery  [facility] ([facility])

## 2024-05-10 NOTE — SUBJECTIVE & OBJECTIVE
Review of Systems   Constitutional: Positive for decreased appetite and malaise/fatigue.   HENT: Negative.     Eyes: Negative.    Cardiovascular:  Positive for chest pain (incisional) and dyspnea on exertion.   Respiratory: Negative.     Endocrine: Negative.    Hematologic/Lymphatic: Negative.    Skin: Negative.    Musculoskeletal:  Positive for arthritis and joint pain.   Gastrointestinal: Negative.    Genitourinary: Negative.    Neurological: Negative.    Psychiatric/Behavioral: Negative.     Allergic/Immunologic: Negative.      Objective:     Vital Signs (Most Recent):  Temp: 98.3 °F (36.8 °C) (05/10/24 0705)  Pulse: 89 (05/10/24 0905)  Resp: 19 (05/10/24 0905)  BP: 106/63 (05/10/24 0900)  SpO2: 100 % (05/10/24 0905) Vital Signs (24h Range):  Temp:  [98.1 °F (36.7 °C)-98.9 °F (37.2 °C)] 98.3 °F (36.8 °C)  Pulse:  [89-97] 89  Resp:  [11-25] 19  SpO2:  [90 %-100 %] 100 %  BP: ()/(50-74) 106/63     Weight: 72.2 kg (159 lb 2.8 oz)  Body mass index is 22.84 kg/m².     SpO2: 100 %         Intake/Output Summary (Last 24 hours) at 5/10/2024 1125  Last data filed at 5/10/2024 0600  Gross per 24 hour   Intake 521.47 ml   Output 225 ml   Net 296.47 ml       Lines/Drains/Airways       Line  Duration                  Pacer Wires 05/06/24 1310 3 days              Peripheral Intravenous Line  Duration                  Peripheral IV - Single Lumen 05/09/24 2130 18 G Right Antecubital <1 day                       Physical Exam  Vitals and nursing note reviewed.   Constitutional:       General: He is not in acute distress.     Appearance: He is well-developed. He is not diaphoretic.      Comments: On supplemental O2   HENT:      Head: Normocephalic and atraumatic.   Eyes:      General:         Right eye: No discharge.         Left eye: No discharge.      Pupils: Pupils are equal, round, and reactive to light.   Cardiovascular:      Rate and Rhythm: Normal rate and regular rhythm.      Heart sounds: Normal heart sounds,  "S1 normal and S2 normal. No murmur heard.  Pulmonary:      Effort: Pulmonary effort is normal. No respiratory distress.      Comments: Diminished BS at bases  Abdominal:      General: There is no distension.   Musculoskeletal:      Right lower leg: No edema.      Left lower leg: No edema.   Skin:     General: Skin is warm and dry.      Findings: No erythema.   Neurological:      General: No focal deficit present.      Mental Status: He is alert and oriented to person, place, and time.   Psychiatric:         Mood and Affect: Mood normal.         Behavior: Behavior normal.            Significant Labs: CMP   Recent Labs   Lab 05/09/24  0444 05/09/24  1717 05/10/24  0511   * 134* 135*   K 4.1 4.2 4.1   CL 96 96 97   CO2 30* 31* 31*   GLU 82 89 104   BUN 39* 44* 37*   CREATININE 1.3 1.2 1.2   CALCIUM 10.4 11.3* 10.3   PROT 5.7*  --  5.8*   ALBUMIN 3.8  --  3.4*   BILITOT 0.8  --  0.7   ALKPHOS 79  --  72   *  --  285*   *  --  430*   ANIONGAP 8 7* 7*   , CBC   Recent Labs   Lab 05/09/24  0444 05/09/24  1717 05/10/24  0511   WBC 22.87* 19.17* 15.59*   HGB 9.9* 9.7* 10.9*   HCT 29.4* 28.5* 33.7*   * 117* 135*   , Troponin No results for input(s): "TROPONINI" in the last 48 hours., and All pertinent lab results from the last 24 hours have been reviewed.    Significant Imaging: Echocardiogram: Transthoracic echo (TTE) complete (Cupid Only):   Results for orders placed or performed during the hospital encounter of 05/03/24   Echo   Result Value Ref Range    BSA 1.8 m2    LVOT stroke volume 50.79 cm3    LVIDd 4.01 3.5 - 6.0 cm    LV Systolic Volume 25.50 mL    LV Systolic Volume Index 14.0 mL/m2    LVIDs 2.64 2.1 - 4.0 cm    LV Diastolic Volume 70.21 mL    LV Diastolic Volume Index 38.58 mL/m2    IVS 1.55 (A) 0.6 - 1.1 cm    LVOT diameter 2.03 cm    LVOT area 3.2 cm2    FS 34 28 - 44 %    Left Ventricle Relative Wall Thickness 0.63 cm    Posterior Wall 1.27 (A) 0.6 - 1.1 cm    LV mass 212.04 g    LV " Mass Index 117 g/m2    MV Peak E Evin 0.68 m/s    TDI LATERAL 0.08 m/s    TDI SEPTAL 0.07 m/s    E/E' ratio 9.07 m/s    MV Peak A Evin 0.99 m/s    TR Max Evin 1.97 m/s    E/A ratio 0.69     IVRT 83.73 msec    E wave deceleration time 268.67 msec    LV SEPTAL E/E' RATIO 9.71 m/s    LV LATERAL E/E' RATIO 8.50 m/s    LVOT peak evin 0.78 m/s    Left Ventricular Outflow Tract Mean Velocity 0.67 cm/s    Left Ventricular Outflow Tract Mean Gradient 1.83 mmHg    RVOT peak VTI 19.6 cm    TAPSE 2.38 cm    LA size 3.87 cm    Left Atrium Minor Axis 4.81 cm    Left Atrium Major Axis 4.91 cm    RA Major Axis 4.15 cm    AV mean gradient 3 mmHg    AV peak gradient 4 mmHg    Ao peak evin 0.98 m/s    Ao VTI 20.50 cm    LVOT peak VTI 15.70 cm    AV valve area 2.48 cm²    AV Velocity Ratio 0.80     AV index (prosthetic) 0.77     LEXUS by Velocity Ratio 2.57 cm²    Mr max evin 3.82 m/s    MV mean gradient 1 mmHg    MV peak gradient 3 mmHg    MV stenosis pressure 1/2 time 77.91 ms    MV valve area p 1/2 method 2.82 cm2    MV valve area by continuity eq 1.74 cm2    MV VTI 29.2 cm    Triscuspid Valve Regurgitation Peak Gradient 16 mmHg    PV mean gradient 3 mmHg    RVOT peak evin 1.05 m/s    Ao root annulus 3.39 cm    STJ 3.33 cm    Ascending aorta 3.23 cm    Mean e' 0.08 m/s    ZLVIDS -1.30     ZLVIDD -2.28     LA Volume Index 34.3 mL/m2    LA volume 62.34 cm3    LA WIDTH 3.9 cm    RA Width 2.8 cm    EF 60 %    TV resting pulmonary artery pressure 19 mmHg    RV TB RVSP 5 mmHg    Est. RA pres 3 mmHg    Narrative      Left Ventricle: The left ventricle is normal in size. Normal wall   thickness. There is concentric hypertrophy. Regional wall motion   abnormalities present. See diagram for wall motion findings. There is   normal systolic function with a visually estimated ejection fraction of 55   - 70%. Ejection fraction by visual approximation is 60%.    Right Ventricle: Normal right ventricular cavity size. Wall thickness   is normal. Systolic  function is normal.    Mitral Valve: There is mild regurgitation.    IVC/SVC: Normal venous pressure at 3 mmHg.      and EKG: Reviewed

## 2024-05-10 NOTE — ASSESSMENT & PLAN NOTE
-Continue current post-op care and mgmt as per CTS  -ASA Plavix statin BB  -IS usage   -Ambulation when able    5/8/24  -Pain improved  -Continue ASA, Plavix, BB  -Statin held due to bumped LFT's  -IS usage and ambulation    5/9/24  -Stable CV wise  -Continue ASA, Plavix, BB  -Statin held due to bumped LFT's  -Continue IS usage and ambulation    5/10/24  -No acute CV issues  -Continue ASA, Plavix, BB  -Statin on hold  -IS usage and ambulation/PT/OT

## 2024-05-10 NOTE — PLAN OF CARE
Pt AAOx4. GCS 14; confused at times. VSS with one episode of hypotension during shift; PRN Albumin given; effective. Afebrile. Pacemaker turned off today; pt tolerating well. SR on monitor. Room air. Accuchecks ACHS; no coverage. Family updated at the bedside.    Pt resting comfortably in bed with side rails up x3; locked and lowered with alarm set. Call light in place. Advised pt to call out if anything is needed. Pt verbalized understanding.

## 2024-05-10 NOTE — SUBJECTIVE & OBJECTIVE
Interval History: The patient is postop day 4 status post coronary artery bypass grafting x4.      ROS  Medications:  Continuous Infusions:   EPINEPHrine  0-0.5 mcg/kg/min Intravenous Continuous   Stopped at 05/08/24 1808    vasopressin  0.04 Units/min Intravenous Continuous   Stopped at 05/08/24 1000     Scheduled Meds:   acetaminophen  650 mg Oral Q6H    albumin human 5%  25 g Intravenous Once    ascorbic acid (vitamin C)  500 mg Oral BID    aspirin  81 mg Oral Daily    chlorhexidine  10 mL Mouth/Throat BID    clopidogreL  75 mg Oral Daily    cyanocobalamin  1,000 mcg Oral Daily    docusate sodium  100 mg Oral BID    EScitalopram oxalate  10 mg Oral Daily    ferrous sulfate  1 tablet Oral Daily    folic acid  1 mg Oral Daily    lubiprostone  24 mcg Oral BID WM    magnesium citrate  296 mL Oral Once    metoprolol tartrate  12.5 mg Oral BID    pantoprazole  40 mg Oral Daily    polyethylene glycol  17 g Oral Daily    rivaroxaban  2.5 mg Oral BID WM     PRN Meds:  Current Facility-Administered Medications:     0.9%  NaCl infusion (for blood administration), , Intravenous, Q24H PRN    albumin human 5%, 25 g, Intravenous, PRN    calcium gluconate IVPB, 1 g, Intravenous, PRN    calcium gluconate IVPB, 2 g, Intravenous, PRN    calcium gluconate IVPB, 3 g, Intravenous, PRN    dextrose 10%, 12.5 g, Intravenous, PRN    dextrose 10%, 25 g, Intravenous, PRN    glucagon (human recombinant), 1 mg, Intramuscular, PRN    glucose, 16 g, Oral, PRN    glucose, 24 g, Oral, PRN    influenza 65up-adj, 0.5 mL, Intramuscular, vaccine x 1 dose    insulin aspart U-100, 0-15 Units, Subcutaneous, QID (AC + HS) PRN    lactated ringers, 1,000 mL, Intravenous, PRN    magnesium sulfate IVPB, 4 g, Intravenous, PRN    metoclopramide, 5 mg, Intravenous, Q6H PRN    naloxone, 0.4 mg, Intravenous, PRN    nitroGLYCERIN, 0.4 mg, Sublingual, Q5 Min PRN    ondansetron, 4 mg, Intravenous, Q6H PRN    pneumoc 20-diaz conj-dip cr(PF), 0.5 mL, Intramuscular,  vaccine x 1 dose    potassium chloride in water, 20 mEq, Intravenous, PRN    potassium chloride in water, 60 mEq, Intravenous, PRN    potassium chloride in water, 40 mEq, Intravenous, PRN    sodium chloride 0.9%, 10 mL, Intravenous, PRN    sodium chloride 0.9%, 10 mL, Intravenous, Q12H PRN    traMADoL, 50 mg, Oral, Q6H PRN     Objective:     Vital Signs (Most Recent):  Temp: 98.4 °F (36.9 °C) (05/10/24 0301)  Pulse: 90 (05/10/24 0839)  Resp: 20 (05/10/24 0839)  BP: (!) 105/55 (05/10/24 0835)  SpO2: 96 % (05/10/24 0839) Vital Signs (24h Range):  Temp:  [98.1 °F (36.7 °C)-99 °F (37.2 °C)] 98.4 °F (36.9 °C)  Pulse:  [89-97] 90  Resp:  [11-28] 20  SpO2:  [90 %-100 %] 96 %  BP: ()/(50-74) 105/55     Weight: 72.2 kg (159 lb 2.8 oz)  Body mass index is 22.84 kg/m².    SpO2: 96 %       Intake/Output - Last 3 Shifts         05/08 0700  05/09 0659 05/09 0700  05/10 0659 05/10 0700 05/11 0659    P.O. 720      I.V. (mL/kg) 594.9 (8.3) 478.1 (6.6)     Blood       IV Piggyback 205.9 43.4     Total Intake(mL/kg) 1520.8 (21.2) 521.5 (7.2)     Urine (mL/kg/hr) 2500 (1.4) 420 (0.2)     Drains       Other  0     Stool 0 0     Chest Tube       Total Output 2500 420     Net -979.2 +101.5            Stool Occurrence 0 x 1 x             Lines/Drains/Airways       Line  Duration                  Pacer Wires 05/06/24 1310 3 days              Peripheral Intravenous Line  Duration                  Peripheral IV - Single Lumen 05/09/24 2130 18 G Right Antecubital <1 day                     Physical Exam  Constitutional:       Appearance: Normal appearance.   HENT:      Head: Normocephalic and atraumatic.      Nose: Nose normal.   Cardiovascular:      Rate and Rhythm: Normal rate and regular rhythm.      Heart sounds: Normal heart sounds.   Pulmonary:      Breath sounds: Normal breath sounds.   Abdominal:      General: Abdomen is flat. Bowel sounds are normal.      Palpations: Abdomen is soft.   Musculoskeletal:      Right lower leg: No  edema.      Left lower leg: No edema.   Skin:     General: Skin is warm and dry.   Neurological:      Mental Status: He is alert and oriented to person, place, and time.   Psychiatric:         Behavior: Behavior normal.            Significant Labs:  All pertinent labs from the last 24 hours have been reviewed.    Significant Diagnostics:  I have reviewed all pertinent imaging results/findings within the past 24 hours.

## 2024-05-10 NOTE — PT/OT/SLP PROGRESS
Physical Therapy Treatment    Patient Name:  Feng Marques   MRN:  [id number]    Recommendations:     Discharge Recommendations: Low Intensity Therapy (WITH 24 HOUR CARE FOR SAFETY)  Discharge Equipment Recommendations: shower chair  Barriers to discharge: None    Assessment:     Feng Marques is a 73 y.o. male admitted with a medical diagnosis of Coronary artery disease of native artery of native heart with stable angina pectoris.  He presents with the following impairments/functional limitations: weakness, impaired endurance, impaired functional mobility, gait instability, impaired balance, decreased safety awareness, decreased lower extremity function, decreased coordination.    Rehab Prognosis: Good; patient would benefit from acute skilled PT services to address these deficits and reach maximum level of function.    Recent Surgery: Procedure(s) (LRB):  CORONARY ARTERY BYPASS GRAFT (CABG) (N/A)  ECHOCARDIOGRAM,TRANSESOPHAGEAL (N/A)  SURGICAL PROCUREMENT, VEIN, ENDOSCOPIC (Left)  BLOCK, NERVE, INTERCOSTAL, 2 OR MORE (N/A) 4 Days Post-Op    Plan:     During this hospitalization, patient to be seen 3 x/week to address the identified rehab impairments via gait training, therapeutic activities, therapeutic exercises and progress toward the following goals:    Plan of Care Expires:  05/22/24    Subjective     Chief Complaint: NONE, EAGER TO WALK  Patient/Family Comments/goals:   Pain/Comfort:  Pain Rating 1: 0/10      Objective:     Communicated with NURSE MODI prior to session.  Patient found up in chair with blood pressure cuff, pulse ox (continuous), oxygen, telemetry, peripheral IV, bryant catheter, central line, external pacer, wound vac upon PT entry to room.     General Precautions: Standard, fall, sternal  Orthopedic Precautions: N/A  Braces: N/A  Respiratory Status: Nasal cannula, flow 2 L/min     Functional Mobility:  Bed Mobility:     Scooting: stand by assistance  Transfers:     Sit to Stand:   "minimum assistance with rolling walker  Gait: PT AMB 70' X 2 TRIALS WITH RW AND FATUMA, SLOW STEADY PACE, VITALS STABLE THROUGHOUT, PT DENIES DIZZINESS, CUES FOR UPRIGHT POSTURE AND RW SAFETY, 1 SMALL STANDING REST BREAK  Balance: FAIR SITTING BALANCE, POOR+ DYNAMIC BALANCE DURING GAIT  PT EDUCATED IN AND PERFORMED SEATED BLE THEREX X 15 REPS: HIP FLEX/EXT, LAQ, AP'S    AM-PAC 6 CLICK MOBILITY  Turning over in bed (including adjusting bedclothes, sheets and blankets)?: 1  Sitting down on and standing up from a chair with arms (e.g., wheelchair, bedside commode, etc.): 3  Moving from lying on back to sitting on the side of the bed?: 1  Moving to and from a bed to a chair (including a wheelchair)?: 3  Need to walk in hospital room?: 3  Climbing 3-5 steps with a railing?: 1  Basic Mobility Total Score: 12     Treatment & Education:  PT EDUCATION:  - ROLE OF P.T. AND POC IN ACUTE CARE HOSPITAL SETTING  - RW USE AND SAFETY DURING TF'S AND GAIT  - REVIEW STERNAL PRECAUTIONS  - ENCOURAGED TO INCREASE TIME OOB IN CHAIR TO TOLERANCE   - TO CONTINUE THERAPUETIC EXERCISES THROUGHOUT THE DAY TO INCREASE ACTIVITY TOLERANCE AND DECREASE RISK FOR PNEUMONIA AND BLOOD CLOTS: HIP FLEX/EXT, HIP ABD/ADD, QUAD SET, HEEL SLIDE, AP  - RISK FOR FALLS DUE TO GENERALIZED WEAKNESS, EDUCATED ON "CALL DON'T FALL", ENCOURAGED TO CALL FOR ASSISTANCE WITH ALL NEEDS SUCH AS BED<>CHAIR TRANSFERS OR TRIPS TO BATHROOM, PT AGREEABLE TO SAFETY PRECAUTIONS    Patient left up in chair with all lines intact, call button in reach, chair alarm on, NURSE notified, and DAUGHTER present..    GOALS:   Multidisciplinary Problems       Physical Therapy Goals          Problem: Physical Therapy    Goal Priority Disciplines Outcome Goal Variances Interventions   Physical Therapy Goal     PT, PT/OT Progressing     Description: LTG'S TO BE MET IN 14 DAYS (5-22-24)  PT WILL BE EMERSON FOR BED MOBILITY  PT WILL BE EMERSON FOR BED<>CHAIR TF'S  PT WILL  FEET NO AD EMERSON  PT " WILL INC AMPAC SCORE BY 2 POINTS TO PROGRESS GROSS FUNC MOBILITY                         Time Tracking:     PT Received On: 05/10/24  PT Start Time: 0910     PT Stop Time: 0940  PT Total Time (min): 30 min     Billable Minutes: Gait Training 15 and Therapeutic Activity 15    Treatment Type: Treatment  PT/PTA: PT     Number of PTA visits since last PT visit: 0     05/10/2024

## 2024-05-10 NOTE — PROGRESS NOTES
[facility] ([facility])  Cardiology  Progress Note    Patient Name: Feng Marques  MRN: [id number]  Admission Date: 5/3/2024  Hospital Length of Stay: 7 days  Code Status: Full Code   Attending Physician: Maury Amato MD   Primary Care Physician: Maciel Enriquez MD  Expected Discharge Date:   Principal Problem:Coronary artery disease of native artery of native heart with stable angina pectoris    Subjective:   Hospital Course:   5/4/2024 uneventful night denies any chest pain or shortness of breath awaiting cabg on Monday 5/5/2024 NO ANGINA ASYMPTOMATIC    5/7/24-Patient seen and examined today, s/p CABG x4, POD # 1. Moaning in pain during exam. Labs reviewed. H/H 7.6/22.8. Platelets 128,000.    5/8/24-Patient seen and examined today, s/p CABG x 4, POD #2. Feeling better, sitting up in chair. Pain improved. Chest tubes removed. Labs reviewed. Creatinine 1.3. H/H improved post transfusion. LFT's bumped.    5/9/24-Patient seen and examined today, s/p CABG x 4 POD # 3. Continues to improve. Pain controlled. Ambulated with PT/OT this AM. Labs reviewed.     5/10/24-Patient seen and examined today, s/p CABG x 4 POD # 4. Feels ok, still weak/tired. Working with PT/OT this AM. Paced on monitor. Labs reviewed. LFT's still bumped.        Review of Systems   Constitutional: Positive for decreased appetite and malaise/fatigue.   HENT: Negative.     Eyes: Negative.    Cardiovascular:  Positive for chest pain (incisional) and dyspnea on exertion.   Respiratory: Negative.     Endocrine: Negative.    Hematologic/Lymphatic: Negative.    Skin: Negative.    Musculoskeletal:  Positive for arthritis and joint pain.   Gastrointestinal: Negative.    Genitourinary: Negative.    Neurological: Negative.    Psychiatric/Behavioral: Negative.     Allergic/Immunologic: Negative.      Objective:     Vital Signs (Most Recent):  Temp: 98.3 °F (36.8 °C) (05/10/24 0705)  Pulse: 89 (05/10/24 0905)  Resp: 19 (05/10/24  0905)  BP: 106/63 (05/10/24 0900)  SpO2: 100 % (05/10/24 0905) Vital Signs (24h Range):  Temp:  [98.1 °F (36.7 °C)-98.9 °F (37.2 °C)] 98.3 °F (36.8 °C)  Pulse:  [89-97] 89  Resp:  [11-25] 19  SpO2:  [90 %-100 %] 100 %  BP: ()/(50-74) 106/63     Weight: 72.2 kg (159 lb 2.8 oz)  Body mass index is 22.84 kg/m².     SpO2: 100 %         Intake/Output Summary (Last 24 hours) at 5/10/2024 1125  Last data filed at 5/10/2024 0600  Gross per 24 hour   Intake 521.47 ml   Output 225 ml   Net 296.47 ml       Lines/Drains/Airways       Line  Duration                  Pacer Wires 05/06/24 1310 3 days              Peripheral Intravenous Line  Duration                  Peripheral IV - Single Lumen 05/09/24 2130 18 G Right Antecubital <1 day                       Physical Exam  Vitals and nursing note reviewed.   Constitutional:       General: He is not in acute distress.     Appearance: He is well-developed. He is not diaphoretic.      Comments: On supplemental O2   HENT:      Head: Normocephalic and atraumatic.   Eyes:      General:         Right eye: No discharge.         Left eye: No discharge.      Pupils: Pupils are equal, round, and reactive to light.   Cardiovascular:      Rate and Rhythm: Normal rate and regular rhythm.      Heart sounds: Normal heart sounds, S1 normal and S2 normal. No murmur heard.  Pulmonary:      Effort: Pulmonary effort is normal. No respiratory distress.      Comments: Diminished BS at bases  Abdominal:      General: There is no distension.   Musculoskeletal:      Right lower leg: No edema.      Left lower leg: No edema.   Skin:     General: Skin is warm and dry.      Findings: No erythema.   Neurological:      General: No focal deficit present.      Mental Status: He is alert and oriented to person, place, and time.   Psychiatric:         Mood and Affect: Mood normal.         Behavior: Behavior normal.            Significant Labs: CMP   Recent Labs   Lab 05/09/24  0444 05/09/24  5557  "05/10/24  0511   * 134* 135*   K 4.1 4.2 4.1   CL 96 96 97   CO2 30* 31* 31*   GLU 82 89 104   BUN 39* 44* 37*   CREATININE 1.3 1.2 1.2   CALCIUM 10.4 11.3* 10.3   PROT 5.7*  --  5.8*   ALBUMIN 3.8  --  3.4*   BILITOT 0.8  --  0.7   ALKPHOS 79  --  72   *  --  285*   *  --  430*   ANIONGAP 8 7* 7*   , CBC   Recent Labs   Lab 05/09/24  0444 05/09/24  1717 05/10/24  0511   WBC 22.87* 19.17* 15.59*   HGB 9.9* 9.7* 10.9*   HCT 29.4* 28.5* 33.7*   * 117* 135*   , Troponin No results for input(s): "TROPONINI" in the last 48 hours., and All pertinent lab results from the last 24 hours have been reviewed.    Significant Imaging: Echocardiogram: Transthoracic echo (TTE) complete (Cupid Only):   Results for orders placed or performed during the hospital encounter of 05/03/24   Echo   Result Value Ref Range    BSA 1.8 m2    LVOT stroke volume 50.79 cm3    LVIDd 4.01 3.5 - 6.0 cm    LV Systolic Volume 25.50 mL    LV Systolic Volume Index 14.0 mL/m2    LVIDs 2.64 2.1 - 4.0 cm    LV Diastolic Volume 70.21 mL    LV Diastolic Volume Index 38.58 mL/m2    IVS 1.55 (A) 0.6 - 1.1 cm    LVOT diameter 2.03 cm    LVOT area 3.2 cm2    FS 34 28 - 44 %    Left Ventricle Relative Wall Thickness 0.63 cm    Posterior Wall 1.27 (A) 0.6 - 1.1 cm    LV mass 212.04 g    LV Mass Index 117 g/m2    MV Peak E Evin 0.68 m/s    TDI LATERAL 0.08 m/s    TDI SEPTAL 0.07 m/s    E/E' ratio 9.07 m/s    MV Peak A Evin 0.99 m/s    TR Max Evin 1.97 m/s    E/A ratio 0.69     IVRT 83.73 msec    E wave deceleration time 268.67 msec    LV SEPTAL E/E' RATIO 9.71 m/s    LV LATERAL E/E' RATIO 8.50 m/s    LVOT peak evin 0.78 m/s    Left Ventricular Outflow Tract Mean Velocity 0.67 cm/s    Left Ventricular Outflow Tract Mean Gradient 1.83 mmHg    RVOT peak VTI 19.6 cm    TAPSE 2.38 cm    LA size 3.87 cm    Left Atrium Minor Axis 4.81 cm    Left Atrium Major Axis 4.91 cm    RA Major Axis 4.15 cm    AV mean gradient 3 mmHg    AV peak gradient 4 mmHg "    Ao peak fabrizio 0.98 m/s    Ao VTI 20.50 cm    LVOT peak VTI 15.70 cm    AV valve area 2.48 cm²    AV Velocity Ratio 0.80     AV index (prosthetic) 0.77     LEXUS by Velocity Ratio 2.57 cm²    Mr max fabrizio 3.82 m/s    MV mean gradient 1 mmHg    MV peak gradient 3 mmHg    MV stenosis pressure 1/2 time 77.91 ms    MV valve area p 1/2 method 2.82 cm2    MV valve area by continuity eq 1.74 cm2    MV VTI 29.2 cm    Triscuspid Valve Regurgitation Peak Gradient 16 mmHg    PV mean gradient 3 mmHg    RVOT peak fabrizio 1.05 m/s    Ao root annulus 3.39 cm    STJ 3.33 cm    Ascending aorta 3.23 cm    Mean e' 0.08 m/s    ZLVIDS -1.30     ZLVIDD -2.28     LA Volume Index 34.3 mL/m2    LA volume 62.34 cm3    LA WIDTH 3.9 cm    RA Width 2.8 cm    EF 60 %    TV resting pulmonary artery pressure 19 mmHg    RV TB RVSP 5 mmHg    Est. RA pres 3 mmHg    Narrative      Left Ventricle: The left ventricle is normal in size. Normal wall   thickness. There is concentric hypertrophy. Regional wall motion   abnormalities present. See diagram for wall motion findings. There is   normal systolic function with a visually estimated ejection fraction of 55   - 70%. Ejection fraction by visual approximation is 60%.    Right Ventricle: Normal right ventricular cavity size. Wall thickness   is normal. Systolic function is normal.    Mitral Valve: There is mild regurgitation.    IVC/SVC: Normal venous pressure at 3 mmHg.      and EKG: Reviewed  Assessment and Plan:   Patient who presents with multivessel CAD, recovering s/p CABG. Mgmt as per CTS.    * Coronary artery disease of native artery of native heart with stable angina pectoris  Multivessel cad for cabg on Monday no angina    S/P CABG x 4  -Continue current post-op care and mgmt as per CTS  -ASA Plavix statin BB  -IS usage   -Ambulation when able    5/8/24  -Pain improved  -Continue ASA, Plavix, BB  -Statin held due to bumped LFT's  -IS usage and ambulation    5/9/24  -Stable CV wise  -Continue ASA,  Plavix, BB  -Statin held due to bumped LFT's  -Continue IS usage and ambulation    5/10/24  -No acute CV issues  -Continue ASA, Plavix, BB  -Statin on hold  -IS usage and ambulation/PT/OT    Other hyperlipidemia  -statins    5/8/24  -LFT's bumped, statin held    Type 2 diabetes mellitus with complication, without long-term current use of insulin  Per hospital medicine    Essential hypertension  -Continuer home meds        VTE Risk Mitigation (From admission, onward)           Ordered     Place DELROY hose  Until discontinued        Comments: Post Op Day 1 - After Ace and Drain Removed    05/06/24 1427     Place sequential compression device  Until discontinued         05/06/24 1427     IP VTE HIGH RISK PATIENT  Once         05/06/24 1427     Place DELROY hose  Until discontinued         05/05/24 1954     Place sequential compression device  Until discontinued         05/05/24 1954     Place sequential compression device  Until discontinued         05/03/24 1124                    Debora Ramírez PA-C  Cardiology  [facility] ([facility])

## 2024-05-11 LAB
ALBUMIN SERPL BCP-MCNC: 3.3 G/DL (ref 3.5–5.2)
ALP SERPL-CCNC: 69 U/L (ref 55–135)
ALT SERPL W/O P-5'-P-CCNC: 544 U/L (ref 10–44)
ANION GAP SERPL CALC-SCNC: 10 MMOL/L (ref 8–16)
ANION GAP SERPL CALC-SCNC: 12 MMOL/L (ref 8–16)
AST SERPL-CCNC: 268 U/L (ref 10–40)
BASOPHILS # BLD AUTO: 0.03 K/UL (ref 0–0.2)
BASOPHILS NFR BLD: 0.2 % (ref 0–1.9)
BILIRUB DIRECT SERPL-MCNC: 0.3 MG/DL (ref 0.1–0.3)
BILIRUB SERPL-MCNC: 0.8 MG/DL (ref 0.1–1)
BILIRUB UR QL STRIP: NEGATIVE
BUN SERPL-MCNC: 30 MG/DL (ref 8–23)
BUN SERPL-MCNC: 31 MG/DL (ref 8–23)
CALCIUM SERPL-MCNC: 10 MG/DL (ref 8.7–10.5)
CALCIUM SERPL-MCNC: 9.1 MG/DL (ref 8.7–10.5)
CHLORIDE SERPL-SCNC: 98 MMOL/L (ref 95–110)
CHLORIDE SERPL-SCNC: 99 MMOL/L (ref 95–110)
CLARITY UR: CLEAR
CO2 SERPL-SCNC: 25 MMOL/L (ref 23–29)
CO2 SERPL-SCNC: 26 MMOL/L (ref 23–29)
COLOR UR: YELLOW
CREAT SERPL-MCNC: 1 MG/DL (ref 0.5–1.4)
CREAT SERPL-MCNC: 1.1 MG/DL (ref 0.5–1.4)
DIFFERENTIAL METHOD BLD: ABNORMAL
EOSINOPHIL # BLD AUTO: 0.2 K/UL (ref 0–0.5)
EOSINOPHIL NFR BLD: 1.7 % (ref 0–8)
ERYTHROCYTE [DISTWIDTH] IN BLOOD BY AUTOMATED COUNT: 15 % (ref 11.5–14.5)
EST. GFR  (NO RACE VARIABLE): >60 ML/MIN/1.73 M^2
EST. GFR  (NO RACE VARIABLE): >60 ML/MIN/1.73 M^2
GLUCOSE SERPL-MCNC: 128 MG/DL (ref 70–110)
GLUCOSE SERPL-MCNC: 189 MG/DL (ref 70–110)
GLUCOSE UR QL STRIP: NEGATIVE
HCT VFR BLD AUTO: 35.4 % (ref 40–54)
HGB BLD-MCNC: 11.7 G/DL (ref 14–18)
HGB UR QL STRIP: NEGATIVE
IMM GRANULOCYTES # BLD AUTO: 0.07 K/UL (ref 0–0.04)
IMM GRANULOCYTES NFR BLD AUTO: 0.6 % (ref 0–0.5)
KETONES UR QL STRIP: ABNORMAL
LEUKOCYTE ESTERASE UR QL STRIP: NEGATIVE
LYMPHOCYTES # BLD AUTO: 2.3 K/UL (ref 1–4.8)
LYMPHOCYTES NFR BLD: 18.3 % (ref 18–48)
MAGNESIUM SERPL-MCNC: 2 MG/DL (ref 1.6–2.6)
MAGNESIUM SERPL-MCNC: 2.9 MG/DL (ref 1.6–2.6)
MCH RBC QN AUTO: 30.4 PG (ref 27–31)
MCHC RBC AUTO-ENTMCNC: 33.1 G/DL (ref 32–36)
MCV RBC AUTO: 92 FL (ref 82–98)
MONOCYTES # BLD AUTO: 1.1 K/UL (ref 0.3–1)
MONOCYTES NFR BLD: 8.5 % (ref 4–15)
NEUTROPHILS # BLD AUTO: 8.7 K/UL (ref 1.8–7.7)
NEUTROPHILS NFR BLD: 70.7 % (ref 38–73)
NITRITE UR QL STRIP: NEGATIVE
NRBC BLD-RTO: 0 /100 WBC
PH UR STRIP: 7 [PH] (ref 5–8)
PLATELET # BLD AUTO: 166 K/UL (ref 150–450)
PMV BLD AUTO: 10.4 FL (ref 9.2–12.9)
POCT GLUCOSE: 168 MG/DL (ref 70–110)
POCT GLUCOSE: 180 MG/DL (ref 70–110)
POCT GLUCOSE: 215 MG/DL (ref 70–110)
POCT GLUCOSE: 218 MG/DL (ref 70–110)
POTASSIUM SERPL-SCNC: 3.9 MMOL/L (ref 3.5–5.1)
POTASSIUM SERPL-SCNC: 4.4 MMOL/L (ref 3.5–5.1)
PROT SERPL-MCNC: 6.2 G/DL (ref 6–8.4)
PROT UR QL STRIP: NEGATIVE
RBC # BLD AUTO: 3.85 M/UL (ref 4.6–6.2)
SODIUM SERPL-SCNC: 134 MMOL/L (ref 136–145)
SODIUM SERPL-SCNC: 136 MMOL/L (ref 136–145)
SP GR UR STRIP: 1.02 (ref 1–1.03)
URN SPEC COLLECT METH UR: ABNORMAL
UROBILINOGEN UR STRIP-ACNC: NEGATIVE EU/DL
WBC # BLD AUTO: 12.3 K/UL (ref 3.9–12.7)

## 2024-05-11 PROCEDURE — 36415 COLL VENOUS BLD VENIPUNCTURE: CPT | Performed by: THORACIC SURGERY (CARDIOTHORACIC VASCULAR SURGERY)

## 2024-05-11 PROCEDURE — 81003 URINALYSIS AUTO W/O SCOPE: CPT | Performed by: THORACIC SURGERY (CARDIOTHORACIC VASCULAR SURGERY)

## 2024-05-11 PROCEDURE — 80048 BASIC METABOLIC PNL TOTAL CA: CPT | Mod: 91 | Performed by: THORACIC SURGERY (CARDIOTHORACIC VASCULAR SURGERY)

## 2024-05-11 PROCEDURE — 99233 SBSQ HOSP IP/OBS HIGH 50: CPT | Mod: ,,, | Performed by: STUDENT IN AN ORGANIZED HEALTH CARE EDUCATION/TRAINING PROGRAM

## 2024-05-11 PROCEDURE — 94799 UNLISTED PULMONARY SVC/PX: CPT

## 2024-05-11 PROCEDURE — 97116 GAIT TRAINING THERAPY: CPT | Mod: CQ

## 2024-05-11 PROCEDURE — 97530 THERAPEUTIC ACTIVITIES: CPT | Mod: CQ

## 2024-05-11 PROCEDURE — 85025 COMPLETE CBC W/AUTO DIFF WBC: CPT | Performed by: THORACIC SURGERY (CARDIOTHORACIC VASCULAR SURGERY)

## 2024-05-11 PROCEDURE — 83735 ASSAY OF MAGNESIUM: CPT | Performed by: THORACIC SURGERY (CARDIOTHORACIC VASCULAR SURGERY)

## 2024-05-11 PROCEDURE — 25000003 PHARM REV CODE 250: Performed by: THORACIC SURGERY (CARDIOTHORACIC VASCULAR SURGERY)

## 2024-05-11 PROCEDURE — 25000003 PHARM REV CODE 250: Performed by: INTERNAL MEDICINE

## 2024-05-11 PROCEDURE — 20000000 HC ICU ROOM

## 2024-05-11 PROCEDURE — 80076 HEPATIC FUNCTION PANEL: CPT | Performed by: THORACIC SURGERY (CARDIOTHORACIC VASCULAR SURGERY)

## 2024-05-11 PROCEDURE — 51702 INSERT TEMP BLADDER CATH: CPT

## 2024-05-11 PROCEDURE — 94761 N-INVAS EAR/PLS OXIMETRY MLT: CPT

## 2024-05-11 PROCEDURE — 63600175 PHARM REV CODE 636 W HCPCS: Performed by: THORACIC SURGERY (CARDIOTHORACIC VASCULAR SURGERY)

## 2024-05-11 RX ORDER — MAGNESIUM SULFATE HEPTAHYDRATE 40 MG/ML
4 INJECTION, SOLUTION INTRAVENOUS ONCE
Status: COMPLETED | OUTPATIENT
Start: 2024-05-11 | End: 2024-05-11

## 2024-05-11 RX ADMIN — RIVAROXABAN 2.5 MG: 2.5 TABLET, FILM COATED ORAL at 05:05

## 2024-05-11 RX ADMIN — ASPIRIN 81 MG: 81 TABLET, COATED ORAL at 08:05

## 2024-05-11 RX ADMIN — PANTOPRAZOLE SODIUM 40 MG: 40 TABLET, DELAYED RELEASE ORAL at 08:05

## 2024-05-11 RX ADMIN — CHLORHEXIDINE GLUCONATE 0.12% ORAL RINSE 10 ML: 1.2 LIQUID ORAL at 08:05

## 2024-05-11 RX ADMIN — FERROUS SULFATE TAB 325 MG (65 MG ELEMENTAL FE) 1 EACH: 325 (65 FE) TAB at 08:05

## 2024-05-11 RX ADMIN — DOCUSATE SODIUM 100 MG: 100 CAPSULE, LIQUID FILLED ORAL at 08:05

## 2024-05-11 RX ADMIN — METOPROLOL TARTRATE 12.5 MG: 25 TABLET, FILM COATED ORAL at 08:05

## 2024-05-11 RX ADMIN — OXYCODONE HYDROCHLORIDE AND ACETAMINOPHEN 500 MG: 500 TABLET ORAL at 08:05

## 2024-05-11 RX ADMIN — ESCITALOPRAM OXALATE 10 MG: 10 TABLET ORAL at 08:05

## 2024-05-11 RX ADMIN — CLOPIDOGREL BISULFATE 75 MG: 75 TABLET ORAL at 08:05

## 2024-05-11 RX ADMIN — CYANOCOBALAMIN TAB 1000 MCG 1000 MCG: 1000 TAB at 08:05

## 2024-05-11 RX ADMIN — FOLIC ACID 1 MG: 1 TABLET ORAL at 08:05

## 2024-05-11 RX ADMIN — MAGNESIUM SULFATE HEPTAHYDRATE 4 G: 40 INJECTION, SOLUTION INTRAVENOUS at 11:05

## 2024-05-11 RX ADMIN — RIVAROXABAN 2.5 MG: 2.5 TABLET, FILM COATED ORAL at 08:05

## 2024-05-11 RX ADMIN — INSULIN ASPART 2 UNITS: 100 INJECTION, SOLUTION INTRAVENOUS; SUBCUTANEOUS at 05:05

## 2024-05-11 RX ADMIN — INSULIN ASPART 2 UNITS: 100 INJECTION, SOLUTION INTRAVENOUS; SUBCUTANEOUS at 09:05

## 2024-05-11 NOTE — PLAN OF CARE
Pt AAOx4. GCS 14; confused at times. VSS. Afebrile. Pacemaker wires removed today. SR on monitor. Room air. Pt c/o being unable to urinate; bladder scanned with 999+mL noted; MD notified; bryant placed; 1600mL UOP this shift. Accuchecks ACHS; coverage needed. Family updated at the bedside.     Pt resting comfortably in bed with side rails up x3; locked and lowered with alarm set. Call light in place. Advised pt to call out if anything is needed. Pt verbalized understanding.

## 2024-05-11 NOTE — PROGRESS NOTES
[facility] ([facility])  Cardiology  Progress Note     Patient Name: Feng Marques  MRN: [id number]  Admission Date: 5/3/2024  Hospital Length of Stay: 7 days  Code Status: Full Code   Attending Physician: Maury Amato MD   Primary Care Physician: Maciel Enriquez MD  Expected Discharge Date:   Principal Problem:Coronary artery disease of native artery of native heart with stable angina pectoris     Subjective:   Hospital Course:   5/4/2024 uneventful night denies any chest pain or shortness of breath awaiting cabg on Monday 5/5/2024 NO ANGINA ASYMPTOMATIC     5/7/24-Patient seen and examined today, s/p CABG x4, POD # 1. Moaning in pain during exam. Labs reviewed. H/H 7.6/22.8. Platelets 128,000.     5/8/24-Patient seen and examined today, s/p CABG x 4, POD #2. Feeling better, sitting up in chair. Pain improved. Chest tubes removed. Labs reviewed. Creatinine 1.3. H/H improved post transfusion. LFT's bumped.     5/9/24-Patient seen and examined today, s/p CABG x 4 POD # 3. Continues to improve. Pain controlled. Ambulated with PT/OT this AM. Labs reviewed.      5/10/24-Patient seen and examined today, s/p CABG x 4 POD # 4. Feels ok, still weak/tired. Working with PT/OT this AM. Paced on monitor. Labs reviewed. LFT's still bumped.    5/11/24- Patient seen and examined today sitting in bedside chair. S/P CABG x 4 POD #5. Feels good. Ambulated with PT this morning. Labs reviewed. LFT's are still high.            Review of Systems   Constitutional: Positive for decreased appetite and malaise/fatigue.   HENT: Negative.     Eyes: Negative.    Cardiovascular:  Positive for chest pain (incisional) and dyspnea on exertion.   Respiratory: Negative.     Endocrine: Negative.    Hematologic/Lymphatic: Negative.    Skin: Negative.    Musculoskeletal:  Positive for arthritis and joint pain.   Gastrointestinal: Negative.    Genitourinary: Negative.    Neurological: Negative.    Psychiatric/Behavioral:  Negative.     Allergic/Immunologic: Negative.       Objective:      Vital Signs (Most Recent):  Temp: 98.3 °F (36.8 °C) (05/10/24 0705)  Pulse: 89 (05/10/24 0905)  Resp: 19 (05/10/24 0905)  BP: 106/63 (05/10/24 0900)  SpO2: 100 % (05/10/24 0905) Vital Signs (24h Range):  Temp:  [98.1 °F (36.7 °C)-98.9 °F (37.2 °C)] 98.3 °F (36.8 °C)  Pulse:  [89-97] 89  Resp:  [11-25] 19  SpO2:  [90 %-100 %] 100 %  BP: ()/(50-74) 106/63      Weight: 72.2 kg (159 lb 2.8 oz)  Body mass index is 22.84 kg/m².     SpO2: 100 %           Intake/Output Summary (Last 24 hours) at 5/10/2024 1125  Last data filed at 5/10/2024 0600      Gross per 24 hour   Intake 521.47 ml   Output 225 ml   Net 296.47 ml         Lines/Drains/Airways         Line  Duration                     Pacer Wires 05/06/24 1310 3 days                  Peripheral Intravenous Line  Duration                     Peripheral IV - Single Lumen 05/09/24 2130 18 G Right Antecubital <1 day                             Physical Exam  Vitals and nursing note reviewed.   Constitutional:       General: He is not in acute distress.     Appearance: He is well-developed. He is not diaphoretic.      Comments: On supplemental O2   HENT:      Head: Normocephalic and atraumatic.   Eyes:      General:         Right eye: No discharge.         Left eye: No discharge.      Pupils: Pupils are equal, round, and reactive to light.   Cardiovascular:      Rate and Rhythm: Normal rate and regular rhythm.      Heart sounds: Normal heart sounds, S1 normal and S2 normal. No murmur heard.  Pulmonary:      Effort: Pulmonary effort is normal. No respiratory distress.      Comments: Diminished BS at bases  Abdominal:      General: There is no distension.   Musculoskeletal:      Right lower leg: No edema.      Left lower leg: No edema.   Skin:     General: Skin is warm and dry.      Findings: No erythema.   Neurological:      General: No focal deficit present.      Mental Status: He is alert and oriented  "to person, place, and time.   Psychiatric:         Mood and Affect: Mood normal.         Behavior: Behavior normal.               Significant Labs: CMP         Recent Labs   Lab 05/09/24  0444 05/09/24  1717 05/10/24  0511   * 134* 135*   K 4.1 4.2 4.1   CL 96 96 97   CO2 30* 31* 31*   GLU 82 89 104   BUN 39* 44* 37*   CREATININE 1.3 1.2 1.2   CALCIUM 10.4 11.3* 10.3   PROT 5.7*  --  5.8*   ALBUMIN 3.8  --  3.4*   BILITOT 0.8  --  0.7   ALKPHOS 79  --  72   *  --  285*   *  --  430*   ANIONGAP 8 7* 7*   , CBC         Recent Labs   Lab 05/09/24  0444 05/09/24  1717 05/10/24  0511   WBC 22.87* 19.17* 15.59*   HGB 9.9* 9.7* 10.9*   HCT 29.4* 28.5* 33.7*   * 117* 135*   , Troponin No results for input(s): "TROPONINI" in the last 48 hours., and All pertinent lab results from the last 24 hours have been reviewed.     Significant Imaging: Echocardiogram: Transthoracic echo (TTE) complete (Cupid Only):         Results for orders placed or performed during the hospital encounter of 05/03/24   Echo   Result Value Ref Range     BSA 1.8 m2     LVOT stroke volume 50.79 cm3     LVIDd 4.01 3.5 - 6.0 cm     LV Systolic Volume 25.50 mL     LV Systolic Volume Index 14.0 mL/m2     LVIDs 2.64 2.1 - 4.0 cm     LV Diastolic Volume 70.21 mL     LV Diastolic Volume Index 38.58 mL/m2     IVS 1.55 (A) 0.6 - 1.1 cm     LVOT diameter 2.03 cm     LVOT area 3.2 cm2     FS 34 28 - 44 %     Left Ventricle Relative Wall Thickness 0.63 cm     Posterior Wall 1.27 (A) 0.6 - 1.1 cm     LV mass 212.04 g     LV Mass Index 117 g/m2     MV Peak E Evin 0.68 m/s     TDI LATERAL 0.08 m/s     TDI SEPTAL 0.07 m/s     E/E' ratio 9.07 m/s     MV Peak A Evin 0.99 m/s     TR Max Evin 1.97 m/s     E/A ratio 0.69       IVRT 83.73 msec     E wave deceleration time 268.67 msec     LV SEPTAL E/E' RATIO 9.71 m/s     LV LATERAL E/E' RATIO 8.50 m/s     LVOT peak evin 0.78 m/s     Left Ventricular Outflow Tract Mean Velocity 0.67 cm/s     Left " Ventricular Outflow Tract Mean Gradient 1.83 mmHg     RVOT peak VTI 19.6 cm     TAPSE 2.38 cm     LA size 3.87 cm     Left Atrium Minor Axis 4.81 cm     Left Atrium Major Axis 4.91 cm     RA Major Axis 4.15 cm     AV mean gradient 3 mmHg     AV peak gradient 4 mmHg     Ao peak fabrizio 0.98 m/s     Ao VTI 20.50 cm     LVOT peak VTI 15.70 cm     AV valve area 2.48 cm²     AV Velocity Ratio 0.80       AV index (prosthetic) 0.77       LEXUS by Velocity Ratio 2.57 cm²     Mr max fabrizio 3.82 m/s     MV mean gradient 1 mmHg     MV peak gradient 3 mmHg     MV stenosis pressure 1/2 time 77.91 ms     MV valve area p 1/2 method 2.82 cm2     MV valve area by continuity eq 1.74 cm2     MV VTI 29.2 cm     Triscuspid Valve Regurgitation Peak Gradient 16 mmHg     PV mean gradient 3 mmHg     RVOT peak fabrizio 1.05 m/s     Ao root annulus 3.39 cm     STJ 3.33 cm     Ascending aorta 3.23 cm     Mean e' 0.08 m/s     ZLVIDS -1.30       ZLVIDD -2.28       LA Volume Index 34.3 mL/m2     LA volume 62.34 cm3     LA WIDTH 3.9 cm     RA Width 2.8 cm     EF 60 %     TV resting pulmonary artery pressure 19 mmHg     RV TB RVSP 5 mmHg     Est. RA pres 3 mmHg     Narrative       Left Ventricle: The left ventricle is normal in size. Normal wall   thickness. There is concentric hypertrophy. Regional wall motion   abnormalities present. See diagram for wall motion findings. There is   normal systolic function with a visually estimated ejection fraction of 55   - 70%. Ejection fraction by visual approximation is 60%.    Right Ventricle: Normal right ventricular cavity size. Wall thickness   is normal. Systolic function is normal.    Mitral Valve: There is mild regurgitation.    IVC/SVC: Normal venous pressure at 3 mmHg.       and EKG: Reviewed  Assessment and Plan:     * Coronary artery disease of native artery of native heart with stable angina pectoris  Multivessel cad for cabg on Monday no angina     S/P CABG x 4  -Continue current post-op care and mgmt as per  CTS  -ASA Plavix statin BB  -IS usage   -Ambulation when able     5/8/24  -Pain improved  -Continue ASA, Plavix, BB  -Statin held due to bumped LFT's  -IS usage and ambulation     5/9/24  -Stable CV wise  -Continue ASA, Plavix, BB  -Statin held due to bumped LFT's  -Continue IS usage and ambulation     5/10/24  -No acute CV issues  -Continue ASA, Plavix, BB  -Statin on hold  -IS usage and ambulation/PT/OT    5/11/24  - No acute CV issues   - Statin held   - PT and OT ambulation  -Continue ASA/ BB and Plavix      Other hyperlipidemia  -statins     5/8/24  -LFT's bumped, statin held    5/11/24  - LFT's are still high, statin held      Type 2 diabetes mellitus with complication, without long-term current use of insulin  Per hospital medicine     Essential hypertension  -Continuer home meds           VTE Risk Mitigation (From admission, onward)              Ordered       Place DELROY hose  Until discontinued        Comments: Post Op Day 1 - After Ace and Drain Removed    05/06/24 1427       Place sequential compression device  Until discontinued         05/06/24 1427       IP VTE HIGH RISK PATIENT  Once         05/06/24 1427       Place DELROY hose  Until discontinued         05/05/24 1954       Place sequential compression device  Until discontinued         05/05/24 1954       Place sequential compression device  Until discontinued         05/03/24 1124                            Cardiology  [facility] ([facility])

## 2024-05-11 NOTE — PLAN OF CARE
PATIENT SITTING IN CHAIR UPON ARRIVAL REQUESTING TO WALK SO HE CAN RETURN TO BED. REPORTS HE HAS BEEN IN THE CHAIR SINCE 0700 THIS MORNING    SIT<-->STAND X 2 EACH WITH MIN A     AMBULATED WITH NO ASSISTIVE DEVICE CG 2 X 70' WITH 2 STANDING REST BREAKS AND VC FOR PATIENT TO HOLD HIS HEAD MORE ERECT.     PATIENT AMBULATED WITH GOOD MINDY WITH NO ASSISTIVE DEVICE AND WAS FATIGUED FOLLOWING

## 2024-05-11 NOTE — PT/OT/SLP PROGRESS
Physical Therapy  Treatment    Feng Marques   MRN: [id number]   Admitting Diagnosis: Coronary artery disease of native artery of native heart with stable angina pectoris       PT Start Time: 1350     PT Stop Time: 1415    PT Total Time (min): 25 min       Billable Minutes:  Gait Training 10 and Therapeutic Activity 15    Treatment Type: Treatment  PT/PTA: PTA     Number of PTA visits since last PT visit: 1       General Precautions: Standard, sternal, fall  Orthopedic Precautions: N/A  Braces: N/A       Subjective:  Communicated with LY prior to session.      Pain/Comfort  Pain Rating 1: 0/10  Pain Rating Post-Intervention 1: 0/10    Treatment and Education:    PATIENT SITTING IN CHAIR UPON ARRIVAL REQUESTING TO WALK SO HE CAN RETURN TO BED. REPORTS HE HAS BEEN IN THE CHAIR SINCE 0700 THIS MORNING    SIT<-->STAND X 2 EACH WITH MIN A     AMBULATED WITH NO ASSISTIVE DEVICE CG 2 X 70' WITH 2 STANDING REST BREAKS AND VC FOR PATIENT TO HOLD HIS HEAD MORE ERECT.     PATIENT AMBULATED WITH GOOD MINDY WITH NO ASSISTIVE DEVICE AND WAS FATIGUED FOLLOWING     AM-PAC 6 CLICK MOBILITY  How much help from another person does this patient currently need?   1 = Unable, Total/Dependent Assistance  2 = A lot, Maximum/Moderate Assistance  3 = A little, Minimum/Contact Guard/Supervision  4 = None, Modified [place]/Independent    Turning over in bed (including adjusting bedclothes, sheets and blankets)?: 4  Sitting down on and standing up from a chair with arms (e.g., wheelchair, bedside commode, etc.): 3  Moving from lying on back to sitting on the side of the bed?: 4  Moving to and from a bed to a chair (including a wheelchair)?:  (NA)  Need to walk in hospital room?: 3  Climbing 3-5 steps with a railing?: 1    AM-PAC Raw Score CMS G-Code Modifier Level of Impairment Assistance   6 % Total / Unable   7 - 9 CM 80 - 100% Maximal Assist   10 - 14 CL 60 - 80% Moderate Assist   15 - 19 CK 40 - 60% Moderate Assist    20 - 22 CJ 20 - 40% Minimal Assist   23 CI 1-20% SBA / CGA   24 CH 0% Independent/ Mod I     Patient left supine with all lines intact and call button in reach.    Assessment:  Feng Marques is a 73 y.o. male with a medical diagnosis of Coronary artery disease of native artery of native heart with stable angina pectoris and presents with .    Rehab identified problem list/impairments: weakness, impaired endurance, decreased upper extremity function, impaired self care skills, impaired functional mobility, decreased lower extremity function, impaired cardiopulmonary response to activity    Rehab potential is good.    Activity tolerance: Good    Discharge recommendations: Low Intensity Therapy      Barriers to discharge:      Equipment recommendations: shower chair     GOALS:   Multidisciplinary Problems       Physical Therapy Goals          Problem: Physical Therapy    Goal Priority Disciplines Outcome Goal Variances Interventions   Physical Therapy Goal     PT, PT/OT Progressing     Description: LTG'S TO BE MET IN 14 DAYS (5-22-24)  PT WILL BE EMERSON FOR BED MOBILITY  PT WILL BE EMERSON FOR BED<>CHAIR TF'S  PT WILL  FEET NO AD EMERSON  PT WILL INC AMPAC SCORE BY 2 POINTS TO PROGRESS GROSS FUNC MOBILITY                         PLAN:    Patient to be seen 3 x/week to address the above listed problems via gait training, therapeutic activities, therapeutic exercises  Plan of Care expires: 05/22/24  Plan of Care reviewed with: patient, daughter         05/11/2024

## 2024-05-12 LAB
ALBUMIN SERPL BCP-MCNC: 3 G/DL (ref 3.5–5.2)
ALP SERPL-CCNC: 71 U/L (ref 55–135)
ALT SERPL W/O P-5'-P-CCNC: 348 U/L (ref 10–44)
ANION GAP SERPL CALC-SCNC: 11 MMOL/L (ref 8–16)
ANION GAP SERPL CALC-SCNC: 7 MMOL/L (ref 8–16)
AST SERPL-CCNC: 110 U/L (ref 10–40)
BILIRUB DIRECT SERPL-MCNC: 0.3 MG/DL (ref 0.1–0.3)
BILIRUB SERPL-MCNC: 0.7 MG/DL (ref 0.1–1)
BUN SERPL-MCNC: 24 MG/DL (ref 8–23)
BUN SERPL-MCNC: 28 MG/DL (ref 8–23)
CALCIUM SERPL-MCNC: 8.5 MG/DL (ref 8.7–10.5)
CALCIUM SERPL-MCNC: 8.8 MG/DL (ref 8.7–10.5)
CHLORIDE SERPL-SCNC: 96 MMOL/L (ref 95–110)
CHLORIDE SERPL-SCNC: 98 MMOL/L (ref 95–110)
CO2 SERPL-SCNC: 26 MMOL/L (ref 23–29)
CO2 SERPL-SCNC: 28 MMOL/L (ref 23–29)
CREAT SERPL-MCNC: 1 MG/DL (ref 0.5–1.4)
CREAT SERPL-MCNC: 1.1 MG/DL (ref 0.5–1.4)
EST. GFR  (NO RACE VARIABLE): >60 ML/MIN/1.73 M^2
EST. GFR  (NO RACE VARIABLE): >60 ML/MIN/1.73 M^2
GLUCOSE SERPL-MCNC: 156 MG/DL (ref 70–110)
GLUCOSE SERPL-MCNC: 213 MG/DL (ref 70–110)
MAGNESIUM SERPL-MCNC: 2.2 MG/DL (ref 1.6–2.6)
MAGNESIUM SERPL-MCNC: 2.6 MG/DL (ref 1.6–2.6)
POCT GLUCOSE: 182 MG/DL (ref 70–110)
POCT GLUCOSE: 205 MG/DL (ref 70–110)
POCT GLUCOSE: 216 MG/DL (ref 70–110)
POTASSIUM SERPL-SCNC: 3.8 MMOL/L (ref 3.5–5.1)
POTASSIUM SERPL-SCNC: 3.9 MMOL/L (ref 3.5–5.1)
PROT SERPL-MCNC: 5.3 G/DL (ref 6–8.4)
SODIUM SERPL-SCNC: 131 MMOL/L (ref 136–145)
SODIUM SERPL-SCNC: 135 MMOL/L (ref 136–145)

## 2024-05-12 PROCEDURE — 25000003 PHARM REV CODE 250: Performed by: THORACIC SURGERY (CARDIOTHORACIC VASCULAR SURGERY)

## 2024-05-12 PROCEDURE — 63600175 PHARM REV CODE 636 W HCPCS: Performed by: THORACIC SURGERY (CARDIOTHORACIC VASCULAR SURGERY)

## 2024-05-12 PROCEDURE — 80076 HEPATIC FUNCTION PANEL: CPT | Performed by: THORACIC SURGERY (CARDIOTHORACIC VASCULAR SURGERY)

## 2024-05-12 PROCEDURE — 20000000 HC ICU ROOM

## 2024-05-12 PROCEDURE — 36415 COLL VENOUS BLD VENIPUNCTURE: CPT | Performed by: THORACIC SURGERY (CARDIOTHORACIC VASCULAR SURGERY)

## 2024-05-12 PROCEDURE — 25000003 PHARM REV CODE 250: Performed by: INTERNAL MEDICINE

## 2024-05-12 PROCEDURE — 83735 ASSAY OF MAGNESIUM: CPT | Performed by: THORACIC SURGERY (CARDIOTHORACIC VASCULAR SURGERY)

## 2024-05-12 PROCEDURE — 94799 UNLISTED PULMONARY SVC/PX: CPT

## 2024-05-12 PROCEDURE — 80048 BASIC METABOLIC PNL TOTAL CA: CPT | Mod: 91 | Performed by: THORACIC SURGERY (CARDIOTHORACIC VASCULAR SURGERY)

## 2024-05-12 PROCEDURE — 99233 SBSQ HOSP IP/OBS HIGH 50: CPT | Mod: ,,, | Performed by: STUDENT IN AN ORGANIZED HEALTH CARE EDUCATION/TRAINING PROGRAM

## 2024-05-12 PROCEDURE — 94761 N-INVAS EAR/PLS OXIMETRY MLT: CPT

## 2024-05-12 RX ORDER — POTASSIUM CHLORIDE 20 MEQ/1
40 TABLET, EXTENDED RELEASE ORAL ONCE
Status: COMPLETED | OUTPATIENT
Start: 2024-05-12 | End: 2024-05-12

## 2024-05-12 RX ORDER — DOXAZOSIN 1 MG/1
1 TABLET ORAL DAILY
Status: DISCONTINUED | OUTPATIENT
Start: 2024-05-12 | End: 2024-05-14 | Stop reason: HOSPADM

## 2024-05-12 RX ADMIN — METOPROLOL TARTRATE 12.5 MG: 25 TABLET, FILM COATED ORAL at 09:05

## 2024-05-12 RX ADMIN — POTASSIUM CHLORIDE 40 MEQ: 1500 TABLET, EXTENDED RELEASE ORAL at 08:05

## 2024-05-12 RX ADMIN — PANTOPRAZOLE SODIUM 40 MG: 40 TABLET, DELAYED RELEASE ORAL at 08:05

## 2024-05-12 RX ADMIN — OXYCODONE HYDROCHLORIDE AND ACETAMINOPHEN 500 MG: 500 TABLET ORAL at 09:05

## 2024-05-12 RX ADMIN — DOCUSATE SODIUM 100 MG: 100 CAPSULE, LIQUID FILLED ORAL at 08:05

## 2024-05-12 RX ADMIN — TRAMADOL HYDROCHLORIDE 50 MG: 50 TABLET, COATED ORAL at 01:05

## 2024-05-12 RX ADMIN — OXYCODONE HYDROCHLORIDE AND ACETAMINOPHEN 500 MG: 500 TABLET ORAL at 08:05

## 2024-05-12 RX ADMIN — CLOPIDOGREL BISULFATE 75 MG: 75 TABLET ORAL at 08:05

## 2024-05-12 RX ADMIN — RIVAROXABAN 2.5 MG: 2.5 TABLET, FILM COATED ORAL at 05:05

## 2024-05-12 RX ADMIN — FOLIC ACID 1 MG: 1 TABLET ORAL at 08:05

## 2024-05-12 RX ADMIN — RIVAROXABAN 2.5 MG: 2.5 TABLET, FILM COATED ORAL at 08:05

## 2024-05-12 RX ADMIN — MAGNESIUM SULFATE HEPTAHYDRATE 4 G: 40 INJECTION, SOLUTION INTRAVENOUS at 08:05

## 2024-05-12 RX ADMIN — FERROUS SULFATE TAB 325 MG (65 MG ELEMENTAL FE) 1 EACH: 325 (65 FE) TAB at 08:05

## 2024-05-12 RX ADMIN — INSULIN ASPART 4 UNITS: 100 INJECTION, SOLUTION INTRAVENOUS; SUBCUTANEOUS at 05:05

## 2024-05-12 RX ADMIN — INSULIN ASPART 4 UNITS: 100 INJECTION, SOLUTION INTRAVENOUS; SUBCUTANEOUS at 11:05

## 2024-05-12 RX ADMIN — ESCITALOPRAM OXALATE 10 MG: 10 TABLET ORAL at 08:05

## 2024-05-12 RX ADMIN — DOCUSATE SODIUM 100 MG: 100 CAPSULE, LIQUID FILLED ORAL at 09:05

## 2024-05-12 RX ADMIN — ASPIRIN 81 MG: 81 TABLET, COATED ORAL at 08:05

## 2024-05-12 RX ADMIN — METOPROLOL TARTRATE 12.5 MG: 25 TABLET, FILM COATED ORAL at 08:05

## 2024-05-12 RX ADMIN — INSULIN ASPART 1 UNITS: 100 INJECTION, SOLUTION INTRAVENOUS; SUBCUTANEOUS at 09:05

## 2024-05-12 RX ADMIN — TRAMADOL HYDROCHLORIDE 50 MG: 50 TABLET, COATED ORAL at 09:05

## 2024-05-12 RX ADMIN — DOXAZOSIN MESYLATE 1 MG: 1 TABLET ORAL at 11:05

## 2024-05-12 RX ADMIN — CYANOCOBALAMIN TAB 1000 MCG 1000 MCG: 1000 TAB at 08:05

## 2024-05-12 NOTE — PROGRESS NOTES
[facility] ([facility])  Cardiology  Progress Note     Patient Name: Feng Marques  MRN: [id number]  Admission Date: 5/3/2024  Hospital Length of Stay: 7 days  Code Status: Full Code   Attending Physician: Maury Amato MD   Primary Care Physician: Maciel Enriquez MD  Expected Discharge Date:   Principal Problem:Coronary artery disease of native artery of native heart with stable angina pectoris     Subjective:   Hospital Course:   5/4/2024 uneventful night denies any chest pain or shortness of breath awaiting cabg on Monday 5/5/2024 NO ANGINA ASYMPTOMATIC     5/7/24-Patient seen and examined today, s/p CABG x4, POD # 1. Moaning in pain during exam. Labs reviewed. H/H 7.6/22.8. Platelets 128,000.     5/8/24-Patient seen and examined today, s/p CABG x 4, POD #2. Feeling better, sitting up in chair. Pain improved. Chest tubes removed. Labs reviewed. Creatinine 1.3. H/H improved post transfusion. LFT's bumped.     5/9/24-Patient seen and examined today, s/p CABG x 4 POD # 3. Continues to improve. Pain controlled. Ambulated with PT/OT this AM. Labs reviewed.      5/10/24-Patient seen and examined today, s/p CABG x 4 POD # 4. Feels ok, still weak/tired. Working with PT/OT this AM. Paced on monitor. Labs reviewed. LFT's still bumped.     5/11/24- Patient seen and examined today sitting in bedside chair. S/P CABG x 4 POD #5. Feels good. Ambulated with PT this morning. Labs reviewed. LFT's are still high.       5/12/24- Patient seen and examined today sitting in up in chair. S/P CABG X4 POD # 6. He states his pain isn't too bad. Ambulated in the hallways with PT yesterday. Patient claims his appetite isn't too good. Labs reviewed. LFT's are improving.      Review of Systems   Constitutional: Positive for decreased appetite and malaise/fatigue.   HENT: Negative.     Eyes: Negative.    Cardiovascular:  Positive for chest pain (incisional) and dyspnea on exertion.   Respiratory: Negative.      Endocrine: Negative.    Hematologic/Lymphatic: Negative.    Skin: Negative.    Musculoskeletal:  Positive for arthritis and joint pain.   Gastrointestinal: Negative.    Genitourinary: Negative.    Neurological: Negative.    Psychiatric/Behavioral: Negative.     Allergic/Immunologic: Negative.       Objective:      Vital Signs (Most Recent):  Temp: 98.3 °F (36.8 °C) (05/10/24 0705)  Pulse: 89 (05/10/24 0905)  Resp: 19 (05/10/24 0905)  BP: 106/63 (05/10/24 0900)  SpO2: 100 % (05/10/24 0905) Vital Signs (24h Range):  Temp:  [98.1 °F (36.7 °C)-98.9 °F (37.2 °C)] 98.3 °F (36.8 °C)  Pulse:  [89-97] 89  Resp:  [11-25] 19  SpO2:  [90 %-100 %] 100 %  BP: ()/(50-74) 106/63      Weight: 72.2 kg (159 lb 2.8 oz)  Body mass index is 22.84 kg/m².     SpO2: 100 %           Intake/Output Summary (Last 24 hours) at 5/10/2024 1125  Last data filed at 5/10/2024 0600        Gross per 24 hour   Intake 521.47 ml   Output 225 ml   Net 296.47 ml         Lines/Drains/Airways         Line  Duration                     Pacer Wires 05/06/24 1310 3 days                  Peripheral Intravenous Line  Duration                     Peripheral IV - Single Lumen 05/09/24 2130 18 G Right Antecubital <1 day                             Physical Exam  Vitals and nursing note reviewed.   Constitutional:       General: He is not in acute distress.     Appearance: He is well-developed. He is not diaphoretic.      Comments: On supplemental O2, Swelling in legs   HENT:      Head: Normocephalic and atraumatic.   Eyes:      General:         Right eye: No discharge.         Left eye: No discharge.      Pupils: Pupils are equal, round, and reactive to light.   Cardiovascular:      Rate and Rhythm: Normal rate and regular rhythm.      Heart sounds: Normal heart sounds, S1 normal and S2 normal. No murmur heard.  Pulmonary:      Effort: Pulmonary effort is normal. No respiratory distress.      Comments: Diminished BS at bases. Crackles at lungs.   Abdominal:  "     General: There is no distension.   Musculoskeletal:      Right lower leg: No edema.      Left lower leg: No edema.   Skin:     General: Skin is warm and dry.      Findings: No erythema.   Neurological:      General: No focal deficit present.      Mental Status: He is alert and oriented to person, place, and time.   Psychiatric:         Mood and Affect: Mood normal.         Behavior: Behavior normal.               Significant Labs: CMP             Recent Labs   Lab 05/09/24  0444 05/09/24  1717 05/10/24  0511   * 134* 135*   K 4.1 4.2 4.1   CL 96 96 97   CO2 30* 31* 31*   GLU 82 89 104   BUN 39* 44* 37*   CREATININE 1.3 1.2 1.2   CALCIUM 10.4 11.3* 10.3   PROT 5.7*  --  5.8*   ALBUMIN 3.8  --  3.4*   BILITOT 0.8  --  0.7   ALKPHOS 79  --  72   *  --  285*   *  --  430*   ANIONGAP 8 7* 7*   , CBC             Recent Labs   Lab 05/09/24  0444 05/09/24  1717 05/10/24  0511   WBC 22.87* 19.17* 15.59*   HGB 9.9* 9.7* 10.9*   HCT 29.4* 28.5* 33.7*   * 117* 135*   , Troponin No results for input(s): "TROPONINI" in the last 48 hours., and All pertinent lab results from the last 24 hours have been reviewed.     Significant Imaging: Echocardiogram: Transthoracic echo (TTE) complete (Cupid Only):             Results for orders placed or performed during the hospital encounter of 05/03/24   Echo   Result Value Ref Range     BSA 1.8 m2     LVOT stroke volume 50.79 cm3     LVIDd 4.01 3.5 - 6.0 cm     LV Systolic Volume 25.50 mL     LV Systolic Volume Index 14.0 mL/m2     LVIDs 2.64 2.1 - 4.0 cm     LV Diastolic Volume 70.21 mL     LV Diastolic Volume Index 38.58 mL/m2     IVS 1.55 (A) 0.6 - 1.1 cm     LVOT diameter 2.03 cm     LVOT area 3.2 cm2     FS 34 28 - 44 %     Left Ventricle Relative Wall Thickness 0.63 cm     Posterior Wall 1.27 (A) 0.6 - 1.1 cm     LV mass 212.04 g     LV Mass Index 117 g/m2     MV Peak E Evin 0.68 m/s     TDI LATERAL 0.08 m/s     TDI SEPTAL 0.07 m/s     E/E' ratio 9.07 m/s "     MV Peak A Evni 0.99 m/s     TR Max Evin 1.97 m/s     E/A ratio 0.69       IVRT 83.73 msec     E wave deceleration time 268.67 msec     LV SEPTAL E/E' RATIO 9.71 m/s     LV LATERAL E/E' RATIO 8.50 m/s     LVOT peak evin 0.78 m/s     Left Ventricular Outflow Tract Mean Velocity 0.67 cm/s     Left Ventricular Outflow Tract Mean Gradient 1.83 mmHg     RVOT peak VTI 19.6 cm     TAPSE 2.38 cm     LA size 3.87 cm     Left Atrium Minor Axis 4.81 cm     Left Atrium Major Axis 4.91 cm     RA Major Axis 4.15 cm     AV mean gradient 3 mmHg     AV peak gradient 4 mmHg     Ao peak evin 0.98 m/s     Ao VTI 20.50 cm     LVOT peak VTI 15.70 cm     AV valve area 2.48 cm²     AV Velocity Ratio 0.80       AV index (prosthetic) 0.77       LEXUS by Velocity Ratio 2.57 cm²     Mr max evin 3.82 m/s     MV mean gradient 1 mmHg     MV peak gradient 3 mmHg     MV stenosis pressure 1/2 time 77.91 ms     MV valve area p 1/2 method 2.82 cm2     MV valve area by continuity eq 1.74 cm2     MV VTI 29.2 cm     Triscuspid Valve Regurgitation Peak Gradient 16 mmHg     PV mean gradient 3 mmHg     RVOT peak evin 1.05 m/s     Ao root annulus 3.39 cm     STJ 3.33 cm     Ascending aorta 3.23 cm     Mean e' 0.08 m/s     ZLVIDS -1.30       ZLVIDD -2.28       LA Volume Index 34.3 mL/m2     LA volume 62.34 cm3     LA WIDTH 3.9 cm     RA Width 2.8 cm     EF 60 %     TV resting pulmonary artery pressure 19 mmHg     RV TB RVSP 5 mmHg     Est. RA pres 3 mmHg     Narrative       Left Ventricle: The left ventricle is normal in size. Normal wall   thickness. There is concentric hypertrophy. Regional wall motion   abnormalities present. See diagram for wall motion findings. There is   normal systolic function with a visually estimated ejection fraction of 55   - 70%. Ejection fraction by visual approximation is 60%.    Right Ventricle: Normal right ventricular cavity size. Wall thickness   is normal. Systolic function is normal.    Mitral Valve: There is mild  regurgitation.    IVC/SVC: Normal venous pressure at 3 mmHg.       and EKG: Sinus rhythm with 1st degree A-V block   ST and T wave abnormality, consider anterior ischemia   Abnormal ECG   When compared with ECG of 09-NOV-2022 09:37,   T wave inversion now evident in Anterior leads   Assessment and Plan:      * Coronary artery disease of native artery of native heart with stable angina pectoris  Multivessel cad for cabg on Monday no angina     S/P CABG x 4  -Continue current post-op care and mgmt as per CTS  -ASA Plavix statin BB  -IS usage   -Ambulation when able     5/8/24  -Pain improved  -Continue ASA, Plavix, BB  -Statin held due to bumped LFT's  -IS usage and ambulation     5/9/24  -Stable CV wise  -Continue ASA, Plavix, BB  -Statin held due to bumped LFT's  -Continue IS usage and ambulation     5/10/24  -No acute CV issues  -Continue ASA, Plavix, BB  -Statin on hold  -IS usage and ambulation/PT/OT     5/11/24  - No acute CV issues   - Statin held   - PT and OT ambulation  -Continue ASA/ BB and Plavix       5/12/24  -management per CT surgery   -Patient still working with PT and ambulating the hallways  -Appetite not great   -LFT's are improving still holding statin   -Continue ASA/ BB and Plavix   Other hyperlipidemia  -statins     5/8/24  -LFT's bumped, statin held     5/11/24  - LFT's are still high, statin held       5/12/24  -LFT's improving statin still held  Type 2 diabetes mellitus with complication, without long-term current use of insulin  Per hospital medicine     Essential hypertension  -Continuer home meds           VTE Risk Mitigation (From admission, onward)              Ordered       Place DELROY hose  Until discontinued        Comments: Post Op Day 1 - After Ace and Drain Removed    05/06/24 1427       Place sequential compression device  Until discontinued         05/06/24 1427       IP VTE HIGH RISK PATIENT  Once         05/06/24 1427       Place DELROY hose  Until discontinued         05/05/24  1954       Place sequential compression device  Until discontinued         05/05/24 1954       Place sequential compression device  Until discontinued         05/03/24 1124                           Emilio Hogan MD.   Cardiology  [facility] ([facility])

## 2024-05-12 NOTE — ASSESSMENT & PLAN NOTE
05/07/2024   The patient is postop day 1 status post coronary artery bypass grafting timesx1. Overall the patient is doing well.  Neuro:  Patient is awake alert and oriented x3.  Neuro exam is nonfocal.  Requiring increasing pain medication.    Cardiac:  Patient has been stable.  Patient is on low-dose epi and vasopressin.  Wean as tolerated.  Respiratory:  Patient has good sats on nasal cannula.  Pulmonary toileting.  Continue incentive spirometer  GI:  Advance diet as tolerated.    Renal:  Creatinine is 1.1.   Patient has good urine output.  Start Lasix.  Id:  Patient is afebrile.  White count is 19.  Suspect due to stress of surgery.  No evidence of infection.  Continue to observe.  Endocrine:  Glucose is controlled with long-acting insulin and sliding scale.  Heme:  Hematocrit is down to 19.  Will transfuse 2 units packed red blood cells.  Activities:  Advance activities as tolerated.  Line tubes and drains:  Patient has a right IJ triple-lumen, Michel catheter, A-line, pacer wires and saphenectomy site PABLO drains.    05/08/2024   The patient is postop day 2 status post coronary artery bypass grafting x 4.  Overall the patient is doing well.    Neuro:  Patient is awake alert and oriented x3.  Neuro exam is nonfocal.  Pain is much better controlled.  Cardiac:  Patient is hemodynamically stable.  Patient is is being weaned off low-dose vasopressin and epinephrine.  Will start metoprolol.    Respiratory: Patient has good sats.  Continue pulmonary toileting.  Continue incentive spirometer.  GI:  Patient is tolerating p.o. will start cardiac diet.  Advance as tolerated.    Renal:  Creatinine is 1.3.  Patient had excellent response to Lasix yesterday.  Patient appears euvolemic.  Will discontinue Lasix.    Id:  Patient had a T-max of 101°.  White count is 21.  However, patient has no evidence of infections.  Continue to observe.    Endocrine: Glucose is controlled with long-acting and sliding scale insulin.    Heme:   Hematocrit is 27 status post transfusion of 2 units of packed red blood cells.  Platelet is 105.  Will add low-dose rivaroxaban for anti thrombin 3 effect.  Continue aspirin and Plavix.    Activities:  Patient is out of bed to chair.  Advance as tolerated.  Line tubes and drains:  Patient has a right IJ triple-lumen, Michel catheter, A-line and pacer wires.  Chest tubes have been discontinued.      05/09/2024   The patient is postop day 3 status post bypass grafting x4.  Overall the patient is.    Neuro:  Patient is awake alert and x3.  Neuro exam is nonfocal.  Pain is well controlled.    Cardiac:  Patient's is off all systolic blood pressure in the high 90s.  Will decrease metoprolol dose.    Respiratory:  Patient has good sats.  Continue pulmonary toileting.  Continue incentive spirometer.  GI:  Patient is complaining of nausea.  Patient has not had a bowel movement.  Will increase bowel regimen.  Patient's abdomen soft nontender sounds.  Renal:  Creatinine is 1.3.  Patient is off diuretics.    Id:  Patient is afebrile.  However white count remains high 22  No evidence of infection.  Continue to follow.  Endocrine: Glucose is controlled with long-acting and sliding scale insulin.  Patient has been having hypoglycemic episode.  Will decrease long-acting insulin.  Dose and frequency.    Heme:  Hematocrit is 24 and platelet count is 122 continue aspirin Plavix and low-dose rivaroxaban.  Activities:  Patient is out of bed to chair advance as tolerated.    Line tubes and drains:  Patient has a right IJ triple-lumen catheter Michel catheter, pacer wires, will discontinue Michel catheter.      05/10/2024   The patient is postop day 4 status post coronary artery bypass grafting x4.  Overall the patient is doing well.  Neuro:  Patient is awake alert and oriented x3.  Neuro exam is nonfocal.  Pain is well controlled.  Cardiac:  Patient's blood pressure has been improved since albumin yesterday.  Continue on low-dose  metoprolol.    Respiratory:  Patient has good sats on nasal cannula.  Continue pulmonary toileting.  Continue incentive spirometer.    GI: Patient nausea is much improved.  Patient had 1 bowel movement yesterday.  Will repeat Mag citrate.  Will add boost to patient's p.o. intake.  Patient has poor p.o. intake.  LFTs are slowly trending better.  Renal: Creatinine is 1.2.  Patient is making urine.  Patient is off diuretics.    Id:  Patient is afebrile.  White count is down to 15.  Will continue to follow.    Endocrine:  Patient had another episode of hypoglycemia on low-dose long-acting insulin.  Long-acting insulin will be discontinued.    Heme:  Hematocrit is 33 and platelet count is 135.  Continue aspirin Plavix and low-dose rivaroxaban.  Activities:  Patient is out of bed to chair ambulate and advance as tolerated.    Line tubes and drains:  Patient has peripheral lines and pacer wires.    05/11/2024   The patient is postop day 5 status post coronary artery bypass grafting x4 overall the patient is doing well.  Anticipate discharge to home or skilled nursing facility in the next 48-72 hours.    Neuro:  Patient is awake alert and oriented x3.  Neuro exam is nonfocal.  Pain is well controlled.    Cardiac:  Patient has been stable.  Continue low-dose metoprolol.    Respiratory:  Patient has good sats.  Continue pulmonary toileting.  Continue incentive spirometer.  GI:  Patient p.o. intake has been poor.  Encourage increased p.o. intake.  LFTs trending lower.  Renal:  Creatinine is 1.  However patient is complaining of urinary retention.  Bladder scan concerns urinary retention.  Michel catheter replaced.  Id:  Patient is afebrile.  White count is 12.    Endocrine:  Patient's glucose being managed with sliding scale insulin.    Heme:  Hematocrit is 35 and platelet count is 166.  Continue aspirin Plavix and low-dose rivaroxaban.  Activities: Patient is out of bed to chair advance as tolerated.    Line tubes and drains  patient has peripheral lines and replaced Michel catheter.    05/12/2024   Patient is postop day 6 status post coronary artery bypass grafting x4.  Overall patient is doing well.  Patient continues to make clinical improvement.  Anticipate discharge to home or skilled nursing care facility in the next 48 hours.    Neuro:  Patient is awake alert and oriented x3.  Neuro exam is nonfocal.  Pain is well controlled.    Cardiac:  Patient remains stable.  Continue low-dose metoprolol.    Respiratory: Patient has good sats on room air.  Continue pulmonary toileting.  Continue incentive spirometer.    GI:  Patient's p.o. intake is improving.  Continue supplemental feeds with boost.  Renal: Creatinine is 1.0.  Michel catheter has been replaced.  Will start patient on doxazosin.  Will plan to discontinue Michel catheter and do voiding trials in the next 24 hours.  Will obtain urology consult.    Endocrine:  Glucose is managed with a sliding scale.    Heme:  Continue aspirin Plavix and low-dose rivaroxaban.    Activities: Patient is out of bed to chair.  Advance as tolerated.  Line tubes and drains:  Patient has peripheral lines and Michel catheter.

## 2024-05-12 NOTE — SUBJECTIVE & OBJECTIVE
Interval History: The patient is postop day 6 status post coronary artery bypass grafting x4.    ROS  Medications:  Continuous Infusions:   vasopressin  0.04 Units/min Intravenous Continuous   Stopped at 05/08/24 1000     Scheduled Meds:   albumin human 5%  25 g Intravenous Once    ascorbic acid (vitamin C)  500 mg Oral BID    aspirin  81 mg Oral Daily    clopidogreL  75 mg Oral Daily    cyanocobalamin  1,000 mcg Oral Daily    docusate sodium  100 mg Oral BID    doxazosin  1 mg Oral Daily    EScitalopram oxalate  10 mg Oral Daily    ferrous sulfate  1 tablet Oral Daily    folic acid  1 mg Oral Daily    metoprolol tartrate  12.5 mg Oral BID    pantoprazole  40 mg Oral Daily    polyethylene glycol  17 g Oral Daily    rivaroxaban  2.5 mg Oral BID WM     PRN Meds:  Current Facility-Administered Medications:     0.9%  NaCl infusion (for blood administration), , Intravenous, Q24H PRN    albumin human 5%, 25 g, Intravenous, PRN    calcium gluconate IVPB, 1 g, Intravenous, PRN    calcium gluconate IVPB, 2 g, Intravenous, PRN    calcium gluconate IVPB, 3 g, Intravenous, PRN    dextrose 10%, 12.5 g, Intravenous, PRN    dextrose 10%, 25 g, Intravenous, PRN    glucagon (human recombinant), 1 mg, Intramuscular, PRN    glucose, 16 g, Oral, PRN    glucose, 24 g, Oral, PRN    influenza 65up-adj, 0.5 mL, Intramuscular, vaccine x 1 dose    insulin aspart U-100, 0-10 Units, Subcutaneous, QID (AC + HS) PRN    lactated ringers, 1,000 mL, Intravenous, PRN    magnesium sulfate IVPB, 4 g, Intravenous, PRN    metoclopramide, 5 mg, Intravenous, Q6H PRN    naloxone, 0.4 mg, Intravenous, PRN    ondansetron, 4 mg, Intravenous, Q6H PRN    pneumoc 20-diaz conj-dip cr(PF), 0.5 mL, Intramuscular, vaccine x 1 dose    potassium chloride in water, 20 mEq, Intravenous, PRN    potassium chloride in water, 60 mEq, Intravenous, PRN    potassium chloride in water, 40 mEq, Intravenous, PRN    sodium chloride 0.9%, 10 mL, Intravenous, PRN    sodium chloride  0.9%, 10 mL, Intravenous, Q12H PRN    traMADoL, 50 mg, Oral, Q6H PRN     Objective:     Vital Signs (Most Recent):  Temp: 98.8 °F (37.1 °C) (05/12/24 0301)  Pulse: 76 (05/12/24 0910)  Resp: (!) 22 (05/12/24 0910)  BP: (!) 115/57 (05/12/24 0910)  SpO2: (!) 94 % (05/12/24 0910) Vital Signs (24h Range):  Temp:  [98.2 °F (36.8 °C)-99 °F (37.2 °C)] 98.8 °F (37.1 °C)  Pulse:  [] 76  Resp:  [10-29] 22  SpO2:  [88 %-99 %] 94 %  BP: ()/(52-69) 115/57     Weight: 72.2 kg (159 lb 2.8 oz)  Body mass index is 22.84 kg/m².    SpO2: (!) 94 %       Intake/Output - Last 3 Shifts         05/10 0700  05/11 0659 05/11 0700  05/12 0659 05/12 0700  05/13 0659    I.V. (mL/kg) 248.1 (3.4) 100 (1.4) 26.6 (0.4)    IV Piggyback       Total Intake(mL/kg) 248.1 (3.4) 100 (1.4) 26.6 (0.4)    Urine (mL/kg/hr) 200 (0.1) 1975 (1.1)     Other 0 0     Stool       Total Output 200 1975     Net +48.1 -1875 +26.6                   Lines/Drains/Airways       Drain  Duration                  Urethral Catheter 05/11/24 1030 Straight-tip <1 day              Line  Duration                  Pacer Wires 05/06/24 1310 5 days              Peripheral Intravenous Line  Duration                  Peripheral IV - Single Lumen 05/10/24 1100 20 G Anterior;Right Upper Arm 1 day                     Physical Exam  Constitutional:       Appearance: Normal appearance.   HENT:      Head: Normocephalic and atraumatic.      Nose: Nose normal.   Cardiovascular:      Rate and Rhythm: Normal rate and regular rhythm.      Heart sounds: Normal heart sounds.   Pulmonary:      Breath sounds: Normal breath sounds.   Abdominal:      General: Abdomen is flat.      Palpations: Abdomen is soft.   Musculoskeletal:      Right lower leg: No edema.      Left lower leg: No edema.   Skin:     General: Skin is warm and dry.   Neurological:      Mental Status: He is alert and oriented to person, place, and time.            Significant Labs:  All pertinent labs from the last 24 hours  have been reviewed.    Significant Diagnostics:  I have reviewed all pertinent imaging results/findings within the past 24 hours.

## 2024-05-12 NOTE — PROGRESS NOTES
'[place] - [facility])  Cardiothoracic Surgery  Progress Note    Patient Name: Feng Marques  MRN: [id number]  Admission Date: 5/3/2024  Hospital Length of Stay: 9 days  Code Status: Full Code   Attending Physician: Maury Amato MD   Referring Provider: Abdulaziz Carrasco MD  Principal Problem:Coronary artery disease of native artery of native heart with stable angina pectoris            Subjective:     Post-Op Info:  Procedure(s) (LRB):  CORONARY ARTERY BYPASS GRAFT (CABG) (N/A)  ECHOCARDIOGRAM,TRANSESOPHAGEAL (N/A)  SURGICAL PROCUREMENT, VEIN, ENDOSCOPIC (Left)  BLOCK, NERVE, INTERCOSTAL, 2 OR MORE (N/A)   6 Days Post-Op     Interval History: The patient is postop day 6 status post coronary artery bypass grafting x4.    ROS  Medications:  Continuous Infusions:   vasopressin  0.04 Units/min Intravenous Continuous   Stopped at 05/08/24 1000     Scheduled Meds:   albumin human 5%  25 g Intravenous Once    ascorbic acid (vitamin C)  500 mg Oral BID    aspirin  81 mg Oral Daily    clopidogreL  75 mg Oral Daily    cyanocobalamin  1,000 mcg Oral Daily    docusate sodium  100 mg Oral BID    doxazosin  1 mg Oral Daily    EScitalopram oxalate  10 mg Oral Daily    ferrous sulfate  1 tablet Oral Daily    folic acid  1 mg Oral Daily    metoprolol tartrate  12.5 mg Oral BID    pantoprazole  40 mg Oral Daily    polyethylene glycol  17 g Oral Daily    rivaroxaban  2.5 mg Oral BID WM     PRN Meds:  Current Facility-Administered Medications:     0.9%  NaCl infusion (for blood administration), , Intravenous, Q24H PRN    albumin human 5%, 25 g, Intravenous, PRN    calcium gluconate IVPB, 1 g, Intravenous, PRN    calcium gluconate IVPB, 2 g, Intravenous, PRN    calcium gluconate IVPB, 3 g, Intravenous, PRN    dextrose 10%, 12.5 g, Intravenous, PRN    dextrose 10%, 25 g, Intravenous, PRN    glucagon (human recombinant), 1 mg, Intramuscular, PRN    glucose, 16 g, Oral, PRN    glucose, 24 g, Oral, PRN    influenza  65up-adj, 0.5 mL, Intramuscular, vaccine x 1 dose    insulin aspart U-100, 0-10 Units, Subcutaneous, QID (AC + HS) PRN    lactated ringers, 1,000 mL, Intravenous, PRN    magnesium sulfate IVPB, 4 g, Intravenous, PRN    metoclopramide, 5 mg, Intravenous, Q6H PRN    naloxone, 0.4 mg, Intravenous, PRN    ondansetron, 4 mg, Intravenous, Q6H PRN    pneumoc 20-diaz conj-dip cr(PF), 0.5 mL, Intramuscular, vaccine x 1 dose    potassium chloride in water, 20 mEq, Intravenous, PRN    potassium chloride in water, 60 mEq, Intravenous, PRN    potassium chloride in water, 40 mEq, Intravenous, PRN    sodium chloride 0.9%, 10 mL, Intravenous, PRN    sodium chloride 0.9%, 10 mL, Intravenous, Q12H PRN    traMADoL, 50 mg, Oral, Q6H PRN     Objective:     Vital Signs (Most Recent):  Temp: 98.8 °F (37.1 °C) (05/12/24 0301)  Pulse: 76 (05/12/24 0910)  Resp: (!) 22 (05/12/24 0910)  BP: (!) 115/57 (05/12/24 0910)  SpO2: (!) 94 % (05/12/24 0910) Vital Signs (24h Range):  Temp:  [98.2 °F (36.8 °C)-99 °F (37.2 °C)] 98.8 °F (37.1 °C)  Pulse:  [] 76  Resp:  [10-29] 22  SpO2:  [88 %-99 %] 94 %  BP: ()/(52-69) 115/57     Weight: 72.2 kg (159 lb 2.8 oz)  Body mass index is 22.84 kg/m².    SpO2: (!) 94 %       Intake/Output - Last 3 Shifts         05/10 0700 05/11 0659 05/11 0700 05/12 0659 05/12 0700 05/13 0659    I.V. (mL/kg) 248.1 (3.4) 100 (1.4) 26.6 (0.4)    IV Piggyback       Total Intake(mL/kg) 248.1 (3.4) 100 (1.4) 26.6 (0.4)    Urine (mL/kg/hr) 200 (0.1) 1975 (1.1)     Other 0 0     Stool       Total Output 200 1975     Net +48.1 -1875 +26.6                   Lines/Drains/Airways       Drain  Duration                  Urethral Catheter 05/11/24 1030 Straight-tip <1 day              Line  Duration                  Pacer Wires 05/06/24 1310 5 days              Peripheral Intravenous Line  Duration                  Peripheral IV - Single Lumen 05/10/24 1100 20 G Anterior;Right Upper Arm 1 day                     Physical  Exam  Constitutional:       Appearance: Normal appearance.   HENT:      Head: Normocephalic and atraumatic.      Nose: Nose normal.   Cardiovascular:      Rate and Rhythm: Normal rate and regular rhythm.      Heart sounds: Normal heart sounds.   Pulmonary:      Breath sounds: Normal breath sounds.   Abdominal:      General: Abdomen is flat.      Palpations: Abdomen is soft.   Musculoskeletal:      Right lower leg: No edema.      Left lower leg: No edema.   Skin:     General: Skin is warm and dry.   Neurological:      Mental Status: He is alert and oriented to person, place, and time.            Significant Labs:  All pertinent labs from the last 24 hours have been reviewed.    Significant Diagnostics:  I have reviewed all pertinent imaging results/findings within the past 24 hours.  Assessment/Plan:     * Coronary artery disease of native artery of native heart with stable angina pectoris  The patient is a 73-year-old male with coronary artery disease status post stenting, diabetes, hyperlipidemia and hypertension who was worked up for chest pain with cardiac catheterization.  Cardiac catheterization shows severe multivessel coronary artery disease with a 90% proximal LAD stenosis.  The patient is a candidate for urgent coronary artery bypass grafting.  The risks and benefits of surgery have been explained to the patient.  The patient understands the risks and benefits of surgery and has agreed to proceed with urgent coronary artery bypass grafting which will be scheduled for 05/06/2024.    S/P CABG x 4  05/07/2024   The patient is postop day 1 status post coronary artery bypass grafting timesx1. Overall the patient is doing well.  Neuro:  Patient is awake alert and oriented x3.  Neuro exam is nonfocal.  Requiring increasing pain medication.    Cardiac:  Patient has been stable.  Patient is on low-dose epi and vasopressin.  Wean as tolerated.  Respiratory:  Patient has good sats on nasal cannula.  Pulmonary  toileting.  Continue incentive spirometer  GI:  Advance diet as tolerated.    Renal:  Creatinine is 1.1.   Patient has good urine output.  Start Lasix.  Id:  Patient is afebrile.  White count is 19.  Suspect due to stress of surgery.  No evidence of infection.  Continue to observe.  Endocrine:  Glucose is controlled with long-acting insulin and sliding scale.  Heme:  Hematocrit is down to 19.  Will transfuse 2 units packed red blood cells.  Activities:  Advance activities as tolerated.  Line tubes and drains:  Patient has a right IJ triple-lumen, Michel catheter, A-line, pacer wires and saphenectomy site PABLO drains.    05/08/2024   The patient is postop day 2 status post coronary artery bypass grafting x 4.  Overall the patient is doing well.    Neuro:  Patient is awake alert and oriented x3.  Neuro exam is nonfocal.  Pain is much better controlled.  Cardiac:  Patient is hemodynamically stable.  Patient is is being weaned off low-dose vasopressin and epinephrine.  Will start metoprolol.    Respiratory: Patient has good sats.  Continue pulmonary toileting.  Continue incentive spirometer.  GI:  Patient is tolerating p.o. will start cardiac diet.  Advance as tolerated.    Renal:  Creatinine is 1.3.  Patient had excellent response to Lasix yesterday.  Patient appears euvolemic.  Will discontinue Lasix.    Id:  Patient had a T-max of 101°.  White count is 21.  However, patient has no evidence of infections.  Continue to observe.    Endocrine: Glucose is controlled with long-acting and sliding scale insulin.    Heme:  Hematocrit is 27 status post transfusion of 2 units of packed red blood cells.  Platelet is 105.  Will add low-dose rivaroxaban for anti thrombin 3 effect.  Continue aspirin and Plavix.    Activities:  Patient is out of bed to chair.  Advance as tolerated.  Line tubes and drains:  Patient has a right IJ triple-lumen, Michel catheter, A-line and pacer wires.  Chest tubes have been discontinued.      05/09/2024    The patient is postop day 3 status post bypass grafting x4.  Overall the patient is.    Neuro:  Patient is awake alert and x3.  Neuro exam is nonfocal.  Pain is well controlled.    Cardiac:  Patient's is off all systolic blood pressure in the high 90s.  Will decrease metoprolol dose.    Respiratory:  Patient has good sats.  Continue pulmonary toileting.  Continue incentive spirometer.  GI:  Patient is complaining of nausea.  Patient has not had a bowel movement.  Will increase bowel regimen.  Patient's abdomen soft nontender sounds.  Renal:  Creatinine is 1.3.  Patient is off diuretics.    Id:  Patient is afebrile.  However white count remains high 22  No evidence of infection.  Continue to follow.  Endocrine: Glucose is controlled with long-acting and sliding scale insulin.  Patient has been having hypoglycemic episode.  Will decrease long-acting insulin.  Dose and frequency.    Heme:  Hematocrit is 24 and platelet count is 122 continue aspirin Plavix and low-dose rivaroxaban.  Activities:  Patient is out of bed to chair advance as tolerated.    Line tubes and drains:  Patient has a right IJ triple-lumen catheter Michel catheter, pacer wires, will discontinue Michel catheter.      05/10/2024   The patient is postop day 4 status post coronary artery bypass grafting x4.  Overall the patient is doing well.  Neuro:  Patient is awake alert and oriented x3.  Neuro exam is nonfocal.  Pain is well controlled.  Cardiac:  Patient's blood pressure has been improved since albumin yesterday.  Continue on low-dose metoprolol.    Respiratory:  Patient has good sats on nasal cannula.  Continue pulmonary toileting.  Continue incentive spirometer.    GI: Patient nausea is much improved.  Patient had 1 bowel movement yesterday.  Will repeat Mag citrate.  Will add boost to patient's p.o. intake.  Patient has poor p.o. intake.  LFTs are slowly trending better.  Renal: Creatinine is 1.2.  Patient is making urine.  Patient is off  diuretics.    Id:  Patient is afebrile.  White count is down to 15.  Will continue to follow.    Endocrine:  Patient had another episode of hypoglycemia on low-dose long-acting insulin.  Long-acting insulin will be discontinued.    Heme:  Hematocrit is 33 and platelet count is 135.  Continue aspirin Plavix and low-dose rivaroxaban.  Activities:  Patient is out of bed to chair ambulate and advance as tolerated.    Line tubes and drains:  Patient has peripheral lines and pacer wires.    05/11/2024   The patient is postop day 5 status post coronary artery bypass grafting x4 overall the patient is doing well.  Anticipate discharge to home or skilled nursing facility in the next 48-72 hours.    Neuro:  Patient is awake alert and oriented x3.  Neuro exam is nonfocal.  Pain is well controlled.    Cardiac:  Patient has been stable.  Continue low-dose metoprolol.    Respiratory:  Patient has good sats.  Continue pulmonary toileting.  Continue incentive spirometer.  GI:  Patient p.o. intake has been poor.  Encourage increased p.o. intake.  LFTs trending lower.  Renal:  Creatinine is 1.  However patient is complaining of urinary retention.  Bladder scan concerns urinary retention.  Michel catheter replaced.  Id:  Patient is afebrile.  White count is 12.    Endocrine:  Patient's glucose being managed with sliding scale insulin.    Heme:  Hematocrit is 35 and platelet count is 166.  Continue aspirin Plavix and low-dose rivaroxaban.  Activities: Patient is out of bed to chair advance as tolerated.    Line tubes and drains patient has peripheral lines and replaced Michel catheter.    05/12/2024   Patient is postop day 6 status post coronary artery bypass grafting x4.  Overall patient is doing well.  Patient continues to make clinical improvement.  Anticipate discharge to home or skilled nursing care facility in the next 48 hours.    Neuro:  Patient is awake alert and oriented x3.  Neuro exam is nonfocal.  Pain is well controlled.     Cardiac:  Patient remains stable.  Continue low-dose metoprolol.    Respiratory: Patient has good sats on room air.  Continue pulmonary toileting.  Continue incentive spirometer.    GI:  Patient's p.o. intake is improving.  Continue supplemental feeds with boost.  Renal: Creatinine is 1.0.  Michel catheter has been replaced.  Will start patient on doxazosin.  Will plan to discontinue Michel catheter and do voiding trials in the next 24 hours.  Will obtain urology consult.    Endocrine:  Glucose is managed with a sliding scale.    Heme:  Continue aspirin Plavix and low-dose rivaroxaban.    Activities: Patient is out of bed to chair.  Advance as tolerated.  Line tubes and drains:  Patient has peripheral lines and Michel catheter.        Maury Amato MD  Cardiothoracic Surgery  [facility] - Intensive Care ([facility])

## 2024-05-12 NOTE — ASSESSMENT & PLAN NOTE
05/07/2024   The patient is postop day 1 status post coronary artery bypass grafting timesx1. Overall the patient is doing well.  Neuro:  Patient is awake alert and oriented x3.  Neuro exam is nonfocal.  Requiring increasing pain medication.    Cardiac:  Patient has been stable.  Patient is on low-dose epi and vasopressin.  Wean as tolerated.  Respiratory:  Patient has good sats on nasal cannula.  Pulmonary toileting.  Continue incentive spirometer  GI:  Advance diet as tolerated.    Renal:  Creatinine is 1.1.   Patient has good urine output.  Start Lasix.  Id:  Patient is afebrile.  White count is 19.  Suspect due to stress of surgery.  No evidence of infection.  Continue to observe.  Endocrine:  Glucose is controlled with long-acting insulin and sliding scale.  Heme:  Hematocrit is down to 19.  Will transfuse 2 units packed red blood cells.  Activities:  Advance activities as tolerated.  Line tubes and drains:  Patient has a right IJ triple-lumen, Michel catheter, A-line, pacer wires and saphenectomy site PABLO drains.    05/08/2024   The patient is postop day 2 status post coronary artery bypass grafting x 4.  Overall the patient is doing well.    Neuro:  Patient is awake alert and oriented x3.  Neuro exam is nonfocal.  Pain is much better controlled.  Cardiac:  Patient is hemodynamically stable.  Patient is is being weaned off low-dose vasopressin and epinephrine.  Will start metoprolol.    Respiratory: Patient has good sats.  Continue pulmonary toileting.  Continue incentive spirometer.  GI:  Patient is tolerating p.o. will start cardiac diet.  Advance as tolerated.    Renal:  Creatinine is 1.3.  Patient had excellent response to Lasix yesterday.  Patient appears euvolemic.  Will discontinue Lasix.    Id:  Patient had a T-max of 101°.  White count is 21.  However, patient has no evidence of infections.  Continue to observe.    Endocrine: Glucose is controlled with long-acting and sliding scale insulin.    Heme:   Hematocrit is 27 status post transfusion of 2 units of packed red blood cells.  Platelet is 105.  Will add low-dose rivaroxaban for anti thrombin 3 effect.  Continue aspirin and Plavix.    Activities:  Patient is out of bed to chair.  Advance as tolerated.  Line tubes and drains:  Patient has a right IJ triple-lumen, Michel catheter, A-line and pacer wires.  Chest tubes have been discontinued.      05/09/2024   The patient is postop day 3 status post bypass grafting x4.  Overall the patient is.    Neuro:  Patient is awake alert and x3.  Neuro exam is nonfocal.  Pain is well controlled.    Cardiac:  Patient's is off all systolic blood pressure in the high 90s.  Will decrease metoprolol dose.    Respiratory:  Patient has good sats.  Continue pulmonary toileting.  Continue incentive spirometer.  GI:  Patient is complaining of nausea.  Patient has not had a bowel movement.  Will increase bowel regimen.  Patient's abdomen soft nontender sounds.  Renal:  Creatinine is 1.3.  Patient is off diuretics.    Id:  Patient is afebrile.  However white count remains high 22  No evidence of infection.  Continue to follow.  Endocrine: Glucose is controlled with long-acting and sliding scale insulin.  Patient has been having hypoglycemic episode.  Will decrease long-acting insulin.  Dose and frequency.    Heme:  Hematocrit is 24 and platelet count is 122 continue aspirin Plavix and low-dose rivaroxaban.  Activities:  Patient is out of bed to chair advance as tolerated.    Line tubes and drains:  Patient has a right IJ triple-lumen catheter Michel catheter, pacer wires, will discontinue Michel catheter.      05/10/2024   The patient is postop day 4 status post coronary artery bypass grafting x4.  Overall the patient is doing well.  Neuro:  Patient is awake alert and oriented x3.  Neuro exam is nonfocal.  Pain is well controlled.  Cardiac:  Patient's blood pressure has been improved since albumin yesterday.  Continue on low-dose  metoprolol.    Respiratory:  Patient has good sats on nasal cannula.  Continue pulmonary toileting.  Continue incentive spirometer.    GI: Patient nausea is much improved.  Patient had 1 bowel movement yesterday.  Will repeat Mag citrate.  Will add boost to patient's p.o. intake.  Patient has poor p.o. intake.  LFTs are slowly trending better.  Renal: Creatinine is 1.2.  Patient is making urine.  Patient is off diuretics.    Id:  Patient is afebrile.  White count is down to 15.  Will continue to follow.    Endocrine:  Patient had another episode of hypoglycemia on low-dose long-acting insulin.  Long-acting insulin will be discontinued.    Heme:  Hematocrit is 33 and platelet count is 135.  Continue aspirin Plavix and low-dose rivaroxaban.  Activities:  Patient is out of bed to chair ambulate and advance as tolerated.    Line tubes and drains:  Patient has peripheral lines and pacer wires.    05/11/2024   The patient is postop day 5 status post coronary artery bypass grafting x4 overall the patient is doing well.  Anticipate discharge to home or skilled nursing facility in the next 48-72 hours.    Neuro:  Patient is awake alert and oriented x3.  Neuro exam is nonfocal.  Pain is well controlled.    Cardiac:  Patient has been stable.  Continue low-dose metoprolol.    Respiratory:  Patient has good sats.  Continue pulmonary toileting.  Continue incentive spirometer.  GI:  Patient p.o. intake has been poor.  Encourage increased p.o. intake.  LFTs trending lower.  Renal:  Creatinine is 1.  However patient is complaining of urinary retention.  Bladder scan concerns urinary retention.  Michel catheter replaced.  Id:  Patient is afebrile.  White count is 12.    Endocrine:  Patient's glucose being managed with sliding scale insulin.    Heme:  Hematocrit is 35 and platelet count is 166.  Continue aspirin Plavix and low-dose rivaroxaban.  Activities: Patient is out of bed to chair advance as tolerated.    Line tubes and drains  patient has peripheral lines and replaced Michel catheter.

## 2024-05-12 NOTE — SUBJECTIVE & OBJECTIVE
Interval History: The patient is postop day 5 status post coronary artery bypass grafting x4.    ROS  Medications:  Continuous Infusions:   vasopressin  0.04 Units/min Intravenous Continuous   Stopped at 05/08/24 1000     Scheduled Meds:   albumin human 5%  25 g Intravenous Once    ascorbic acid (vitamin C)  500 mg Oral BID    aspirin  81 mg Oral Daily    clopidogreL  75 mg Oral Daily    cyanocobalamin  1,000 mcg Oral Daily    docusate sodium  100 mg Oral BID    doxazosin  1 mg Oral Daily    EScitalopram oxalate  10 mg Oral Daily    ferrous sulfate  1 tablet Oral Daily    folic acid  1 mg Oral Daily    metoprolol tartrate  12.5 mg Oral BID    pantoprazole  40 mg Oral Daily    polyethylene glycol  17 g Oral Daily    rivaroxaban  2.5 mg Oral BID WM     PRN Meds:  Current Facility-Administered Medications:     0.9%  NaCl infusion (for blood administration), , Intravenous, Q24H PRN    albumin human 5%, 25 g, Intravenous, PRN    calcium gluconate IVPB, 1 g, Intravenous, PRN    calcium gluconate IVPB, 2 g, Intravenous, PRN    calcium gluconate IVPB, 3 g, Intravenous, PRN    dextrose 10%, 12.5 g, Intravenous, PRN    dextrose 10%, 25 g, Intravenous, PRN    glucagon (human recombinant), 1 mg, Intramuscular, PRN    glucose, 16 g, Oral, PRN    glucose, 24 g, Oral, PRN    influenza 65up-adj, 0.5 mL, Intramuscular, vaccine x 1 dose    insulin aspart U-100, 0-10 Units, Subcutaneous, QID (AC + HS) PRN    lactated ringers, 1,000 mL, Intravenous, PRN    magnesium sulfate IVPB, 4 g, Intravenous, PRN    metoclopramide, 5 mg, Intravenous, Q6H PRN    naloxone, 0.4 mg, Intravenous, PRN    ondansetron, 4 mg, Intravenous, Q6H PRN    pneumoc 20-diaz conj-dip cr(PF), 0.5 mL, Intramuscular, vaccine x 1 dose    potassium chloride in water, 20 mEq, Intravenous, PRN    potassium chloride in water, 60 mEq, Intravenous, PRN    potassium chloride in water, 40 mEq, Intravenous, PRN    sodium chloride 0.9%, 10 mL, Intravenous, PRN    sodium chloride  0.9%, 10 mL, Intravenous, Q12H PRN    traMADoL, 50 mg, Oral, Q6H PRN     Objective:     Vital Signs (Most Recent):  Temp: 98.8 °F (37.1 °C) (05/12/24 0301)  Pulse: 76 (05/12/24 0910)  Resp: (!) 22 (05/12/24 0910)  BP: (!) 115/57 (05/12/24 0910)  SpO2: (!) 94 % (05/12/24 0910) Vital Signs (24h Range):  Temp:  [98.2 °F (36.8 °C)-99 °F (37.2 °C)] 98.8 °F (37.1 °C)  Pulse:  [] 76  Resp:  [10-29] 22  SpO2:  [88 %-99 %] 94 %  BP: ()/(52-69) 115/57     Weight: 72.2 kg (159 lb 2.8 oz)  Body mass index is 22.84 kg/m².    SpO2: (!) 94 %       Intake/Output - Last 3 Shifts         05/10 0700  05/11 0659 05/11 0700  05/12 0659 05/12 0700  05/13 0659    I.V. (mL/kg) 248.1 (3.4) 100 (1.4) 26.6 (0.4)    IV Piggyback       Total Intake(mL/kg) 248.1 (3.4) 100 (1.4) 26.6 (0.4)    Urine (mL/kg/hr) 200 (0.1) 1975 (1.1)     Other 0 0     Stool       Total Output 200 1975     Net +48.1 -1875 +26.6                   Lines/Drains/Airways       Drain  Duration                  Urethral Catheter 05/11/24 1030 Straight-tip <1 day              Line  Duration                  Pacer Wires 05/06/24 1310 5 days              Peripheral Intravenous Line  Duration                  Peripheral IV - Single Lumen 05/10/24 1100 20 G Anterior;Right Upper Arm 1 day                     Physical Exam  Constitutional:       Appearance: Normal appearance.   HENT:      Head: Normocephalic and atraumatic.      Nose: Nose normal.   Cardiovascular:      Rate and Rhythm: Normal rate and regular rhythm.      Heart sounds: Normal heart sounds.   Pulmonary:      Effort: Pulmonary effort is normal.      Breath sounds: Normal breath sounds.   Abdominal:      General: Abdomen is flat.      Palpations: Abdomen is soft.   Musculoskeletal:      Right lower leg: No edema.      Left lower leg: No edema.   Skin:     General: Skin is warm and dry.   Neurological:      Mental Status: He is alert and oriented to person, place, and time.   Psychiatric:         Behavior:  Behavior normal.            Significant Labs:  All pertinent labs from the last 24 hours have been reviewed.    Significant Diagnostics:  I have reviewed all pertinent imaging results/findings within the past 24 hours.

## 2024-05-12 NOTE — PLAN OF CARE
Pt AAAOx4. GCS 15. VSS. Afebrile. SR on monitor. Room air. Michel in place; 875mL UOP this shift. Accuchecks ACHS; coverage needed. Family updated at the bedside.     Pt resting comfortably in bed with side rails up x3; locked and lowered with alarm set. Call light in place. Advised pt to call out if anything is needed. Pt verbalized understanding.

## 2024-05-12 NOTE — PROGRESS NOTES
'[place] - [facility])  Cardiothoracic Surgery  Progress Note    Patient Name: Feng Marques  MRN: [id number]  Admission Date: 5/3/2024  Hospital Length of Stay: 9 days  Code Status: Full Code   Attending Physician: Maury Amato MD   Referring Provider: Abdulaziz Carrasco MD  Principal Problem:Coronary artery disease of native artery of native heart with stable angina pectoris            Subjective:     Post-Op Info:  Procedure(s) (LRB):  CORONARY ARTERY BYPASS GRAFT (CABG) (N/A)  ECHOCARDIOGRAM,TRANSESOPHAGEAL (N/A)  SURGICAL PROCUREMENT, VEIN, ENDOSCOPIC (Left)  BLOCK, NERVE, INTERCOSTAL, 2 OR MORE (N/A)   6 Days Post-Op     Interval History: The patient is postop day 5 status post coronary artery bypass grafting x4.    ROS  Medications:  Continuous Infusions:   vasopressin  0.04 Units/min Intravenous Continuous   Stopped at 05/08/24 1000     Scheduled Meds:   albumin human 5%  25 g Intravenous Once    ascorbic acid (vitamin C)  500 mg Oral BID    aspirin  81 mg Oral Daily    clopidogreL  75 mg Oral Daily    cyanocobalamin  1,000 mcg Oral Daily    docusate sodium  100 mg Oral BID    doxazosin  1 mg Oral Daily    EScitalopram oxalate  10 mg Oral Daily    ferrous sulfate  1 tablet Oral Daily    folic acid  1 mg Oral Daily    metoprolol tartrate  12.5 mg Oral BID    pantoprazole  40 mg Oral Daily    polyethylene glycol  17 g Oral Daily    rivaroxaban  2.5 mg Oral BID WM     PRN Meds:  Current Facility-Administered Medications:     0.9%  NaCl infusion (for blood administration), , Intravenous, Q24H PRN    albumin human 5%, 25 g, Intravenous, PRN    calcium gluconate IVPB, 1 g, Intravenous, PRN    calcium gluconate IVPB, 2 g, Intravenous, PRN    calcium gluconate IVPB, 3 g, Intravenous, PRN    dextrose 10%, 12.5 g, Intravenous, PRN    dextrose 10%, 25 g, Intravenous, PRN    glucagon (human recombinant), 1 mg, Intramuscular, PRN    glucose, 16 g, Oral, PRN    glucose, 24 g, Oral, PRN    influenza  Problem: SELF HARM/SUICIDALITY  Goal: Will have no self-injury during hospital stay  Description: INTERVENTIONS:  - Q 15 MINUTES: Routine safety checks  - Q WAKING SHIFT & PRN: Assess risk to determine if routine checks are adequate to maintain patient safety  - Encourage patient to participate actively in care by formulating a plan to combat response to suicidal ideation, identify supports and resources  Outcome: Adequate for Discharge     Problem: DEPRESSION  Goal: Will be euthymic at discharge  Description: INTERVENTIONS:  - Administer medication as ordered  - Provide emotional support via 1:1 interaction with staff  - Encourage involvement in milieu/groups/activities  - Monitor for social isolation  Outcome: Adequate for Discharge     Problem: BEHAVIOR  Goal: Pt/Family maintain appropriate behavior and adhere to behavioral management agreement, if implemented  Description: INTERVENTIONS:  - Assess the family dynamic   - Encourage verbalization of thoughts and concerns in a socially appropriate manner  - Assess patient/family's coping skills and non-compliant behavior (including use of illegal substances)  - Utilize positive, consistent limit setting strategies supporting safety of patient, staff and others  - Initiate consult with Case Management, Spiritual Care or other ancillary services as appropriate  - If a patient's/visitor's behavior jeopardizes the safety of the patient, staff, or others, refer to organization procedure     - Notify Security of behavior or suspected illegal substances which indicate the need for search of the patient and/or belongings  - Encourage participation in the decision making process about a behavioral management agreement; implement if patient meets criteria  Outcome: Adequate for Discharge     Problem: ANXIETY  Goal: Will report anxiety at manageable levels  Description: INTERVENTIONS:  - Administer medication as ordered  - Teach and encourage coping skills  - Provide 65up-adj, 0.5 mL, Intramuscular, vaccine x 1 dose    insulin aspart U-100, 0-10 Units, Subcutaneous, QID (AC + HS) PRN    lactated ringers, 1,000 mL, Intravenous, PRN    magnesium sulfate IVPB, 4 g, Intravenous, PRN    metoclopramide, 5 mg, Intravenous, Q6H PRN    naloxone, 0.4 mg, Intravenous, PRN    ondansetron, 4 mg, Intravenous, Q6H PRN    pneumoc 20-diaz conj-dip cr(PF), 0.5 mL, Intramuscular, vaccine x 1 dose    potassium chloride in water, 20 mEq, Intravenous, PRN    potassium chloride in water, 60 mEq, Intravenous, PRN    potassium chloride in water, 40 mEq, Intravenous, PRN    sodium chloride 0.9%, 10 mL, Intravenous, PRN    sodium chloride 0.9%, 10 mL, Intravenous, Q12H PRN    traMADoL, 50 mg, Oral, Q6H PRN     Objective:     Vital Signs (Most Recent):  Temp: 98.8 °F (37.1 °C) (05/12/24 0301)  Pulse: 76 (05/12/24 0910)  Resp: (!) 22 (05/12/24 0910)  BP: (!) 115/57 (05/12/24 0910)  SpO2: (!) 94 % (05/12/24 0910) Vital Signs (24h Range):  Temp:  [98.2 °F (36.8 °C)-99 °F (37.2 °C)] 98.8 °F (37.1 °C)  Pulse:  [] 76  Resp:  [10-29] 22  SpO2:  [88 %-99 %] 94 %  BP: ()/(52-69) 115/57     Weight: 72.2 kg (159 lb 2.8 oz)  Body mass index is 22.84 kg/m².    SpO2: (!) 94 %       Intake/Output - Last 3 Shifts         05/10 0700 05/11 0659 05/11 0700 05/12 0659 05/12 0700 05/13 0659    I.V. (mL/kg) 248.1 (3.4) 100 (1.4) 26.6 (0.4)    IV Piggyback       Total Intake(mL/kg) 248.1 (3.4) 100 (1.4) 26.6 (0.4)    Urine (mL/kg/hr) 200 (0.1) 1975 (1.1)     Other 0 0     Stool       Total Output 200 1975     Net +48.1 -1875 +26.6                   Lines/Drains/Airways       Drain  Duration                  Urethral Catheter 05/11/24 1030 Straight-tip <1 day              Line  Duration                  Pacer Wires 05/06/24 1310 5 days              Peripheral Intravenous Line  Duration                  Peripheral IV - Single Lumen 05/10/24 1100 20 G Anterior;Right Upper Arm 1 day                     Physical  emotional support  - Assess patient/family for anxiety and ability to cope  Outcome: Adequate for Discharge  Goal: By discharge: Patient will verbalize 2 strategies to deal with anxiety  Description: Interventions:  - Identify any obvious source/trigger to anxiety  - Staff will assist patient in applying identified coping technique/skills  - Encourage attendance of scheduled groups and activities  Outcome: Adequate for Discharge     Problem: SLEEP DISTURBANCE  Goal: Will exhibit normal sleeping pattern  Description: Interventions:  -  Assess the patients sleep pattern, noting recent changes  - Administer medication as ordered  - Decrease environmental stimuli, including noise, as appropriate during the night  - Encourage the patient to actively participate in unit groups and or exercise during the day to enhance ability to achieve adequate sleep at night  - Assess the patient, in the morning, encouraging a description of sleep experience  Outcome: Adequate for Discharge     Problem: Ineffective Coping  Goal: Participates in unit activities  Description: Interventions:  - Provide therapeutic environment   - Provide required programming   - Redirect inappropriate behaviors   Outcome: Adequate for Discharge     Problem: DISCHARGE PLANNING  Goal: Discharge to home or other facility with appropriate resources  Description: INTERVENTIONS:  - Identify barriers to discharge w/patient and caregiver  - Arrange for needed discharge resources and transportation as appropriate  - Identify discharge learning needs (meds, wound care, etc )  - Arrange for interpretive services to assist at discharge as needed  - Refer to Case Management Department for coordinating discharge planning if the patient needs post-hospital services based on physician/advanced practitioner order or complex needs related to functional status, cognitive ability, or social support system  Outcome: Adequate for Discharge Exam  Constitutional:       Appearance: Normal appearance.   HENT:      Head: Normocephalic and atraumatic.      Nose: Nose normal.   Cardiovascular:      Rate and Rhythm: Normal rate and regular rhythm.      Heart sounds: Normal heart sounds.   Pulmonary:      Effort: Pulmonary effort is normal.      Breath sounds: Normal breath sounds.   Abdominal:      General: Abdomen is flat.      Palpations: Abdomen is soft.   Musculoskeletal:      Right lower leg: No edema.      Left lower leg: No edema.   Skin:     General: Skin is warm and dry.   Neurological:      Mental Status: He is alert and oriented to person, place, and time.   Psychiatric:         Behavior: Behavior normal.            Significant Labs:  All pertinent labs from the last 24 hours have been reviewed.    Significant Diagnostics:  I have reviewed all pertinent imaging results/findings within the past 24 hours.  Assessment/Plan:     * Coronary artery disease of native artery of native heart with stable angina pectoris  The patient is a 73-year-old male with coronary artery disease status post stenting, diabetes, hyperlipidemia and hypertension who was worked up for chest pain with cardiac catheterization.  Cardiac catheterization shows severe multivessel coronary artery disease with a 90% proximal LAD stenosis.  The patient is a candidate for urgent coronary artery bypass grafting.  The risks and benefits of surgery have been explained to the patient.  The patient understands the risks and benefits of surgery and has agreed to proceed with urgent coronary artery bypass grafting which will be scheduled for 05/06/2024.    S/P CABG x 4  05/07/2024   The patient is postop day 1 status post coronary artery bypass grafting timesx1. Overall the patient is doing well.  Neuro:  Patient is awake alert and oriented x3.  Neuro exam is nonfocal.  Requiring increasing pain medication.    Cardiac:  Patient has been stable.  Patient is on low-dose epi and vasopressin.   Wean as tolerated.  Respiratory:  Patient has good sats on nasal cannula.  Pulmonary toileting.  Continue incentive spirometer  GI:  Advance diet as tolerated.    Renal:  Creatinine is 1.1.   Patient has good urine output.  Start Lasix.  Id:  Patient is afebrile.  White count is 19.  Suspect due to stress of surgery.  No evidence of infection.  Continue to observe.  Endocrine:  Glucose is controlled with long-acting insulin and sliding scale.  Heme:  Hematocrit is down to 19.  Will transfuse 2 units packed red blood cells.  Activities:  Advance activities as tolerated.  Line tubes and drains:  Patient has a right IJ triple-lumen, Michel catheter, A-line, pacer wires and saphenectomy site PABLO drains.    05/08/2024   The patient is postop day 2 status post coronary artery bypass grafting x 4.  Overall the patient is doing well.    Neuro:  Patient is awake alert and oriented x3.  Neuro exam is nonfocal.  Pain is much better controlled.  Cardiac:  Patient is hemodynamically stable.  Patient is is being weaned off low-dose vasopressin and epinephrine.  Will start metoprolol.    Respiratory: Patient has good sats.  Continue pulmonary toileting.  Continue incentive spirometer.  GI:  Patient is tolerating p.o. will start cardiac diet.  Advance as tolerated.    Renal:  Creatinine is 1.3.  Patient had excellent response to Lasix yesterday.  Patient appears euvolemic.  Will discontinue Lasix.    Id:  Patient had a T-max of 101°.  White count is 21.  However, patient has no evidence of infections.  Continue to observe.    Endocrine: Glucose is controlled with long-acting and sliding scale insulin.    Heme:  Hematocrit is 27 status post transfusion of 2 units of packed red blood cells.  Platelet is 105.  Will add low-dose rivaroxaban for anti thrombin 3 effect.  Continue aspirin and Plavix.    Activities:  Patient is out of bed to chair.  Advance as tolerated.  Line tubes and drains:  Patient has a right IJ triple-lumen, Michel  catheter, A-line and pacer wires.  Chest tubes have been discontinued.      05/09/2024   The patient is postop day 3 status post bypass grafting x4.  Overall the patient is.    Neuro:  Patient is awake alert and x3.  Neuro exam is nonfocal.  Pain is well controlled.    Cardiac:  Patient's is off all systolic blood pressure in the high 90s.  Will decrease metoprolol dose.    Respiratory:  Patient has good sats.  Continue pulmonary toileting.  Continue incentive spirometer.  GI:  Patient is complaining of nausea.  Patient has not had a bowel movement.  Will increase bowel regimen.  Patient's abdomen soft nontender sounds.  Renal:  Creatinine is 1.3.  Patient is off diuretics.    Id:  Patient is afebrile.  However white count remains high 22  No evidence of infection.  Continue to follow.  Endocrine: Glucose is controlled with long-acting and sliding scale insulin.  Patient has been having hypoglycemic episode.  Will decrease long-acting insulin.  Dose and frequency.    Heme:  Hematocrit is 24 and platelet count is 122 continue aspirin Plavix and low-dose rivaroxaban.  Activities:  Patient is out of bed to chair advance as tolerated.    Line tubes and drains:  Patient has a right IJ triple-lumen catheter Michel catheter, pacer wires, will discontinue Michel catheter.      05/10/2024   The patient is postop day 4 status post coronary artery bypass grafting x4.  Overall the patient is doing well.  Neuro:  Patient is awake alert and oriented x3.  Neuro exam is nonfocal.  Pain is well controlled.  Cardiac:  Patient's blood pressure has been improved since albumin yesterday.  Continue on low-dose metoprolol.    Respiratory:  Patient has good sats on nasal cannula.  Continue pulmonary toileting.  Continue incentive spirometer.    GI: Patient nausea is much improved.  Patient had 1 bowel movement yesterday.  Will repeat Mag citrate.  Will add boost to patient's p.o. intake.  Patient has poor p.o. intake.  LFTs are slowly  trending better.  Renal: Creatinine is 1.2.  Patient is making urine.  Patient is off diuretics.    Id:  Patient is afebrile.  White count is down to 15.  Will continue to follow.    Endocrine:  Patient had another episode of hypoglycemia on low-dose long-acting insulin.  Long-acting insulin will be discontinued.    Heme:  Hematocrit is 33 and platelet count is 135.  Continue aspirin Plavix and low-dose rivaroxaban.  Activities:  Patient is out of bed to chair ambulate and advance as tolerated.    Line tubes and drains:  Patient has peripheral lines and pacer wires.    05/11/2024   The patient is postop day 5 status post coronary artery bypass grafting x4 overall the patient is doing well.  Anticipate discharge to home or skilled nursing facility in the next 48-72 hours.    Neuro:  Patient is awake alert and oriented x3.  Neuro exam is nonfocal.  Pain is well controlled.    Cardiac:  Patient has been stable.  Continue low-dose metoprolol.    Respiratory:  Patient has good sats.  Continue pulmonary toileting.  Continue incentive spirometer.  GI:  Patient p.o. intake has been poor.  Encourage increased p.o. intake.  LFTs trending lower.  Renal:  Creatinine is 1.  However patient is complaining of urinary retention.  Bladder scan concerns urinary retention.  Michel catheter replaced.  Id:  Patient is afebrile.  White count is 12.    Endocrine:  Patient's glucose being managed with sliding scale insulin.    Heme:  Hematocrit is 35 and platelet count is 166.  Continue aspirin Plavix and low-dose rivaroxaban.  Activities: Patient is out of bed to chair advance as tolerated.    Line tubes and drains patient has peripheral lines and replaced Michel catheter.        Maury Amato MD  Cardiothoracic Surgery  [facility] hospital - Int[facility] Care ([facility])

## 2024-05-13 LAB
ANION GAP SERPL CALC-SCNC: 7 MMOL/L (ref 8–16)
ANION GAP SERPL CALC-SCNC: 8 MMOL/L (ref 8–16)
BASOPHILS # BLD AUTO: 0.02 K/UL (ref 0–0.2)
BASOPHILS NFR BLD: 0.3 % (ref 0–1.9)
BUN SERPL-MCNC: 17 MG/DL (ref 8–23)
BUN SERPL-MCNC: 19 MG/DL (ref 8–23)
CALCIUM SERPL-MCNC: 8.6 MG/DL (ref 8.7–10.5)
CALCIUM SERPL-MCNC: 8.7 MG/DL (ref 8.7–10.5)
CHLORIDE SERPL-SCNC: 98 MMOL/L (ref 95–110)
CHLORIDE SERPL-SCNC: 99 MMOL/L (ref 95–110)
CO2 SERPL-SCNC: 26 MMOL/L (ref 23–29)
CO2 SERPL-SCNC: 28 MMOL/L (ref 23–29)
CREAT SERPL-MCNC: 1 MG/DL (ref 0.5–1.4)
CREAT SERPL-MCNC: 1 MG/DL (ref 0.5–1.4)
DIFFERENTIAL METHOD BLD: ABNORMAL
EOSINOPHIL # BLD AUTO: 0.5 K/UL (ref 0–0.5)
EOSINOPHIL NFR BLD: 6.7 % (ref 0–8)
ERYTHROCYTE [DISTWIDTH] IN BLOOD BY AUTOMATED COUNT: 15.1 % (ref 11.5–14.5)
EST. GFR  (NO RACE VARIABLE): >60 ML/MIN/1.73 M^2
EST. GFR  (NO RACE VARIABLE): >60 ML/MIN/1.73 M^2
GLUCOSE SERPL-MCNC: 151 MG/DL (ref 70–110)
GLUCOSE SERPL-MCNC: 215 MG/DL (ref 70–110)
HCT VFR BLD AUTO: 32.7 % (ref 40–54)
HGB BLD-MCNC: 10.7 G/DL (ref 14–18)
IMM GRANULOCYTES # BLD AUTO: 0.13 K/UL (ref 0–0.04)
IMM GRANULOCYTES NFR BLD AUTO: 1.8 % (ref 0–0.5)
LYMPHOCYTES # BLD AUTO: 1.5 K/UL (ref 1–4.8)
LYMPHOCYTES NFR BLD: 20 % (ref 18–48)
MAGNESIUM SERPL-MCNC: 2.2 MG/DL (ref 1.6–2.6)
MAGNESIUM SERPL-MCNC: 2.8 MG/DL (ref 1.6–2.6)
MCH RBC QN AUTO: 30.4 PG (ref 27–31)
MCHC RBC AUTO-ENTMCNC: 32.7 G/DL (ref 32–36)
MCV RBC AUTO: 93 FL (ref 82–98)
MONOCYTES # BLD AUTO: 1 K/UL (ref 0.3–1)
MONOCYTES NFR BLD: 13.2 % (ref 4–15)
NEUTROPHILS # BLD AUTO: 4.3 K/UL (ref 1.8–7.7)
NEUTROPHILS NFR BLD: 58 % (ref 38–73)
NRBC BLD-RTO: 0 /100 WBC
PLATELET # BLD AUTO: 199 K/UL (ref 150–450)
PMV BLD AUTO: 10.2 FL (ref 9.2–12.9)
POCT GLUCOSE: 208 MG/DL (ref 70–110)
POCT GLUCOSE: 245 MG/DL (ref 70–110)
POTASSIUM SERPL-SCNC: 3.9 MMOL/L (ref 3.5–5.1)
POTASSIUM SERPL-SCNC: 4.4 MMOL/L (ref 3.5–5.1)
RBC # BLD AUTO: 3.52 M/UL (ref 4.6–6.2)
SODIUM SERPL-SCNC: 132 MMOL/L (ref 136–145)
SODIUM SERPL-SCNC: 134 MMOL/L (ref 136–145)
WBC # BLD AUTO: 7.35 K/UL (ref 3.9–12.7)

## 2024-05-13 PROCEDURE — 20000000 HC ICU ROOM

## 2024-05-13 PROCEDURE — 36415 COLL VENOUS BLD VENIPUNCTURE: CPT | Performed by: THORACIC SURGERY (CARDIOTHORACIC VASCULAR SURGERY)

## 2024-05-13 PROCEDURE — 97530 THERAPEUTIC ACTIVITIES: CPT

## 2024-05-13 PROCEDURE — 97116 GAIT TRAINING THERAPY: CPT

## 2024-05-13 PROCEDURE — 80048 BASIC METABOLIC PNL TOTAL CA: CPT | Performed by: THORACIC SURGERY (CARDIOTHORACIC VASCULAR SURGERY)

## 2024-05-13 PROCEDURE — 63600175 PHARM REV CODE 636 W HCPCS: Performed by: THORACIC SURGERY (CARDIOTHORACIC VASCULAR SURGERY)

## 2024-05-13 PROCEDURE — 83735 ASSAY OF MAGNESIUM: CPT | Mod: 91 | Performed by: THORACIC SURGERY (CARDIOTHORACIC VASCULAR SURGERY)

## 2024-05-13 PROCEDURE — 85025 COMPLETE CBC W/AUTO DIFF WBC: CPT | Performed by: THORACIC SURGERY (CARDIOTHORACIC VASCULAR SURGERY)

## 2024-05-13 PROCEDURE — 99232 SBSQ HOSP IP/OBS MODERATE 35: CPT | Mod: ,,, | Performed by: INTERNAL MEDICINE

## 2024-05-13 PROCEDURE — 25000003 PHARM REV CODE 250: Performed by: INTERNAL MEDICINE

## 2024-05-13 PROCEDURE — 25000003 PHARM REV CODE 250: Performed by: THORACIC SURGERY (CARDIOTHORACIC VASCULAR SURGERY)

## 2024-05-13 RX ORDER — POTASSIUM CHLORIDE 20 MEQ/1
40 TABLET, EXTENDED RELEASE ORAL ONCE
Qty: 2 TABLET | Refills: 0 | Status: COMPLETED | OUTPATIENT
Start: 2024-05-13 | End: 2024-05-13

## 2024-05-13 RX ORDER — POTASSIUM CHLORIDE 20 MEQ/1
40 TABLET, EXTENDED RELEASE ORAL ONCE
Status: DISCONTINUED | OUTPATIENT
Start: 2024-05-13 | End: 2024-05-13

## 2024-05-13 RX ADMIN — ESCITALOPRAM OXALATE 10 MG: 10 TABLET ORAL at 10:05

## 2024-05-13 RX ADMIN — RIVAROXABAN 2.5 MG: 2.5 TABLET, FILM COATED ORAL at 10:05

## 2024-05-13 RX ADMIN — INSULIN ASPART 4 UNITS: 100 INJECTION, SOLUTION INTRAVENOUS; SUBCUTANEOUS at 11:05

## 2024-05-13 RX ADMIN — MAGNESIUM SULFATE HEPTAHYDRATE 4 G: 40 INJECTION, SOLUTION INTRAVENOUS at 10:05

## 2024-05-13 RX ADMIN — INSULIN ASPART 4 UNITS: 100 INJECTION, SOLUTION INTRAVENOUS; SUBCUTANEOUS at 03:05

## 2024-05-13 RX ADMIN — DOCUSATE SODIUM 100 MG: 100 CAPSULE, LIQUID FILLED ORAL at 10:05

## 2024-05-13 RX ADMIN — POLYETHYLENE GLYCOL 3350 17 G: 17 POWDER, FOR SOLUTION ORAL at 10:05

## 2024-05-13 RX ADMIN — INSULIN ASPART 1 UNITS: 100 INJECTION, SOLUTION INTRAVENOUS; SUBCUTANEOUS at 08:05

## 2024-05-13 RX ADMIN — PANTOPRAZOLE SODIUM 40 MG: 40 TABLET, DELAYED RELEASE ORAL at 10:05

## 2024-05-13 RX ADMIN — METOPROLOL TARTRATE 12.5 MG: 25 TABLET, FILM COATED ORAL at 08:05

## 2024-05-13 RX ADMIN — ASPIRIN 81 MG: 81 TABLET, COATED ORAL at 10:05

## 2024-05-13 RX ADMIN — OXYCODONE HYDROCHLORIDE AND ACETAMINOPHEN 500 MG: 500 TABLET ORAL at 10:05

## 2024-05-13 RX ADMIN — OXYCODONE HYDROCHLORIDE AND ACETAMINOPHEN 500 MG: 500 TABLET ORAL at 08:05

## 2024-05-13 RX ADMIN — FERROUS SULFATE TAB 325 MG (65 MG ELEMENTAL FE) 1 EACH: 325 (65 FE) TAB at 10:05

## 2024-05-13 RX ADMIN — TRAMADOL HYDROCHLORIDE 50 MG: 50 TABLET, COATED ORAL at 03:05

## 2024-05-13 RX ADMIN — CLOPIDOGREL BISULFATE 75 MG: 75 TABLET ORAL at 10:05

## 2024-05-13 RX ADMIN — CYANOCOBALAMIN TAB 1000 MCG 1000 MCG: 1000 TAB at 10:05

## 2024-05-13 RX ADMIN — POTASSIUM CHLORIDE 40 MEQ: 1500 TABLET, EXTENDED RELEASE ORAL at 10:05

## 2024-05-13 RX ADMIN — METOPROLOL TARTRATE 12.5 MG: 25 TABLET, FILM COATED ORAL at 10:05

## 2024-05-13 RX ADMIN — FOLIC ACID 1 MG: 1 TABLET ORAL at 10:05

## 2024-05-13 RX ADMIN — RIVAROXABAN 2.5 MG: 2.5 TABLET, FILM COATED ORAL at 05:05

## 2024-05-13 RX ADMIN — DOXAZOSIN MESYLATE 1 MG: 1 TABLET ORAL at 10:05

## 2024-05-13 NOTE — PROGRESS NOTES
[facility] ([facility])  Cardiology  Progress Note    Patient Name: Feng Marques  MRN: [id number]  Admission Date: 5/3/2024  Hospital Length of Stay: 10 days  Code Status: Full Code   Attending Physician: Maury Amato MD   Primary Care Physician: Maciel Enriquez MD  Expected Discharge Date:   Principal Problem:Coronary artery disease of native artery of native heart with stable angina pectoris    Subjective:   Hospital Course:   5/4/2024 uneventful night denies any chest pain or shortness of breath awaiting cabg on Monday 5/5/2024 NO ANGINA ASYMPTOMATIC    5/7/24-Patient seen and examined today, s/p CABG x4, POD # 1. Moaning in pain during exam. Labs reviewed. H/H 7.6/22.8. Platelets 128,000.    5/8/24-Patient seen and examined today, s/p CABG x 4, POD #2. Feeling better, sitting up in chair. Pain improved. Chest tubes removed. Labs reviewed. Creatinine 1.3. H/H improved post transfusion. LFT's bumped.    5/9/24-Patient seen and examined today, s/p CABG x 4 POD # 3. Continues to improve. Pain controlled. Ambulated with PT/OT this AM. Labs reviewed.     5/10/24-Patient seen and examined today, s/p CABG x 4 POD # 4. Feels ok, still weak/tired. Working with PT/OT this AM. Paced on monitor. Labs reviewed. LFT's still bumped.    5/11/24- Patient seen and examined today sitting in bedside chair. S/P CABG x 4 POD #5. Feels good. Ambulated with PT this morning. Labs reviewed. LFT's are still high.       5/12/24- Patient seen and examined today sitting in up in chair. S/P CABG X4 POD # 6. He states his pain isn't too bad. Ambulated in the hallways with PT yesterday. Patient claims his appetite isn't too good. Labs reviewed. LFT's are improving.     5/13/24-Patient amy nd examined today, s/p CABG x 4 POD #7. Sitting up in bedside chair. Feels ok. Admits to fatigue/loss of appetite. States it hurts to swallow certain foods. Incisional pain controlled. Working with PT/OT. BP marginal at times. Labs  reviewed.         Review of Systems   Constitutional: Positive for malaise/fatigue.   HENT:  Positive for odynophagia.    Eyes: Negative.    Cardiovascular: Negative.    Respiratory: Negative.     Endocrine: Negative.    Hematologic/Lymphatic: Negative.    Skin: Negative.    Musculoskeletal:  Positive for arthritis.   Gastrointestinal: Negative.    Genitourinary: Negative.    Neurological: Negative.    Psychiatric/Behavioral: Negative.     Allergic/Immunologic: Negative.      Objective:     Vital Signs (Most Recent):  Temp: 98.4 °F (36.9 °C) (05/13/24 0705)  Pulse: 93 (05/13/24 0905)  Resp: (!) 24 (05/13/24 0905)  BP: (!) 102/56 (05/13/24 0900)  SpO2: 97 % (05/13/24 0905) Vital Signs (24h Range):  Temp:  [98.2 °F (36.8 °C)-98.7 °F (37.1 °C)] 98.4 °F (36.9 °C)  Pulse:  [77-99] 93  Resp:  [13-25] 24  SpO2:  [93 %-100 %] 97 %  BP: ()/(52-71) 102/56     Weight: 71.5 kg (157 lb 10.1 oz)  Body mass index is 22.62 kg/m².     SpO2: 97 %         Intake/Output Summary (Last 24 hours) at 5/13/2024 0959  Last data filed at 5/13/2024 0900  Gross per 24 hour   Intake 73.46 ml   Output 1570 ml   Net -1496.54 ml       Lines/Drains/Airways       Line  Duration                  Pacer Wires 05/06/24 1310 6 days              Peripheral Intravenous Line  Duration                  Peripheral IV - Single Lumen 05/10/24 1100 20 G Anterior;Right Upper Arm 2 days                       Physical Exam  Vitals and nursing note reviewed.   Constitutional:       Appearance: Normal appearance.   HENT:      Head: Normocephalic and atraumatic.   Cardiovascular:      Rate and Rhythm: Normal rate and regular rhythm.      Heart sounds: S1 normal and S2 normal. No murmur heard.     Comments: Sternotomy site C/D/I; no bleeding erythema or drainage  Pulmonary:      Effort: Pulmonary effort is normal.   Abdominal:      Palpations: Abdomen is soft.   Musculoskeletal:      Right lower leg: No edema.      Left lower leg: No edema.   Neurological:       "General: No focal deficit present.      Mental Status: He is oriented to person, place, and time.   Psychiatric:         Mood and Affect: Mood normal.         Behavior: Behavior normal.            Significant Labs: CMP   Recent Labs   Lab 05/12/24  0532 05/12/24  1652 05/13/24  0519   * 131* 134*   K 3.8 3.9 3.9   CL 98 96 98   CO2 26 28 28   * 213* 151*   BUN 28* 24* 19   CREATININE 1.0 1.1 1.0   CALCIUM 8.5* 8.8 8.6*   PROT 5.3*  --   --    ALBUMIN 3.0*  --   --    BILITOT 0.7  --   --    ALKPHOS 71  --   --    *  --   --    *  --   --    ANIONGAP 11 7* 8   , CBC   Recent Labs   Lab 05/13/24  0519   WBC 7.35   HGB 10.7*   HCT 32.7*      , Troponin No results for input(s): "TROPONINI" in the last 48 hours., and All pertinent lab results from the last 24 hours have been reviewed.    Significant Imaging: Echocardiogram: Transthoracic echo (TTE) complete (Cupid Only):   Results for orders placed or performed during the hospital encounter of 05/03/24   Echo   Result Value Ref Range    BSA 1.8 m2    LVOT stroke volume 50.79 cm3    LVIDd 4.01 3.5 - 6.0 cm    LV Systolic Volume 25.50 mL    LV Systolic Volume Index 14.0 mL/m2    LVIDs 2.64 2.1 - 4.0 cm    LV Diastolic Volume 70.21 mL    LV Diastolic Volume Index 38.58 mL/m2    IVS 1.55 (A) 0.6 - 1.1 cm    LVOT diameter 2.03 cm    LVOT area 3.2 cm2    FS 34 28 - 44 %    Left Ventricle Relative Wall Thickness 0.63 cm    Posterior Wall 1.27 (A) 0.6 - 1.1 cm    LV mass 212.04 g    LV Mass Index 117 g/m2    MV Peak E Evin 0.68 m/s    TDI LATERAL 0.08 m/s    TDI SEPTAL 0.07 m/s    E/E' ratio 9.07 m/s    MV Peak A Evin 0.99 m/s    TR Max Evin 1.97 m/s    E/A ratio 0.69     IVRT 83.73 msec    E wave deceleration time 268.67 msec    LV SEPTAL E/E' RATIO 9.71 m/s    LV LATERAL E/E' RATIO 8.50 m/s    LVOT peak evin 0.78 m/s    Left Ventricular Outflow Tract Mean Velocity 0.67 cm/s    Left Ventricular Outflow Tract Mean Gradient 1.83 mmHg    RVOT peak VTI " 19.6 cm    TAPSE 2.38 cm    LA size 3.87 cm    Left Atrium Minor Axis 4.81 cm    Left Atrium Major Axis 4.91 cm    RA Major Axis 4.15 cm    AV mean gradient 3 mmHg    AV peak gradient 4 mmHg    Ao peak fabrizio 0.98 m/s    Ao VTI 20.50 cm    LVOT peak VTI 15.70 cm    AV valve area 2.48 cm²    AV Velocity Ratio 0.80     AV index (prosthetic) 0.77     LEXUS by Velocity Ratio 2.57 cm²    Mr max fabrizio 3.82 m/s    MV mean gradient 1 mmHg    MV peak gradient 3 mmHg    MV stenosis pressure 1/2 time 77.91 ms    MV valve area p 1/2 method 2.82 cm2    MV valve area by continuity eq 1.74 cm2    MV VTI 29.2 cm    Triscuspid Valve Regurgitation Peak Gradient 16 mmHg    PV mean gradient 3 mmHg    RVOT peak fabrizio 1.05 m/s    Ao root annulus 3.39 cm    STJ 3.33 cm    Ascending aorta 3.23 cm    Mean e' 0.08 m/s    ZLVIDS -1.30     ZLVIDD -2.28     LA Volume Index 34.3 mL/m2    LA volume 62.34 cm3    LA WIDTH 3.9 cm    RA Width 2.8 cm    EF 60 %    TV resting pulmonary artery pressure 19 mmHg    RV TB RVSP 5 mmHg    Est. RA pres 3 mmHg    Narrative      Left Ventricle: The left ventricle is normal in size. Normal wall   thickness. There is concentric hypertrophy. Regional wall motion   abnormalities present. See diagram for wall motion findings. There is   normal systolic function with a visually estimated ejection fraction of 55   - 70%. Ejection fraction by visual approximation is 60%.    Right Ventricle: Normal right ventricular cavity size. Wall thickness   is normal. Systolic function is normal.    Mitral Valve: There is mild regurgitation.    IVC/SVC: Normal venous pressure at 3 mmHg.      and EKG: Reviewed  Assessment and Plan:   Patient with multivessel CAD, recovering s/p CABG x 4. Continue post-op care and mgmt as per CTS. Needs statin on board once LFT's normalize.     * Coronary artery disease of native artery of native heart with stable angina pectoris  Multivessel cad for cabg on Monday no angina    S/P CABG x 4  -Continue current  post-op care and mgmt as per CTS  -ASA Plavix statin BB  -IS usage   -Ambulation when able    5/8/24  -Pain improved  -Continue ASA, Plavix, BB  -Statin held due to bumped LFT's  -IS usage and ambulation    5/9/24  -Stable CV wise  -Continue ASA, Plavix, BB  -Statin held due to bumped LFT's  -Continue IS usage and ambulation    5/10/24  -No acute CV issues  -Continue ASA, Plavix, BB  -Statin on hold  -IS usage and ambulation/PT/OT    5/11/24  - No acute CV issues   - Statin held   - PT and OT ambulation  -Continue ASA/ BB and Plavix      5/12/24  -management per CT surgery   -Patient still working with PT and ambulating the hallways  -Appetite not great   -LFT's are improving still holding statin   -Continue ASA/ BB and Plavix     5/13/24  -Stable CV wise  -Continue ASA, BB, plavix  -resume statin once LFT's normalize  -PT/OT and IS usage    Other hyperlipidemia  -statins    5/8/24  -LFT's bumped, statin held    Type 2 diabetes mellitus with complication, without long-term current use of insulin  Per hospital medicine    Essential hypertension  -Continue home meds as tolerated        VTE Risk Mitigation (From admission, onward)           Ordered     Place DELROY hose  Until discontinued        Comments: Post Op Day 1 - After Ace and Drain Removed    05/06/24 1427     Place sequential compression device  Until discontinued         05/06/24 1427     IP VTE HIGH RISK PATIENT  Once         05/06/24 1427     Place DELROY hose  Until discontinued         05/05/24 1954     Place sequential compression device  Until discontinued         05/05/24 1954     Place sequential compression device  Until discontinued         05/03/24 1124                    Debora Ramírez PA-C  Cardiology  [facility] ([facility])

## 2024-05-13 NOTE — PT/OT/SLP PROGRESS
"Occupational Therapy   Treatment    Name: Feng Marques  MRN: [id number]  Admitting Diagnosis:  Coronary artery disease of native artery of native heart with stable angina pectoris  7 Days Post-Op    Recommendations:     Discharge Recommendations: Low Intensity Therapy  Discharge Equipment Recommendations:  shower chair  Barriers to discharge:  None    Assessment:     Feng Marques is a 73 y.o. male with a medical diagnosis of Coronary artery disease of native artery of native heart with stable angina pectoris.  He presents with the following performance deficits affecting function are weakness, impaired endurance, impaired self care skills, impaired functional mobility, impaired balance, impaired cardiopulmonary response to activity, pain (sternal precautions).     Rehab Prognosis:  Good; patient would benefit from acute skilled OT services to address these deficits and reach maximum level of function.       Patient tolerated session well. Improvements in activity tolerance noted.    Plan:     Patient to be seen 2 x/week to address the above listed problems via self-care/home management, therapeutic activities, therapeutic exercises  Plan of Care Expires: 05/22/24  Plan of Care Reviewed with: patient    Subjective     Chief Complaint: Reported "My neck bothers me the longer I stand up."  Patient/Family Comments/goals: increase independence  Pain/Comfort:  Pain Rating 1:  (declines pain at rest. reports significant increase in posterior neck pain with upright activity. did not rate. improved upon transition to supine.)  Pain Addressed 1:  (activity pacing)    Objective:     Communicated with: NursePam, prior to session.  Patient found up in chair with blood pressure cuff, pulse ox (continuous), telemetry, peripheral IV upon OT entry to room.    General Precautions: Standard, fall, sternal    Orthopedic Precautions:N/A  Braces: N/A  Respiratory Status: Room air     Occupational Performance:     Bed " Mobility:    Patient completed Sit to Supine with contact guard assistance     Functional Mobility/Transfers:  Patient completed Sit <> Stand Transfer with minimum assistance  with  rolling walker   Patient completed Bed <> Chair Transfer using Step Transfer technique with contact guard assistance with rolling walker  Functional Mobility: Patient completed x260ft functional mobility with RW and CGA to increase dynamic standing balance and activity tolerance needed for ADL completion.  Provided with v/c for technique with transfers to increase safety and independence with completion  Cueing throughout for sternal precaution compliance.  Patient self initiated multiple standing rest breaks to decrease posterior neck pain. Remains with kyphotic/rounded posturing, reporting chronic in nature.    Activities of Daily Living:  Upper Body Dressing: minimum assistance donning gown as robe while seated in bedside chair.    [facility] 6 Click ADL: 20    Treatment & Education:  Provided with additional education re: sternal precautions and their functional implications. Poor carryover from previous sessions, requiring cueing throughout. Reinforced need for splinting with cough/sneeze for sternum protection. Encouraged completion of B UE AROM therex throughout the day to increase functional strength and activity tolerance needed for ADL completion. Educated on benefits of OOB activity, importance of frequency rather than duration, and need to call for A for all transfers. Patient stated understanding and in agreement with POC.    Patient left HOB elevated with all lines intact, call button in reach, and nurse present    GOALS:   Multidisciplinary Problems       Occupational Therapy Goals          Problem: Occupational Therapy    Goal Priority Disciplines Outcome Interventions   Occupational Therapy Goal     OT, PT/OT Progressing    Description: Goals to be met by: 5/22/24     Patient will increase functional independence with ADLs by  performing:    Toileting from toilet with Supervision for hygiene and clothing management.   Toilet transfer to toilet with Supervision.  Demonstrates 100% functional compliance with sternal precautions.                         Time Tracking:     OT Date of Treatment: 05/13/24  OT Start Time: 1050  OT Stop Time: 1120  OT Total Time (min): 30 min    Billable Minutes:Therapeutic Activity 30    Su Beatty, OT  5/13/2024

## 2024-05-13 NOTE — SUBJECTIVE & OBJECTIVE
Review of Systems   Constitutional: Positive for malaise/fatigue.   HENT:  Positive for odynophagia.    Eyes: Negative.    Cardiovascular: Negative.    Respiratory: Negative.     Endocrine: Negative.    Hematologic/Lymphatic: Negative.    Skin: Negative.    Musculoskeletal:  Positive for arthritis.   Gastrointestinal: Negative.    Genitourinary: Negative.    Neurological: Negative.    Psychiatric/Behavioral: Negative.     Allergic/Immunologic: Negative.      Objective:     Vital Signs (Most Recent):  Temp: 98.4 °F (36.9 °C) (05/13/24 0705)  Pulse: 93 (05/13/24 0905)  Resp: (!) 24 (05/13/24 0905)  BP: (!) 102/56 (05/13/24 0900)  SpO2: 97 % (05/13/24 0905) Vital Signs (24h Range):  Temp:  [98.2 °F (36.8 °C)-98.7 °F (37.1 °C)] 98.4 °F (36.9 °C)  Pulse:  [77-99] 93  Resp:  [13-25] 24  SpO2:  [93 %-100 %] 97 %  BP: ()/(52-71) 102/56     Weight: 71.5 kg (157 lb 10.1 oz)  Body mass index is 22.62 kg/m².     SpO2: 97 %         Intake/Output Summary (Last 24 hours) at 5/13/2024 0959  Last data filed at 5/13/2024 0900  Gross per 24 hour   Intake 73.46 ml   Output 1570 ml   Net -1496.54 ml       Lines/Drains/Airways       Line  Duration                  Pacer Wires 05/06/24 1310 6 days              Peripheral Intravenous Line  Duration                  Peripheral IV - Single Lumen 05/10/24 1100 20 G Anterior;Right Upper Arm 2 days                       Physical Exam  Vitals and nursing note reviewed.   Constitutional:       Appearance: Normal appearance.   HENT:      Head: Normocephalic and atraumatic.   Cardiovascular:      Rate and Rhythm: Normal rate and regular rhythm.      Heart sounds: S1 normal and S2 normal. No murmur heard.     Comments: Sternotomy site C/D/I; no bleeding erythema or drainage  Pulmonary:      Effort: Pulmonary effort is normal.   Abdominal:      Palpations: Abdomen is soft.   Musculoskeletal:      Right lower leg: No edema.      Left lower leg: No edema.   Neurological:      General: No  "focal deficit present.      Mental Status: He is oriented to person, place, and time.   Psychiatric:         Mood and Affect: Mood normal.         Behavior: Behavior normal.            Significant Labs: CMP   Recent Labs   Lab 05/12/24  0532 05/12/24  1652 05/13/24  0519   * 131* 134*   K 3.8 3.9 3.9   CL 98 96 98   CO2 26 28 28   * 213* 151*   BUN 28* 24* 19   CREATININE 1.0 1.1 1.0   CALCIUM 8.5* 8.8 8.6*   PROT 5.3*  --   --    ALBUMIN 3.0*  --   --    BILITOT 0.7  --   --    ALKPHOS 71  --   --    *  --   --    *  --   --    ANIONGAP 11 7* 8   , CBC   Recent Labs   Lab 05/13/24  0519   WBC 7.35   HGB 10.7*   HCT 32.7*      , Troponin No results for input(s): "TROPONINI" in the last 48 hours., and All pertinent lab results from the last 24 hours have been reviewed.    Significant Imaging: Echocardiogram: Transthoracic echo (TTE) complete (Cupid Only):   Results for orders placed or performed during the hospital encounter of 05/03/24   Echo   Result Value Ref Range    BSA 1.8 m2    LVOT stroke volume 50.79 cm3    LVIDd 4.01 3.5 - 6.0 cm    LV Systolic Volume 25.50 mL    LV Systolic Volume Index 14.0 mL/m2    LVIDs 2.64 2.1 - 4.0 cm    LV Diastolic Volume 70.21 mL    LV Diastolic Volume Index 38.58 mL/m2    IVS 1.55 (A) 0.6 - 1.1 cm    LVOT diameter 2.03 cm    LVOT area 3.2 cm2    FS 34 28 - 44 %    Left Ventricle Relative Wall Thickness 0.63 cm    Posterior Wall 1.27 (A) 0.6 - 1.1 cm    LV mass 212.04 g    LV Mass Index 117 g/m2    MV Peak E Evin 0.68 m/s    TDI LATERAL 0.08 m/s    TDI SEPTAL 0.07 m/s    E/E' ratio 9.07 m/s    MV Peak A Evin 0.99 m/s    TR Max Evin 1.97 m/s    E/A ratio 0.69     IVRT 83.73 msec    E wave deceleration time 268.67 msec    LV SEPTAL E/E' RATIO 9.71 m/s    LV LATERAL E/E' RATIO 8.50 m/s    LVOT peak evin 0.78 m/s    Left Ventricular Outflow Tract Mean Velocity 0.67 cm/s    Left Ventricular Outflow Tract Mean Gradient 1.83 mmHg    RVOT peak VTI 19.6 cm    " TAPSE 2.38 cm    LA size 3.87 cm    Left Atrium Minor Axis 4.81 cm    Left Atrium Major Axis 4.91 cm    RA Major Axis 4.15 cm    AV mean gradient 3 mmHg    AV peak gradient 4 mmHg    Ao peak fabrizio 0.98 m/s    Ao VTI 20.50 cm    LVOT peak VTI 15.70 cm    AV valve area 2.48 cm²    AV Velocity Ratio 0.80     AV index (prosthetic) 0.77     LEXUS by Velocity Ratio 2.57 cm²    Mr max fabrizio 3.82 m/s    MV mean gradient 1 mmHg    MV peak gradient 3 mmHg    MV stenosis pressure 1/2 time 77.91 ms    MV valve area p 1/2 method 2.82 cm2    MV valve area by continuity eq 1.74 cm2    MV VTI 29.2 cm    Triscuspid Valve Regurgitation Peak Gradient 16 mmHg    PV mean gradient 3 mmHg    RVOT peak fabrizio 1.05 m/s    Ao root annulus 3.39 cm    STJ 3.33 cm    Ascending aorta 3.23 cm    Mean e' 0.08 m/s    ZLVIDS -1.30     ZLVIDD -2.28     LA Volume Index 34.3 mL/m2    LA volume 62.34 cm3    LA WIDTH 3.9 cm    RA Width 2.8 cm    EF 60 %    TV resting pulmonary artery pressure 19 mmHg    RV TB RVSP 5 mmHg    Est. RA pres 3 mmHg    Narrative      Left Ventricle: The left ventricle is normal in size. Normal wall   thickness. There is concentric hypertrophy. Regional wall motion   abnormalities present. See diagram for wall motion findings. There is   normal systolic function with a visually estimated ejection fraction of 55   - 70%. Ejection fraction by visual approximation is 60%.    Right Ventricle: Normal right ventricular cavity size. Wall thickness   is normal. Systolic function is normal.    Mitral Valve: There is mild regurgitation.    IVC/SVC: Normal venous pressure at 3 mmHg.      and EKG: Reviewed

## 2024-05-13 NOTE — PROGRESS NOTES
[facility] - Intensive Care ([facility])  Cardiothoracic Surgery  Progress Note    Patient Name: Feng Marques  MRN: [id number]  Admission Date: 5/3/2024  Hospital Length of Stay: 10 days  Code Status: Full Code   Attending Physician: Maury Amato MD   Referring Provider: Abdulaziz Carrasco MD  Principal Problem:Coronary artery disease of native artery of native heart with stable angina pectoris            Subjective:     Post-Op Info:  Procedure(s) (LRB):  CORONARY ARTERY BYPASS GRAFT (CABG) (N/A)  ECHOCARDIOGRAM,TRANSESOPHAGEAL (N/A)  SURGICAL PROCUREMENT, VEIN, ENDOSCOPIC (Left)  BLOCK, NERVE, INTERCOSTAL, 2 OR MORE (N/A)   7 Days Post-Op     Interval History: The patient is postop day 7 status post coronary artery bypass grafting x4.    ROS  Medications:  Continuous Infusions:   vasopressin  0.04 Units/min Intravenous Continuous   Stopped at 05/08/24 1000     Scheduled Meds:   albumin human 5%  25 g Intravenous Once    ascorbic acid (vitamin C)  500 mg Oral BID    aspirin  81 mg Oral Daily    clopidogreL  75 mg Oral Daily    cyanocobalamin  1,000 mcg Oral Daily    docusate sodium  100 mg Oral BID    doxazosin  1 mg Oral Daily    EScitalopram oxalate  10 mg Oral Daily    ferrous sulfate  1 tablet Oral Daily    folic acid  1 mg Oral Daily    metoprolol tartrate  12.5 mg Oral BID    pantoprazole  40 mg Oral Daily    polyethylene glycol  17 g Oral Daily    potassium chloride  40 mEq Oral Once    rivaroxaban  2.5 mg Oral BID WM     PRN Meds:  Current Facility-Administered Medications:     albumin human 5%, 25 g, Intravenous, PRN    dextrose 10%, 12.5 g, Intravenous, PRN    dextrose 10%, 25 g, Intravenous, PRN    glucagon (human recombinant), 1 mg, Intramuscular, PRN    glucose, 16 g, Oral, PRN    glucose, 24 g, Oral, PRN    influenza 65up-adj, 0.5 mL, Intramuscular, vaccine x 1 dose    insulin aspart U-100, 0-10 Units, Subcutaneous, QID (AC + HS) PRN    lactated ringers, 1,000 mL, Intravenous, PRN    magnesium  sulfate IVPB, 4 g, Intravenous, PRN    metoclopramide, 5 mg, Intravenous, Q6H PRN    naloxone, 0.4 mg, Intravenous, PRN    ondansetron, 4 mg, Intravenous, Q6H PRN    pneumoc 20-diaz conj-dip cr(PF), 0.5 mL, Intramuscular, vaccine x 1 dose    potassium chloride in water, 20 mEq, Intravenous, PRN    potassium chloride in water, 60 mEq, Intravenous, PRN    potassium chloride in water, 40 mEq, Intravenous, PRN    sodium chloride 0.9%, 10 mL, Intravenous, PRN    sodium chloride 0.9%, 10 mL, Intravenous, Q12H PRN    traMADoL, 50 mg, Oral, Q6H PRN     Objective:     Vital Signs (Most Recent):  Temp: 98.4 °F (36.9 °C) (05/13/24 0705)  Pulse: 84 (05/13/24 0812)  Resp: 15 (05/13/24 0812)  BP: (!) 107/58 (05/13/24 0700)  SpO2: 95 % (05/13/24 0812) Vital Signs (24h Range):  Temp:  [98.2 °F (36.8 °C)-98.7 °F (37.1 °C)] 98.4 °F (36.9 °C)  Pulse:  [76-99] 84  Resp:  [13-25] 15  SpO2:  [93 %-100 %] 95 %  BP: ()/(52-71) 107/58     Weight: 71.5 kg (157 lb 10.1 oz)  Body mass index is 22.62 kg/m².    SpO2: 95 %       Intake/Output - Last 3 Shifts         05/11 0700 05/12 0659 05/12 0700 05/13 0659 05/13 0700 05/14 0659    I.V. (mL/kg) 100 (1.4) 100.1 (1.4)     Total Intake(mL/kg) 100 (1.4) 100.1 (1.4)     Urine (mL/kg/hr) 1975 (1.1) 1650 (1) 70 (0.5)    Other 0      Total Output 1975 1650 70    Net -1875 -1549.9 -70                   Lines/Drains/Airways       Drain  Duration                  Urethral Catheter 05/11/24 1030 Straight-tip 1 day              Line  Duration                  Pacer Wires 05/06/24 1310 6 days              Peripheral Intravenous Line  Duration                  Peripheral IV - Single Lumen 05/10/24 1100 20 G Anterior;Right Upper Arm 2 days                     Physical Exam  Constitutional:       Appearance: Normal appearance.   HENT:      Head: Normocephalic and atraumatic.      Nose: Nose normal.   Cardiovascular:      Rate and Rhythm: Normal rate.      Heart sounds: Normal heart sounds.   Pulmonary:       Effort: Pulmonary effort is normal.      Breath sounds: Normal breath sounds.   Abdominal:      General: Abdomen is flat. Bowel sounds are normal.      Palpations: Abdomen is soft.   Musculoskeletal:      Right lower leg: No edema.      Left lower leg: No edema.   Neurological:      Mental Status: He is alert and oriented to person, place, and time.            Significant Labs:  All pertinent labs from the last 24 hours have been reviewed.    Significant Diagnostics:  I have reviewed all pertinent imaging results/findings within the past 24 hours.  Assessment/Plan:     * Coronary artery disease of native artery of native heart with stable angina pectoris  The patient is a 73-year-old male with coronary artery disease status post stenting, diabetes, hyperlipidemia and hypertension who was worked up for chest pain with cardiac catheterization.  Cardiac catheterization shows severe multivessel coronary artery disease with a 90% proximal LAD stenosis.  The patient is a candidate for urgent coronary artery bypass grafting.  The risks and benefits of surgery have been explained to the patient.  The patient understands the risks and benefits of surgery and has agreed to proceed with urgent coronary artery bypass grafting which will be scheduled for 05/06/2024.    S/P CABG x 4  05/07/2024   The patient is postop day 1 status post coronary artery bypass grafting timesx1. Overall the patient is doing well.  Neuro:  Patient is awake alert and oriented x3.  Neuro exam is nonfocal.  Requiring increasing pain medication.    Cardiac:  Patient has been stable.  Patient is on low-dose epi and vasopressin.  Wean as tolerated.  Respiratory:  Patient has good sats on nasal cannula.  Pulmonary toileting.  Continue incentive spirometer  GI:  Advance diet as tolerated.    Renal:  Creatinine is 1.1.   Patient has good urine output.  Start Lasix.  Id:  Patient is afebrile.  White count is 19.  Suspect due to stress of surgery.  No  evidence of infection.  Continue to observe.  Endocrine:  Glucose is controlled with long-acting insulin and sliding scale.  Heme:  Hematocrit is down to 19.  Will transfuse 2 units packed red blood cells.  Activities:  Advance activities as tolerated.  Line tubes and drains:  Patient has a right IJ triple-lumen, Michel catheter, A-line, pacer wires and saphenectomy site PABLO drains.    05/08/2024   The patient is postop day 2 status post coronary artery bypass grafting x 4.  Overall the patient is doing well.    Neuro:  Patient is awake alert and oriented x3.  Neuro exam is nonfocal.  Pain is much better controlled.  Cardiac:  Patient is hemodynamically stable.  Patient is is being weaned off low-dose vasopressin and epinephrine.  Will start metoprolol.    Respiratory: Patient has good sats.  Continue pulmonary toileting.  Continue incentive spirometer.  GI:  Patient is tolerating p.o. will start cardiac diet.  Advance as tolerated.    Renal:  Creatinine is 1.3.  Patient had excellent response to Lasix yesterday.  Patient appears euvolemic.  Will discontinue Lasix.    Id:  Patient had a T-max of 101°.  White count is 21.  However, patient has no evidence of infections.  Continue to observe.    Endocrine: Glucose is controlled with long-acting and sliding scale insulin.    Heme:  Hematocrit is 27 status post transfusion of 2 units of packed red blood cells.  Platelet is 105.  Will add low-dose rivaroxaban for anti thrombin 3 effect.  Continue aspirin and Plavix.    Activities:  Patient is out of bed to chair.  Advance as tolerated.  Line tubes and drains:  Patient has a right IJ triple-lumen, Michel catheter, A-line and pacer wires.  Chest tubes have been discontinued.      05/09/2024   The patient is postop day 3 status post bypass grafting x4.  Overall the patient is.    Neuro:  Patient is awake alert and x3.  Neuro exam is nonfocal.  Pain is well controlled.    Cardiac:  Patient's is off all systolic blood pressure  in the high 90s.  Will decrease metoprolol dose.    Respiratory:  Patient has good sats.  Continue pulmonary toileting.  Continue incentive spirometer.  GI:  Patient is complaining of nausea.  Patient has not had a bowel movement.  Will increase bowel regimen.  Patient's abdomen soft nontender sounds.  Renal:  Creatinine is 1.3.  Patient is off diuretics.    Id:  Patient is afebrile.  However white count remains high 22  No evidence of infection.  Continue to follow.  Endocrine: Glucose is controlled with long-acting and sliding scale insulin.  Patient has been having hypoglycemic episode.  Will decrease long-acting insulin.  Dose and frequency.    Heme:  Hematocrit is 24 and platelet count is 122 continue aspirin Plavix and low-dose rivaroxaban.  Activities:  Patient is out of bed to chair advance as tolerated.    Line tubes and drains:  Patient has a right IJ triple-lumen catheter Michel catheter, pacer wires, will discontinue Michel catheter.      05/10/2024   The patient is postop day 4 status post coronary artery bypass grafting x4.  Overall the patient is doing well.  Neuro:  Patient is awake alert and oriented x3.  Neuro exam is nonfocal.  Pain is well controlled.  Cardiac:  Patient's blood pressure has been improved since albumin yesterday.  Continue on low-dose metoprolol.    Respiratory:  Patient has good sats on nasal cannula.  Continue pulmonary toileting.  Continue incentive spirometer.    GI: Patient nausea is much improved.  Patient had 1 bowel movement yesterday.  Will repeat Mag citrate.  Will add boost to patient's p.o. intake.  Patient has poor p.o. intake.  LFTs are slowly trending better.  Renal: Creatinine is 1.2.  Patient is making urine.  Patient is off diuretics.    Id:  Patient is afebrile.  White count is down to 15.  Will continue to follow.    Endocrine:  Patient had another episode of hypoglycemia on low-dose long-acting insulin.  Long-acting insulin will be discontinued.    Heme:   Hematocrit is 33 and platelet count is 135.  Continue aspirin Plavix and low-dose rivaroxaban.  Activities:  Patient is out of bed to chair ambulate and advance as tolerated.    Line tubes and drains:  Patient has peripheral lines and pacer wires.    05/11/2024   The patient is postop day 5 status post coronary artery bypass grafting x4 overall the patient is doing well.  Anticipate discharge to home or skilled nursing facility in the next 48-72 hours.    Neuro:  Patient is awake alert and oriented x3.  Neuro exam is nonfocal.  Pain is well controlled.    Cardiac:  Patient has been stable.  Continue low-dose metoprolol.    Respiratory:  Patient has good sats.  Continue pulmonary toileting.  Continue incentive spirometer.  GI:  Patient p.o. intake has been poor.  Encourage increased p.o. intake.  LFTs trending lower.  Renal:  Creatinine is 1.  However patient is complaining of urinary retention.  Bladder scan concerns urinary retention.  Michel catheter replaced.  Id:  Patient is afebrile.  White count is 12.    Endocrine:  Patient's glucose being managed with sliding scale insulin.    Heme:  Hematocrit is 35 and platelet count is 166.  Continue aspirin Plavix and low-dose rivaroxaban.  Activities: Patient is out of bed to chair advance as tolerated.    Line tubes and drains patient has peripheral lines and replaced Michel catheter.    05/12/2024   Patient is postop day 6 status post coronary artery bypass grafting x4.  Overall patient is doing well.  Patient continues to make clinical improvement.  Anticipate discharge to home or skilled nursing care facility in the next 48 hours.    Neuro:  Patient is awake alert and oriented x3.  Neuro exam is nonfocal.  Pain is well controlled.    Cardiac:  Patient remains stable.  Continue low-dose metoprolol.    Respiratory: Patient has good sats on room air.  Continue pulmonary toileting.  Continue incentive spirometer.    GI:  Patient's p.o. intake is improving.  Continue  supplemental feeds with boost.  Renal: Creatinine is 1.0.  Michel catheter has been replaced.  Will start patient on doxazosin.  Will plan to discontinue Michel catheter and do voiding trials in the next 24 hours.  Will obtain urology consult.    Endocrine:  Glucose is managed with a sliding scale.    Heme:  Continue aspirin Plavix and low-dose rivaroxaban.    Activities: Patient is out of bed to chair.  Advance as tolerated.  Line tubes and drains:  Patient has peripheral lines and Michel catheter.    05/13/2024   The patient is postop day 7 status post coronary artery bypass grafting x4.  Overall the patient is doing.  Anticipate discharge to skilled nursing facility next 24 hours.    Neuro:  Patient is awake alert and oriented x3.  Neuro exam is nonfocal pain is well controlled.    Cardiac: Patient is on low-dose metoprolol.  To monitor.    Respiratory: Patient has good sats on continue pulmonary toileting.  Continue incentive spirometer.    GI:  Patient has increased p.o. intake.  Continues to feeds.  Renal: Creatinine is 1.0 patient has good urine output.  Michel catheter is in place.  Patient is started on doxazosin.  Will discontinue Michel to day and perform a voiding trial.    Endocrine:  Glucose is controlled with a sliding scale.    Heme:  Hematocrit is 32.7 platelet count is 199.  Continue aspirin Plavix and low-dose rivaroxaban  Id:  Patient is afebrile.  White count is 7.    Activities: Patient is out of bed and  Inches and drains:  Patient has peripheral lines and Michel catheter.  Michel catheter discontinued with voiding trials.        Maury Amato MD  Cardiothoracic Surgery  [facility] - Intensive Care ([facility])

## 2024-05-13 NOTE — PT/OT/SLP PROGRESS
Physical Therapy Treatment    Patient Name:  Feng Marques   MRN:  [id number]    Recommendations:     Discharge Recommendations: Low Intensity Therapy  Discharge Equipment Recommendations: shower chair  Barriers to discharge: None    Assessment:     Feng Marques is a 73 y.o. male admitted with a medical diagnosis of Coronary artery disease of native artery of native heart with stable angina pectoris.  He presents with the following impairments/functional limitations: weakness, impaired endurance, impaired functional mobility, gait instability, impaired balance, pain, decreased safety awareness, decreased coordination.    Rehab Prognosis: Good; patient would benefit from acute skilled PT services to address these deficits and reach maximum level of function.    Recent Surgery: Procedure(s) (LRB):  CORONARY ARTERY BYPASS GRAFT (CABG) (N/A)  ECHOCARDIOGRAM,TRANSESOPHAGEAL (N/A)  SURGICAL PROCUREMENT, VEIN, ENDOSCOPIC (Left)  BLOCK, NERVE, INTERCOSTAL, 2 OR MORE (N/A) 7 Days Post-Op    The mobility limitation cannot be sufficiently resolved by the use of a cane.   Patient's functional mobility deficit can be sufficiently resolved with the use of a rolling walker. Patient's mobility limitation significantly impairs their ability to participate in one of more activities of daily living. The use of a rolling walker will significantly improve the patient's ability to participate in MRADLS and the patient will use it on regular basis in the home.     This patient has a mobility limitation that   Prevents them entirely  from accomplishing MRADL's in customary locations in the home   Places them at reasonable determined heightened risk of mobility or mortality secondary to attempts to perform an MRADL   Prevents them from completing MRADL's in a reasonable time frame     Plan:     During this hospitalization, patient to be seen 3 x/week to address the identified rehab impairments via gait training, therapeutic  activities, therapeutic exercises and progress toward the following goals:    Plan of Care Expires:  05/22/24    Subjective     Chief Complaint: NECK PAIN WITH FATIGUE  Patient/Family Comments/goals:   Pain/Comfort:  Pain Rating 1:  (NO PAIN AT REST, C/O INCREASED PAIN TO POSTERIOR NECK OVER TIME WITH FATIGUE FROM WALK, PT FF AT NECK AND TRUNK)  Location - Side 1: Bilateral  Location - Orientation 1: posterior  Location 1: neck  Pain Addressed 1:  (ACTIVITY PACING, ENCOURAGED GENTLE STRETCH TO TOLERANCE)      Objective:     Communicated with NURSE MODI prior to session.  Patient found up in chair with telemetry, peripheral IV, pulse ox (continuous), blood pressure cuff upon PT entry to room.     General Precautions: Standard, fall, sternal  Orthopedic Precautions: N/A  Braces: N/A  Respiratory Status: Room air     Functional Mobility:  Bed Mobility:     Rolling Left:  stand by assistance  Scooting: stand by assistance-SEATED SCOOT IN CHAIR  Bridging: moderate assistance and of 2 persons- SUPINE SCOOT TOWARD HOB  Sit to Supine: contact guard assistance  Transfers:     Sit to Stand:  minimum assistance with rolling walker  Chair to bed: minimum assistance with  rolling walker  using  Step Transfer  Gait: PT ' WITH RW AND CGA, SLOW PACE, PT REQUIRED 5 SMALL STANDING REST BREAKS MOSTLY DUE TO POSTERIOR NECK FATIGUE, PT ENCOURAGED IN UPRIGHT POSTURE FOR HEAD BUT PT UNABLE TO MAINTAIN DURING GAIT TRIAL  Balance: FAIR SITTING AND STANDING BALANCE    AM-PAC 6 CLICK MOBILITY  Turning over in bed (including adjusting bedclothes, sheets and blankets)?: 4  Sitting down on and standing up from a chair with arms (e.g., wheelchair, bedside commode, etc.): 3  Moving from lying on back to sitting on the side of the bed?: 4  Moving to and from a bed to a chair (including a wheelchair)?: 3  Need to walk in hospital room?: 3  Climbing 3-5 steps with a railing?: 1  Basic Mobility Total Score: 18     Treatment & Education:  PT  "EDUCATION:  - ROLE OF P.T. AND POC IN ACUTE CARE HOSPITAL SETTING  - RW USE AND SAFETY DURING TF'S AND GAIT  - REVIEW STERNAL PRECAUTIONS IN RELATION TO FUNCTIONAL MOBILITY  - ENCOURAGED TO INCREASE TIME OOB IN CHAIR TO TOLERANCE   - TO CONTINUE THERAPUETIC EXERCISES THROUGHOUT THE DAY TO INCREASE ACTIVITY TOLERANCE AND DECREASE RISK FOR PNEUMONIA AND BLOOD CLOTS: HIP FLEX/EXT, HIP ABD/ADD, QUAD SET, HEEL SLIDE, AP  - RISK FOR FALLS DUE TO GENERALIZED WEAKNESS, EDUCATED ON "CALL DON'T FALL", ENCOURAGED TO CALL FOR ASSISTANCE WITH ALL NEEDS SUCH AS BED<>CHAIR TRANSFERS OR TRIPS TO BATHROOM, PT AGREEABLE TO SAFETY PRECAUTIONS    Patient left HOB elevated with all lines intact, call button in reach, NURSE notified, and NURSE present..    GOALS:   Multidisciplinary Problems       Physical Therapy Goals          Problem: Physical Therapy    Goal Priority Disciplines Outcome Goal Variances Interventions   Physical Therapy Goal     PT, PT/OT Progressing     Description: LTG'S TO BE MET IN 14 DAYS (5-22-24)  PT WILL BE EMERSON FOR BED MOBILITY  PT WILL BE EMERSON FOR BED<>CHAIR TF'S  PT WILL  FEET NO AD EMERSON  PT WILL INC AMPAC SCORE BY 2 POINTS TO PROGRESS GROSS FUNC MOBILITY                         Time Tracking:     PT Received On: 05/13/24  PT Start Time: 1100     PT Stop Time: 1130  PT Total Time (min): 30 min     Billable Minutes: Gait Training 15 and Therapeutic Activity 15    Treatment Type: Treatment  PT/PTA: PT     Number of PTA visits since last PT visit: 0     05/13/2024  "

## 2024-05-13 NOTE — PLAN OF CARE
05/13/24 0851   Post-Acute Status   Post-Acute Authorization Placement   Post-Acute Placement Status Referrals Sent   Discharge Plan   Discharge Plan A Skilled Nursing Facility     SNF referrals sent to in network facilities to check bed availability. List left at bedside. [employer]et called into [employer] [facility] faxed.

## 2024-05-13 NOTE — PLAN OF CARE
05/13/24 1114   Post-Acute Status   Post-Acute Authorization Placement   Post-Acute Placement Status Pending payor review/awaiting authorization (if required)   Discharge Plan   Discharge Plan A Skilled Nursing Facility     Patient and daughter in law chose [facility] [place]. Advised liaison. Facility will submit for authorization.  PASRR and 142 uploaded into [employer].

## 2024-05-13 NOTE — ASSESSMENT & PLAN NOTE
-Continue current post-op care and mgmt as per CTS  -ASA Plavix statin BB  -IS usage   -Ambulation when able    5/8/24  -Pain improved  -Continue ASA, Plavix, BB  -Statin held due to bumped LFT's  -IS usage and ambulation    5/9/24  -Stable CV wise  -Continue ASA, Plavix, BB  -Statin held due to bumped LFT's  -Continue IS usage and ambulation    5/10/24  -No acute CV issues  -Continue ASA, Plavix, BB  -Statin on hold  -IS usage and ambulation/PT/OT    5/11/24  - No acute CV issues   - Statin held   - PT and OT ambulation  -Continue ASA/ BB and Plavix      5/12/24  -management per CT surgery   -Patient still working with PT and ambulating the hallways  -Appetite not great   -LFT's are improving still holding statin   -Continue ASA/ BB and Plavix     5/13/24  -Stable CV wise  -Continue ASA, BB, plavix  -resume statin once LFT's normalize  -PT/OT and IS usage

## 2024-05-13 NOTE — SUBJECTIVE & OBJECTIVE
Interval History: The patient is postop day 7 status post coronary artery bypass grafting x4.    ROS  Medications:  Continuous Infusions:   vasopressin  0.04 Units/min Intravenous Continuous   Stopped at 05/08/24 1000     Scheduled Meds:   albumin human 5%  25 g Intravenous Once    ascorbic acid (vitamin C)  500 mg Oral BID    aspirin  81 mg Oral Daily    clopidogreL  75 mg Oral Daily    cyanocobalamin  1,000 mcg Oral Daily    docusate sodium  100 mg Oral BID    doxazosin  1 mg Oral Daily    EScitalopram oxalate  10 mg Oral Daily    ferrous sulfate  1 tablet Oral Daily    folic acid  1 mg Oral Daily    metoprolol tartrate  12.5 mg Oral BID    pantoprazole  40 mg Oral Daily    polyethylene glycol  17 g Oral Daily    potassium chloride  40 mEq Oral Once    rivaroxaban  2.5 mg Oral BID WM     PRN Meds:  Current Facility-Administered Medications:     albumin human 5%, 25 g, Intravenous, PRN    dextrose 10%, 12.5 g, Intravenous, PRN    dextrose 10%, 25 g, Intravenous, PRN    glucagon (human recombinant), 1 mg, Intramuscular, PRN    glucose, 16 g, Oral, PRN    glucose, 24 g, Oral, PRN    influenza 65up-adj, 0.5 mL, Intramuscular, vaccine x 1 dose    insulin aspart U-100, 0-10 Units, Subcutaneous, QID (AC + HS) PRN    lactated ringers, 1,000 mL, Intravenous, PRN    magnesium sulfate IVPB, 4 g, Intravenous, PRN    metoclopramide, 5 mg, Intravenous, Q6H PRN    naloxone, 0.4 mg, Intravenous, PRN    ondansetron, 4 mg, Intravenous, Q6H PRN    pneumoc 20-diaz conj-dip cr(PF), 0.5 mL, Intramuscular, vaccine x 1 dose    potassium chloride in water, 20 mEq, Intravenous, PRN    potassium chloride in water, 60 mEq, Intravenous, PRN    potassium chloride in water, 40 mEq, Intravenous, PRN    sodium chloride 0.9%, 10 mL, Intravenous, PRN    sodium chloride 0.9%, 10 mL, Intravenous, Q12H PRN    traMADoL, 50 mg, Oral, Q6H PRN     Objective:     Vital Signs (Most Recent):  Temp: 98.4 °F (36.9 °C) (05/13/24 0705)  Pulse: 84 (05/13/24  0812)  Resp: 15 (05/13/24 0812)  BP: (!) 107/58 (05/13/24 0700)  SpO2: 95 % (05/13/24 0812) Vital Signs (24h Range):  Temp:  [98.2 °F (36.8 °C)-98.7 °F (37.1 °C)] 98.4 °F (36.9 °C)  Pulse:  [76-99] 84  Resp:  [13-25] 15  SpO2:  [93 %-100 %] 95 %  BP: ()/(52-71) 107/58     Weight: 71.5 kg (157 lb 10.1 oz)  Body mass index is 22.62 kg/m².    SpO2: 95 %       Intake/Output - Last 3 Shifts         05/11 0700 05/12 0659 05/12 0700 05/13 0659 05/13 0700 05/14 0659    I.V. (mL/kg) 100 (1.4) 100.1 (1.4)     Total Intake(mL/kg) 100 (1.4) 100.1 (1.4)     Urine (mL/kg/hr) 1975 (1.1) 1650 (1) 70 (0.5)    Other 0      Total Output 1975 1650 70    Net -1875 -1549.9 -70                   Lines/Drains/Airways       Drain  Duration                  Urethral Catheter 05/11/24 1030 Straight-tip 1 day              Line  Duration                  Pacer Wires 05/06/24 1310 6 days              Peripheral Intravenous Line  Duration                  Peripheral IV - Single Lumen 05/10/24 1100 20 G Anterior;Right Upper Arm 2 days                     Physical Exam  Constitutional:       Appearance: Normal appearance.   HENT:      Head: Normocephalic and atraumatic.      Nose: Nose normal.   Cardiovascular:      Rate and Rhythm: Normal rate.      Heart sounds: Normal heart sounds.   Pulmonary:      Effort: Pulmonary effort is normal.      Breath sounds: Normal breath sounds.   Abdominal:      General: Abdomen is flat. Bowel sounds are normal.      Palpations: Abdomen is soft.   Musculoskeletal:      Right lower leg: No edema.      Left lower leg: No edema.   Neurological:      Mental Status: He is alert and oriented to person, place, and time.            Significant Labs:  All pertinent labs from the last 24 hours have been reviewed.    Significant Diagnostics:  I have reviewed all pertinent imaging results/findings within the past 24 hours.

## 2024-05-13 NOTE — ASSESSMENT & PLAN NOTE
05/07/2024   The patient is postop day 1 status post coronary artery bypass grafting timesx1. Overall the patient is doing well.  Neuro:  Patient is awake alert and oriented x3.  Neuro exam is nonfocal.  Requiring increasing pain medication.    Cardiac:  Patient has been stable.  Patient is on low-dose epi and vasopressin.  Wean as tolerated.  Respiratory:  Patient has good sats on nasal cannula.  Pulmonary toileting.  Continue incentive spirometer  GI:  Advance diet as tolerated.    Renal:  Creatinine is 1.1.   Patient has good urine output.  Start Lasix.  Id:  Patient is afebrile.  White count is 19.  Suspect due to stress of surgery.  No evidence of infection.  Continue to observe.  Endocrine:  Glucose is controlled with long-acting insulin and sliding scale.  Heme:  Hematocrit is down to 19.  Will transfuse 2 units packed red blood cells.  Activities:  Advance activities as tolerated.  Line tubes and drains:  Patient has a right IJ triple-lumen, Michel catheter, A-line, pacer wires and saphenectomy site PABLO drains.    05/08/2024   The patient is postop day 2 status post coronary artery bypass grafting x 4.  Overall the patient is doing well.    Neuro:  Patient is awake alert and oriented x3.  Neuro exam is nonfocal.  Pain is much better controlled.  Cardiac:  Patient is hemodynamically stable.  Patient is is being weaned off low-dose vasopressin and epinephrine.  Will start metoprolol.    Respiratory: Patient has good sats.  Continue pulmonary toileting.  Continue incentive spirometer.  GI:  Patient is tolerating p.o. will start cardiac diet.  Advance as tolerated.    Renal:  Creatinine is 1.3.  Patient had excellent response to Lasix yesterday.  Patient appears euvolemic.  Will discontinue Lasix.    Id:  Patient had a T-max of 101°.  White count is 21.  However, patient has no evidence of infections.  Continue to observe.    Endocrine: Glucose is controlled with long-acting and sliding scale insulin.    Heme:   Hematocrit is 27 status post transfusion of 2 units of packed red blood cells.  Platelet is 105.  Will add low-dose rivaroxaban for anti thrombin 3 effect.  Continue aspirin and Plavix.    Activities:  Patient is out of bed to chair.  Advance as tolerated.  Line tubes and drains:  Patient has a right IJ triple-lumen, Michel catheter, A-line and pacer wires.  Chest tubes have been discontinued.      05/09/2024   The patient is postop day 3 status post bypass grafting x4.  Overall the patient is.    Neuro:  Patient is awake alert and x3.  Neuro exam is nonfocal.  Pain is well controlled.    Cardiac:  Patient's is off all systolic blood pressure in the high 90s.  Will decrease metoprolol dose.    Respiratory:  Patient has good sats.  Continue pulmonary toileting.  Continue incentive spirometer.  GI:  Patient is complaining of nausea.  Patient has not had a bowel movement.  Will increase bowel regimen.  Patient's abdomen soft nontender sounds.  Renal:  Creatinine is 1.3.  Patient is off diuretics.    Id:  Patient is afebrile.  However white count remains high 22  No evidence of infection.  Continue to follow.  Endocrine: Glucose is controlled with long-acting and sliding scale insulin.  Patient has been having hypoglycemic episode.  Will decrease long-acting insulin.  Dose and frequency.    Heme:  Hematocrit is 24 and platelet count is 122 continue aspirin Plavix and low-dose rivaroxaban.  Activities:  Patient is out of bed to chair advance as tolerated.    Line tubes and drains:  Patient has a right IJ triple-lumen catheter Michel catheter, pacer wires, will discontinue Michel catheter.      05/10/2024   The patient is postop day 4 status post coronary artery bypass grafting x4.  Overall the patient is doing well.  Neuro:  Patient is awake alert and oriented x3.  Neuro exam is nonfocal.  Pain is well controlled.  Cardiac:  Patient's blood pressure has been improved since albumin yesterday.  Continue on low-dose  metoprolol.    Respiratory:  Patient has good sats on nasal cannula.  Continue pulmonary toileting.  Continue incentive spirometer.    GI: Patient nausea is much improved.  Patient had 1 bowel movement yesterday.  Will repeat Mag citrate.  Will add boost to patient's p.o. intake.  Patient has poor p.o. intake.  LFTs are slowly trending better.  Renal: Creatinine is 1.2.  Patient is making urine.  Patient is off diuretics.    Id:  Patient is afebrile.  White count is down to 15.  Will continue to follow.    Endocrine:  Patient had another episode of hypoglycemia on low-dose long-acting insulin.  Long-acting insulin will be discontinued.    Heme:  Hematocrit is 33 and platelet count is 135.  Continue aspirin Plavix and low-dose rivaroxaban.  Activities:  Patient is out of bed to chair ambulate and advance as tolerated.    Line tubes and drains:  Patient has peripheral lines and pacer wires.    05/11/2024   The patient is postop day 5 status post coronary artery bypass grafting x4 overall the patient is doing well.  Anticipate discharge to home or skilled nursing facility in the next 48-72 hours.    Neuro:  Patient is awake alert and oriented x3.  Neuro exam is nonfocal.  Pain is well controlled.    Cardiac:  Patient has been stable.  Continue low-dose metoprolol.    Respiratory:  Patient has good sats.  Continue pulmonary toileting.  Continue incentive spirometer.  GI:  Patient p.o. intake has been poor.  Encourage increased p.o. intake.  LFTs trending lower.  Renal:  Creatinine is 1.  However patient is complaining of urinary retention.  Bladder scan concerns urinary retention.  Michel catheter replaced.  Id:  Patient is afebrile.  White count is 12.    Endocrine:  Patient's glucose being managed with sliding scale insulin.    Heme:  Hematocrit is 35 and platelet count is 166.  Continue aspirin Plavix and low-dose rivaroxaban.  Activities: Patient is out of bed to chair advance as tolerated.    Line tubes and drains  patient has peripheral lines and replaced Michel catheter.    05/12/2024   Patient is postop day 6 status post coronary artery bypass grafting x4.  Overall patient is doing well.  Patient continues to make clinical improvement.  Anticipate discharge to home or skilled nursing care facility in the next 48 hours.    Neuro:  Patient is awake alert and oriented x3.  Neuro exam is nonfocal.  Pain is well controlled.    Cardiac:  Patient remains stable.  Continue low-dose metoprolol.    Respiratory: Patient has good sats on room air.  Continue pulmonary toileting.  Continue incentive spirometer.    GI:  Patient's p.o. intake is improving.  Continue supplemental feeds with boost.  Renal: Creatinine is 1.0.  Michel catheter has been replaced.  Will start patient on doxazosin.  Will plan to discontinue Michel catheter and do voiding trials in the next 24 hours.  Will obtain urology consult.    Endocrine:  Glucose is managed with a sliding scale.    Heme:  Continue aspirin Plavix and low-dose rivaroxaban.    Activities: Patient is out of bed to chair.  Advance as tolerated.  Line tubes and drains:  Patient has peripheral lines and Michel catheter.    05/13/2024   The patient is postop day 7 status post coronary artery bypass grafting x4.  Overall the patient is doing.  Anticipate discharge to skilled nursing facility next 24 hours.    Neuro:  Patient is awake alert and oriented x3.  Neuro exam is nonfocal pain is well controlled.    Cardiac: Patient is on low-dose metoprolol.  To monitor.    Respiratory: Patient has good sats on continue pulmonary toileting.  Continue incentive spirometer.    GI:  Patient has increased p.o. intake.  Continues to feeds.  Renal: Creatinine is 1.0 patient has good urine output.  Michel catheter is in place.  Patient is started on doxazosin.  Will discontinue Michel to day and perform a voiding trial.    Endocrine:  Glucose is controlled with a sliding scale.    Heme:  Hematocrit is 32.7 platelet count  is 199.  Continue aspirin Plavix and low-dose rivaroxaban  Id:  Patient is afebrile.  White count is 7.    Activities: Patient is out of bed and  Inches and drains:  Patient has peripheral lines and Michel catheter.  Michel catheter discontinued with voiding trials.

## 2024-05-13 NOTE — PLAN OF CARE
Pt AAAOx4. GCS 15. VSS. Afebrile. SR on monitor. Room air. Appetite improved today. Accuchecks ACHS; coverage needed. Ambulated with PT this morning. Family updated at the bedside. Plan to d/c to home with home health tomorrow.     Pt resting comfortably in bed with side rails up x3; locked and lowered with alarm set. Call light in place. Advised pt to call out if anything is needed. Pt verbalized understanding.

## 2024-05-13 NOTE — PLAN OF CARE
[facility] Discussed with Dr. Amato. Patient will DC to his son's home for 2 weeks prior to returning to his home alone. No peer to peer needed. Home health has already been set up. Advised insurance rep and SNF liaison.

## 2024-05-14 VITALS
HEIGHT: 70 IN | WEIGHT: 156.75 LBS | OXYGEN SATURATION: 99 % | DIASTOLIC BLOOD PRESSURE: 63 MMHG | SYSTOLIC BLOOD PRESSURE: 113 MMHG | BODY MASS INDEX: 22.44 KG/M2 | RESPIRATION RATE: 21 BRPM | TEMPERATURE: 98 F | HEART RATE: 89 BPM

## 2024-05-14 LAB
ALBUMIN SERPL BCP-MCNC: 2.9 G/DL (ref 3.5–5.2)
ALP SERPL-CCNC: 68 U/L (ref 55–135)
ALT SERPL W/O P-5'-P-CCNC: 157 U/L (ref 10–44)
ANION GAP SERPL CALC-SCNC: 8 MMOL/L (ref 8–16)
AST SERPL-CCNC: 41 U/L (ref 10–40)
BASOPHILS # BLD AUTO: 0.04 K/UL (ref 0–0.2)
BASOPHILS NFR BLD: 0.5 % (ref 0–1.9)
BILIRUB DIRECT SERPL-MCNC: 0.3 MG/DL (ref 0.1–0.3)
BILIRUB SERPL-MCNC: 0.7 MG/DL (ref 0.1–1)
BUN SERPL-MCNC: 15 MG/DL (ref 8–23)
CALCIUM SERPL-MCNC: 8.3 MG/DL (ref 8.7–10.5)
CHLORIDE SERPL-SCNC: 102 MMOL/L (ref 95–110)
CO2 SERPL-SCNC: 22 MMOL/L (ref 23–29)
CREAT SERPL-MCNC: 1 MG/DL (ref 0.5–1.4)
DIFFERENTIAL METHOD BLD: ABNORMAL
EOSINOPHIL # BLD AUTO: 0.5 K/UL (ref 0–0.5)
EOSINOPHIL NFR BLD: 6.5 % (ref 0–8)
ERYTHROCYTE [DISTWIDTH] IN BLOOD BY AUTOMATED COUNT: 14.9 % (ref 11.5–14.5)
EST. GFR  (NO RACE VARIABLE): >60 ML/MIN/1.73 M^2
GLUCOSE SERPL-MCNC: 160 MG/DL (ref 70–110)
HCT VFR BLD AUTO: 33 % (ref 40–54)
HGB BLD-MCNC: 10.8 G/DL (ref 14–18)
IMM GRANULOCYTES # BLD AUTO: 0.15 K/UL (ref 0–0.04)
IMM GRANULOCYTES NFR BLD AUTO: 1.8 % (ref 0–0.5)
LYMPHOCYTES # BLD AUTO: 1.9 K/UL (ref 1–4.8)
LYMPHOCYTES NFR BLD: 23.3 % (ref 18–48)
MAGNESIUM SERPL-MCNC: 2.1 MG/DL (ref 1.6–2.6)
MCH RBC QN AUTO: 30.3 PG (ref 27–31)
MCHC RBC AUTO-ENTMCNC: 32.7 G/DL (ref 32–36)
MCV RBC AUTO: 92 FL (ref 82–98)
MONOCYTES # BLD AUTO: 1 K/UL (ref 0.3–1)
MONOCYTES NFR BLD: 12.1 % (ref 4–15)
NEUTROPHILS # BLD AUTO: 4.6 K/UL (ref 1.8–7.7)
NEUTROPHILS NFR BLD: 55.8 % (ref 38–73)
NRBC BLD-RTO: 0 /100 WBC
PLATELET # BLD AUTO: 233 K/UL (ref 150–450)
PMV BLD AUTO: 9.6 FL (ref 9.2–12.9)
POCT GLUCOSE: 159 MG/DL (ref 70–110)
POCT GLUCOSE: 169 MG/DL (ref 70–110)
POTASSIUM SERPL-SCNC: 5.1 MMOL/L (ref 3.5–5.1)
PROT SERPL-MCNC: 5.5 G/DL (ref 6–8.4)
RBC # BLD AUTO: 3.57 M/UL (ref 4.6–6.2)
SODIUM SERPL-SCNC: 132 MMOL/L (ref 136–145)
WBC # BLD AUTO: 8.29 K/UL (ref 3.9–12.7)

## 2024-05-14 PROCEDURE — 25000003 PHARM REV CODE 250: Performed by: INTERNAL MEDICINE

## 2024-05-14 PROCEDURE — 80076 HEPATIC FUNCTION PANEL: CPT | Performed by: THORACIC SURGERY (CARDIOTHORACIC VASCULAR SURGERY)

## 2024-05-14 PROCEDURE — 99232 SBSQ HOSP IP/OBS MODERATE 35: CPT | Mod: ,,, | Performed by: INTERNAL MEDICINE

## 2024-05-14 PROCEDURE — 94761 N-INVAS EAR/PLS OXIMETRY MLT: CPT

## 2024-05-14 PROCEDURE — 97530 THERAPEUTIC ACTIVITIES: CPT

## 2024-05-14 PROCEDURE — 25000003 PHARM REV CODE 250: Performed by: THORACIC SURGERY (CARDIOTHORACIC VASCULAR SURGERY)

## 2024-05-14 PROCEDURE — 85025 COMPLETE CBC W/AUTO DIFF WBC: CPT | Performed by: THORACIC SURGERY (CARDIOTHORACIC VASCULAR SURGERY)

## 2024-05-14 PROCEDURE — 83735 ASSAY OF MAGNESIUM: CPT | Performed by: THORACIC SURGERY (CARDIOTHORACIC VASCULAR SURGERY)

## 2024-05-14 PROCEDURE — 36415 COLL VENOUS BLD VENIPUNCTURE: CPT | Performed by: THORACIC SURGERY (CARDIOTHORACIC VASCULAR SURGERY)

## 2024-05-14 PROCEDURE — 80048 BASIC METABOLIC PNL TOTAL CA: CPT | Performed by: THORACIC SURGERY (CARDIOTHORACIC VASCULAR SURGERY)

## 2024-05-14 PROCEDURE — 97116 GAIT TRAINING THERAPY: CPT

## 2024-05-14 RX ORDER — LANOLIN ALCOHOL/MO/W.PET/CERES
1000 CREAM (GRAM) TOPICAL DAILY
Qty: 30 TABLET | Refills: 0 | Status: SHIPPED | OUTPATIENT
Start: 2024-05-15 | End: 2024-06-14

## 2024-05-14 RX ORDER — SODIUM CHLORIDE 1 G/1
1000 TABLET ORAL 3 TIMES DAILY
Qty: 15 TABLET | Refills: 0 | Status: SHIPPED | OUTPATIENT
Start: 2024-05-14 | End: 2024-05-19

## 2024-05-14 RX ORDER — DOXAZOSIN 1 MG/1
1 TABLET ORAL DAILY
Qty: 90 TABLET | Refills: 3 | Status: SHIPPED | OUTPATIENT
Start: 2024-05-15 | End: 2024-05-20 | Stop reason: SDUPTHER

## 2024-05-14 RX ORDER — ASCORBIC ACID 500 MG
500 TABLET ORAL 2 TIMES DAILY
Qty: 60 TABLET | Refills: 0 | Status: SHIPPED | OUTPATIENT
Start: 2024-05-14 | End: 2024-06-13

## 2024-05-14 RX ORDER — SYRING-NEEDL,DISP,INSUL,0.3 ML 29 G X1/2"
296 SYRINGE, EMPTY DISPOSABLE MISCELLANEOUS ONCE
Status: COMPLETED | OUTPATIENT
Start: 2024-05-14 | End: 2024-05-14

## 2024-05-14 RX ORDER — METOPROLOL TARTRATE 25 MG/1
12.5 TABLET, FILM COATED ORAL 2 TIMES DAILY
Qty: 90 TABLET | Refills: 3 | Status: SHIPPED | OUTPATIENT
Start: 2024-05-14 | End: 2025-05-14

## 2024-05-14 RX ORDER — CLOPIDOGREL BISULFATE 75 MG/1
75 TABLET ORAL DAILY
Qty: 30 TABLET | Refills: 11 | Status: SHIPPED | OUTPATIENT
Start: 2024-05-15 | End: 2025-05-15

## 2024-05-14 RX ORDER — DOCUSATE SODIUM 100 MG/1
100 CAPSULE, LIQUID FILLED ORAL 2 TIMES DAILY
Qty: 60 CAPSULE | Refills: 0 | Status: SHIPPED | OUTPATIENT
Start: 2024-05-14 | End: 2024-06-13

## 2024-05-14 RX ORDER — POLYETHYLENE GLYCOL 3350 17 G/17G
17 POWDER, FOR SOLUTION ORAL DAILY
Qty: 510 G | Refills: 0 | Status: SHIPPED | OUTPATIENT
Start: 2024-05-15 | End: 2024-06-14

## 2024-05-14 RX ORDER — PRAVASTATIN SODIUM 20 MG/1
40 TABLET ORAL NIGHTLY
Status: DISCONTINUED | OUTPATIENT
Start: 2024-05-14 | End: 2024-05-14 | Stop reason: HOSPADM

## 2024-05-14 RX ORDER — FOLIC ACID 1 MG/1
1 TABLET ORAL DAILY
Qty: 30 TABLET | Refills: 0 | Status: SHIPPED | OUTPATIENT
Start: 2024-05-15 | End: 2024-06-14

## 2024-05-14 RX ORDER — TRAMADOL HYDROCHLORIDE 50 MG/1
50 TABLET ORAL EVERY 6 HOURS PRN
Qty: 10 TABLET | Refills: 0 | Status: SHIPPED | OUTPATIENT
Start: 2024-05-14

## 2024-05-14 RX ADMIN — INSULIN ASPART 2 UNITS: 100 INJECTION, SOLUTION INTRAVENOUS; SUBCUTANEOUS at 08:05

## 2024-05-14 RX ADMIN — OXYCODONE HYDROCHLORIDE AND ACETAMINOPHEN 500 MG: 500 TABLET ORAL at 08:05

## 2024-05-14 RX ADMIN — METOPROLOL TARTRATE 12.5 MG: 25 TABLET, FILM COATED ORAL at 08:05

## 2024-05-14 RX ADMIN — CYANOCOBALAMIN TAB 1000 MCG 1000 MCG: 1000 TAB at 08:05

## 2024-05-14 RX ADMIN — FOLIC ACID 1 MG: 1 TABLET ORAL at 08:05

## 2024-05-14 RX ADMIN — FERROUS SULFATE TAB 325 MG (65 MG ELEMENTAL FE) 1 EACH: 325 (65 FE) TAB at 08:05

## 2024-05-14 RX ADMIN — PANTOPRAZOLE SODIUM 40 MG: 40 TABLET, DELAYED RELEASE ORAL at 08:05

## 2024-05-14 RX ADMIN — MAGNESIUM CITRATE 296 ML: 1.75 LIQUID ORAL at 10:05

## 2024-05-14 RX ADMIN — DOXAZOSIN MESYLATE 1 MG: 1 TABLET ORAL at 08:05

## 2024-05-14 RX ADMIN — DOCUSATE SODIUM 100 MG: 100 CAPSULE, LIQUID FILLED ORAL at 08:05

## 2024-05-14 RX ADMIN — RIVAROXABAN 2.5 MG: 2.5 TABLET, FILM COATED ORAL at 08:05

## 2024-05-14 RX ADMIN — ESCITALOPRAM OXALATE 10 MG: 10 TABLET ORAL at 08:05

## 2024-05-14 RX ADMIN — ASPIRIN 81 MG: 81 TABLET, COATED ORAL at 08:05

## 2024-05-14 RX ADMIN — POLYETHYLENE GLYCOL 3350 17 G: 17 POWDER, FOR SOLUTION ORAL at 08:05

## 2024-05-14 RX ADMIN — CLOPIDOGREL BISULFATE 75 MG: 75 TABLET ORAL at 08:05

## 2024-05-14 NOTE — PROGRESS NOTES
[facility] ([facility])  Cardiothoracic Surgery  Progress Note    Patient Name: Feng Marques  MRN: [id number]  Admission Date: 5/3/2024  Hospital Length of Stay: 11 days  Code Status: Full Code   Attending Physician: Maury Amato MD   Referring Provider: Abdulaziz Carrasco MD  Principal Problem:Coronary artery disease of native artery of native heart with stable angina pectoris            Subjective:     Post-Op Info:  Procedure(s) (LRB):  CORONARY ARTERY BYPASS GRAFT (CABG) (N/A)  ECHOCARDIOGRAM,TRANSESOPHAGEAL (N/A)  SURGICAL PROCUREMENT, VEIN, ENDOSCOPIC (Left)  BLOCK, NERVE, INTERCOSTAL, 2 OR MORE (N/A)   8 Days Post-Op     Interval History: Patient is postop day 8 status post coronary artery bypass grafting x4.    ROS  Medications:  Continuous Infusions:  Scheduled Meds:   ascorbic acid (vitamin C)  500 mg Oral BID    aspirin  81 mg Oral Daily    clopidogreL  75 mg Oral Daily    cyanocobalamin  1,000 mcg Oral Daily    docusate sodium  100 mg Oral BID    doxazosin  1 mg Oral Daily    EScitalopram oxalate  10 mg Oral Daily    ferrous sulfate  1 tablet Oral Daily    folic acid  1 mg Oral Daily    magnesium citrate  296 mL Oral Once    metoprolol tartrate  12.5 mg Oral BID    pantoprazole  40 mg Oral Daily    polyethylene glycol  17 g Oral Daily    pravastatin  40 mg Oral QHS    rivaroxaban  2.5 mg Oral BID WM     PRN Meds:  Current Facility-Administered Medications:     albumin human 5%, 25 g, Intravenous, PRN    dextrose 10%, 12.5 g, Intravenous, PRN    dextrose 10%, 25 g, Intravenous, PRN    glucagon (human recombinant), 1 mg, Intramuscular, PRN    glucose, 16 g, Oral, PRN    glucose, 24 g, Oral, PRN    influenza 65up-adj, 0.5 mL, Intramuscular, vaccine x 1 dose    insulin aspart U-100, 0-10 Units, Subcutaneous, QID (AC + HS) PRN    lactated ringers, 1,000 mL, Intravenous, PRN    magnesium sulfate IVPB, 4 g, Intravenous, PRN    metoclopramide, 5 mg, Intravenous, Q6H PRN    naloxone, 0.4 mg,  Intravenous, PRN    ondansetron, 4 mg, Intravenous, Q6H PRN    pneumoc 20-diaz conj-dip cr(PF), 0.5 mL, Intramuscular, vaccine x 1 dose    potassium chloride in water, 20 mEq, Intravenous, PRN    potassium chloride in water, 60 mEq, Intravenous, PRN    potassium chloride in water, 40 mEq, Intravenous, PRN    sodium chloride 0.9%, 10 mL, Intravenous, PRN    sodium chloride 0.9%, 10 mL, Intravenous, Q12H PRN    traMADoL, 50 mg, Oral, Q6H PRN     Objective:     Vital Signs (Most Recent):  Temp: 98 °F (36.7 °C) (05/14/24 0800)  Pulse: 89 (05/14/24 0800)  Resp: (!) 21 (05/14/24 0800)  BP: 113/63 (05/14/24 0800)  SpO2: 99 % (05/14/24 0800) Vital Signs (24h Range):  Temp:  [97.9 °F (36.6 °C)-98.6 °F (37 °C)] 98 °F (36.7 °C)  Pulse:  [] 89  Resp:  [12-31] 21  SpO2:  [92 %-99 %] 99 %  BP: ()/(52-74) 113/63     Weight: 71.1 kg (156 lb 12 oz)  Body mass index is 22.49 kg/m².    SpO2: 99 %       Intake/Output - Last 3 Shifts         05/12 0700  05/13 0659 05/13 0700 05/14 0659 05/14 0700  05/15 0659    I.V. (mL/kg) 100.1 (1.4) 99.9 (1.4)     Total Intake(mL/kg) 100.1 (1.4) 99.9 (1.4)     Urine (mL/kg/hr) 1650 (1) 220 (0.1)     Other       Total Output 1650 220     Net -1549.9 -120.1                    Lines/Drains/Airways       Peripheral Intravenous Line  Duration                  Peripheral IV - Single Lumen 05/10/24 1100 20 G Anterior;Right Upper Arm 3 days                     Physical Exam  Constitutional:       Appearance: Normal appearance.   HENT:      Head: Normocephalic and atraumatic.      Nose: Nose normal.   Cardiovascular:      Rate and Rhythm: Normal rate and regular rhythm.      Heart sounds: Normal heart sounds.   Pulmonary:      Effort: Pulmonary effort is normal.      Breath sounds: Normal breath sounds.   Abdominal:      General: Abdomen is flat. Bowel sounds are normal.      Palpations: Abdomen is soft.   Musculoskeletal:      Right lower leg: No edema.      Left lower leg: No edema.   Skin:      General: Skin is warm and dry.   Neurological:      Mental Status: He is alert and oriented to person, place, and time.   Psychiatric:         Behavior: Behavior normal.            Significant Labs:  All pertinent labs from the last 24 hours have been reviewed.    Significant Diagnostics:  I have reviewed all pertinent imaging results/findings within the past 24 hours.  Assessment/Plan:     * Coronary artery disease of native artery of native heart with stable angina pectoris  The patient is a 73-year-old male with coronary artery disease status post stenting, diabetes, hyperlipidemia and hypertension who was worked up for chest pain with cardiac catheterization.  Cardiac catheterization shows severe multivessel coronary artery disease with a 90% proximal LAD stenosis.  The patient is a candidate for urgent coronary artery bypass grafting.  The risks and benefits of surgery have been explained to the patient.  The patient understands the risks and benefits of surgery and has agreed to proceed with urgent coronary artery bypass grafting which will be scheduled for 05/06/2024.    S/P CABG x 4  05/07/2024   The patient is postop day 1 status post coronary artery bypass grafting timesx1. Overall the patient is doing well.  Neuro:  Patient is awake alert and oriented x3.  Neuro exam is nonfocal.  Requiring increasing pain medication.    Cardiac:  Patient has been stable.  Patient is on low-dose epi and vasopressin.  Wean as tolerated.  Respiratory:  Patient has good sats on nasal cannula.  Pulmonary toileting.  Continue incentive spirometer  GI:  Advance diet as tolerated.    Renal:  Creatinine is 1.1.   Patient has good urine output.  Start Lasix.  Id:  Patient is afebrile.  White count is 19.  Suspect due to stress of surgery.  No evidence of infection.  Continue to observe.  Endocrine:  Glucose is controlled with long-acting insulin and sliding scale.  Heme:  Hematocrit is down to 19.  Will transfuse 2 units packed  red blood cells.  Activities:  Advance activities as tolerated.  Line tubes and drains:  Patient has a right IJ triple-lumen, Michel catheter, A-line, pacer wires and saphenectomy site PABLO drains.    05/08/2024   The patient is postop day 2 status post coronary artery bypass grafting x 4.  Overall the patient is doing well.    Neuro:  Patient is awake alert and oriented x3.  Neuro exam is nonfocal.  Pain is much better controlled.  Cardiac:  Patient is hemodynamically stable.  Patient is is being weaned off low-dose vasopressin and epinephrine.  Will start metoprolol.    Respiratory: Patient has good sats.  Continue pulmonary toileting.  Continue incentive spirometer.  GI:  Patient is tolerating p.o. will start cardiac diet.  Advance as tolerated.    Renal:  Creatinine is 1.3.  Patient had excellent response to Lasix yesterday.  Patient appears euvolemic.  Will discontinue Lasix.    Id:  Patient had a T-max of 101°.  White count is 21.  However, patient has no evidence of infections.  Continue to observe.    Endocrine: Glucose is controlled with long-acting and sliding scale insulin.    Heme:  Hematocrit is 27 status post transfusion of 2 units of packed red blood cells.  Platelet is 105.  Will add low-dose rivaroxaban for anti thrombin 3 effect.  Continue aspirin and Plavix.    Activities:  Patient is out of bed to chair.  Advance as tolerated.  Line tubes and drains:  Patient has a right IJ triple-lumen, Michel catheter, A-line and pacer wires.  Chest tubes have been discontinued.      05/09/2024   The patient is postop day 3 status post bypass grafting x4.  Overall the patient is.    Neuro:  Patient is awake alert and x3.  Neuro exam is nonfocal.  Pain is well controlled.    Cardiac:  Patient's is off all systolic blood pressure in the high 90s.  Will decrease metoprolol dose.    Respiratory:  Patient has good sats.  Continue pulmonary toileting.  Continue incentive spirometer.  GI:  Patient is complaining of  nausea.  Patient has not had a bowel movement.  Will increase bowel regimen.  Patient's abdomen soft nontender sounds.  Renal:  Creatinine is 1.3.  Patient is off diuretics.    Id:  Patient is afebrile.  However white count remains high 22  No evidence of infection.  Continue to follow.  Endocrine: Glucose is controlled with long-acting and sliding scale insulin.  Patient has been having hypoglycemic episode.  Will decrease long-acting insulin.  Dose and frequency.    Heme:  Hematocrit is 24 and platelet count is 122 continue aspirin Plavix and low-dose rivaroxaban.  Activities:  Patient is out of bed to chair advance as tolerated.    Line tubes and drains:  Patient has a right IJ triple-lumen catheter Michel catheter, pacer wires, will discontinue Michel catheter.      05/10/2024   The patient is postop day 4 status post coronary artery bypass grafting x4.  Overall the patient is doing well.  Neuro:  Patient is awake alert and oriented x3.  Neuro exam is nonfocal.  Pain is well controlled.  Cardiac:  Patient's blood pressure has been improved since albumin yesterday.  Continue on low-dose metoprolol.    Respiratory:  Patient has good sats on nasal cannula.  Continue pulmonary toileting.  Continue incentive spirometer.    GI: Patient nausea is much improved.  Patient had 1 bowel movement yesterday.  Will repeat Mag citrate.  Will add boost to patient's p.o. intake.  Patient has poor p.o. intake.  LFTs are slowly trending better.  Renal: Creatinine is 1.2.  Patient is making urine.  Patient is off diuretics.    Id:  Patient is afebrile.  White count is down to 15.  Will continue to follow.    Endocrine:  Patient had another episode of hypoglycemia on low-dose long-acting insulin.  Long-acting insulin will be discontinued.    Heme:  Hematocrit is 33 and platelet count is 135.  Continue aspirin Plavix and low-dose rivaroxaban.  Activities:  Patient is out of bed to chair ambulate and advance as tolerated.    Line tubes  and drains:  Patient has peripheral lines and pacer wires.    05/11/2024   The patient is postop day 5 status post coronary artery bypass grafting x4 overall the patient is doing well.  Anticipate discharge to home or skilled nursing facility in the next 48-72 hours.    Neuro:  Patient is awake alert and oriented x3.  Neuro exam is nonfocal.  Pain is well controlled.    Cardiac:  Patient has been stable.  Continue low-dose metoprolol.    Respiratory:  Patient has good sats.  Continue pulmonary toileting.  Continue incentive spirometer.  GI:  Patient p.o. intake has been poor.  Encourage increased p.o. intake.  LFTs trending lower.  Renal:  Creatinine is 1.  However patient is complaining of urinary retention.  Bladder scan concerns urinary retention.  Michel catheter replaced.  Id:  Patient is afebrile.  White count is 12.    Endocrine:  Patient's glucose being managed with sliding scale insulin.    Heme:  Hematocrit is 35 and platelet count is 166.  Continue aspirin Plavix and low-dose rivaroxaban.  Activities: Patient is out of bed to chair advance as tolerated.    Line tubes and drains patient has peripheral lines and replaced Michel catheter.    05/12/2024   Patient is postop day 6 status post coronary artery bypass grafting x4.  Overall patient is doing well.  Patient continues to make clinical improvement.  Anticipate discharge to home or skilled nursing care facility in the next 48 hours.    Neuro:  Patient is awake alert and oriented x3.  Neuro exam is nonfocal.  Pain is well controlled.    Cardiac:  Patient remains stable.  Continue low-dose metoprolol.    Respiratory: Patient has good sats on room air.  Continue pulmonary toileting.  Continue incentive spirometer.    GI:  Patient's p.o. intake is improving.  Continue supplemental feeds with boost.  Renal: Creatinine is 1.0.  Michel catheter has been replaced.  Will start patient on doxazosin.  Will plan to discontinue Michel catheter and do voiding trials in  the next 24 hours.  Will obtain urology consult.    Endocrine:  Glucose is managed with a sliding scale.    Heme:  Continue aspirin Plavix and low-dose rivaroxaban.    Activities: Patient is out of bed to chair.  Advance as tolerated.  Line tubes and drains:  Patient has peripheral lines and Michel catheter.    05/13/2024   The patient is postop day 7 status post coronary artery bypass grafting x4.  Overall the patient is doing.  Anticipate discharge to skilled nursing facility next 24 hours.    Neuro:  Patient is awake alert and oriented x3.  Neuro exam is nonfocal pain is well controlled.    Cardiac: Patient is on low-dose metoprolol.  To monitor.    Respiratory: Patient has good sats on continue pulmonary toileting.  Continue incentive spirometer.    GI:  Patient has increased p.o. intake.  Continues to feeds.  Renal: Creatinine is 1.0 patient has good urine output.  Michel catheter is in place.  Patient is started on doxazosin.  Will discontinue Michel to day and perform a voiding trial.    Endocrine:  Glucose is controlled with a sliding scale.    Heme:  Hematocrit is 32.7 platelet count is 199.  Continue aspirin Plavix and low-dose rivaroxaban  Id:  Patient is afebrile.  White count is 7.    Activities: Patient is out of bed and  Inches and drains:  Patient has peripheral lines and Michel catheter.  Michel catheter discontinued with voiding trials.    05/14/2024   The patient is postop day 8 status post coronary grafting times.  Overall the patient is doing well.  Patient's physical activities in the improving over the past 24-48 hours.  Anticipate discharge to home with home health today.  Neuro:  Patient is awake alert and oriented x3 neuro exam is nonfocal.  Pain is well controlled.  Cardiac:  Continue on low-dose metoprolol.  Patient's LFTs are much improved.  Restart pravastatin.    Respiratory: Patient has good sats on room air.  Continue pulmonary toileting.  GI:  Patient is having improved p.o. intake.   Will add Mag citrate for bowel movement.    Renal:  Patient has creatinine of 1.0.  Michel catheter was removed and patient has been able to spontaneously void.  Will place an outpatient urology consult for patient.  Endocrine: Glucose is controlled with sliding scale.  Patient will be placed back on his preop home hypoglycemic agent.    Heme:  Hematocrit is 33 platelet count is 233.  Continue aspirin and Plavix.  Continue low-dose rivaroxaban for 30 days post discharge.  Id:  Patient is afebrile white count is 8.    Activities:  Patient is out bed and ambulating in the hallways.  Line tubes and drains.  Patient has peripheral lines.         Maury Amato MD  Cardiothoracic Surgery  [facility] ([facility])

## 2024-05-14 NOTE — DISCHARGE SUMMARY
[facility] - Intensive Care ([facility])  Cardiothoracic Surgery  Discharge Summary      Patient Name: Feng Marques  MRN: [id number]  Admission Date: 5/3/2024  Hospital Length of Stay: 11 days  Discharge Date and Time:  05/14/2024 9:58 AM  Attending Physician: Maury Amato MD   Discharging Provider: Maury Amato MD  Primary Care Provider: Maciel Enriquez MD    HPI:   The patient is a 73-year-old male with hypertension, hyperlipidemia, diabetes and coronary artery disease status post stenting several years ago who presented for cardiac catheterization today as part of a workup for complaints of chest pain.  Patient has episodes of chest pain at mild exertion.  Chest pain frequency has been increasing over the past few weeks.  Patient denies any chest pain at rest.  Patient denies any orthopnea or PND.  Patient denies any ankle swelling.  Cardiac catheterization shows severe multivessel coronary artery disease with a 90% proximal LAD stenosis.    Procedure(s) (LRB):  CORONARY ARTERY BYPASS GRAFT (CABG) (N/A)  ECHOCARDIOGRAM,TRANSESOPHAGEAL (N/A)  SURGICAL PROCUREMENT, VEIN, ENDOSCOPIC (Left)  BLOCK, NERVE, INTERCOSTAL, 2 OR MORE (N/A)      Indwelling Lines/Drains at time of discharge:   Lines/Drains/Airways       None                 Hospital Course: 05/07/2024   The patient is post op day 1 coronary artery bypass grafting x4.    05/08/2024   The patient is postop day 2 status post coronary artery bypass grafting x4.    05/09/2024   Patient is postop day 3 status post coronary artery bypass grafting x4.    05/10/2024   The patient is postop day 4 status post coronary artery bypass grafting x4.    05/11/2024   The patient is postop day 5 status post coronary artery bypass grafting x4.    05/12/2024   The patient is postop day 6 status post coronary artery bypass grafting x4.    05/13/2024   The patient is postop day 7 status post coronary artery bypass grafting x4.    05/14/2024   Patient is postop day 8  status post coronary artery bypass grafting x4.    Goals of Care Treatment Preferences:  Code Status: Full Code      Consults (From admission, onward)          Status Ordering Provider     Consult Case Management/Social Work  Once        Provider:  (Not yet assigned)    Completed MIREYA NEGRETE.     Inpatient consult to Registered Dietitian/Nutritionist  Once        Provider:  (Not yet assigned)    Completed MIREYA NEGRETE.     Inpatient consult to Cardiothoracic Surgery  Once        Provider:  Mireya Negrete MD Acknowledged ABDALLAH, MOKHTAR            Significant Diagnostic Studies: N/A    Pending Diagnostic Studies:       None            Cardiac/Vascular  S/P CABG x 4  05/07/2024   The patient is postop day 1 status post coronary artery bypass grafting timesx1. Overall the patient is doing well.  Neuro:  Patient is awake alert and oriented x3.  Neuro exam is nonfocal.  Requiring increasing pain medication.    Cardiac:  Patient has been stable.  Patient is on low-dose epi and vasopressin.  Wean as tolerated.  Respiratory:  Patient has good sats on nasal cannula.  Pulmonary toileting.  Continue incentive spirometer  GI:  Advance diet as tolerated.    Renal:  Creatinine is 1.1.   Patient has good urine output.  Start Lasix.  Id:  Patient is afebrile.  White count is 19.  Suspect due to stress of surgery.  No evidence of infection.  Continue to observe.  Endocrine:  Glucose is controlled with long-acting insulin and sliding scale.  Heme:  Hematocrit is down to 19.  Acute blood loss anemia expected after surgery. Will transfuse 2 units packed red blood cells.  Activities:  Advance activities as tolerated.  Line tubes and drains:  Patient has a right IJ triple-lumen, Michel catheter, A-line, pacer wires and saphenectomy site PABLO drains.    05/08/2024   The patient is postop day 2 status post coronary artery bypass grafting x 4.  Overall the patient is doing well.    Neuro:  Patient is awake alert and oriented x3.   Neuro exam is nonfocal.  Pain is much better controlled.  Cardiac:  Patient is hemodynamically stable.  Patient is is being weaned off low-dose vasopressin and epinephrine.  Will start metoprolol.    Respiratory: Patient has good sats.  Continue pulmonary toileting.  Continue incentive spirometer.  GI:  Patient is tolerating p.o. will start cardiac diet.  Advance as tolerated.    Renal:  Creatinine is 1.3.  Patient had excellent response to Lasix yesterday.  Patient appears euvolemic.  Will discontinue Lasix.    Id:  Patient had a T-max of 101°.  White count is 21.  However, patient has no evidence of infections.  Continue to observe.    Endocrine: Glucose is controlled with long-acting and sliding scale insulin.    Heme:  Hematocrit is 27 status post transfusion of 2 units of packed red blood cells.  Platelet is 105.  Will add low-dose rivaroxaban for anti thrombin 3 effect.  Continue aspirin and Plavix.    Activities:  Patient is out of bed to chair.  Advance as tolerated.  Line tubes and drains:  Patient has a right IJ triple-lumen, Michel catheter, A-line and pacer wires.  Chest tubes have been discontinued.      05/09/2024   The patient is postop day 3 status post bypass grafting x4.  Overall the patient is.    Neuro:  Patient is awake alert and x3.  Neuro exam is nonfocal.  Pain is well controlled.    Cardiac:  Patient's is off all systolic blood pressure in the high 90s.  Will decrease metoprolol dose.    Respiratory:  Patient has good sats.  Continue pulmonary toileting.  Continue incentive spirometer.  GI:  Patient is complaining of nausea.  Patient has not had a bowel movement.  Will increase bowel regimen.  Patient's abdomen soft nontender sounds.  Renal:  Creatinine is 1.3.  Patient is off diuretics.    Id:  Patient is afebrile.  However white count remains high 22  No evidence of infection.  Continue to follow.  Endocrine: Glucose is controlled with long-acting and sliding scale insulin.  Patient has  been having hypoglycemic episode.  Will decrease long-acting insulin.  Dose and frequency.    Heme:  Hematocrit is 24 and platelet count is 122 continue aspirin Plavix and low-dose rivaroxaban.  Activities:  Patient is out of bed to chair advance as tolerated.    Line tubes and drains:  Patient has a right IJ triple-lumen catheter Michel catheter, pacer wires, will discontinue Michel catheter.      05/10/2024   The patient is postop day 4 status post coronary artery bypass grafting x4.  Overall the patient is doing well.  Neuro:  Patient is awake alert and oriented x3.  Neuro exam is nonfocal.  Pain is well controlled.  Cardiac:  Patient's blood pressure has been improved since albumin yesterday.  Continue on low-dose metoprolol.    Respiratory:  Patient has good sats on nasal cannula.  Continue pulmonary toileting.  Continue incentive spirometer.    GI: Patient nausea is much improved.  Patient had 1 bowel movement yesterday.  Will repeat Mag citrate.  Will add boost to patient's p.o. intake.  Patient has poor p.o. intake.  LFTs are slowly trending better.  Renal: Creatinine is 1.2.  Patient is making urine.  Patient is off diuretics.    Id:  Patient is afebrile.  White count is down to 15.  Will continue to follow.    Endocrine:  Patient had another episode of hypoglycemia on low-dose long-acting insulin.  Long-acting insulin will be discontinued.    Heme:  Hematocrit is 33 and platelet count is 135.  Continue aspirin Plavix and low-dose rivaroxaban.  Activities:  Patient is out of bed to chair ambulate and advance as tolerated.    Line tubes and drains:  Patient has peripheral lines and pacer wires.    05/11/2024   The patient is postop day 5 status post coronary artery bypass grafting x4 overall the patient is doing well.  Anticipate discharge to home or skilled nursing facility in the next 48-72 hours.    Neuro:  Patient is awake alert and oriented x3.  Neuro exam is nonfocal.  Pain is well controlled.     Cardiac:  Patient has been stable.  Continue low-dose metoprolol.    Respiratory:  Patient has good sats.  Continue pulmonary toileting.  Continue incentive spirometer.  GI:  Patient p.o. intake has been poor.  Encourage increased p.o. intake.  LFTs trending lower.  Renal:  Creatinine is 1.  However patient is complaining of urinary retention.  Bladder scan concerns urinary retention.  Michel catheter replaced.  Id:  Patient is afebrile.  White count is 12.    Endocrine:  Patient's glucose being managed with sliding scale insulin.    Heme:  Hematocrit is 35 and platelet count is 166.  Continue aspirin Plavix and low-dose rivaroxaban.  Activities: Patient is out of bed to chair advance as tolerated.    Line tubes and drains patient has peripheral lines and replaced Michel catheter.    05/12/2024   Patient is postop day 6 status post coronary artery bypass grafting x4.  Overall patient is doing well.  Patient continues to make clinical improvement.  Anticipate discharge to home or skilled nursing care facility in the next 48 hours.    Neuro:  Patient is awake alert and oriented x3.  Neuro exam is nonfocal.  Pain is well controlled.    Cardiac:  Patient remains stable.  Continue low-dose metoprolol.    Respiratory: Patient has good sats on room air.  Continue pulmonary toileting.  Continue incentive spirometer.    GI:  Patient's p.o. intake is improving.  Continue supplemental feeds with boost.  Renal: Creatinine is 1.0.  Michel catheter has been replaced.  Will start patient on doxazosin.  Will plan to discontinue Michel catheter and do voiding trials in the next 24 hours.  Will obtain urology consult.    Endocrine:  Glucose is managed with a sliding scale.    Heme:  Continue aspirin Plavix and low-dose rivaroxaban.    Activities: Patient is out of bed to chair.  Advance as tolerated.  Line tubes and drains:  Patient has peripheral lines and Michel catheter.    05/13/2024   The patient is postop day 7 status post  coronary artery bypass grafting x4.  Overall the patient is doing.  Anticipate discharge to skilled nursing facility next 24 hours.    Neuro:  Patient is awake alert and oriented x3.  Neuro exam is nonfocal pain is well controlled.    Cardiac: Patient is on low-dose metoprolol.  To monitor.    Respiratory: Patient has good sats on continue pulmonary toileting.  Continue incentive spirometer.    GI:  Patient has increased p.o. intake.  Continues to feeds.  Renal: Creatinine is 1.0 patient has good urine output.  Michel catheter is in place.  Patient is started on doxazosin.  Will discontinue Michel to day and perform a voiding trial.    Endocrine:  Glucose is controlled with a sliding scale.    Heme:  Hematocrit is 32.7 platelet count is 199.  Continue aspirin Plavix and low-dose rivaroxaban  Id:  Patient is afebrile.  White count is 7.    Activities: Patient is out of bed and  Inches and drains:  Patient has peripheral lines and Michel catheter.  Michel catheter discontinued with voiding trials.    05/14/2024   The patient is postop day 8 status post coronary grafting times.  Overall the patient is doing well.  Patient's physical activities in the improving over the past 24-48 hours.  Anticipate discharge to home with home health today.  Neuro:  Patient is awake alert and oriented x3 neuro exam is nonfocal.  Pain is well controlled.  Cardiac:  Continue on low-dose metoprolol.  Patient's LFTs are much improved.  Restart pravastatin.    Respiratory: Patient has good sats on room air.  Continue pulmonary toileting.  GI:  Patient is having improved p.o. intake.  Will add Mag citrate for bowel movement.    Renal:  Patient has creatinine of 1.0.  Michel catheter was removed and patient has been able to spontaneously void.  Will place an outpatient urology consult for patient.  Endocrine: Glucose is controlled with sliding scale.  Patient will be placed back on his preop home hypoglycemic agent.    Heme:  Hematocrit is 33  "platelet count is 233.  Continue aspirin and Plavix.  Continue low-dose rivaroxaban for 30 days post discharge.  Id:  Patient is afebrile white count is 8.    Activities:  Patient is out bed and ambulating in the hallways.  Line tubes and drains.  Patient has peripheral lines.       Final Active Diagnoses:    Diagnosis Date Noted POA    PRINCIPAL PROBLEM:  Coronary artery disease of native artery of native heart with stable angina pectoris [I25.118] 06/13/2019 Yes    S/P CABG x 4 [Z95.1] 05/07/2024 Not Applicable    On mechanically assisted ventilation [Z99.11] 05/06/2024 Not Applicable    Essential hypertension [I10] 06/13/2019 Yes    Other hyperlipidemia [E78.49] 06/13/2019 Yes    Type 2 diabetes mellitus with complication, without long-term current use of insulin [E11.8] 06/13/2019 Yes      Problems Resolved During this Admission:      Discharged Condition: good    Disposition: [place]-Health [place] [facility] [place]    Follow Up:   Follow-up Information       MATTHEW GUADARRAMA Follow up.    Specialties: Home Health Services, Home Therapy Services, Home Living Aide Services  Contact information:  [address] [place] [address], [place] [address]  [phone]                         Patient Instructions:      WALKER FOR HOME USE     Order Specific Question Answer Comments   Type of Walker: Adult (5'4"-6'6")    With wheels? Yes    Height: 5' 10" (1.778 m)    Weight: 71.1 kg (156 lb 12 oz)    Length of need (1-99 months): 99    Does patient have medical equipment at home? none    Please check all that apply: Patient's condition impairs ambulation.    Please check all that apply: Patient is unable to safely ambulate without equipment.    Please check all that apply: Patient needs help to get in and out of chair.      CBC W/ AUTO DIFFERENTIAL   Standing Status: Future Standing Exp. Date: 08/12/25     COMPREHENSIVE METABOLIC PANEL   Standing Status: Future Standing Exp. Date: 08/12/25     Ambulatory referral/consult to Urology   Standing " Status: Future   Referral Priority: Routine Referral Type: Consultation   Referral Reason: Specialty Services Required   Requested Specialty: Urology   Number of Visits Requested: 1     Ambulatory referral/consult to Cardiac Rehab   Standing Status: Future   Referral Priority: Routine Referral Type: Consultation   Referral Reason: Specialty Services Required   Requested Specialty: Cardiac Rehabilitation   Number of Visits Requested: 1     Ambulatory referral/consult to Home Health   Standing Status: Future   Referral Priority: Routine Referral Type: Home Health Care   Referral Reason: Specialty Services Required   Requested Specialty: Home Health Services   Number of Visits Requested: 1     Diet diabetic     No driving until:     Notify your health care provider if you experience any of the following:  temperature >100.4     Notify your health care provider if you experience any of the following:  persistent nausea and vomiting or diarrhea     Notify your health care provider if you experience any of the following:  severe uncontrolled pain     Notify your health care provider if you experience any of the following:  redness, tenderness, or signs of infection (pain, swelling, redness, odor or green/yellow discharge around incision site)     Notify your health care provider if you experience any of the following:  difficulty breathing or increased cough     Notify your health care provider if you experience any of the following:  severe persistent headache     Notify your health care provider if you experience any of the following:  worsening rash     Notify your health care provider if you experience any of the following:  persistent dizziness, light-headedness, or visual disturbances     Notify your health care provider if you experience any of the following:  increased confusion or weakness     No dressing needed     Activity as tolerated     Weight bearing restrictions (specify):     Medications:  Reconciled Home  Medications:      Medication List        START taking these medications      ascorbic acid (vitamin C) 500 MG tablet  Commonly known as: VITAMIN C  Take 1 tablet (500 mg total) by mouth 2 (two) times daily.     clopidogreL 75 mg tablet  Commonly known as: PLAVIX  Take 1 tablet (75 mg total) by mouth once daily.  Start taking on: May 15, 2024     cyanocobalamin 1000 MCG tablet  Commonly known as: VITAMIN B-12  Take 1 tablet (1,000 mcg total) by mouth once daily.  Start taking on: May 15, 2024     docusate sodium 100 MG capsule  Commonly known as: COLACE  Take 1 capsule (100 mg total) by mouth 2 (two) times daily.     doxazosin 1 MG tablet  Commonly known as: CARDURA  Take 1 tablet (1 mg total) by mouth once daily.  Start taking on: May 15, 2024     folic acid 1 MG tablet  Commonly known as: FOLVITE  Take 1 tablet (1 mg total) by mouth once daily.  Start taking on: May 15, 2024     metoprolol tartrate 25 MG tablet  Commonly known as: LOPRESSOR  Take 0.5 tablets (12.5 mg total) by mouth 2 (two) times daily.     polyethylene glycol 17 gram/dose powder  Commonly known as: GLYCOLAX  Take 17 g by mouth once daily.  Start taking on: May 15, 2024     rivaroxaban 2.5 mg Tab  Commonly known as: XARELTO  Take 1 tablet (2.5 mg total) by mouth 2 (two) times daily with meals.     sodium chloride 1,000 mg Tbso oral tablet  Take 1 tablet (1,000 mg total) by mouth 3 (three) times daily. for 5 days     traMADoL 50 mg tablet  Commonly known as: ULTRAM  Take 1 tablet (50 mg total) by mouth every 6 (six) hours as needed for Pain.            CONTINUE taking these medications      aspirin 81 MG EC tablet  Commonly known as: ECOTRIN  Take 81 mg by mouth once daily.     blood sugar diagnostic Strp  Use as directed up to 3 times daily     EScitalopram oxalate 10 MG tablet  Commonly known as: LEXAPRO  Take 10 mg by mouth once daily.     ferrous fumarate 324 mg (106 mg iron) Tab  Take by mouth.     glimepiride 2 MG tablet  Commonly known as:  AMARYL     pantoprazole 40 MG tablet  Commonly known as: PROTONIX  Take 1 tablet (40 mg total) by mouth once daily.     pravastatin 10 MG tablet  Commonly known as: PRAVACHOL     vitamin D 1000 units Tab  Commonly known as: VITAMIN D3  Take 1,000 Units by mouth once daily.            STOP taking these medications      amLODIPine 5 MG tablet  Commonly known as: NORVASC     isosorbide mononitrate 30 MG 24 hr tablet  Commonly known as: IMDUR     losartan 100 MG tablet  Commonly known as: COZAAR     nitroGLYCERIN 0.3 MG SL tablet  Commonly known as: NITROSTAT            Time spent on the discharge of patient: 45 minutes    Maury Amato MD  Cardiothoracic Surgery  '[facility] - Intensive Care ([facility])

## 2024-05-14 NOTE — ASSESSMENT & PLAN NOTE
-Continue current post-op care and mgmt as per CTS  -ASA Plavix statin BB  -IS usage   -Ambulation when able    5/8/24  -Pain improved  -Continue ASA, Plavix, BB  -Statin held due to bumped LFT's  -IS usage and ambulation    5/9/24  -Stable CV wise  -Continue ASA, Plavix, BB  -Statin held due to bumped LFT's  -Continue IS usage and ambulation    5/10/24  -No acute CV issues  -Continue ASA, Plavix, BB  -Statin on hold  -IS usage and ambulation/PT/OT    5/11/24  - No acute CV issues   - Statin held   - PT and OT ambulation  -Continue ASA/ BB and Plavix      5/12/24  -management per CT surgery   -Patient still working with PT and ambulating the hallways  -Appetite not great   -LFT's are improving still holding statin   -Continue ASA/ BB and Plavix     5/13/24  -Stable CV wise  -Continue ASA, BB, plavix  -resume statin once LFT's normalize  -PT/OT and IS usage    05/14/24  Continue asa BB Plavix  F/u as OP at cardiology for resuming statin

## 2024-05-14 NOTE — PT/OT/SLP PROGRESS
Physical Therapy Treatment    Patient Name:  Feng Marques   MRN:  [id number]    Recommendations:     Discharge Recommendations: Low Intensity Therapy  Discharge Equipment Recommendations: shower chair  Barriers to discharge: None    Assessment:     Feng Marques is a 73 y.o. male admitted with a medical diagnosis of Coronary artery disease of native artery of native heart with stable angina pectoris.  He presents with the following impairments/functional limitations: weakness, impaired endurance, impaired functional mobility, gait instability, impaired balance.    Rehab Prognosis: Good; patient would benefit from acute skilled PT services to address these deficits and reach maximum level of function.    Recent Surgery: Procedure(s) (LRB):  CORONARY ARTERY BYPASS GRAFT (CABG) (N/A)  ECHOCARDIOGRAM,TRANSESOPHAGEAL (N/A)  SURGICAL PROCUREMENT, VEIN, ENDOSCOPIC (Left)  BLOCK, NERVE, INTERCOSTAL, 2 OR MORE (N/A) 8 Days Post-Op    Plan:     During this hospitalization, patient to be seen 3 x/week to address the identified rehab impairments via gait training, therapeutic activities, therapeutic exercises and progress toward the following goals:    Plan of Care Expires:  05/22/24    Subjective     Chief Complaint: NONE, READY TO GO HOME  Patient/Family Comments/goals:   Pain/Comfort:  Pain Rating 1: 0/10      Objective:     Communicated with NURSE ROSE prior to session.  Patient found up in chair with blood pressure cuff, pulse ox (continuous), peripheral IV, telemetry upon PT entry to room.     General Precautions: Standard, fall, sternal  Orthopedic Precautions: N/A  Braces: N/A  Respiratory Status: Room air     Functional Mobility:  Bed Mobility:     Scooting: stand by assistance  Transfers:     Sit to Stand:  contact guard assistance with rolling walker  Gait: PT ' WITH RW AND SBA, 3 SMALL STANDING REST BREAKS, CUES FOR UPRIGHT POSTURE DUE TO FF AT NECK AND TRUNK, NO LOB OR SOB ON ROOM AIR  Balance:  "GOOD SITTING BALANCE, FAIR+ DYNAMIC BALANCE DURING GAIT    AM-PAC 6 CLICK MOBILITY  Turning over in bed (including adjusting bedclothes, sheets and blankets)?: 3  Sitting down on and standing up from a chair with arms (e.g., wheelchair, bedside commode, etc.): 3  Moving from lying on back to sitting on the side of the bed?: 3  Moving to and from a bed to a chair (including a wheelchair)?: 3  Need to walk in hospital room?: 4  Climbing 3-5 steps with a railing?: 1  Basic Mobility Total Score: 17     Treatment & Education:  PT AND DAUGHTER EDUCATION:  - ROLE OF P.T. AND POC IN ACUTE CARE HOSPITAL SETTING  - RW USE AND SAFETY DURING TF'S AND GAIT  - REVIEW STERNAL PRECAUTIONS IN RELATION TO MOBILITY AND ADL'S  - ENCOURAGED TO INCREASE TIME OOB IN CHAIR TO TOLERANCE   - TO CONTINUE THERAPUETIC EXERCISES THROUGHOUT THE DAY TO INCREASE ACTIVITY TOLERANCE AND DECREASE RISK FOR PNEUMONIA AND BLOOD CLOTS: HIP FLEX/EXT, HIP ABD/ADD, QUAD SET, HEEL SLIDE, AP  - RISK FOR FALLS DUE TO GENERALIZED WEAKNESS, EDUCATED ON "CALL DON'T FALL", ENCOURAGED TO CALL FOR ASSISTANCE WITH ALL NEEDS SUCH AS BED<>CHAIR TRANSFERS OR TRIPS TO BATHROOM, PT AGREEABLE TO SAFETY PRECAUTIONS    Patient left up in chair with all lines intact, call button in reach, NURSE notified, and DAUGHTER present..    GOALS:   Multidisciplinary Problems       Physical Therapy Goals          Problem: Physical Therapy    Goal Priority Disciplines Outcome Goal Variances Interventions   Physical Therapy Goal     PT, PT/OT Progressing     Description: LTG'S TO BE MET IN 14 DAYS (5-22-24)  PT WILL BE EMERSON FOR BED MOBILITY  PT WILL BE EMERSON FOR BED<>CHAIR TF'S  PT WILL  FEET NO AD EMERSON  PT WILL INC AMPAC SCORE BY 2 POINTS TO PROGRESS GROSS FUNC MOBILITY                         Time Tracking:     PT Received On: 05/14/24  PT Start Time: 0905     PT Stop Time: 0935  PT Total Time (min): 30 min     Billable Minutes: Gait Training 15 and Therapeutic Activity " 15    Treatment Type: Treatment  PT/PTA: PT     Number of PTA visits since last PT visit: 0     05/14/2024

## 2024-05-14 NOTE — PT/OT/SLP PROGRESS
"Occupational Therapy   Treatment    Name: Feng Marques  MRN: [id number]  Admitting Diagnosis:  Coronary artery disease of native artery of native heart with stable angina pectoris  8 Days Post-Op    Recommendations:     Discharge Recommendations: Low Intensity Therapy  Discharge Equipment Recommendations:  shower chair  Barriers to discharge:  None    Assessment:     Feng Marques is a 73 y.o. male with a medical diagnosis of Coronary artery disease of native artery of native heart with stable angina pectoris.  He presents with the following performance deficits affecting function:  weakness, impaired functional mobility, impaired endurance, gait instability, pain, impaired self care skills, impaired cardiopulmonary response to activity, impaired coordination.     Rehab Prognosis:  Good; patient would benefit from acute skilled OT services to address these deficits and reach maximum level of function.       Plan:     Patient to be seen 2 x/week to address the above listed problems via self-care/home management, therapeutic activities, therapeutic exercises  Plan of Care Expires: 05/22/24  Plan of Care Reviewed with: patient, family    Subjective     Chief Complaint: None reported  Patient/Family Comments/goals: "Get back to my garden"  Pain/Comfort:  Pain Rating 1: 0/10    Objective:     Communicated with his nurse, Wilma, prior to session.  Patient found up in chair with blood pressure cuff, telemetry, peripheral IV, pulse ox (continuous) upon OT entry to room.    General Precautions: Standard, sternal    Orthopedic Precautions:N/A  Braces: N/A  Respiratory Status: Room air     Occupational Performance:     Functional Mobility/Transfers:  Patient completed Sit <> Stand Transfer with contact guard assistance  with  rolling walker   Functional Mobility: Pt ambulated 260 ft with SBA and the use of RW. Pt completed ambulation in order to increase standing balance and overall endurance required for ADLs. "   Pt with fewer complaints of neck pain, requiring less standing rest breaks on this date.        [facility] 6 Click ADL: 20    Treatment & Education:  Pt and family educated on sternal precautions and activity pacing strategies for use at home. Reviewed KYMITT document and answered all questions regarding it's content via patient and daughter in law. Pt and family both expressed understanding.     Patient left up in chair with all lines intact, call button in reach, chair alarm on, and family present  Pt educated on using the call button to get assistance if he wanted to get back in bed to prevent any injury.     GOALS:   Multidisciplinary Problems       Occupational Therapy Goals          Problem: Occupational Therapy    Goal Priority Disciplines Outcome Interventions   Occupational Therapy Goal     OT, PT/OT Progressing    Description: Goals to be met by: 5/22/24     Patient will increase functional independence with ADLs by performing:    Toileting from toilet with Supervision for hygiene and clothing management.   Toilet transfer to toilet with Supervision.  Demonstrates 100% functional compliance with sternal precautions.                         Time Tracking:     OT Date of Treatment: 05/14/24  OT Start Time: 0850  OT Stop Time: 0915  OT Total Time (min): 25 min    Billable Minutes:Therapeutic Activity 25           SULMA Gates   5/14/2024

## 2024-05-14 NOTE — PLAN OF CARE
[facility] - Intensive Care (Hospital)  Discharge Final Note    Primary Care Provider: Maciel Enriquez MD    Expected Discharge Date: 5/14/2024    Final Discharge Note (most recent)       Final Note - 05/14/24 0927          Final Note    Assessment Type Final Discharge Note     Anticipated Discharge Disposition [place] [facility] [place]     Hospital Resources/Appts/Education Provided Post-Acute resouces added to AVS        Post-Acute Status    Post-Acute Authorization Home Health;HME     HME Status Pending therapy documentation     Home Health Status Set-up Complete/Auth obtained     Discharge Delays Home Medical Equipment (Insurance, Delivery)                     Important Message from Medicare  Important Message from Medicare regarding Discharge Appeal Rights: Given to patient/caregiver, Explained to patient/caregiver, Signed/date by patient/caregiver     Date IMM was signed: 05/14/24  Time IMM was signed: 0900    Contact Info       LUIS GUADARRAMA   Specialty: Home Health Services, Home Therapy Services, Home Living Aide Services    [address] [address]   Phone: [phone]       Next Steps: Follow up          Rolling walker ordered. Pending delivery from [facility] and therapy documentation. uLis Guadarrama set up for home health services. No other CM needs.

## 2024-05-14 NOTE — SUBJECTIVE & OBJECTIVE
Interval History: Patient is postop day 8 status post coronary artery bypass grafting x4.    ROS  Medications:  Continuous Infusions:  Scheduled Meds:   ascorbic acid (vitamin C)  500 mg Oral BID    aspirin  81 mg Oral Daily    clopidogreL  75 mg Oral Daily    cyanocobalamin  1,000 mcg Oral Daily    docusate sodium  100 mg Oral BID    doxazosin  1 mg Oral Daily    EScitalopram oxalate  10 mg Oral Daily    ferrous sulfate  1 tablet Oral Daily    folic acid  1 mg Oral Daily    magnesium citrate  296 mL Oral Once    metoprolol tartrate  12.5 mg Oral BID    pantoprazole  40 mg Oral Daily    polyethylene glycol  17 g Oral Daily    pravastatin  40 mg Oral QHS    rivaroxaban  2.5 mg Oral BID WM     PRN Meds:  Current Facility-Administered Medications:     albumin human 5%, 25 g, Intravenous, PRN    dextrose 10%, 12.5 g, Intravenous, PRN    dextrose 10%, 25 g, Intravenous, PRN    glucagon (human recombinant), 1 mg, Intramuscular, PRN    glucose, 16 g, Oral, PRN    glucose, 24 g, Oral, PRN    influenza 65up-adj, 0.5 mL, Intramuscular, vaccine x 1 dose    insulin aspart U-100, 0-10 Units, Subcutaneous, QID (AC + HS) PRN    lactated ringers, 1,000 mL, Intravenous, PRN    magnesium sulfate IVPB, 4 g, Intravenous, PRN    metoclopramide, 5 mg, Intravenous, Q6H PRN    naloxone, 0.4 mg, Intravenous, PRN    ondansetron, 4 mg, Intravenous, Q6H PRN    pneumoc 20-diaz conj-dip cr(PF), 0.5 mL, Intramuscular, vaccine x 1 dose    potassium chloride in water, 20 mEq, Intravenous, PRN    potassium chloride in water, 60 mEq, Intravenous, PRN    potassium chloride in water, 40 mEq, Intravenous, PRN    sodium chloride 0.9%, 10 mL, Intravenous, PRN    sodium chloride 0.9%, 10 mL, Intravenous, Q12H PRN    traMADoL, 50 mg, Oral, Q6H PRN     Objective:     Vital Signs (Most Recent):  Temp: 98 °F (36.7 °C) (05/14/24 0800)  Pulse: 89 (05/14/24 0800)  Resp: (!) 21 (05/14/24 0800)  BP: 113/63 (05/14/24 0800)  SpO2: 99 % (05/14/24 0800) Vital Signs (24h  Range):  Temp:  [97.9 °F (36.6 °C)-98.6 °F (37 °C)] 98 °F (36.7 °C)  Pulse:  [] 89  Resp:  [12-31] 21  SpO2:  [92 %-99 %] 99 %  BP: ()/(52-74) 113/63     Weight: 71.1 kg (156 lb 12 oz)  Body mass index is 22.49 kg/m².    SpO2: 99 %       Intake/Output - Last 3 Shifts         05/12 0700  05/13 0659 05/13 0700  05/14 0659 05/14 0700  05/15 0659    I.V. (mL/kg) 100.1 (1.4) 99.9 (1.4)     Total Intake(mL/kg) 100.1 (1.4) 99.9 (1.4)     Urine (mL/kg/hr) 1650 (1) 220 (0.1)     Other       Total Output 1650 220     Net -1549.9 -120.1                    Lines/Drains/Airways       Peripheral Intravenous Line  Duration                  Peripheral IV - Single Lumen 05/10/24 1100 20 G Anterior;Right Upper Arm 3 days                     Physical Exam  Constitutional:       Appearance: Normal appearance.   HENT:      Head: Normocephalic and atraumatic.      Nose: Nose normal.   Cardiovascular:      Rate and Rhythm: Normal rate and regular rhythm.      Heart sounds: Normal heart sounds.   Pulmonary:      Effort: Pulmonary effort is normal.      Breath sounds: Normal breath sounds.   Abdominal:      General: Abdomen is flat. Bowel sounds are normal.      Palpations: Abdomen is soft.   Musculoskeletal:      Right lower leg: No edema.      Left lower leg: No edema.   Skin:     General: Skin is warm and dry.   Neurological:      Mental Status: He is alert and oriented to person, place, and time.   Psychiatric:         Behavior: Behavior normal.            Significant Labs:  All pertinent labs from the last 24 hours have been reviewed.    Significant Diagnostics:  I have reviewed all pertinent imaging results/findings within the past 24 hours.

## 2024-05-14 NOTE — SUBJECTIVE & OBJECTIVE
"    ROS  Objective:     Vital Signs (Most Recent):  Temp: 98 °F (36.7 °C) (05/14/24 0800)  Pulse: 89 (05/14/24 0800)  Resp: (!) 21 (05/14/24 0800)  BP: 113/63 (05/14/24 0800)  SpO2: 99 % (05/14/24 0800) Vital Signs (24h Range):  Temp:  [97.9 °F (36.6 °C)-98.5 °F (36.9 °C)] 98 °F (36.7 °C)  Pulse:  [83-94] 89  Resp:  [12-25] 21  SpO2:  [94 %-99 %] 99 %  BP: ()/(55-74) 113/63     Weight: 71.1 kg (156 lb 12 oz)  Body mass index is 22.49 kg/m².     SpO2: 99 %       No intake or output data in the 24 hours ending 05/14/24 1839    Lines/Drains/Airways       None                      Physical Exam  Vitals and nursing note reviewed.   Constitutional:       Appearance: Normal appearance.   HENT:      Head: Normocephalic and atraumatic.   Cardiovascular:      Rate and Rhythm: Normal rate and regular rhythm.      Heart sounds: S1 normal and S2 normal. No murmur heard.     Comments: Sternotomy site C/D/I; no bleeding erythema or drainage  Pulmonary:      Effort: Pulmonary effort is normal.   Abdominal:      Palpations: Abdomen is soft.   Musculoskeletal:      Right lower leg: No edema.      Left lower leg: No edema.   Neurological:      General: No focal deficit present.      Mental Status: He is oriented to person, place, and time.   Psychiatric:         Mood and Affect: Mood normal.         Behavior: Behavior normal.            Significant Labs: ABG: No results for input(s): "PH", "PCO2", "HCO3", "POCSATURATED", "BE" in the last 48 hours., Blood Culture: No results for input(s): "LABBLOO" in the last 48 hours., BMP:   Recent Labs   Lab 05/13/24  0519 05/13/24  1636 05/14/24  0519   * 215* 160*   * 132* 132*   K 3.9 4.4 5.1   CL 98 99 102   CO2 28 26 22*   BUN 19 17 15   CREATININE 1.0 1.0 1.0   CALCIUM 8.6* 8.7 8.3*   MG 2.2 2.8* 2.1   , CMP   Recent Labs   Lab 05/13/24  0519 05/13/24  1636 05/14/24  0519   * 132* 132*   K 3.9 4.4 5.1   CL 98 99 102   CO2 28 26 22*   * 215* 160*   BUN 19 17 15 " "  CREATININE 1.0 1.0 1.0   CALCIUM 8.6* 8.7 8.3*   PROT  --   --  5.5*   ALBUMIN  --   --  2.9*   BILITOT  --   --  0.7   ALKPHOS  --   --  68   AST  --   --  41*   ALT  --   --  157*   ANIONGAP 8 7* 8   , CBC   Recent Labs   Lab 05/13/24  0519 05/14/24  0519   WBC 7.35 8.29   HGB 10.7* 10.8*   HCT 32.7* 33.0*    233   , INR No results for input(s): "INR", "PROTIME" in the last 48 hours., Lipid Panel No results for input(s): "CHOL", "HDL", "LDLCALC", "TRIG", "CHOLHDL" in the last 48 hours., and Troponin No results for input(s): "TROPONINI" in the last 48 hours.    Significant Imaging: EKG:    "

## 2024-05-14 NOTE — PROGRESS NOTES
[facility] - Intensive Care ([facility])  Cardiology  Progress Note    Patient Name: Feng Marques  MRN: [id number]  Admission Date: 5/3/2024  Hospital Length of Stay: 11 days  Code Status: Prior   Attending Physician: No att. providers found   Primary Care Physician: Maciel Enriquez MD  Expected Discharge Date: 5/14/2024  Principal Problem:Coronary artery disease of native artery of native heart with stable angina pectoris    Subjective:     Hospital Course:   5/4/2024 uneventful night denies any chest pain or shortness of breath awaiting cabg on Monday 5/5/2024 NO ANGINA ASYMPTOMATIC    5/7/24-Patient seen and examined today, s/p CABG x4, POD # 1. Moaning in pain during exam. Labs reviewed. H/H 7.6/22.8. Platelets 128,000.    5/8/24-Patient seen and examined today, s/p CABG x 4, POD #2. Feeling better, sitting up in chair. Pain improved. Chest tubes removed. Labs reviewed. Creatinine 1.3. H/H improved post transfusion. LFT's bumped.    5/9/24-Patient seen and examined today, s/p CABG x 4 POD # 3. Continues to improve. Pain controlled. Ambulated with PT/OT this AM. Labs reviewed.     5/10/24-Patient seen and examined today, s/p CABG x 4 POD # 4. Feels ok, still weak/tired. Working with PT/OT this AM. Paced on monitor. Labs reviewed. LFT's still bumped.    5/11/24- Patient seen and examined today sitting in bedside chair. S/P CABG x 4 POD #5. Feels good. Ambulated with PT this morning. Labs reviewed. LFT's are still high.       5/12/24- Patient seen and examined today sitting in up in chair. S/P CABG X4 POD # 6. He states his pain isn't too bad. Ambulated in the hallways with PT yesterday. Patient claims his appetite isn't too good. Labs reviewed. LFT's are improving.     5/13/24-Patient amy nd examined today, s/p CABG x 4 POD #7. Sitting up in bedside chair. Feels ok. Admits to fatigue/loss of appetite. States it hurts to swallow certain foods. Incisional pain controlled. Working with PT/OT. BP marginal at  "times. Labs reviewed.     05/14/24 no chest pain arrhythmia and SOB. The lab reviewed and VSS. Home today per CVT         ROS  Objective:     Vital Signs (Most Recent):  Temp: 98 °F (36.7 °C) (05/14/24 0800)  Pulse: 89 (05/14/24 0800)  Resp: (!) 21 (05/14/24 0800)  BP: 113/63 (05/14/24 0800)  SpO2: 99 % (05/14/24 0800) Vital Signs (24h Range):  Temp:  [97.9 °F (36.6 °C)-98.5 °F (36.9 °C)] 98 °F (36.7 °C)  Pulse:  [83-94] 89  Resp:  [12-25] 21  SpO2:  [94 %-99 %] 99 %  BP: ()/(55-74) 113/63     Weight: 71.1 kg (156 lb 12 oz)  Body mass index is 22.49 kg/m².     SpO2: 99 %       No intake or output data in the 24 hours ending 05/14/24 1839    Lines/Drains/Airways       None                      Physical Exam  Vitals and nursing note reviewed.   Constitutional:       Appearance: Normal appearance.   HENT:      Head: Normocephalic and atraumatic.   Cardiovascular:      Rate and Rhythm: Normal rate and regular rhythm.      Heart sounds: S1 normal and S2 normal. No murmur heard.     Comments: Sternotomy site C/D/I; no bleeding erythema or drainage  Pulmonary:      Effort: Pulmonary effort is normal.   Abdominal:      Palpations: Abdomen is soft.   Musculoskeletal:      Right lower leg: No edema.      Left lower leg: No edema.   Neurological:      General: No focal deficit present.      Mental Status: He is oriented to person, place, and time.   Psychiatric:         Mood and Affect: Mood normal.         Behavior: Behavior normal.            Significant Labs: ABG: No results for input(s): "PH", "PCO2", "HCO3", "POCSATURATED", "BE" in the last 48 hours., Blood Culture: No results for input(s): "LABBLOO" in the last 48 hours., BMP:   Recent Labs   Lab 05/13/24  0519 05/13/24  1636 05/14/24  0519   * 215* 160*   * 132* 132*   K 3.9 4.4 5.1   CL 98 99 102   CO2 28 26 22*   BUN 19 17 15   CREATININE 1.0 1.0 1.0   CALCIUM 8.6* 8.7 8.3*   MG 2.2 2.8* 2.1   , CMP   Recent Labs   Lab 05/13/24  0519 05/13/24  1636 " "05/14/24 0519   * 132* 132*   K 3.9 4.4 5.1   CL 98 99 102   CO2 28 26 22*   * 215* 160*   BUN 19 17 15   CREATININE 1.0 1.0 1.0   CALCIUM 8.6* 8.7 8.3*   PROT  --   --  5.5*   ALBUMIN  --   --  2.9*   BILITOT  --   --  0.7   ALKPHOS  --   --  68   AST  --   --  41*   ALT  --   --  157*   ANIONGAP 8 7* 8   , CBC   Recent Labs   Lab 05/13/24 0519 05/14/24 0519   WBC 7.35 8.29   HGB 10.7* 10.8*   HCT 32.7* 33.0*    233   , INR No results for input(s): "INR", "PROTIME" in the last 48 hours., Lipid Panel No results for input(s): "CHOL", "HDL", "LDLCALC", "TRIG", "CHOLHDL" in the last 48 hours., and Troponin No results for input(s): "TROPONINI" in the last 48 hours.    Significant Imaging: EKG:    Assessment and Plan:         * Coronary artery disease of native artery of native heart with stable angina pectoris  Multivessel cad for cabg on Monday no angina    S/P CABG x 4  -Continue current post-op care and mgmt as per CTS  -ASA Plavix statin BB  -IS usage   -Ambulation when able    5/8/24  -Pain improved  -Continue ASA, Plavix, BB  -Statin held due to bumped LFT's  -IS usage and ambulation    5/9/24  -Stable CV wise  -Continue ASA, Plavix, BB  -Statin held due to bumped LFT's  -Continue IS usage and ambulation    5/10/24  -No acute CV issues  -Continue ASA, Plavix, BB  -Statin on hold  -IS usage and ambulation/PT/OT    5/11/24  - No acute CV issues   - Statin held   - PT and OT ambulation  -Continue ASA/ BB and Plavix      5/12/24  -management per CT surgery   -Patient still working with PT and ambulating the hallways  -Appetite not great   -LFT's are improving still holding statin   -Continue ASA/ BB and Plavix     5/13/24  -Stable CV wise  -Continue ASA, BB, plavix  -resume statin once LFT's normalize  -PT/OT and IS usage    05/14/24  Continue asa BB Plavix  F/u as OP at cardiology for resuming statin    Other hyperlipidemia  -statins    5/8/24  -LFT's bumped, statin held    Type 2 diabetes " mellitus with complication, without long-term current use of insulin  Per hospital medicine    Essential hypertension  -Continue home meds as tolerated        VTE Risk Mitigation (From admission, onward)           Ordered     IP VTE HIGH RISK PATIENT  Once         05/06/24 1427                    Kevin Henriquez MD  Cardiology  '[place] - Intensive Care ([facility])

## 2024-05-14 NOTE — PLAN OF CARE
Pt tolerated OT well today. Pt ambulated with CGA using the RW, pt t/f from sit to stand w/ CGA and stand to sit with SBA.

## 2024-05-14 NOTE — ASSESSMENT & PLAN NOTE
05/07/2024   The patient is postop day 1 status post coronary artery bypass grafting timesx1. Overall the patient is doing well.  Neuro:  Patient is awake alert and oriented x3.  Neuro exam is nonfocal.  Requiring increasing pain medication.    Cardiac:  Patient has been stable.  Patient is on low-dose epi and vasopressin.  Wean as tolerated.  Respiratory:  Patient has good sats on nasal cannula.  Pulmonary toileting.  Continue incentive spirometer  GI:  Advance diet as tolerated.    Renal:  Creatinine is 1.1.   Patient has good urine output.  Start Lasix.  Id:  Patient is afebrile.  White count is 19.  Suspect due to stress of surgery.  No evidence of infection.  Continue to observe.  Endocrine:  Glucose is controlled with long-acting insulin and sliding scale.  Heme:  Hematocrit is down to 19.  Will transfuse 2 units packed red blood cells.  Activities:  Advance activities as tolerated.  Line tubes and drains:  Patient has a right IJ triple-lumen, Michel catheter, A-line, pacer wires and saphenectomy site PABLO drains.    05/08/2024   The patient is postop day 2 status post coronary artery bypass grafting x 4.  Overall the patient is doing well.    Neuro:  Patient is awake alert and oriented x3.  Neuro exam is nonfocal.  Pain is much better controlled.  Cardiac:  Patient is hemodynamically stable.  Patient is is being weaned off low-dose vasopressin and epinephrine.  Will start metoprolol.    Respiratory: Patient has good sats.  Continue pulmonary toileting.  Continue incentive spirometer.  GI:  Patient is tolerating p.o. will start cardiac diet.  Advance as tolerated.    Renal:  Creatinine is 1.3.  Patient had excellent response to Lasix yesterday.  Patient appears euvolemic.  Will discontinue Lasix.    Id:  Patient had a T-max of 101°.  White count is 21.  However, patient has no evidence of infections.  Continue to observe.    Endocrine: Glucose is controlled with long-acting and sliding scale insulin.    Heme:   Hematocrit is 27 status post transfusion of 2 units of packed red blood cells.  Platelet is 105.  Will add low-dose rivaroxaban for anti thrombin 3 effect.  Continue aspirin and Plavix.    Activities:  Patient is out of bed to chair.  Advance as tolerated.  Line tubes and drains:  Patient has a right IJ triple-lumen, Michel catheter, A-line and pacer wires.  Chest tubes have been discontinued.      05/09/2024   The patient is postop day 3 status post bypass grafting x4.  Overall the patient is.    Neuro:  Patient is awake alert and x3.  Neuro exam is nonfocal.  Pain is well controlled.    Cardiac:  Patient's is off all systolic blood pressure in the high 90s.  Will decrease metoprolol dose.    Respiratory:  Patient has good sats.  Continue pulmonary toileting.  Continue incentive spirometer.  GI:  Patient is complaining of nausea.  Patient has not had a bowel movement.  Will increase bowel regimen.  Patient's abdomen soft nontender sounds.  Renal:  Creatinine is 1.3.  Patient is off diuretics.    Id:  Patient is afebrile.  However white count remains high 22  No evidence of infection.  Continue to follow.  Endocrine: Glucose is controlled with long-acting and sliding scale insulin.  Patient has been having hypoglycemic episode.  Will decrease long-acting insulin.  Dose and frequency.    Heme:  Hematocrit is 24 and platelet count is 122 continue aspirin Plavix and low-dose rivaroxaban.  Activities:  Patient is out of bed to chair advance as tolerated.    Line tubes and drains:  Patient has a right IJ triple-lumen catheter Michel catheter, pacer wires, will discontinue Michel catheter.      05/10/2024   The patient is postop day 4 status post coronary artery bypass grafting x4.  Overall the patient is doing well.  Neuro:  Patient is awake alert and oriented x3.  Neuro exam is nonfocal.  Pain is well controlled.  Cardiac:  Patient's blood pressure has been improved since albumin yesterday.  Continue on low-dose  metoprolol.    Respiratory:  Patient has good sats on nasal cannula.  Continue pulmonary toileting.  Continue incentive spirometer.    GI: Patient nausea is much improved.  Patient had 1 bowel movement yesterday.  Will repeat Mag citrate.  Will add boost to patient's p.o. intake.  Patient has poor p.o. intake.  LFTs are slowly trending better.  Renal: Creatinine is 1.2.  Patient is making urine.  Patient is off diuretics.    Id:  Patient is afebrile.  White count is down to 15.  Will continue to follow.    Endocrine:  Patient had another episode of hypoglycemia on low-dose long-acting insulin.  Long-acting insulin will be discontinued.    Heme:  Hematocrit is 33 and platelet count is 135.  Continue aspirin Plavix and low-dose rivaroxaban.  Activities:  Patient is out of bed to chair ambulate and advance as tolerated.    Line tubes and drains:  Patient has peripheral lines and pacer wires.    05/11/2024   The patient is postop day 5 status post coronary artery bypass grafting x4 overall the patient is doing well.  Anticipate discharge to home or skilled nursing facility in the next 48-72 hours.    Neuro:  Patient is awake alert and oriented x3.  Neuro exam is nonfocal.  Pain is well controlled.    Cardiac:  Patient has been stable.  Continue low-dose metoprolol.    Respiratory:  Patient has good sats.  Continue pulmonary toileting.  Continue incentive spirometer.  GI:  Patient p.o. intake has been poor.  Encourage increased p.o. intake.  LFTs trending lower.  Renal:  Creatinine is 1.  However patient is complaining of urinary retention.  Bladder scan concerns urinary retention.  Michel catheter replaced.  Id:  Patient is afebrile.  White count is 12.    Endocrine:  Patient's glucose being managed with sliding scale insulin.    Heme:  Hematocrit is 35 and platelet count is 166.  Continue aspirin Plavix and low-dose rivaroxaban.  Activities: Patient is out of bed to chair advance as tolerated.    Line tubes and drains  patient has peripheral lines and replaced Michel catheter.    05/12/2024   Patient is postop day 6 status post coronary artery bypass grafting x4.  Overall patient is doing well.  Patient continues to make clinical improvement.  Anticipate discharge to home or skilled nursing care facility in the next 48 hours.    Neuro:  Patient is awake alert and oriented x3.  Neuro exam is nonfocal.  Pain is well controlled.    Cardiac:  Patient remains stable.  Continue low-dose metoprolol.    Respiratory: Patient has good sats on room air.  Continue pulmonary toileting.  Continue incentive spirometer.    GI:  Patient's p.o. intake is improving.  Continue supplemental feeds with boost.  Renal: Creatinine is 1.0.  Michel catheter has been replaced.  Will start patient on doxazosin.  Will plan to discontinue Michel catheter and do voiding trials in the next 24 hours.  Will obtain urology consult.    Endocrine:  Glucose is managed with a sliding scale.    Heme:  Continue aspirin Plavix and low-dose rivaroxaban.    Activities: Patient is out of bed to chair.  Advance as tolerated.  Line tubes and drains:  Patient has peripheral lines and Michel catheter.    05/13/2024   The patient is postop day 7 status post coronary artery bypass grafting x4.  Overall the patient is doing.  Anticipate discharge to skilled nursing facility next 24 hours.    Neuro:  Patient is awake alert and oriented x3.  Neuro exam is nonfocal pain is well controlled.    Cardiac: Patient is on low-dose metoprolol.  To monitor.    Respiratory: Patient has good sats on continue pulmonary toileting.  Continue incentive spirometer.    GI:  Patient has increased p.o. intake.  Continues to feeds.  Renal: Creatinine is 1.0 patient has good urine output.  Michel catheter is in place.  Patient is started on doxazosin.  Will discontinue Michel to day and perform a voiding trial.    Endocrine:  Glucose is controlled with a sliding scale.    Heme:  Hematocrit is 32.7 platelet count  is 199.  Continue aspirin Plavix and low-dose rivaroxaban  Id:  Patient is afebrile.  White count is 7.    Activities: Patient is out of bed and  Inches and drains:  Patient has peripheral lines and Michel catheter.  Michel catheter discontinued with voiding trials.    05/14/2024   The patient is postop day 8 status post coronary grafting times.  Overall the patient is doing well.  Patient's physical activities in the improving over the past 24-48 hours.  Anticipate discharge to home with home health today.  Neuro:  Patient is awake alert and oriented x3 neuro exam is nonfocal.  Pain is well controlled.  Cardiac:  Continue on low-dose metoprolol.  Patient's LFTs are much improved.  Restart pravastatin.    Respiratory: Patient has good sats on room air.  Continue pulmonary toileting.  GI:  Patient is having improved p.o. intake.  Will add Mag citrate for bowel movement.    Renal:  Patient has creatinine of 1.0.  Michel catheter was removed and patient has been able to spontaneously void.  Will place an outpatient urology consult for patient.  Endocrine: Glucose is controlled with sliding scale.  Patient will be placed back on his preop home hypoglycemic agent.    Heme:  Hematocrit is 33 platelet count is 233.  Continue aspirin and Plavix.  Continue low-dose rivaroxaban for 30 days post discharge.  Id:  Patient is afebrile white count is 8.    Activities:  Patient is out bed and ambulating in the hallways.  Line tubes and drains.  Patient has peripheral lines.

## 2024-05-17 DIAGNOSIS — I10 ESSENTIAL HYPERTENSION: Primary | ICD-10-CM

## 2024-05-20 ENCOUNTER — OFFICE VISIT (OUTPATIENT)
Dept: UROLOGY | Facility: CLINIC | Age: 74
End: 2024-05-20
Payer: MEDICARE

## 2024-05-20 ENCOUNTER — LAB VISIT (OUTPATIENT)
Dept: LAB | Facility: HOSPITAL | Age: 74
End: 2024-05-20
Attending: UROLOGY
Payer: MEDICARE

## 2024-05-20 VITALS
RESPIRATION RATE: 16 BRPM | WEIGHT: 156.75 LBS | HEART RATE: 98 BPM | BODY MASS INDEX: 22.49 KG/M2 | SYSTOLIC BLOOD PRESSURE: 97 MMHG | DIASTOLIC BLOOD PRESSURE: 61 MMHG

## 2024-05-20 DIAGNOSIS — N40.1 BENIGN PROSTATIC HYPERPLASIA WITH WEAK URINARY STREAM: ICD-10-CM

## 2024-05-20 DIAGNOSIS — Z95.1 S/P CABG X 4: ICD-10-CM

## 2024-05-20 DIAGNOSIS — R39.12 BENIGN PROSTATIC HYPERPLASIA WITH WEAK URINARY STREAM: Primary | ICD-10-CM

## 2024-05-20 DIAGNOSIS — R39.12 BENIGN PROSTATIC HYPERPLASIA WITH WEAK URINARY STREAM: ICD-10-CM

## 2024-05-20 DIAGNOSIS — N40.1 BENIGN PROSTATIC HYPERPLASIA WITH WEAK URINARY STREAM: Primary | ICD-10-CM

## 2024-05-20 DIAGNOSIS — R33.9 URINARY RETENTION: ICD-10-CM

## 2024-05-20 LAB — COMPLEXED PSA SERPL-MCNC: 6.2 NG/ML (ref 0–4)

## 2024-05-20 PROCEDURE — 99999 PR PBB SHADOW E&M-EST. PATIENT-LVL IV: CPT | Mod: PBBFAC,,, | Performed by: UROLOGY

## 2024-05-20 PROCEDURE — 36415 COLL VENOUS BLD VENIPUNCTURE: CPT | Performed by: UROLOGY

## 2024-05-20 PROCEDURE — 99204 OFFICE O/P NEW MOD 45 MIN: CPT | Mod: S$GLB,,, | Performed by: UROLOGY

## 2024-05-20 PROCEDURE — 1159F MED LIST DOCD IN RCRD: CPT | Mod: CPTII,S$GLB,, | Performed by: UROLOGY

## 2024-05-20 PROCEDURE — 1101F PT FALLS ASSESS-DOCD LE1/YR: CPT | Mod: CPTII,S$GLB,, | Performed by: UROLOGY

## 2024-05-20 PROCEDURE — 3051F HG A1C>EQUAL 7.0%<8.0%: CPT | Mod: CPTII,S$GLB,, | Performed by: UROLOGY

## 2024-05-20 PROCEDURE — 84153 ASSAY OF PSA TOTAL: CPT | Performed by: UROLOGY

## 2024-05-20 PROCEDURE — 3078F DIAST BP <80 MM HG: CPT | Mod: CPTII,S$GLB,, | Performed by: UROLOGY

## 2024-05-20 PROCEDURE — 3288F FALL RISK ASSESSMENT DOCD: CPT | Mod: CPTII,S$GLB,, | Performed by: UROLOGY

## 2024-05-20 PROCEDURE — 4010F ACE/ARB THERAPY RXD/TAKEN: CPT | Mod: CPTII,S$GLB,, | Performed by: UROLOGY

## 2024-05-20 PROCEDURE — 3074F SYST BP LT 130 MM HG: CPT | Mod: CPTII,S$GLB,, | Performed by: UROLOGY

## 2024-05-20 PROCEDURE — 3008F BODY MASS INDEX DOCD: CPT | Mod: CPTII,S$GLB,, | Performed by: UROLOGY

## 2024-05-20 PROCEDURE — 1160F RVW MEDS BY RX/DR IN RCRD: CPT | Mod: CPTII,S$GLB,, | Performed by: UROLOGY

## 2024-05-20 RX ORDER — DOXAZOSIN 4 MG/1
4 TABLET ORAL DAILY
Qty: 30 TABLET | Refills: 5 | Status: SHIPPED | OUTPATIENT
Start: 2024-05-20 | End: 2024-11-16

## 2024-05-20 NOTE — PROGRESS NOTES
Chief Complaint:   Encounter Diagnoses   Name Primary?    S/P CABG x 4     Benign prostatic hyperplasia with weak urinary stream Yes    Urinary retention        HPI:  HPI Feng Marques amirah 73 y.o. male who presents as a referral for evaluation of BPH.  Patient recently underwent a CABG.  He would postoperative retention which eventually resolved while he was in the hospital.  He does state he has had trouble with slow stream and incomplete emptying for some time however this has been intermittent.  He has never been on medicine for his prostate.  He has been voiding fairly well since discharge.  He does get up at night occasionally 1-2 times a night.    History:  Social History     Tobacco Use    Smoking status: Never    Smokeless tobacco: Current     Types: Chew   Substance Use Topics    Alcohol use: No     Past Medical History:   Diagnosis Date    Diabetes mellitus 2002     am 09/15/2017    DM (diabetes mellitus) 2002     am 09/21/2018    DM (diabetes mellitus) 2002     am 09/27/2019    DM (diabetes mellitus) 2002     am 09/10/2020    Hypertension      Past Surgical History:   Procedure Laterality Date    ANGIOGRAM, CORONARY, WITH LEFT HEART CATHETERIZATION N/A 5/3/2024    Procedure: Angiogram, Coronary, with Left Heart Cath;  Surgeon: Abdulaziz Carrasco MD;  Location: [facility] CATH LAB;  Service: Cardiology;  Laterality: N/A;    AORTOGRAPHY N/A 5/3/2024    Procedure: AORTOGRAM;  Surgeon: Abdulaziz Carrasco MD;  Location: [facility] CATH LAB;  Service: Cardiology;  Laterality: N/A;    ARTERIOGRAPHY OF SUBCLAVIAN ARTERY  5/3/2024    Procedure: ARTERIOGRAM, SUBCLAVIAN;  Surgeon: Abdulaziz Carrasco MD;  Location: [facility] CATH LAB;  Service: Cardiology;;    CORONARY ARTERY BYPASS GRAFT (CABG) N/A 5/6/2024    Procedure: CORONARY ARTERY BYPASS GRAFT (CABG);  Surgeon: Maury Amato MD;  Location: [facility] OR;  Service: Cardiothoracic;  Laterality: N/A;  4-VESSEL WITH EPI-AORTIC ULTRASOUND     ECHOCARDIOGRAM,TRANSESOPHAGEAL N/A 5/6/2024    Procedure: ECHOCARDIOGRAM,TRANSESOPHAGEAL;  Surgeon: Maury Amato MD;  Location: [facility] OR;  Service: Cardiothoracic;  Laterality: N/A;    ENDOSCOPIC HARVEST OF VEIN Left 5/6/2024    Procedure: SURGICAL PROCUREMENT, VEIN, ENDOSCOPIC;  Surgeon: Maury Amato MD;  Location: [facility] OR;  Service: Cardiothoracic;  Laterality: Left;    INJECTION OF ANESTHETIC AGENT AROUND MULTIPLE INTERCOSTAL NERVES N/A 5/6/2024    Procedure: BLOCK, NERVE, INTERCOSTAL, 2 OR MORE;  Surgeon: Maury Amato MD;  Location: [facility] OR;  Service: Cardiothoracic;  Laterality: N/A;    VENTRICULOGRAM, LEFT  5/3/2024    Procedure: Ventriculogram, Left;  Surgeon: Abdulaziz Carrasco MD;  Location: [facility] CATH LAB;  Service: Cardiology;;     Family History   Problem Relation Name Age of Onset    Diabetes Mother      Cataracts Mother      Diabetes Sister      Diabetes Sister      Diabetes Sister          borderline       Current Outpatient Medications on File Prior to Visit   Medication Sig Dispense Refill    ascorbic acid, vitamin C, (VITAMIN C) 500 MG tablet Take 1 tablet (500 mg total) by mouth 2 (two) times daily. 60 tablet 0    aspirin (ECOTRIN) 81 MG EC tablet Take 81 mg by mouth once daily.      blood sugar diagnostic Strp Use as directed up to 3 times daily      clopidogreL (PLAVIX) 75 mg tablet Take 1 tablet (75 mg total) by mouth once daily. 30 tablet 11    cyanocobalamin (VITAMIN B-12) 1000 MCG tablet Take 1 tablet (1,000 mcg total) by mouth once daily. 30 tablet 0    docusate sodium (COLACE) 100 MG capsule Take 1 capsule (100 mg total) by mouth 2 (two) times daily. 60 capsule 0    EScitalopram oxalate (LEXAPRO) 10 MG tablet Take 10 mg by mouth once daily.      ferrous fumarate 324 mg (106 mg iron) Tab Take by mouth.      folic acid (FOLVITE) 1 MG tablet Take 1 tablet (1 mg total) by mouth once daily. 30 tablet 0    glimepiride (AMARYL) 2 MG tablet       metoprolol tartrate (LOPRESSOR) 25 MG  tablet Take 0.5 tablets (12.5 mg total) by mouth 2 (two) times daily. 90 tablet 3    polyethylene glycol (GLYCOLAX) 17 gram/dose powder Take 17 g by mouth once daily. 510 g 0    pravastatin (PRAVACHOL) 10 MG tablet       rivaroxaban (XARELTO) 2.5 mg Tab Take 1 tablet (2.5 mg total) by mouth 2 (two) times daily with meals. 60 tablet 0    traMADoL (ULTRAM) 50 mg tablet Take 1 tablet (50 mg total) by mouth every 6 (six) hours as needed for Pain. 10 tablet 0    vitamin D 1000 units Tab Take 1,000 Units by mouth once daily.      [DISCONTINUED] doxazosin (CARDURA) 1 MG tablet Take 1 tablet (1 mg total) by mouth once daily. 90 tablet 3    pantoprazole (PROTONIX) 40 MG tablet Take 1 tablet (40 mg total) by mouth once daily. 30 tablet 11    [] sodium chloride 1,000 mg TbSO oral tablet Take 1 tablet (1,000 mg total) by mouth 3 (three) times daily. for 5 days 15 tablet 0     No current facility-administered medications on file prior to visit.        Objective:     Vitals:    24 1358   BP: 97/61   Pulse: 98   Resp: 16   Weight: 71.1 kg (156 lb 12 oz)      BMI Readings from Last 1 Encounters:   24 22.49 kg/m²          Physical Exam  No acute distress alert and oriented   Respirations even unlabored   Abdomen is thin  Digital rectal exam reveals a 25-30 g prostate      Lab Results   Component Value Date    CREATININE 1.0 2024      Assessment:       1. Benign prostatic hyperplasia with weak urinary stream    2. S/P CABG x 4    3. Urinary retention        Plan:     1. Benign prostatic hyperplasia with weak urinary stream    2. S/P CABG x 4    3. Urinary retention       Orders Placed This Encounter    PROSTATE SPECIFIC ANTIGEN, DIAGNOSTIC    doxazosin (CARDURA) 4 MG tablet      BPH with a history of urinary retention postoperatively.  I suspect he has mild BPH.  I recommend starting doxazosin and titrating up to 4 mg.  He is currently on 1 mg.  I have reviewed his blood pressures.  We will check a baseline  PSA and see him back in a few months to see how he was doing.  I have warned him of lightheadedness or syncope.  If he develops either of these he will discontinue doxazosin.

## 2024-05-21 ENCOUNTER — OFFICE VISIT (OUTPATIENT)
Dept: CARDIOLOGY | Facility: CLINIC | Age: 74
End: 2024-05-21
Payer: MEDICARE

## 2024-05-21 ENCOUNTER — HOSPITAL ENCOUNTER (OUTPATIENT)
Dept: CARDIOLOGY | Facility: HOSPITAL | Age: 74
Discharge: HOME | End: 2024-05-21
Attending: INTERNAL MEDICINE
Payer: MEDICARE

## 2024-05-21 VITALS
DIASTOLIC BLOOD PRESSURE: 58 MMHG | OXYGEN SATURATION: 97 % | BODY MASS INDEX: 20.87 KG/M2 | SYSTOLIC BLOOD PRESSURE: 95 MMHG | RESPIRATION RATE: 16 BRPM | HEART RATE: 88 BPM | WEIGHT: 145.75 LBS | HEIGHT: 70 IN

## 2024-05-21 DIAGNOSIS — E78.49 OTHER HYPERLIPIDEMIA: ICD-10-CM

## 2024-05-21 DIAGNOSIS — I44.0 FIRST DEGREE AV BLOCK: ICD-10-CM

## 2024-05-21 DIAGNOSIS — I10 ESSENTIAL HYPERTENSION: ICD-10-CM

## 2024-05-21 DIAGNOSIS — I25.118 CORONARY ARTERY DISEASE OF NATIVE ARTERY OF NATIVE HEART WITH STABLE ANGINA PECTORIS: ICD-10-CM

## 2024-05-21 DIAGNOSIS — Z95.1 HX OF CABG: Primary | ICD-10-CM

## 2024-05-21 DIAGNOSIS — R00.2 PALPITATIONS: ICD-10-CM

## 2024-05-21 DIAGNOSIS — E11.8 TYPE 2 DIABETES MELLITUS WITH COMPLICATION, WITHOUT LONG-TERM CURRENT USE OF INSULIN: ICD-10-CM

## 2024-05-21 DIAGNOSIS — Z87.891 EX-SMOKER: ICD-10-CM

## 2024-05-21 LAB
OHS QRS DURATION: 78 MS
OHS QTC CALCULATION: 437 MS

## 2024-05-21 PROCEDURE — 3288F FALL RISK ASSESSMENT DOCD: CPT | Mod: CPTII,S$GLB,, | Performed by: INTERNAL MEDICINE

## 2024-05-21 PROCEDURE — 1159F MED LIST DOCD IN RCRD: CPT | Mod: CPTII,S$GLB,, | Performed by: INTERNAL MEDICINE

## 2024-05-21 PROCEDURE — 93010 ELECTROCARDIOGRAM REPORT: CPT | Mod: ,,, | Performed by: INTERNAL MEDICINE

## 2024-05-21 PROCEDURE — 1111F DSCHRG MED/CURRENT MED MERGE: CPT | Mod: CPTII,S$GLB,, | Performed by: INTERNAL MEDICINE

## 2024-05-21 PROCEDURE — 99999 PR PBB SHADOW E&M-EST. PATIENT-LVL IV: CPT | Mod: PBBFAC,,, | Performed by: INTERNAL MEDICINE

## 2024-05-21 PROCEDURE — 3008F BODY MASS INDEX DOCD: CPT | Mod: CPTII,S$GLB,, | Performed by: INTERNAL MEDICINE

## 2024-05-21 PROCEDURE — 3074F SYST BP LT 130 MM HG: CPT | Mod: CPTII,S$GLB,, | Performed by: INTERNAL MEDICINE

## 2024-05-21 PROCEDURE — 3051F HG A1C>EQUAL 7.0%<8.0%: CPT | Mod: CPTII,S$GLB,, | Performed by: INTERNAL MEDICINE

## 2024-05-21 PROCEDURE — 99214 OFFICE O/P EST MOD 30 MIN: CPT | Mod: S$GLB,,, | Performed by: INTERNAL MEDICINE

## 2024-05-21 PROCEDURE — 3078F DIAST BP <80 MM HG: CPT | Mod: CPTII,S$GLB,, | Performed by: INTERNAL MEDICINE

## 2024-05-21 PROCEDURE — 4010F ACE/ARB THERAPY RXD/TAKEN: CPT | Mod: CPTII,S$GLB,, | Performed by: INTERNAL MEDICINE

## 2024-05-21 PROCEDURE — 1101F PT FALLS ASSESS-DOCD LE1/YR: CPT | Mod: CPTII,S$GLB,, | Performed by: INTERNAL MEDICINE

## 2024-05-21 PROCEDURE — 93005 ELECTROCARDIOGRAM TRACING: CPT

## 2024-05-21 NOTE — PROGRESS NOTES
Subjective:   Patient ID:  Feng Marques is a 73 y.o. male who presents for evaluation of No chief complaint on file.        HPI  5.21.2024   Comes in for a follow-up.  After last visit he underwent a coronary angiogram that showed triple-vessel disease.  He underwent CABG.    He has been discharged.  He is doing better.  He complains of palpitations at night.  He is currently taking doxazosin 1 mg for prostate hypertrophy and recent urinary retention.    Denies significant angina.    He has physical therapy coming home.    EKG today shows normal sinus rhythm.  He has a first-degree AV block with a RI segment of 270.Milliseconds     He has a stitch left to his right neck from the recent central line.  And a small thread left to his left groin  Access. Possibly thread still left underneath the skin possibly not cut properly at the time of removal.    Sternal wound  healing well.      4.9.2024  Comes in for a 1 year follow-up.    Continues to chew tobacco.    He has a intermittent chest pains.  Reports chest pain lately with exertion.  However only once every 1-2 weeks.  And subsided quickly with nitroglycerin.  His pain is 4 to 5/10 midsternal.  He also has some stigmata of esophageal spasm as he continues to chew tobacco.    His last stress test in October 2022 showed a TID of 1.3 however visually I did not appreciate any dilatation.  So we continued to treat him medically.          4.12.2023  Takes lipid panel with his pcp and states never had a lipid issue.  However his last LDL in 2019 was 94.    He takes pravastatin 3 times a week if he takes it.  Had a normal nuclear stress test TID ratio was 1.32 on visual like there is no TID.    His echocardiogram was normal.    He states he never took the isosorbide.  He only took the PPI and he states that this has resolved all his symptoms.          11.2022  72-year-old male, smokeless tobacco user, diabetes, history of stented coronaries in 2012, no records, but  states that was in the main artery? LAD  Last seen Dr. Do in 2019 for chest pain as well.  There was a plan to get a nuclear stress test however not done for unclear reason.    He states that he has been having chest pain for the last few years but worse in the last few months.  Chest pains described as a right and left-sided chest pain sometime to his lower rib sometimes to the mid chest.  Some of it is feels sharp and sometimes feels like a pressure.  Exertional and nonexertional moderate in intensity.  He also states that sometimes he has feeling that his pain is in his stomach.    He denies heartburn.  But he does chew tobacco.    Not tender or reproducible on exam.  Past Medical History:   Diagnosis Date    Diabetes mellitus 2002     am 09/15/2017    DM (diabetes mellitus) 2002     am 09/21/2018    DM (diabetes mellitus) 2002     am 09/27/2019    DM (diabetes mellitus) 2002     am 09/10/2020    Hypertension        Past Surgical History:   Procedure Laterality Date    ANGIOGRAM, CORONARY, WITH LEFT HEART CATHETERIZATION N/A 5/3/2024    Procedure: Angiogram, Coronary, with Left Heart Cath;  Surgeon: Abdulaziz Carrasco MD;  Location: [facility] CATH LAB;  Service: Cardiology;  Laterality: N/A;    AORTOGRAPHY N/A 5/3/2024    Procedure: AORTOGRAM;  Surgeon: Abdulaziz Carrasco MD;  Location: [facility] CATH LAB;  Service: Cardiology;  Laterality: N/A;    ARTERIOGRAPHY OF SUBCLAVIAN ARTERY  5/3/2024    Procedure: ARTERIOGRAM, SUBCLAVIAN;  Surgeon: Abdulaziz Carrasco MD;  Location: [facility] CATH LAB;  Service: Cardiology;;    CORONARY ARTERY BYPASS GRAFT (CABG) N/A 5/6/2024    Procedure: CORONARY ARTERY BYPASS GRAFT (CABG);  Surgeon: Maury Amato MD;  Location: [facility] OR;  Service: Cardiothoracic;  Laterality: N/A;  4-VESSEL WITH EPI-AORTIC ULTRASOUND    ECHOCARDIOGRAM,TRANSESOPHAGEAL N/A 5/6/2024    Procedure: ECHOCARDIOGRAM,TRANSESOPHAGEAL;  Surgeon: Maury Amato MD;  Location: [facility] OR;  Service:  Cardiothoracic;  Laterality: N/A;    ENDOSCOPIC HARVEST OF VEIN Left 5/6/2024    Procedure: SURGICAL PROCUREMENT, VEIN, ENDOSCOPIC;  Surgeon: Maury Amato MD;  Location: [facility] OR;  Service: Cardiothoracic;  Laterality: Left;    INJECTION OF ANESTHETIC AGENT AROUND MULTIPLE INTERCOSTAL NERVES N/A 5/6/2024    Procedure: BLOCK, NERVE, INTERCOSTAL, 2 OR MORE;  Surgeon: Maury Amato MD;  Location: [facility] OR;  Service: Cardiothoracic;  Laterality: N/A;    VENTRICULOGRAM, LEFT  5/3/2024    Procedure: Ventriculogram, Left;  Surgeon: Abdulaziz Carrasco MD;  Location: [facility] CATH LAB;  Service: Cardiology;;       Social History     Tobacco Use    Smoking status: Never    Smokeless tobacco: Current     Types: Chew   Substance Use Topics    Alcohol use: No       Family History   Problem Relation Name Age of Onset    Diabetes Mother      Cataracts Mother      Diabetes Sister      Diabetes Sister      Diabetes Sister          borderline       Review of Systems   Cardiovascular:  Positive for chest pain. Negative for dyspnea on exertion, palpitations and syncope.   Genitourinary: Negative.    Neurological: Negative.        Current Outpatient Medications on File Prior to Visit   Medication Sig    ascorbic acid, vitamin C, (VITAMIN C) 500 MG tablet Take 1 tablet (500 mg total) by mouth 2 (two) times daily.    aspirin (ECOTRIN) 81 MG EC tablet Take 81 mg by mouth once daily.    blood sugar diagnostic Strp Use as directed up to 3 times daily    clopidogreL (PLAVIX) 75 mg tablet Take 1 tablet (75 mg total) by mouth once daily.    cyanocobalamin (VITAMIN B-12) 1000 MCG tablet Take 1 tablet (1,000 mcg total) by mouth once daily.    docusate sodium (COLACE) 100 MG capsule Take 1 capsule (100 mg total) by mouth 2 (two) times daily.    doxazosin (CARDURA) 4 MG tablet Take 1 tablet (4 mg total) by mouth once daily.    EScitalopram oxalate (LEXAPRO) 10 MG tablet Take 10 mg by mouth once daily.    ferrous fumarate 324 mg (106 mg iron) Tab  "Take by mouth.    folic acid (FOLVITE) 1 MG tablet Take 1 tablet (1 mg total) by mouth once daily.    glimepiride (AMARYL) 2 MG tablet     metoprolol tartrate (LOPRESSOR) 25 MG tablet Take 0.5 tablets (12.5 mg total) by mouth 2 (two) times daily.    polyethylene glycol (GLYCOLAX) 17 gram/dose powder Take 17 g by mouth once daily.    pravastatin (PRAVACHOL) 10 MG tablet     rivaroxaban (XARELTO) 2.5 mg Tab Take 1 tablet (2.5 mg total) by mouth 2 (two) times daily with meals.    traMADoL (ULTRAM) 50 mg tablet Take 1 tablet (50 mg total) by mouth every 6 (six) hours as needed for Pain.    vitamin D 1000 units Tab Take 1,000 Units by mouth once daily.    pantoprazole (PROTONIX) 40 MG tablet Take 1 tablet (40 mg total) by mouth once daily.     No current facility-administered medications on file prior to visit.       Objective:   Objective:  Wt Readings from Last 3 Encounters:   05/21/24 66.1 kg (145 lb 11.6 oz)   05/20/24 71.1 kg (156 lb 12 oz)   05/14/24 71.1 kg (156 lb 12 oz)     Temp Readings from Last 3 Encounters:   05/14/24 98 °F (36.7 °C) (Oral)     BP Readings from Last 3 Encounters:   05/21/24 (!) 95/58   05/20/24 97/61   05/14/24 113/63     Pulse Readings from Last 3 Encounters:   05/21/24 88   05/20/24 98   05/14/24 89       Physical Exam  Vitals reviewed.   Constitutional:       Appearance: He is well-developed.   Neck:      Vascular: No carotid bruit.   Cardiovascular:      Rate and Rhythm: Normal rate and regular rhythm.      Pulses: Intact distal pulses.      Heart sounds: Normal heart sounds. No murmur heard.  Pulmonary:      Breath sounds: Normal breath sounds.   Neurological:      Mental Status: He is oriented to person, place, and time.         No results found for: "CHOL"  No results found for: "HDL"  No results found for: "LDLCALC"  No results found for: "TRIG"  No results found for: "CHOLHDL"    Chemistry        Component Value Date/Time     (L) 05/14/2024 0519    K 5.1 05/14/2024 0519     " "102 05/14/2024 0519    CO2 22 (L) 05/14/2024 0519    BUN 15 05/14/2024 0519    CREATININE 1.0 05/14/2024 0519     (H) 05/14/2024 0519        Component Value Date/Time    CALCIUM 8.3 (L) 05/14/2024 0519    ALKPHOS 68 05/14/2024 0519    AST 41 (H) 05/14/2024 0519     (H) 05/14/2024 0519    BILITOT 0.7 05/14/2024 0519          No results found for: "TSH"  Lab Results   Component Value Date    INR 1.2 05/07/2024    INR 1.3 (H) 05/06/2024    INR 0.9 05/06/2024     Lab Results   Component Value Date    WBC 8.29 05/14/2024    HGB 10.8 (L) 05/14/2024    HCT 33.0 (L) 05/14/2024    MCV 92 05/14/2024     05/14/2024     BNP  @LABRCNTIP(BNP,BNPTRIAGEBLO)@  CrCl cannot be calculated (Patient's most recent lab result is older than the maximum 7 days allowed.).     Imaging:  ======  No results found for this or any previous visit.    No results found for this or any previous visit.    No results found for this or any previous visit.    No results found for this or any previous visit.    No results found for this or any previous visit.    No valid procedures specified.    Diagnostic Results:  ECG: Reviewed  Vent. Rate : 068 BPM     Atrial Rate : 068 BPM      P-R Int : 266 ms          QRS Dur : 078 ms       QT Int : 396 ms       P-R-T Axes : 077 -05 018 degrees      QTc Int : 421 ms     Sinus rhythm with 1st degree A-V block   Otherwise normal ECG   When compared with ECG of 13-JUN-2019 15:40,   No significant change was found       The ASCVD Risk score (Ina DK, et al., 2019) failed to calculate for the following reasons:    Cannot find a previous HDL lab    Cannot find a previous total cholesterol lab    Assessment and Plan:   Hx of CABG  -     Ambulatory referral/consult to Cardiac Rehab; Future; Expected date: 05/28/2024    Palpitations  -     Cardiac Monitor - 3-15 Day Adult (Cupid Only); Future    Essential hypertension    First degree AV block    Coronary artery disease of native artery of native heart " with stable angina pectoris    Ex-smoker    Type 2 diabetes mellitus with complication, without long-term current use of insulin    Other hyperlipidemia            Not on beta-blocker or ccb due to first-degree AV block.  Encouraged to maintain abstinence from tobacco chewing.  Reviewed all tests and above medical conditions with patient in detail and formulated treatment plan.  Risk factor modification discussed.   Cardiac low salt diet discussed.    Currently on doxazosin possible reflex tachycardia.  Recommended to follow with urology and possibly switch to Flomax.    Follow up in 6 months  or sooner as needed

## 2024-05-28 ENCOUNTER — HOSPITAL ENCOUNTER (OUTPATIENT)
Dept: CARDIOLOGY | Facility: HOSPITAL | Age: 74
Discharge: HOME | End: 2024-05-28
Attending: INTERNAL MEDICINE
Payer: MEDICARE

## 2024-05-28 DIAGNOSIS — R00.2 PALPITATIONS: ICD-10-CM

## 2024-05-28 PROCEDURE — 93244 EXT ECG>48HR<7D REV&INTERPJ: CPT | Mod: ,,, | Performed by: INTERNAL MEDICINE

## 2024-06-05 ENCOUNTER — OFFICE VISIT (OUTPATIENT)
Dept: CARDIOTHORACIC SURGERY | Facility: CLINIC | Age: 74
End: 2024-06-05
Payer: MEDICARE

## 2024-06-05 VITALS
DIASTOLIC BLOOD PRESSURE: 58 MMHG | OXYGEN SATURATION: 99 % | HEART RATE: 64 BPM | BODY MASS INDEX: 19.83 KG/M2 | SYSTOLIC BLOOD PRESSURE: 116 MMHG | WEIGHT: 138.25 LBS

## 2024-06-05 DIAGNOSIS — Z95.1 S/P CABG X 4: Primary | ICD-10-CM

## 2024-06-05 PROCEDURE — 1159F MED LIST DOCD IN RCRD: CPT | Mod: CPTII,S$GLB,, | Performed by: THORACIC SURGERY (CARDIOTHORACIC VASCULAR SURGERY)

## 2024-06-05 PROCEDURE — 99024 POSTOP FOLLOW-UP VISIT: CPT | Mod: S$GLB,,, | Performed by: THORACIC SURGERY (CARDIOTHORACIC VASCULAR SURGERY)

## 2024-06-05 PROCEDURE — 1160F RVW MEDS BY RX/DR IN RCRD: CPT | Mod: CPTII,S$GLB,, | Performed by: THORACIC SURGERY (CARDIOTHORACIC VASCULAR SURGERY)

## 2024-06-05 PROCEDURE — 1101F PT FALLS ASSESS-DOCD LE1/YR: CPT | Mod: CPTII,S$GLB,, | Performed by: THORACIC SURGERY (CARDIOTHORACIC VASCULAR SURGERY)

## 2024-06-05 PROCEDURE — 99999 PR PBB SHADOW E&M-EST. PATIENT-LVL III: CPT | Mod: PBBFAC,,, | Performed by: THORACIC SURGERY (CARDIOTHORACIC VASCULAR SURGERY)

## 2024-06-05 PROCEDURE — 3078F DIAST BP <80 MM HG: CPT | Mod: CPTII,S$GLB,, | Performed by: THORACIC SURGERY (CARDIOTHORACIC VASCULAR SURGERY)

## 2024-06-05 PROCEDURE — 3074F SYST BP LT 130 MM HG: CPT | Mod: CPTII,S$GLB,, | Performed by: THORACIC SURGERY (CARDIOTHORACIC VASCULAR SURGERY)

## 2024-06-05 PROCEDURE — 1126F AMNT PAIN NOTED NONE PRSNT: CPT | Mod: CPTII,S$GLB,, | Performed by: THORACIC SURGERY (CARDIOTHORACIC VASCULAR SURGERY)

## 2024-06-05 PROCEDURE — 4010F ACE/ARB THERAPY RXD/TAKEN: CPT | Mod: CPTII,S$GLB,, | Performed by: THORACIC SURGERY (CARDIOTHORACIC VASCULAR SURGERY)

## 2024-06-05 PROCEDURE — 3288F FALL RISK ASSESSMENT DOCD: CPT | Mod: CPTII,S$GLB,, | Performed by: THORACIC SURGERY (CARDIOTHORACIC VASCULAR SURGERY)

## 2024-06-05 PROCEDURE — 3051F HG A1C>EQUAL 7.0%<8.0%: CPT | Mod: CPTII,S$GLB,, | Performed by: THORACIC SURGERY (CARDIOTHORACIC VASCULAR SURGERY)

## 2024-06-05 NOTE — PROGRESS NOTES
Subjective:      Patient ID: Feng Marques is a 73 y.o. male.    Chief Complaint: No chief complaint on file.    HPI:   The patient is status post coronary artery bypass grafting x4.  The patient has no complaints.  The patient is able to perform activities of daily living.  The patient is able to ambulate up to half a mi without any problems.  The patient denies any orthopnea or PND.  Patient denies any shortness of breath.  Patient denies any ankle swelling.  Review of patient's allergies indicates:   Allergen Reactions    Codeine Nausea Only            Objective:   BP (!) 116/58 (BP Location: Left arm, Patient Position: Sitting, BP Method: Small (Manual))   Pulse 64   Wt 62.7 kg (138 lb 3.7 oz)   SpO2 99%   BMI 19.83 kg/m²     X-Ray Chest AP Portable  Narrative: EXAMINATION:  XR CHEST AP PORTABLE    CLINICAL HISTORY:  Post-op;    TECHNIQUE:  Single frontal view of the chest was performed.    COMPARISON:  None    FINDINGS:  The lungs are clear, with normal appearance of pulmonary vasculature and no pleural effusion or pneumothorax.    The cardiac silhouette is normal in size. The hilar and mediastinal contours are unremarkable.    Right central venous catheter tip over the cavoatrial junction.  Removal of mediastinal drainage catheters.  Impression: No acute abnormality.    Electronically signed by: Pantera Butler  Date:    05/08/2024  Time:    06:54         Physical Exam  Constitutional:       Appearance: Normal appearance.   HENT:      Head: Normocephalic and atraumatic.      Nose: Nose normal.   Cardiovascular:      Rate and Rhythm: Normal rate and regular rhythm.      Heart sounds: Normal heart sounds.   Pulmonary:      Breath sounds: Normal breath sounds.   Abdominal:      General: Abdomen is flat. Bowel sounds are normal.      Palpations: Abdomen is soft.   Musculoskeletal:      Right lower leg: No edema.      Left lower leg: No edema.   Skin:     General: Skin is warm and dry.   Neurological:       Mental Status: He is alert and oriented to person, place, and time.   Psychiatric:         Behavior: Behavior normal.         Assessment:     1. S/P CABG x 4          Plan   The patient is status post coronary artery bypass grafting x4.  Overall the patient is doing well.  Incisions are well healed.   Continue aspirin and clopidogrel  Patient is about to complete a 30 day regimen of low-dose rivaroxaban.  Will discontinue rivaroxaban after his last dose.  Continue pravastatin.  Patient was seen by Urology with prescription of doxazosin.  However patient is not taking doxazosin due to reflex tachycardia.  Patient will contact urology for reassessment.  Return to clinic in 2 weeks.        Mar[facility]sc[facility]

## 2024-06-12 LAB
OHS CV HOLTER SINUS AVERAGE HR: 66
OHS CV HOLTER SINUS MAX HR: 105
OHS CV HOLTER SINUS MIN HR: 48

## 2024-06-19 ENCOUNTER — OFFICE VISIT (OUTPATIENT)
Dept: CARDIOTHORACIC SURGERY | Facility: CLINIC | Age: 74
End: 2024-06-19
Payer: MEDICARE

## 2024-06-19 VITALS
HEIGHT: 70 IN | HEART RATE: 72 BPM | RESPIRATION RATE: 16 BRPM | SYSTOLIC BLOOD PRESSURE: 129 MMHG | BODY MASS INDEX: 19.93 KG/M2 | OXYGEN SATURATION: 95 % | WEIGHT: 139.25 LBS | DIASTOLIC BLOOD PRESSURE: 78 MMHG

## 2024-06-19 DIAGNOSIS — Z95.1 S/P CABG X 4: Primary | ICD-10-CM

## 2024-06-19 PROCEDURE — 99999 PR PBB SHADOW E&M-EST. PATIENT-LVL IV: CPT | Mod: PBBFAC,,, | Performed by: THORACIC SURGERY (CARDIOTHORACIC VASCULAR SURGERY)

## 2024-06-19 NOTE — PROGRESS NOTES
"Subjective:      Patient ID: Feng Marques is a 73 y.o. male.    Chief Complaint: No chief complaint on file.    HPI:   The patient is a 73-year-old male who is status post coronary artery bypass grafting x4.  The patient has no complaints.  His exercise tolerance is increasing.  He denies any orthopnea or PND.  Denies any ankle swelling.  Review of patient's allergies indicates:   Allergen Reactions    Codeine Nausea Only            Objective:   /78   Pulse 72   Resp 16   Ht 5' 10" (1.778 m)   Wt 63.2 kg (139 lb 3.5 oz)   SpO2 95%   BMI 19.98 kg/m²     Cardiac Monitor - 3-15 Day Adult (Cupid Only)    The predominant rhythm is sinus.    2nd Degree, Mobitz I: 1.32 %]    Attached at the bottom of this summary is the study interpretation as   provided by the ambulatory cardiac telemetry service provider ([facility]).   The actual submitted strips are posted under the media tab in the   patient's chart. I reviewed them in detail and I agree with the findings   as listed with salient findings, personal comments and edits as listed   below:           Study duration:  7 days of recording.  Seven days of  useful data   (the balance could not be analyzed due to artifacts, etc).           Baseline rhythm is sinus            Average diurnal HR is @ 70            Average nocturnal HR is @ 65            Average post awakening HR is @ 75 to 80            No patient triggers were attached to patient's fastest recorded   sinus rate (105 bpm).           SDNN is 121  - c/w preserved vagal tone for age         Overall, autonomic data analysis is c/w controlled adrenergic tone   and preserved vagal tone.            AF was not detected          No -non sinus- SVT of significance was noted.         No VT of significance was noted.           Long pauses were not detected.            No patient entries submitted.      Service provider quantitative analysis and interpretation:  The patient was monitored for a total " of 6d 23h, underlying rhythm is   Sinus.  The minimum heart rate was 48 bpm; the maximum 105 bpm; the average 66   bpm.  0 % of Atrial fibrillation/Atrial flutter with longest episode of 0 ms.  The total burden of AV Block present was 1.32 % [Complete Heart Block: 0   %; Advanced (High  Grade):  0 %; 2nd Degree, Mobitz II: 0 %; 2nd Degree, Mobitz I: 1.32 %].  There were 0 pauses, the longest pause was 0 ms at --.  Total count of Ventricular Tachycardia (VT): 1 episode(s). Longest VT: 3   beats on Day 5 / 03:13:09  am. Fastest VT: 106 bpm on Day 5 / 03:13:09 am.  0 supraventricular episodes were found. Longest SVT Episode 0 s, Fastest   SVT -- bpm  There were a total of 455 PVCs with 2 morphologies and 14 couplets.   Overall PVC [place] at 0.07 %  There were a total of 0 Other Beats. There were 0 total number of paced   beats.  There were a total of 1006 PSVCs with 1 morphologies and 0 couplets.   Overall PSVC [place] at  0.15 %  There is a total of 0 patient events.         Physical Exam  Constitutional:       Appearance: Normal appearance.   HENT:      Head: Normocephalic and atraumatic.      Nose: Nose normal.   Cardiovascular:      Rate and Rhythm: Normal rate and regular rhythm.      Heart sounds: Normal heart sounds.   Pulmonary:      Effort: Pulmonary effort is normal.      Breath sounds: Normal breath sounds.   Abdominal:      General: Abdomen is flat. Bowel sounds are normal.      Palpations: Abdomen is soft.   Musculoskeletal:      Right lower leg: No edema.      Left lower leg: No edema.   Skin:     General: Skin is warm and dry.   Neurological:      General: No focal deficit present.      Mental Status: He is alert and oriented to person, place, and time.   Psychiatric:         Behavior: Behavior normal.         Assessment:     1. S/P CABG x 4          Plan   The patient is status post coronary artery bypass grafting x4.  Overall the patient is doing well.    Continue aspirin and Plavix  Continue  metoprolol  Continue pravastatin.  Patient will be discharged from Cardioth[facility]acic surgery Clinic.  Patient may return as needed.      Mark Awolesi, [facility]

## 2024-12-03 ENCOUNTER — OFFICE VISIT (OUTPATIENT)
Dept: CARDIOLOGY | Facility: CLINIC | Age: 74
End: 2024-12-03
Payer: MEDICARE

## 2024-12-03 VITALS
SYSTOLIC BLOOD PRESSURE: 120 MMHG | BODY MASS INDEX: 21.59 KG/M2 | WEIGHT: 150.44 LBS | HEART RATE: 52 BPM | OXYGEN SATURATION: 100 % | DIASTOLIC BLOOD PRESSURE: 75 MMHG

## 2024-12-03 DIAGNOSIS — I25.110 CORONARY ARTERY DISEASE INVOLVING NATIVE CORONARY ARTERY OF NATIVE HEART WITH UNSTABLE ANGINA PECTORIS: ICD-10-CM

## 2024-12-03 DIAGNOSIS — Z95.1 S/P CABG X 4: ICD-10-CM

## 2024-12-03 DIAGNOSIS — I10 ESSENTIAL HYPERTENSION: ICD-10-CM

## 2024-12-03 DIAGNOSIS — Z95.1 HX OF CABG: Primary | ICD-10-CM

## 2024-12-03 DIAGNOSIS — E78.49 OTHER HYPERLIPIDEMIA: ICD-10-CM

## 2024-12-03 DIAGNOSIS — I44.0 FIRST DEGREE AV BLOCK: ICD-10-CM

## 2024-12-03 DIAGNOSIS — I65.23 BILATERAL CAROTID ARTERY STENOSIS: ICD-10-CM

## 2024-12-03 DIAGNOSIS — E11.8 TYPE 2 DIABETES MELLITUS WITH COMPLICATION, WITHOUT LONG-TERM CURRENT USE OF INSULIN: ICD-10-CM

## 2024-12-03 PROCEDURE — 3074F SYST BP LT 130 MM HG: CPT | Mod: CPTII,S$GLB,, | Performed by: INTERNAL MEDICINE

## 2024-12-03 PROCEDURE — 99214 OFFICE O/P EST MOD 30 MIN: CPT | Mod: S$GLB,,, | Performed by: INTERNAL MEDICINE

## 2024-12-03 PROCEDURE — 3008F BODY MASS INDEX DOCD: CPT | Mod: CPTII,S$GLB,, | Performed by: INTERNAL MEDICINE

## 2024-12-03 PROCEDURE — 3051F HG A1C>EQUAL 7.0%<8.0%: CPT | Mod: CPTII,S$GLB,, | Performed by: INTERNAL MEDICINE

## 2024-12-03 PROCEDURE — 3078F DIAST BP <80 MM HG: CPT | Mod: CPTII,S$GLB,, | Performed by: INTERNAL MEDICINE

## 2024-12-03 PROCEDURE — 4010F ACE/ARB THERAPY RXD/TAKEN: CPT | Mod: CPTII,S$GLB,, | Performed by: INTERNAL MEDICINE

## 2024-12-03 PROCEDURE — 1159F MED LIST DOCD IN RCRD: CPT | Mod: CPTII,S$GLB,, | Performed by: INTERNAL MEDICINE

## 2024-12-03 PROCEDURE — 3288F FALL RISK ASSESSMENT DOCD: CPT | Mod: CPTII,S$GLB,, | Performed by: INTERNAL MEDICINE

## 2024-12-03 PROCEDURE — 99999 PR PBB SHADOW E&M-EST. PATIENT-LVL III: CPT | Mod: PBBFAC,,, | Performed by: INTERNAL MEDICINE

## 2024-12-03 PROCEDURE — 1101F PT FALLS ASSESS-DOCD LE1/YR: CPT | Mod: CPTII,S$GLB,, | Performed by: INTERNAL MEDICINE

## 2024-12-03 PROCEDURE — 1126F AMNT PAIN NOTED NONE PRSNT: CPT | Mod: CPTII,S$GLB,, | Performed by: INTERNAL MEDICINE

## 2024-12-03 NOTE — PROGRESS NOTES
Subjective:   Patient ID:  Feng Marques is a 74 y.o. male who presents for evaluation of Follow-up        Follow-up  Associated symptoms include chest pain.   12.3.2024  Comes in for six-month follow-up   His 14 days cardiac monitor after last visit reveal 1.3% Mobitz 1.    Otherwise no long pauses.    On low dose metoprolol, doxazosin for BPH  Going for a tooth extraction , complains of easy bruising, uses aspirin to dissolve on top of his tooth     5.21.2024   Comes in for a follow-up.  After last visit he underwent a coronary angiogram that showed triple-vessel disease.  He underwent CABG.    He has been discharged.  He is doing better.  He complains of palpitations at night.  He is currently taking doxazosin 1 mg for prostate hypertrophy and recent urinary retention.    Denies significant angina.    He has physical therapy coming home.    EKG today shows normal sinus rhythm.  He has a first-degree AV block with a NV segment of 270.Milliseconds     He has a stitch left to his right neck from the recent central line.  And a small thread left to his left groin  Access. Possibly thread still left underneath the skin possibly not cut properly at the time of removal.    Sternal wound  healing well.      4.9.2024  Comes in for a 1 year follow-up.    Continues to chew tobacco.    He has a intermittent chest pains.  Reports chest pain lately with exertion.  However only once every 1-2 weeks.  And subsided quickly with nitroglycerin.  His pain is 4 to 5/10 midsternal.  He also has some stigmata of esophageal spasm as he continues to chew tobacco.    His last stress test in October 2022 showed a TID of 1.3 however visually I did not appreciate any dilatation.  So we continued to treat him medically.          4.12.2023  Takes lipid panel with his pcp and states never had a lipid issue.  However his last LDL in 2019 was 94.    He takes pravastatin 3 times a week if he takes it.  Had a normal nuclear stress test TID  ratio was 1.32 on visual like there is no TID.    His echocardiogram was normal.    He states he never took the isosorbide.  He only took the PPI and he states that this has resolved all his symptoms.          11.2022  72-year-old male, smokeless tobacco user, diabetes, history of stented coronaries in 2012, no records, but states that was in the main artery? LAD  Last seen Dr. Do in 2019 for chest pain as well.  There was a plan to get a nuclear stress test however not done for unclear reason.    He states that he has been having chest pain for the last few years but worse in the last few months.  Chest pains described as a right and left-sided chest pain sometime to his lower rib sometimes to the mid chest.  Some of it is feels sharp and sometimes feels like a pressure.  Exertional and nonexertional moderate in intensity.  He also states that sometimes he has feeling that his pain is in his stomach.    He denies heartburn.  But he does chew tobacco.    Not tender or reproducible on exam.  Past Medical History:   Diagnosis Date    Diabetes mellitus 2002     am 09/15/2017    DM (diabetes mellitus) 2002     am 09/21/2018    DM (diabetes mellitus) 2002     am 09/27/2019    DM (diabetes mellitus) 2002     am 09/10/2020    Hypertension        Past Surgical History:   Procedure Laterality Date    ANGIOGRAM, CORONARY, WITH LEFT HEART CATHETERIZATION N/A 5/3/2024    Procedure: Angiogram, Coronary, with Left Heart Cath;  Surgeon: Abdulaziz Carrasco MD;  Location: [facility] CATH LAB;  Service: Cardiology;  Laterality: N/A;    AORTOGRAPHY N/A 5/3/2024    Procedure: AORTOGRAM;  Surgeon: Abdulaziz Carrasco MD;  Location: [facility] CATH LAB;  Service: Cardiology;  Laterality: N/A;    ARTERIOGRAPHY OF SUBCLAVIAN ARTERY  5/3/2024    Procedure: ARTERIOGRAM, SUBCLAVIAN;  Surgeon: Abdulaziz Carrasco MD;  Location: [facility] CATH LAB;  Service: Cardiology;;    CORONARY ARTERY BYPASS GRAFT (CABG) N/A 5/6/2024    Procedure:  CORONARY ARTERY BYPASS GRAFT (CABG);  Surgeon: Maury Amato MD;  Location: [facility] OR;  Service: Cardiothoracic;  Laterality: N/A;  4-VESSEL WITH EPI-AORTIC ULTRASOUND    ECHOCARDIOGRAM,TRANSESOPHAGEAL N/A 5/6/2024    Procedure: ECHOCARDIOGRAM,TRANSESOPHAGEAL;  Surgeon: Maury Amato MD;  Location: [facility] OR;  Service: Cardiothoracic;  Laterality: N/A;    ENDOSCOPIC HARVEST OF VEIN Left 5/6/2024    Procedure: SURGICAL PROCUREMENT, VEIN, ENDOSCOPIC;  Surgeon: Maury Amato MD;  Location: [facility] OR;  Service: Cardiothoracic;  Laterality: Left;    INJECTION OF ANESTHETIC AGENT AROUND MULTIPLE INTERCOSTAL NERVES N/A 5/6/2024    Procedure: BLOCK, NERVE, INTERCOSTAL, 2 OR MORE;  Surgeon: Maury Amato MD;  Location: [facility] OR;  Service: Cardiothoracic;  Laterality: N/A;    VENTRICULOGRAM, LEFT  5/3/2024    Procedure: Ventriculogram, Left;  Surgeon: Abdulaziz Carrasco MD;  Location: [facility] CATH LAB;  Service: Cardiology;;       Social History     Tobacco Use    Smoking status: Never    Smokeless tobacco: Current     Types: Chew   Substance Use Topics    Alcohol use: No       Family History   Problem Relation Name Age of Onset    Diabetes Mother      Cataracts Mother      Diabetes Sister      Diabetes Sister      Diabetes Sister          borderline       Review of Systems   Cardiovascular:  Positive for chest pain. Negative for dyspnea on exertion, palpitations and syncope.   Genitourinary: Negative.    Neurological: Negative.        Current Outpatient Medications on File Prior to Visit   Medication Sig    blood sugar diagnostic Strp Use as directed up to 3 times daily    clopidogreL (PLAVIX) 75 mg tablet Take 1 tablet (75 mg total) by mouth once daily.    EScitalopram oxalate (LEXAPRO) 10 MG tablet Take 10 mg by mouth once daily.    ferrous fumarate 324 mg (106 mg iron) Tab Take by mouth.    glimepiride (AMARYL) 2 MG tablet     metoprolol tartrate (LOPRESSOR) 25 MG tablet Take 0.5 tablets (12.5 mg total) by mouth 2  "(two) times daily.    pravastatin (PRAVACHOL) 10 MG tablet     traMADoL (ULTRAM) 50 mg tablet Take 1 tablet (50 mg total) by mouth every 6 (six) hours as needed for Pain.    vitamin D 1000 units Tab Take 1,000 Units by mouth once daily.    [DISCONTINUED] aspirin (ECOTRIN) 81 MG EC tablet Take 81 mg by mouth once daily.    doxazosin (CARDURA) 4 MG tablet Take 1 tablet (4 mg total) by mouth once daily.    folic acid (FOLVITE) 1 MG tablet Take 1 tablet (1 mg total) by mouth once daily.    pantoprazole (PROTONIX) 40 MG tablet Take 1 tablet (40 mg total) by mouth once daily.    [DISCONTINUED] rivaroxaban (XARELTO) 2.5 mg Tab Take 1 tablet (2.5 mg total) by mouth 2 (two) times daily with meals.     No current facility-administered medications on file prior to visit.       Objective:   Objective:  Wt Readings from Last 3 Encounters:   12/03/24 68.2 kg (150 lb 7.4 oz)   06/19/24 63.2 kg (139 lb 3.5 oz)   06/05/24 62.7 kg (138 lb 3.7 oz)     Temp Readings from Last 3 Encounters:   05/14/24 98 °F (36.7 °C) (Oral)     BP Readings from Last 3 Encounters:   12/03/24 120/75   06/19/24 129/78   06/05/24 (!) 116/58     Pulse Readings from Last 3 Encounters:   12/03/24 (!) 52   06/19/24 72   06/05/24 64       Physical Exam  Vitals reviewed.   Constitutional:       Appearance: He is well-developed.   Neck:      Vascular: No carotid bruit.   Cardiovascular:      Rate and Rhythm: Normal rate and regular rhythm.      Pulses: Intact distal pulses.      Heart sounds: Normal heart sounds. No murmur heard.  Pulmonary:      Breath sounds: Normal breath sounds.   Neurological:      Mental Status: He is oriented to person, place, and time.         No results found for: "CHOL"  No results found for: "HDL"  No results found for: "LDLCALC"  No results found for: "TRIG"  No results found for: "CHOLHDL"    Chemistry        Component Value Date/Time     (L) 05/14/2024 0519    K 5.1 05/14/2024 0519     05/14/2024 0519    CO2 22 (L) " "05/14/2024 0519    BUN 15 05/14/2024 0519    CREATININE 1.0 05/14/2024 0519     (H) 05/14/2024 0519        Component Value Date/Time    CALCIUM 8.3 (L) 05/14/2024 0519    ALKPHOS 68 05/14/2024 0519    AST 41 (H) 05/14/2024 0519     (H) 05/14/2024 0519    BILITOT 0.7 05/14/2024 0519          No results found for: "TSH"  Lab Results   Component Value Date    INR 1.2 05/07/2024    INR 1.3 (H) 05/06/2024    INR 0.9 05/06/2024     Lab Results   Component Value Date    WBC 8.29 05/14/2024    HGB 10.8 (L) 05/14/2024    HCT 33.0 (L) 05/14/2024    MCV 92 05/14/2024     05/14/2024     BNP  @LABRCNTIP(BNP,BNPTRIAGEBLO)@  CrCl cannot be calculated (Patient's most recent lab result is older than the maximum 7 days allowed.).     Imaging:  ======  No results found for this or any previous visit.    No results found for this or any previous visit.    No results found for this or any previous visit.    No results found for this or any previous visit.    No results found for this or any previous visit.    No valid procedures specified.    Diagnostic Results:  ECG: Reviewed  Vent. Rate : 068 BPM     Atrial Rate : 068 BPM      P-R Int : 266 ms          QRS Dur : 078 ms       QT Int : 396 ms       P-R-T Axes : 077 -05 018 degrees      QTc Int : 421 ms     Sinus rhythm with 1st degree A-V block   Otherwise normal ECG   When compared with ECG of 13-JUN-2019 15:40,   No significant change was found     6.2024    Interpretation Summary  Show Result Comparison     The predominant rhythm is sinus.    2nd Degree, Mobitz I: 1.32 %]     Attached at the bottom of this summary is the study interpretation as provided by the ambulatory cardiac telemetry service provider ([facility] [employer]).   The actual submitted strips are posted under the media tab in the patient's chart. I reviewed them in detail and I agree with the findings as listed with salient findings, personal comments and edits as listed below:            Study duration: "  7 days of recording.  Seven days of  useful data (the balance could not be analyzed due to artifacts, etc).            Baseline rhythm is sinus            Average diurnal HR is @ 70            Average nocturnal HR is @ 65            Average post awakening HR is @ 75 to 80            No patient triggers were attached to patient's fastest recorded sinus rate (105 bpm).            SDNN is 121  - c/w preserved vagal tone for age         Overall, autonomic data analysis is c/w controlled adrenergic tone and preserved vagal tone.             AF was not detected          No -non sinus- SVT of significance was noted.         No VT of significance was noted.            Long pauses were not detected.             No patient entries submitted.      Impression:     1.  There are elevated velocities within the left greater than right distal common carotid arteries with findings suggestive of 50-69% stenosis.     2.  Negative for elevated velocities within the internal or external carotid arteries.     3.  Moderate to marked plaque formation involves the left greater than right distal common carotid arteries and carotid bulbs.     Stenosis of 50-69%-validated velocity measurements with angiographic measurements, velocity criteria are extrapolated from diameter data as defined by the Society of Radiologists in Ultrasound Consensus Conference Radiology 2003; 229;340-346.        Electronically signed by:Joao Martinez MD  Date:                                            05/04/2024  Time:                                           11:18  The ASCVD Risk score (Ina DK, et al., 2019) failed to calculate for the following reasons:    Cannot find a previous HDL lab    Cannot find a previous total cholesterol lab    Assessment and Plan:   Hx of CABG    Essential hypertension    First degree AV block    Coronary artery disease involving native coronary artery of native heart with unstable angina pectoris    Other hyperlipidemia    Type 2  diabetes mellitus with complication, without long-term current use of insulin          Bb restarted by CT surgery, controlling palpitations   Reviewed cardiac monitor   Encouraged to maintain abstinence from tobacco chewing.  Reviewed all tests and above medical conditions with patient in detail and formulated treatment plan.  Risk factor modification discussed.   Cardiac low salt diet discussed  Plavix monotherapy, ok to hold prior to his tooth extraction for 3-5 days   Bilateral carotid disease, cw plavix and statin, repeath US next visit    Follow up in 6 months

## 2025-06-10 ENCOUNTER — OFFICE VISIT (OUTPATIENT)
Dept: CARDIOLOGY | Facility: CLINIC | Age: 75
End: 2025-06-10
Payer: MEDICARE

## 2025-06-10 VITALS
SYSTOLIC BLOOD PRESSURE: 154 MMHG | OXYGEN SATURATION: 98 % | HEART RATE: 66 BPM | WEIGHT: 154.56 LBS | BODY MASS INDEX: 22.17 KG/M2 | DIASTOLIC BLOOD PRESSURE: 80 MMHG

## 2025-06-10 DIAGNOSIS — I65.23 BILATERAL CAROTID ARTERY STENOSIS: ICD-10-CM

## 2025-06-10 DIAGNOSIS — I10 ESSENTIAL HYPERTENSION: ICD-10-CM

## 2025-06-10 DIAGNOSIS — Z87.891 EX-SMOKER: ICD-10-CM

## 2025-06-10 DIAGNOSIS — E11.8 TYPE 2 DIABETES MELLITUS WITH COMPLICATION, WITHOUT LONG-TERM CURRENT USE OF INSULIN: ICD-10-CM

## 2025-06-10 DIAGNOSIS — Z95.1 HX OF CABG: Primary | ICD-10-CM

## 2025-06-10 DIAGNOSIS — Z95.1 S/P CABG X 4: ICD-10-CM

## 2025-06-10 DIAGNOSIS — Z72.0 TOBACCO CHEW USE: ICD-10-CM

## 2025-06-10 DIAGNOSIS — Z95.5 STENTED CORONARY ARTERY: ICD-10-CM

## 2025-06-10 DIAGNOSIS — I44.0 FIRST DEGREE AV BLOCK: ICD-10-CM

## 2025-06-10 PROCEDURE — 99999 PR PBB SHADOW E&M-EST. PATIENT-LVL III: CPT | Mod: PBBFAC,,, | Performed by: INTERNAL MEDICINE

## 2025-06-10 RX ORDER — ASPIRIN 81 MG/1
81 TABLET ORAL DAILY
COMMUNITY
Start: 2025-06-10 | End: 2026-06-10

## 2025-06-10 RX ORDER — AMLODIPINE BESYLATE 5 MG/1
5 TABLET ORAL NIGHTLY
Qty: 30 TABLET | Refills: 11 | Status: SHIPPED | OUTPATIENT
Start: 2025-06-10 | End: 2026-06-10

## 2025-06-10 NOTE — PROGRESS NOTES
Subjective:   Patient ID:  06/10/2025      Feng Marques is a 74 y.o. male who presents for evaluation of Follow-up and Hypertension      History of Present Illness    CHIEF COMPLAINT:  Patient presents today for follow up    CARDIOVASCULAR:  He underwent CABG 1 year ago. He reports hypertension in the mornings that decreases in the evening. He takes BP medication in the morning.    EASY BRUISING:  He reports increased bruising tendency, noting that minimal trauma such as carrying grocery bags can now cause bruising whereas previously it required significantly more force.    DIABETES:  His A1C is 11, with glucose elevation occurring following CABG.    MEDICATIONS:  He currently takes Clopidogrel after discontinuing ASA 81 mg due to easy bruising. He previously took Xarelto for post-cardiac surgery prophylaxis which has been discontinued. He also takes Pravastatin and Linagliptin for diabetes management.    SOCIAL HISTORY:  He quit tobacco use following CABG but has since resumed smoking. He acknowledges the need to reduce consumption.         HPI    Past Medical History:   Diagnosis Date    Diabetes mellitus 2002     am 09/15/2017    DM (diabetes mellitus) 2002     am 09/21/2018    DM (diabetes mellitus) 2002     am 09/27/2019    DM (diabetes mellitus) 2002     am 09/10/2020    Hypertension        Past Surgical History:   Procedure Laterality Date    ANGIOGRAM, CORONARY, WITH LEFT HEART CATHETERIZATION N/A 5/3/2024    Procedure: Angiogram, Coronary, with Left Heart Cath;  Surgeon: Abdulaziz Carrasco MD;  Location: [facility] CATH LAB;  Service: Cardiology;  Laterality: N/A;    AORTOGRAPHY N/A 5/3/2024    Procedure: AORTOGRAM;  Surgeon: Abdulaziz Carrasco MD;  Location: [facility] CATH LAB;  Service: Cardiology;  Laterality: N/A;    ARTERIOGRAPHY OF SUBCLAVIAN ARTERY  5/3/2024    Procedure: ARTERIOGRAM, SUBCLAVIAN;  Surgeon: Abdulaziz Carrasco MD;  Location: [facility] CATH LAB;  Service: Cardiology;;     CORONARY ARTERY BYPASS GRAFT (CABG) N/A 5/6/2024    Procedure: CORONARY ARTERY BYPASS GRAFT (CABG);  Surgeon: Maury Amato MD;  Location: [facility] OR;  Service: Cardiothoracic;  Laterality: N/A;  4-VESSEL WITH EPI-AORTIC ULTRASOUND    ECHOCARDIOGRAM,TRANSESOPHAGEAL N/A 5/6/2024    Procedure: ECHOCARDIOGRAM,TRANSESOPHAGEAL;  Surgeon: Maury Amato MD;  Location: [facility] OR;  Service: Cardiothoracic;  Laterality: N/A;    ENDOSCOPIC HARVEST OF VEIN Left 5/6/2024    Procedure: SURGICAL PROCUREMENT, VEIN, ENDOSCOPIC;  Surgeon: Maury Amato MD;  Location: [facility] OR;  Service: Cardiothoracic;  Laterality: Left;    INJECTION OF ANESTHETIC AGENT AROUND MULTIPLE INTERCOSTAL NERVES N/A 5/6/2024    Procedure: BLOCK, NERVE, INTERCOSTAL, 2 OR MORE;  Surgeon: Maury Amato MD;  Location: [facility] OR;  Service: Cardiothoracic;  Laterality: N/A;    VENTRICULOGRAM, LEFT  5/3/2024    Procedure: Ventriculogram, Left;  Surgeon: Abdulaziz Carrasco MD;  Location: [facility] CATH LAB;  Service: Cardiology;;       Social History[1]    Family History   Problem Relation Name Age of Onset    Diabetes Mother      Cataracts Mother      Diabetes Sister      Diabetes Sister      Diabetes Sister          borderline         ROS    Current Outpatient Medications on File Prior to Visit   Medication Sig    blood sugar diagnostic Strp Use as directed up to 3 times daily    doxazosin (CARDURA) 4 MG tablet Take 1 tablet (4 mg total) by mouth once daily.    EScitalopram oxalate (LEXAPRO) 10 MG tablet Take 10 mg by mouth once daily.    ferrous fumarate 324 mg (106 mg iron) Tab Take by mouth.    folic acid (FOLVITE) 1 MG tablet Take 1 tablet (1 mg total) by mouth once daily.    glimepiride (AMARYL) 2 MG tablet     pravastatin (PRAVACHOL) 10 MG tablet     traMADoL (ULTRAM) 50 mg tablet Take 1 tablet (50 mg total) by mouth every 6 (six) hours as needed for Pain.    vitamin D 1000 units Tab Take 1,000 Units by mouth once daily.    [DISCONTINUED] clopidogreL  "(PLAVIX) 75 mg tablet Take 1 tablet (75 mg total) by mouth once daily.    pantoprazole (PROTONIX) 40 MG tablet Take 1 tablet (40 mg total) by mouth once daily. (Patient not taking: Reported on 6/10/2025)     No current facility-administered medications on file prior to visit.       Objective:   Objective:  Wt Readings from Last 3 Encounters:   06/10/25 70.1 kg (154 lb 8.7 oz)   12/03/24 68.2 kg (150 lb 7.4 oz)   06/19/24 63.2 kg (139 lb 3.5 oz)     Temp Readings from Last 3 Encounters:   05/14/24 98 °F (36.7 °C) (Oral)     BP Readings from Last 3 Encounters:   06/10/25 (!) 154/80   12/03/24 120/75   06/19/24 129/78     Pulse Readings from Last 3 Encounters:   06/10/25 66   12/03/24 (!) 52   06/19/24 72       Physical Exam      No results found for: "CHOL"  No results found for: "LDLCALC"      Chemistry        Component Value Date/Time     (L) 05/14/2024 0519    K 5.1 05/14/2024 0519     05/14/2024 0519    CO2 22 (L) 05/14/2024 0519    BUN 15 05/14/2024 0519    CREATININE 1.0 05/14/2024 0519     (H) 05/14/2024 0519        Component Value Date/Time    CALCIUM 8.3 (L) 05/14/2024 0519    ALKPHOS 68 05/14/2024 0519    AST 41 (H) 05/14/2024 0519     (H) 05/14/2024 0519    BILITOT 0.7 05/14/2024 0519          No results found for: "TSH"    Lab Results   Component Value Date    WBC 8.29 05/14/2024    HGB 10.8 (L) 05/14/2024    HCT 33.0 (L) 05/14/2024    MCV 92 05/14/2024     05/14/2024       @LABRCNTIP(BNP,BNPTRIAGEBLO)@       Imaging:  ======    No results found for this or any previous visit.    Results for orders placed during the hospital encounter of 05/03/24    US Carotid Bilateral    Narrative  EXAMINATION:  US CAROTID BILATERAL    CLINICAL HISTORY:  Pre Op; bruit    COMPARISON:  No comparison studies are available.    FINDINGS:  Antegrade flow is seen in all vessels imaged. The gray scale images fail to demonstrate areas of significant stenosis or occlusion.  Moderate hard and soft " plaque formation involves the left greater than right distal common carotid artery and carotid bulb region.    The flow velocities are as follows:    Right  cm/sec    Right ICA 94 cm/sec    Right ECA 83 cm/sec    Right ICA to CCA ratio 0.7    Left  cm/sec    Left ICA 97 cm/sec    Left ECA 94 cm/sec    Left ICA to CCA ratio 0.5    Impression  1.  There are elevated velocities within the left greater than right distal common carotid arteries with findings suggestive of 50-69% stenosis.    2.  Negative for elevated velocities within the internal or external carotid arteries.    3.  Moderate to marked plaque formation involves the left greater than right distal common carotid arteries and carotid bulbs.    Stenosis of 50-69%-validated velocity measurements with angiographic measurements, velocity criteria are extrapolated from diameter data as defined by the Society of Radiologists in Ultrasound Consensus Conference Radiology 2003; 229;340-346.      Electronically signed by: Joao Martinez MD  Date:    05/04/2024  Time:    11:18    No results found for this or any previous visit.      No results found for this or any previous visit.      No valid procedures specified.        Results for orders placed during the hospital encounter of 04/18/24    Nuclear Stress - Cardiology Interpreted    Interpretation Summary    There are two significant perfusion abnormalities.    Perfusion Abnormality #1 - There is a moderate to severe intensity, fixed perfusion abnormality consistent with scar in the mid to apical anteroapical wall(s).    Perfusion Abnormality #2 - There is a moderate intensity, fixed perfusion abnormality consistent with scar in the basal to distal inferolateral wall(s).    The gated perfusion images showed an ejection fraction of 62% at rest. The gated perfusion images showed an ejection fraction of 54% post stress. Normal ejection fraction is greater than 59%.    There is moderate global hypokinesis  at rest and stress.    LV cavity size is mildly enlarged at rest and mildly enlarged at stress.    The ECG portion of the study is negative for ischemia.      Results for orders placed during the hospital encounter of 05/03/24    Echo    Interpretation Summary    Left Ventricle: The left ventricle is normal in size. Normal wall thickness. There is concentric hypertrophy. Regional wall motion abnormalities present. See diagram for wall motion findings. There is normal systolic function with a visually estimated ejection fraction of 55 - 70%. Ejection fraction by visual approximation is 60%.    Right Ventricle: Normal right ventricular cavity size. Wall thickness is normal. Systolic function is normal.    Mitral Valve: There is mild regurgitation.    IVC/SVC: Normal venous pressure at 3 mmHg.      Results for orders placed during the hospital encounter of 05/03/24    Cardiac catheterization    Conclusion    The Prox RCA lesion was 50% stenosed.    The Dist RCA lesion was 70% stenosed.    The RPDA lesion was 80% stenosed.    The Ost LAD to Prox LAD lesion was 90% stenosed.    The Mid LAD lesion was 99% stenosed.    The Ost Cx to Prox Cx lesion was 95% stenosed.    The Lat 1st Mrg lesion was 75% stenosed.    The ejection fraction was calculated to be 70%.    The pre-procedure left ventricular end diastolic pressure was 12.    The post-procedure left ventricular end diastolic pressure was 14.    The estimated blood loss was none.    There was three vessel coronary artery disease.    The filling pressures on the left were normal.    There was no mitral valve regurgitation.    CT surgery evaluation, discussed with Dr Amato    The procedure log was documented by Documenter: Lucero Almaraz RN and verified by Abdulaziz Carrasco MD.    Date: 5/3/2024  Time: 10:48 AM      Lab Results   Component Value Date    HGBA1C 7.0 (H) 05/06/2024         The ASCVD Risk score (Ina DUPONT, et al., 2019) failed to calculate for the following  reasons:    Cannot find a previous HDL lab    Cannot find a previous total cholesterol lab    Diagnostic Results:  ECG: Reviewed    Assessment and Plan:   Hx of CABG    Essential hypertension  -     amLODIPine (NORVASC) 5 MG tablet; Take 1 tablet (5 mg total) by mouth every evening.  Dispense: 30 tablet; Refill: 11    First degree AV block    S/P CABG x 4  -     aspirin (ECOTRIN) 81 MG EC tablet; Take 1 tablet (81 mg total) by mouth once daily.    Type 2 diabetes mellitus with complication, without long-term current use of insulin    Bilateral carotid artery stenosis  -     CV Ultrasound Bilateral Doppler Carotid; Future    Ex-smoker    Stented coronary artery    Tobacco chew use        Assessment & Plan    E11.8 Type 2 diabetes mellitus with complication, without long-term current use of insulin  Z95.1 Hx of CABG  I10 Essential hypertension  I44.0 First degree AV block  I65.23 Bilateral carotid artery stenosis  Z87.891 Ex-smoker  Z95.5 Stented coronary artery  Z72.0 Tobacco chew use    Carotid duplex results show 50-60% stenosis bilaterally, requiring annual monitoring.    E11.8 TYPE 2 DIABETES MELLITUS WITH COMPLICATION, WITHOUT LONG-TERM CURRENT USE OF INSULIN:  - Recommend increasing exercise, specifically walking in a safe environment.    I10 ESSENTIAL HYPERTENSION:  - Added evening dose of Amlodipine, moderate dose, to be taken before bed due to morning blood pressure elevations.    I65.23 BILATERAL CAROTID ARTERY STENOSIS:  - Switched from Plavix to aspirin 81 mg daily (OTC) to manage bruising while maintaining anticoagulation, explained this is the lowest safe anticoagulation option.    Z95.5 STENTED CORONARY ARTERY:  - Continued Pravastatin.  - Switched from Plavix to aspirin 81 mg daily (OTC) to manage bruising while maintaining anticoagulation, explained this is the lowest safe anticoagulation option.    Z72.0 TOBACCO CHEW USE:  - Patient to quit tobacco use.  - Follow up in 6 months.                Reviewed all tests and above medical conditions with patient in detail and formulated treatment plan.      Follow up in  6 months         [1]   Social History  Tobacco Use    Smoking status: Never    Smokeless tobacco: Current     Types: Chew   Substance Use Topics    Alcohol use: No

## (undated) DEVICE — LIGATING CLIP LARGE

## (undated) DEVICE — KIT PREVENA PLUS

## (undated) DEVICE — VENTILATOR TRANSPORT CIRCUIT

## (undated) DEVICE — SUT VICRYL 2-0 36 CT-1

## (undated) DEVICE — KIT SYR REUSABLE

## (undated) DEVICE — COVER MAYO STND XL 30X57IN

## (undated) DEVICE — CANNULA IMA 1MM

## (undated) DEVICE — CATH INFINITI MULTIPAK JR4 5FR

## (undated) DEVICE — EVACUATOR WOUND BULB 100CC

## (undated) DEVICE — BANDAGE ACE DOUBLE STER 6IN

## (undated) DEVICE — GLOVE BIOGEL 7.5

## (undated) DEVICE — SUT VICRYL 1 OB 36 CTX

## (undated) DEVICE — ELECTRODE REM PLYHSV RETURN 9

## (undated) DEVICE — GOWN NONREINF SET-IN SLV 2XL

## (undated) DEVICE — KIT PROBE COVER WITH GEL

## (undated) DEVICE — SUT MONOCRYL 4-0 PS-1 UND

## (undated) DEVICE — SYS VIRTUOSAPH PLUS EVM

## (undated) DEVICE — SUT STEEL 7 MONO B&S18 CCS

## (undated) DEVICE — DRAPE THREE-QUARTER 53X77IN

## (undated) DEVICE — BLADE KNIFE UNITOME 4.0MM

## (undated) DEVICE — DRAIN CHAN RND HUBLS 8MM 24FR

## (undated) DEVICE — SET PERFUSION

## (undated) DEVICE — SUT ETHIBOND XTRA 5-0 RB-1

## (undated) DEVICE — CONNECTOR 6 IN 1 Y TUBING STRL

## (undated) DEVICE — DRAIN CHEST DRY SUCTION

## (undated) DEVICE — SYR 30CC LUER LOCK

## (undated) DEVICE — CANNULA PERF RCSP 15FR SINUS

## (undated) DEVICE — TUBING CONNECTION 5MMX 15 18IN

## (undated) DEVICE — ELECTRODE BLADE E-Z CLEAN 4IN

## (undated) DEVICE — BANDAGE ROLL COTTN 4.5INX4.1YD

## (undated) DEVICE — SPONGE LAP 18X18 PREWASHED

## (undated) DEVICE — CATH JL4 5FR

## (undated) DEVICE — CATH JR4 5FR

## (undated) DEVICE — DRESSING MEPORE ISLAND 31/2X4

## (undated) DEVICE — SUT 8/0 24IN PROLENE BL MON

## (undated) DEVICE — BOWL CELL SAVER 5 225ML

## (undated) DEVICE — POWDER ARISTA AH 3G

## (undated) DEVICE — DRAPE SLUSH WARMER WITH DISC

## (undated) DEVICE — DRESSING MEPORE 3.6 X 10

## (undated) DEVICE — PACK OPEN HEART SC BR

## (undated) DEVICE — SET PNEUMOCLEAR HEAT HUM SE HF

## (undated) DEVICE — DRESSING ANTIMICROBIAL 1 INCH

## (undated) DEVICE — APPLIER CLIP LIAGCLIP 9.375IN

## (undated) DEVICE — SUT SILK 2-0 SH 18IN BLACK

## (undated) DEVICE — GLOVE SURG BIOGEL LATEX SZ 7.5

## (undated) DEVICE — CATH THORACIC ANGLE 24F

## (undated) DEVICE — RETRACTOR OCTOBASE INSERT HOLD

## (undated) DEVICE — CANNULA VENOUS MC2X 29FR

## (undated) DEVICE — NDL ECLIPSE SAFETY 23G 1.5IN

## (undated) DEVICE — TAPE SILK 3IN

## (undated) DEVICE — SPONGE COTTON TRAY 4X4IN

## (undated) DEVICE — BAND TR COMP DEVICE REG 24CM

## (undated) DEVICE — CATH PIG145 INFINITI 5X110CM

## (undated) DEVICE — PUNCH AORTIC SHORT 4.4MM

## (undated) DEVICE — OMNIPAQUE 300MG 150ML VIAL

## (undated) DEVICE — SUT PERMA HAND SILK BLK 0-0

## (undated) DEVICE — GUIDE WIRE WHOLEY EXCHANGE 300

## (undated) DEVICE — COVER LIGHT HANDLE 80/CA

## (undated) DEVICE — SYR 20ML BLUE LUER LOCK

## (undated) DEVICE — CONTAINER SPECIMEN OR STER 4OZ

## (undated) DEVICE — CABLE PACING WIRE EPICARD 12FT

## (undated) DEVICE — SOL PLASMALYTE PH 7.4 1000ML

## (undated) DEVICE — RESERVOIR CARDIOTOMY.

## (undated) DEVICE — SOL IRRI STRL WATER 1000ML

## (undated) DEVICE — CATH URETHRAL RED RUBBER 18FR

## (undated) DEVICE — SET SUCTION ANTICOAGULANT

## (undated) DEVICE — TIP YANKAUERS BULB NO VENT

## (undated) DEVICE — COUNTER BDL BLADEGUARD LI DBL

## (undated) DEVICE — GOWN POLY REINF BRTH SLV XL

## (undated) DEVICE — CANNULA VSL W/DUCKBILL

## (undated) DEVICE — SET CARDIOPLEGIA DEL

## (undated) DEVICE — SUT PROLENE 6-0 C-1 30IN BL

## (undated) DEVICE — BLOWER MISTER

## (undated) DEVICE — KIT MANIFOLD LOW PRESS TUBING

## (undated) DEVICE — SUT PROLENE 4-0 SH BLU 36IN

## (undated) DEVICE — SOL NORMAL USPCA 0.9%

## (undated) DEVICE — CONTRAST OMNIPAQUE 240 150ML

## (undated) DEVICE — CELL SAVER 4/CASE

## (undated) DEVICE — TOWEL OR DISP STRL BLUE 4/PK

## (undated) DEVICE — KIT GLIDESHEATH SLEND 6FR 10CM

## (undated) DEVICE — SUT PROLENE 4-0 RB-1 BL MO

## (undated) DEVICE — TUBING MEDI-VAC 20FT .25IN

## (undated) DEVICE — PACK SPY-PHI DRUG DRAPE

## (undated) DEVICE — ORGNZR TUBING CLR W/CLIPS

## (undated) DEVICE — Device

## (undated) DEVICE — ADAPTER DLP Y PERF 3.5 &10IN

## (undated) DEVICE — SUT SILK 1 6-30 LABYRINTH

## (undated) DEVICE — SUT PLEDGET LG SOFT

## (undated) DEVICE — SOL NACL IRR 1000ML BTL

## (undated) DEVICE — BLANKET HYPOTHERMIA 25X64IN

## (undated) DEVICE — DRAIN CHANNEL ROUND 19FR

## (undated) DEVICE — SUT SILK 2-0 STRANDS 30IN

## (undated) DEVICE — INSERT STEALTH SURGICAL CLAMP

## (undated) DEVICE — CLIP STEALTH LATIS 1/4 FORCE 6

## (undated) DEVICE — TRAY CATH FOL SIL TEMP 10 16FR

## (undated) DEVICE — SENSORS CDI

## (undated) DEVICE — SUT 7/0 24IN PROLENE BL MO

## (undated) DEVICE — KIT SURGIFLO HEMOSTATIC MATRIX

## (undated) DEVICE — DECANTER FLUID TRNSF WHITE 9IN

## (undated) DEVICE — CONNECTOR 3/8X3/8 STERILE

## (undated) DEVICE — DRESSING MEPORE ADH 3.5X12

## (undated) DEVICE — GOWN POLY REINF X-LONG XL

## (undated) DEVICE — GUIDEWIRE EMERALD .035IN 260CM

## (undated) DEVICE — APPLIER LIGACLIP SM 9.38IN

## (undated) DEVICE — CANNULA 21FR 7MM SOFT FLOW EXT

## (undated) DEVICE — CONNECTOR STRAIGHT 1/4X1/4IN

## (undated) DEVICE — CANNULA AG CARDPLG 14G FLANGE

## (undated) DEVICE — ANGIOTOUCH KIT

## (undated) DEVICE — PERFUSION FX X-COATED